# Patient Record
Sex: FEMALE | Race: WHITE | Employment: OTHER | URBAN - METROPOLITAN AREA
[De-identification: names, ages, dates, MRNs, and addresses within clinical notes are randomized per-mention and may not be internally consistent; named-entity substitution may affect disease eponyms.]

---

## 2019-04-12 ENCOUNTER — HOSPITAL ENCOUNTER (OUTPATIENT)
Dept: WOMENS IMAGING | Age: 64
Discharge: HOME | End: 2019-04-12
Payer: MEDICARE

## 2019-04-12 DIAGNOSIS — Z12.31 VISIT FOR SCREENING MAMMOGRAM: ICD-10-CM

## 2019-04-12 PROCEDURE — 77067 SCR MAMMO BI INCL CAD: CPT

## 2019-10-30 LAB — DEXA SCAN, EXTERNAL: NORMAL

## 2021-06-14 ENCOUNTER — APPOINTMENT (OUTPATIENT)
Dept: ULTRASOUND IMAGING | Age: 66
DRG: 308 | End: 2021-06-14
Payer: MEDICARE

## 2021-06-14 ENCOUNTER — HOSPITAL ENCOUNTER (INPATIENT)
Age: 66
LOS: 3 days | Discharge: HOME | DRG: 308 | End: 2021-06-17
Attending: EMERGENCY MEDICINE | Admitting: HOSPITALIST
Payer: MEDICARE

## 2021-06-14 ENCOUNTER — APPOINTMENT (OUTPATIENT)
Dept: GENERAL RADIOLOGY | Age: 66
DRG: 308 | End: 2021-06-14
Payer: MEDICARE

## 2021-06-14 ENCOUNTER — APPOINTMENT (OUTPATIENT)
Dept: CT IMAGING | Age: 66
DRG: 308 | End: 2021-06-14
Payer: MEDICARE

## 2021-06-14 DIAGNOSIS — K85.90 ACUTE PANCREATITIS WITHOUT INFECTION OR NECROSIS, UNSPECIFIED PANCREATITIS TYPE: Primary | ICD-10-CM

## 2021-06-14 DIAGNOSIS — I48.91 NEW ONSET ATRIAL FIBRILLATION (HCC): ICD-10-CM

## 2021-06-14 DIAGNOSIS — R65.10 SIRS (SYSTEMIC INFLAMMATORY RESPONSE SYNDROME) (HCC): ICD-10-CM

## 2021-06-14 DIAGNOSIS — I48.91 ATRIAL FIBRILLATION WITH RVR (HCC): ICD-10-CM

## 2021-06-14 PROBLEM — K85.00 IDIOPATHIC ACUTE PANCREATITIS WITHOUT INFECTION OR NECROSIS: Status: ACTIVE | Noted: 2021-06-14

## 2021-06-14 LAB
A/G RATIO: 1.3 (ref 1.1–2.2)
ALBUMIN SERPL-MCNC: 4 G/DL (ref 3.4–5)
ALP BLD-CCNC: 72 U/L (ref 40–129)
ALT SERPL-CCNC: 70 U/L (ref 10–40)
ANION GAP SERPL CALCULATED.3IONS-SCNC: 12 MMOL/L (ref 3–16)
AST SERPL-CCNC: 55 U/L (ref 15–37)
BASOPHILS ABSOLUTE: 0 K/UL (ref 0–0.2)
BASOPHILS RELATIVE PERCENT: 0.4 %
BILIRUB SERPL-MCNC: 1 MG/DL (ref 0–1)
BILIRUBIN URINE: NEGATIVE
BLOOD, URINE: NEGATIVE
BUN BLDV-MCNC: 16 MG/DL (ref 7–20)
C-REACTIVE PROTEIN: 61.3 MG/L (ref 0–5.1)
CALCIUM SERPL-MCNC: 9 MG/DL (ref 8.3–10.6)
CHLORIDE BLD-SCNC: 102 MMOL/L (ref 99–110)
CHOLESTEROL, TOTAL: 109 MG/DL (ref 0–199)
CLARITY: CLEAR
CO2: 24 MMOL/L (ref 21–32)
COLOR: YELLOW
CREAT SERPL-MCNC: 1 MG/DL (ref 0.6–1.2)
D DIMER: 735 NG/ML DDU (ref 0–229)
EOSINOPHILS ABSOLUTE: 0.2 K/UL (ref 0–0.6)
EOSINOPHILS RELATIVE PERCENT: 2.3 %
FERRITIN: 138.5 NG/ML (ref 15–150)
GFR AFRICAN AMERICAN: >60
GFR NON-AFRICAN AMERICAN: 55
GLOBULIN: 3.2 G/DL
GLUCOSE BLD-MCNC: 118 MG/DL (ref 70–99)
GLUCOSE BLD-MCNC: 138 MG/DL (ref 70–99)
GLUCOSE URINE: >=1000 MG/DL
HCT VFR BLD CALC: 38.9 % (ref 36–48)
HDLC SERPL-MCNC: 31 MG/DL (ref 40–60)
HEMOGLOBIN: 13.3 G/DL (ref 12–16)
KETONES, URINE: NEGATIVE MG/DL
LACTATE DEHYDROGENASE: 216 U/L (ref 100–190)
LACTIC ACID: 0.9 MMOL/L (ref 0.4–2)
LACTIC ACID: 1.4 MMOL/L (ref 0.4–2)
LDL CHOLESTEROL CALCULATED: 45 MG/DL
LEUKOCYTE ESTERASE, URINE: NEGATIVE
LIPASE: 1299 U/L (ref 13–60)
LYMPHOCYTES ABSOLUTE: 1.6 K/UL (ref 1–5.1)
LYMPHOCYTES RELATIVE PERCENT: 15.9 %
MCH RBC QN AUTO: 30.2 PG (ref 26–34)
MCHC RBC AUTO-ENTMCNC: 34.2 G/DL (ref 31–36)
MCV RBC AUTO: 88.2 FL (ref 80–100)
MICROSCOPIC EXAMINATION: ABNORMAL
MONOCYTES ABSOLUTE: 0.8 K/UL (ref 0–1.3)
MONOCYTES RELATIVE PERCENT: 7.6 %
NEUTROPHILS ABSOLUTE: 7.5 K/UL (ref 1.7–7.7)
NEUTROPHILS RELATIVE PERCENT: 73.8 %
NITRITE, URINE: NEGATIVE
PDW BLD-RTO: 14.4 % (ref 12.4–15.4)
PERFORMED ON: ABNORMAL
PH UA: 8 (ref 5–8)
PLATELET # BLD: 266 K/UL (ref 135–450)
PMV BLD AUTO: 7.8 FL (ref 5–10.5)
POTASSIUM REFLEX MAGNESIUM: 3.8 MMOL/L (ref 3.5–5.1)
PRO-BNP: 821 PG/ML (ref 0–124)
PROCALCITONIN: 0.46 NG/ML (ref 0–0.15)
PROTEIN UA: NEGATIVE MG/DL
RBC # BLD: 4.41 M/UL (ref 4–5.2)
SARS-COV-2, NAAT: NOT DETECTED
SODIUM BLD-SCNC: 138 MMOL/L (ref 136–145)
SPECIFIC GRAVITY UA: 1.02 (ref 1–1.03)
TOTAL PROTEIN: 7.2 G/DL (ref 6.4–8.2)
TRIGL SERPL-MCNC: 163 MG/DL (ref 0–150)
TROPONIN: <0.01 NG/ML
TROPONIN: <0.01 NG/ML
TSH REFLEX FT4: 5.51 UIU/ML (ref 0.27–4.2)
URINE REFLEX TO CULTURE: ABNORMAL
URINE TYPE: ABNORMAL
UROBILINOGEN, URINE: 2 E.U./DL
VLDLC SERPL CALC-MCNC: 33 MG/DL
WBC # BLD: 10.2 K/UL (ref 4–11)

## 2021-06-14 PROCEDURE — 83605 ASSAY OF LACTIC ACID: CPT

## 2021-06-14 PROCEDURE — 93005 ELECTROCARDIOGRAM TRACING: CPT | Performed by: EMERGENCY MEDICINE

## 2021-06-14 PROCEDURE — 83690 ASSAY OF LIPASE: CPT

## 2021-06-14 PROCEDURE — 81003 URINALYSIS AUTO W/O SCOPE: CPT

## 2021-06-14 PROCEDURE — 87040 BLOOD CULTURE FOR BACTERIA: CPT

## 2021-06-14 PROCEDURE — 96367 TX/PROPH/DG ADDL SEQ IV INF: CPT

## 2021-06-14 PROCEDURE — 36415 COLL VENOUS BLD VENIPUNCTURE: CPT

## 2021-06-14 PROCEDURE — 84439 ASSAY OF FREE THYROXINE: CPT

## 2021-06-14 PROCEDURE — 71045 X-RAY EXAM CHEST 1 VIEW: CPT

## 2021-06-14 PROCEDURE — 85379 FIBRIN DEGRADATION QUANT: CPT

## 2021-06-14 PROCEDURE — 87635 SARS-COV-2 COVID-19 AMP PRB: CPT

## 2021-06-14 PROCEDURE — 80053 COMPREHEN METABOLIC PANEL: CPT

## 2021-06-14 PROCEDURE — 2060000000 HC ICU INTERMEDIATE R&B

## 2021-06-14 PROCEDURE — 99285 EMERGENCY DEPT VISIT HI MDM: CPT

## 2021-06-14 PROCEDURE — 2580000003 HC RX 258: Performed by: NURSE PRACTITIONER

## 2021-06-14 PROCEDURE — 6360000002 HC RX W HCPCS: Performed by: NURSE PRACTITIONER

## 2021-06-14 PROCEDURE — 86140 C-REACTIVE PROTEIN: CPT

## 2021-06-14 PROCEDURE — 6370000000 HC RX 637 (ALT 250 FOR IP): Performed by: NURSE PRACTITIONER

## 2021-06-14 PROCEDURE — 84443 ASSAY THYROID STIM HORMONE: CPT

## 2021-06-14 PROCEDURE — 85025 COMPLETE CBC W/AUTO DIFF WBC: CPT

## 2021-06-14 PROCEDURE — 84145 PROCALCITONIN (PCT): CPT

## 2021-06-14 PROCEDURE — 83880 ASSAY OF NATRIURETIC PEPTIDE: CPT

## 2021-06-14 PROCEDURE — 96375 TX/PRO/DX INJ NEW DRUG ADDON: CPT

## 2021-06-14 PROCEDURE — 2500000003 HC RX 250 WO HCPCS: Performed by: NURSE PRACTITIONER

## 2021-06-14 PROCEDURE — 74177 CT ABD & PELVIS W/CONTRAST: CPT

## 2021-06-14 PROCEDURE — 83036 HEMOGLOBIN GLYCOSYLATED A1C: CPT

## 2021-06-14 PROCEDURE — 80061 LIPID PANEL: CPT

## 2021-06-14 PROCEDURE — 96365 THER/PROPH/DIAG IV INF INIT: CPT

## 2021-06-14 PROCEDURE — 84484 ASSAY OF TROPONIN QUANT: CPT

## 2021-06-14 PROCEDURE — 76705 ECHO EXAM OF ABDOMEN: CPT

## 2021-06-14 PROCEDURE — 82728 ASSAY OF FERRITIN: CPT

## 2021-06-14 PROCEDURE — 6360000004 HC RX CONTRAST MEDICATION

## 2021-06-14 PROCEDURE — 83615 LACTATE (LD) (LDH) ENZYME: CPT

## 2021-06-14 RX ORDER — METOPROLOL TARTRATE 50 MG/1
100 TABLET, FILM COATED ORAL 2 TIMES DAILY
Status: ON HOLD | COMMUNITY
End: 2021-06-17 | Stop reason: HOSPADM

## 2021-06-14 RX ORDER — DEXTROSE MONOHYDRATE 25 G/50ML
12.5 INJECTION, SOLUTION INTRAVENOUS PRN
Status: DISCONTINUED | OUTPATIENT
Start: 2021-06-14 | End: 2021-06-17 | Stop reason: HOSPADM

## 2021-06-14 RX ORDER — ALBUTEROL SULFATE 90 UG/1
2 AEROSOL, METERED RESPIRATORY (INHALATION) EVERY 4 HOURS PRN
Status: DISCONTINUED | OUTPATIENT
Start: 2021-06-14 | End: 2021-06-14 | Stop reason: CLARIF

## 2021-06-14 RX ORDER — SODIUM CHLORIDE 0.9 % (FLUSH) 0.9 %
5-40 SYRINGE (ML) INJECTION PRN
Status: DISCONTINUED | OUTPATIENT
Start: 2021-06-14 | End: 2021-06-17 | Stop reason: HOSPADM

## 2021-06-14 RX ORDER — ONDANSETRON 2 MG/ML
4 INJECTION INTRAMUSCULAR; INTRAVENOUS EVERY 6 HOURS PRN
Status: DISCONTINUED | OUTPATIENT
Start: 2021-06-14 | End: 2021-06-17 | Stop reason: HOSPADM

## 2021-06-14 RX ORDER — CETIRIZINE HYDROCHLORIDE 10 MG/1
10 TABLET ORAL DAILY
Status: DISCONTINUED | OUTPATIENT
Start: 2021-06-15 | End: 2021-06-17 | Stop reason: HOSPADM

## 2021-06-14 RX ORDER — NICOTINE POLACRILEX 4 MG
15 LOZENGE BUCCAL PRN
Status: DISCONTINUED | OUTPATIENT
Start: 2021-06-14 | End: 2021-06-17 | Stop reason: HOSPADM

## 2021-06-14 RX ORDER — SODIUM CHLORIDE 9 MG/ML
INJECTION, SOLUTION INTRAVENOUS CONTINUOUS
Status: DISCONTINUED | OUTPATIENT
Start: 2021-06-14 | End: 2021-06-17

## 2021-06-14 RX ORDER — ALBUTEROL SULFATE 90 UG/1
2 AEROSOL, METERED RESPIRATORY (INHALATION) EVERY 4 HOURS PRN
COMMUNITY

## 2021-06-14 RX ORDER — ACETAMINOPHEN 500 MG
1000 TABLET ORAL ONCE
Status: COMPLETED | OUTPATIENT
Start: 2021-06-14 | End: 2021-06-14

## 2021-06-14 RX ORDER — MELOXICAM 15 MG/1
15 TABLET ORAL DAILY
Status: ON HOLD | COMMUNITY
End: 2022-04-02

## 2021-06-14 RX ORDER — METHOCARBAMOL 500 MG/1
500 TABLET, FILM COATED ORAL 3 TIMES DAILY PRN
COMMUNITY

## 2021-06-14 RX ORDER — ASPIRIN 81 MG/1
81 TABLET, CHEWABLE ORAL DAILY
Status: DISCONTINUED | OUTPATIENT
Start: 2021-06-15 | End: 2021-06-17 | Stop reason: HOSPADM

## 2021-06-14 RX ORDER — LANOLIN ALCOHOL/MO/W.PET/CERES
3-6 CREAM (GRAM) TOPICAL NIGHTLY PRN
COMMUNITY

## 2021-06-14 RX ORDER — SODIUM CHLORIDE 9 MG/ML
25 INJECTION, SOLUTION INTRAVENOUS PRN
Status: DISCONTINUED | OUTPATIENT
Start: 2021-06-14 | End: 2021-06-17 | Stop reason: HOSPADM

## 2021-06-14 RX ORDER — INSULIN GLARGINE 100 [IU]/ML
72 INJECTION, SOLUTION SUBCUTANEOUS NIGHTLY
COMMUNITY

## 2021-06-14 RX ORDER — FENOFIBRATE 160 MG/1
160 TABLET ORAL DAILY
Status: ON HOLD | COMMUNITY
End: 2022-04-06

## 2021-06-14 RX ORDER — ACETAMINOPHEN 325 MG/1
650 TABLET ORAL EVERY 6 HOURS PRN
Status: DISCONTINUED | OUTPATIENT
Start: 2021-06-14 | End: 2021-06-17 | Stop reason: HOSPADM

## 2021-06-14 RX ORDER — ACETAMINOPHEN 650 MG/1
650 SUPPOSITORY RECTAL EVERY 6 HOURS PRN
Status: DISCONTINUED | OUTPATIENT
Start: 2021-06-14 | End: 2021-06-17 | Stop reason: HOSPADM

## 2021-06-14 RX ORDER — ONDANSETRON 4 MG/1
4 TABLET, ORALLY DISINTEGRATING ORAL EVERY 8 HOURS PRN
Status: DISCONTINUED | OUTPATIENT
Start: 2021-06-14 | End: 2021-06-17 | Stop reason: HOSPADM

## 2021-06-14 RX ORDER — MORPHINE SULFATE 4 MG/ML
4 INJECTION, SOLUTION INTRAMUSCULAR; INTRAVENOUS EVERY 4 HOURS PRN
Status: DISCONTINUED | OUTPATIENT
Start: 2021-06-14 | End: 2021-06-17 | Stop reason: HOSPADM

## 2021-06-14 RX ORDER — PIOGLITAZONEHYDROCHLORIDE 30 MG/1
30 TABLET ORAL DAILY
COMMUNITY

## 2021-06-14 RX ORDER — ONDANSETRON 2 MG/ML
4 INJECTION INTRAMUSCULAR; INTRAVENOUS ONCE
Status: COMPLETED | OUTPATIENT
Start: 2021-06-14 | End: 2021-06-14

## 2021-06-14 RX ORDER — 0.9 % SODIUM CHLORIDE 0.9 %
30 INTRAVENOUS SOLUTION INTRAVENOUS ONCE
Status: COMPLETED | OUTPATIENT
Start: 2021-06-14 | End: 2021-06-14

## 2021-06-14 RX ORDER — LORATADINE 10 MG/1
10 TABLET ORAL DAILY
COMMUNITY

## 2021-06-14 RX ORDER — MORPHINE SULFATE 4 MG/ML
4 INJECTION, SOLUTION INTRAMUSCULAR; INTRAVENOUS EVERY 4 HOURS PRN
Status: DISCONTINUED | OUTPATIENT
Start: 2021-06-14 | End: 2021-06-14

## 2021-06-14 RX ORDER — ALBUTEROL SULFATE 2.5 MG/3ML
2.5 SOLUTION RESPIRATORY (INHALATION) EVERY 4 HOURS PRN
Status: DISCONTINUED | OUTPATIENT
Start: 2021-06-14 | End: 2021-06-17 | Stop reason: HOSPADM

## 2021-06-14 RX ORDER — CITALOPRAM 20 MG/1
40 TABLET ORAL DAILY
Status: DISCONTINUED | OUTPATIENT
Start: 2021-06-15 | End: 2021-06-17 | Stop reason: HOSPADM

## 2021-06-14 RX ORDER — MORPHINE SULFATE 2 MG/ML
2 INJECTION, SOLUTION INTRAMUSCULAR; INTRAVENOUS EVERY 4 HOURS PRN
Status: DISCONTINUED | OUTPATIENT
Start: 2021-06-14 | End: 2021-06-17 | Stop reason: HOSPADM

## 2021-06-14 RX ORDER — EMPAGLIFLOZIN 25 MG/1
25 TABLET, FILM COATED ORAL DAILY
COMMUNITY

## 2021-06-14 RX ORDER — DILTIAZEM HYDROCHLORIDE 5 MG/ML
10 INJECTION INTRAVENOUS ONCE
Status: COMPLETED | OUTPATIENT
Start: 2021-06-14 | End: 2021-06-14

## 2021-06-14 RX ORDER — MORPHINE SULFATE 4 MG/ML
4 INJECTION, SOLUTION INTRAMUSCULAR; INTRAVENOUS ONCE
Status: COMPLETED | OUTPATIENT
Start: 2021-06-14 | End: 2021-06-14

## 2021-06-14 RX ORDER — SODIUM CHLORIDE 0.9 % (FLUSH) 0.9 %
5-40 SYRINGE (ML) INJECTION EVERY 12 HOURS SCHEDULED
Status: DISCONTINUED | OUTPATIENT
Start: 2021-06-14 | End: 2021-06-17 | Stop reason: HOSPADM

## 2021-06-14 RX ORDER — CITALOPRAM 40 MG/1
40 TABLET ORAL DAILY
COMMUNITY

## 2021-06-14 RX ORDER — INSULIN GLARGINE 100 [IU]/ML
72 INJECTION, SOLUTION SUBCUTANEOUS NIGHTLY
Status: DISCONTINUED | OUTPATIENT
Start: 2021-06-14 | End: 2021-06-16

## 2021-06-14 RX ORDER — LISINOPRIL AND HYDROCHLOROTHIAZIDE 20; 12.5 MG/1; MG/1
1 TABLET ORAL 2 TIMES DAILY
COMMUNITY

## 2021-06-14 RX ORDER — DEXTROSE MONOHYDRATE 50 MG/ML
100 INJECTION, SOLUTION INTRAVENOUS PRN
Status: DISCONTINUED | OUTPATIENT
Start: 2021-06-14 | End: 2021-06-17 | Stop reason: HOSPADM

## 2021-06-14 RX ORDER — POLYETHYLENE GLYCOL 3350 17 G/17G
17 POWDER, FOR SOLUTION ORAL DAILY PRN
Status: DISCONTINUED | OUTPATIENT
Start: 2021-06-14 | End: 2021-06-17 | Stop reason: HOSPADM

## 2021-06-14 RX ORDER — MORPHINE SULFATE 2 MG/ML
2 INJECTION, SOLUTION INTRAMUSCULAR; INTRAVENOUS EVERY 4 HOURS PRN
Status: DISCONTINUED | OUTPATIENT
Start: 2021-06-14 | End: 2021-06-14

## 2021-06-14 RX ADMIN — ACETAMINOPHEN 1000 MG: 500 TABLET ORAL at 15:59

## 2021-06-14 RX ADMIN — MORPHINE SULFATE 4 MG: 4 INJECTION, SOLUTION INTRAMUSCULAR; INTRAVENOUS at 16:01

## 2021-06-14 RX ADMIN — ONDANSETRON 4 MG: 2 INJECTION INTRAMUSCULAR; INTRAVENOUS at 16:03

## 2021-06-14 RX ADMIN — SODIUM CHLORIDE 2326 ML: 9 INJECTION, SOLUTION INTRAVENOUS at 16:07

## 2021-06-14 RX ADMIN — VANCOMYCIN HYDROCHLORIDE 1000 MG: 1 INJECTION, POWDER, LYOPHILIZED, FOR SOLUTION INTRAVENOUS at 18:01

## 2021-06-14 RX ADMIN — PIPERACILLIN AND TAZOBACTAM 4500 MG: 4; .5 INJECTION, POWDER, FOR SOLUTION INTRAVENOUS at 16:29

## 2021-06-14 RX ADMIN — DILTIAZEM HYDROCHLORIDE 10 MG: 5 INJECTION INTRAVENOUS at 18:08

## 2021-06-14 RX ADMIN — HYDROMORPHONE HYDROCHLORIDE 1 MG: 1 INJECTION, SOLUTION INTRAMUSCULAR; INTRAVENOUS; SUBCUTANEOUS at 18:08

## 2021-06-14 RX ADMIN — IOPAMIDOL 75 ML: 755 INJECTION, SOLUTION INTRAVENOUS at 16:53

## 2021-06-14 RX ADMIN — DILTIAZEM HYDROCHLORIDE 5 MG/HR: 5 INJECTION INTRAVENOUS at 18:38

## 2021-06-14 ASSESSMENT — PAIN SCALES - GENERAL
PAINLEVEL_OUTOF10: 10
PAINLEVEL_OUTOF10: 9
PAINLEVEL_OUTOF10: 0
PAINLEVEL_OUTOF10: 7
PAINLEVEL_OUTOF10: 7
PAINLEVEL_OUTOF10: 6
PAINLEVEL_OUTOF10: 0
PAINLEVEL_OUTOF10: 7
PAINLEVEL_OUTOF10: 0

## 2021-06-14 ASSESSMENT — PAIN DESCRIPTION - LOCATION
LOCATION: ABDOMEN

## 2021-06-14 ASSESSMENT — ENCOUNTER SYMPTOMS
SHORTNESS OF BREATH: 0
COLOR CHANGE: 0
CONSTIPATION: 0
VOMITING: 1
NAUSEA: 1
BLOOD IN STOOL: 0
ABDOMINAL PAIN: 1
COUGH: 0
ABDOMINAL DISTENTION: 0
BACK PAIN: 0
DIARRHEA: 0

## 2021-06-14 ASSESSMENT — PAIN DESCRIPTION - DESCRIPTORS
DESCRIPTORS: SHARP

## 2021-06-14 ASSESSMENT — PAIN - FUNCTIONAL ASSESSMENT: PAIN_FUNCTIONAL_ASSESSMENT: ACTIVITIES ARE NOT PREVENTED

## 2021-06-14 ASSESSMENT — PAIN DESCRIPTION - PROGRESSION: CLINICAL_PROGRESSION: NOT CHANGED

## 2021-06-14 ASSESSMENT — PAIN DESCRIPTION - PAIN TYPE: TYPE: ACUTE PAIN

## 2021-06-14 NOTE — H&P
Historical Provider, MD   citalopram (CELEXA) 40 MG tablet Take 40 mg by mouth daily   Yes Historical Provider, MD   fenofibrate (TRIGLIDE) 160 MG tablet Take 160 mg by mouth daily   Yes Historical Provider, MD   meloxicam (MOBIC) 15 MG tablet Take 15 mg by mouth daily   Yes Historical Provider, MD   pioglitazone (ACTOS) 30 MG tablet Take 30 mg by mouth daily   Yes Historical Provider, MD   insulin aspart (NOVOLOG) 100 UNIT/ML injection vial Inject 25 Units into the skin 3 times daily (before meals)   Yes Historical Provider, MD   terconazole (TERAZOL 3) 0.8 % vaginal cream Place 1 applicator vaginally nightly Place vaginally nightly. Yes Historical Provider, MD   empagliflozin (JARDIANCE) 25 MG tablet Take 25 mg by mouth daily   Yes Historical Provider, MD   methocarbamol (ROBAXIN) 500 MG tablet Take 500 mg by mouth 3 times daily as needed   Yes Historical Provider, MD   loratadine (CLARITIN) 10 MG tablet Take 10 mg by mouth daily   Yes Historical Provider, MD   lisinopril-hydroCHLOROthiazide (ZESTORETIC) 20-12.5 MG per tablet Take 1 tablet by mouth 2 times daily   Yes Historical Provider, MD   metoprolol tartrate (LOPRESSOR) 50 MG tablet Take 100 mg by mouth 2 times daily   Yes Historical Provider, MD   mirabegron (MYRBETRIQ) 25 MG TB24 Take 25 mg by mouth daily   Yes Historical Provider, MD   insulin glargine (LANTUS) 100 UNIT/ML injection vial Inject 72 Units into the skin nightly   Yes Historical Provider, MD   aspirin 81 MG tablet Take 81 mg by mouth daily   Yes Historical Provider, MD       Allergies:  Hydrocodone    Social History:         TOBACCO:   reports that she has never smoked. She does not have any smokeless tobacco history on file. ETOH:   reports no history of alcohol use. Family History:      Denies premature CAD    REVIEW OF SYSTEMS:   Pertinent positives as noted in the HPI. All other systems reviewed and negative.     PHYSICAL EXAM PERFORMED:    /84   Pulse 110   Temp 101.6 °F (38.7 °C) (Rectal)   Resp 22   Ht 4' 11\" (1.499 m)   Wt 171 lb 1.2 oz (77.6 kg)   SpO2 94%   BMI 34.55 kg/m²     General appearance:  No apparent distress, appears stated age and cooperative. HEENT:  Normal cephalic, atraumatic without obvious deformity. Pupils equal, round, and reactive to light. Extra ocular muscles intact. Conjunctivae/corneas clear. Neck: Supple, with full range of motion. No jugular venous distention. Trachea midline. Respiratory:  Normal respiratory effort. No use of accessory muscles, no intercostal retractions  Cardiovascular:  Irregularly irreg rhythm, tachy rate  Abdomen: Soft, (+) epigastric tenderness, no guarding, obese abd  Musculoskeletal:  No clubbing, cyanosis or edema bilaterally. No calf tenderness  Skin: Skin color, texture, turgor normal.     Neurologic:    grossly non-focal.  Psychiatric:  Alert and oriented, thought content appropriate, normal insight         Labs:     Recent Labs     06/14/21  1526   WBC 10.2   HGB 13.3   HCT 38.9        Recent Labs     06/14/21  1526      K 3.8      CO2 24   BUN 16   CREATININE 1.0   CALCIUM 9.0     Recent Labs     06/14/21  1526   AST 55*   ALT 70*   BILITOT 1.0   ALKPHOS 72     No results for input(s): INR in the last 72 hours. Recent Labs     06/14/21  1526   TROPONINI <0.01       Urinalysis:      Lab Results   Component Value Date    NITRU Negative 06/14/2021    WBCUA 6-10 06/16/2017    BACTERIA 2+ 04/27/2015    RBCUA 3-5 06/16/2017    BLOODU Negative 06/14/2021    SPECGRAV 1.021 06/14/2021    GLUCOSEU >=1000 06/14/2021       Radiology:          CT ABDOMEN PELVIS W IV CONTRAST Additional Contrast? None   Preliminary Result   1. No acute process within the abdomen or pelvis. 2. No acute bowel inflammation or obstruction. 3. Stable bilateral renal cysts. 4. Patient status post hysterectomy. XR CHEST PORTABLE   Final Result   No acute cardiopulmonary disease.          US ABDOMEN LIMITED Specify organ? LIVER, GALLBLADDER, PANCREAS    (Results Pending)       ASSESSMENT:    Active Hospital Problems    Diagnosis Date Noted    Idiopathic acute pancreatitis without infection or necrosis [K85.00] 06/14/2021         PLAN:    1) Acute pancreatitis  - IVFs  - NPO  - RUQ usn  - IV prn pain control  - check triglycerides    2) New Onset Atrial fib  - CAD history, trend trop, Card consult, cardizem gtts    3) Acute Hypoxemic resp failure  - r/o covid  - check d dimer    4) Sepsis  - elev pct,  - follow up blood cultures, RUQ usn  - started on vanc/zosyn    DVT Prophylaxis: lovenox  Diet: Diet NPO Effective Now  Code Status: Prior         Manuela Osborn MD    Thank you Orlando Santana MD for the opportunity to be involved in this patient's care. If you have any questions or concerns please feel free to contact me at [phone].

## 2021-06-14 NOTE — CONSULTS
Clinical Pharmacy Note  Vancomycin Consult    Pharmacy consult received for one-time dose of vancomycin in the Emergency Department per valentin almanzar. Ht Readings from Last 1 Encounters:   06/14/21 4' 11\" (1.499 m)        Wt Readings from Last 1 Encounters:   06/14/21 171 lb 1.2 oz (77.6 kg)         Assessment/Plan:   Vancomycin 1000 mg x 1 in ED.  If Vancomycin is to continue on admission and pharmacy is to manage dosing, please re-consult with admission orders.

## 2021-06-14 NOTE — LETTER
[facility] [address] [address]  Phone: [phone]             June 17, 2021    Patient: Linda Beth   YOB: 1955   Date of Visit: 6/14/2021       To Whom It May Concern:    Julio Vyas was seen and treated in our facility  beginning 6/14/2021 until 6/17/2021 . She may return to work on Monday, 6/21/2021.       Sincerely,       Lexa Omalley RN         Signature:__________________________________

## 2021-06-14 NOTE — ED NOTES
RN received bedside report from oncoming RN. Patient is on cardiac and SPO2 monitor with granddaughter at bedside. Patient voices no concerns at this time.         Jair Greene RN  06/14/21 1916

## 2021-06-14 NOTE — ED PROVIDER NOTES
dizziness, syncope, weakness and headaches. Hematological: Negative for adenopathy. Psychiatric/Behavioral: Negative for confusion. All other systems reviewed and are negative. Positives and Pertinent negatives as per HPI. Except as noted above in the ROS, all other systems were reviewed and negative.        PAST MEDICAL HISTORY     Past Medical History:   Diagnosis Date    CAD (coronary artery disease)     Depression     Diabetes mellitus ([facility] [address]     Hyperlipidemia     Hypertension          SURGICAL HISTORY     Past Surgical History:   Procedure Laterality Date    HYSTERECTOMY           CURRENTMEDICATIONS       Previous Medications    ALBUTEROL SULFATE HFA (VENTOLIN HFA) 108 (90 BASE) MCG/ACT INHALER    Inhale 2 puffs into the lungs every 4 hours as needed for Wheezing or Shortness of Breath    ASPIRIN 81 MG TABLET    Take 81 mg by mouth daily    CITALOPRAM (CELEXA) 40 MG TABLET    Take 40 mg by mouth daily    DICLOFENAC SODIUM (VOLTAREN) 1 % GEL    Apply 2 g topically 4 times daily as needed for Pain    EMPAGLIFLOZIN (JARDIANCE) 25 MG TABLET    Take 25 mg by mouth daily    FENOFIBRATE (TRIGLIDE) 160 MG TABLET    Take 160 mg by mouth daily    INSULIN ASPART (NOVOLOG) 100 UNIT/ML INJECTION VIAL    Inject 25 Units into the skin 3 times daily (before meals)    INSULIN GLARGINE (LANTUS) 100 UNIT/ML INJECTION VIAL    Inject 72 Units into the skin nightly    LISINOPRIL-HYDROCHLOROTHIAZIDE (ZESTORETIC) 20-12.5 MG PER TABLET    Take 1 tablet by mouth 2 times daily    LORATADINE (CLARITIN) 10 MG TABLET    Take 10 mg by mouth daily    MELATONIN 3 MG TABS TABLET    Take 3-6 mg by mouth nightly as needed (insomnia)    MELOXICAM (MOBIC) 15 MG TABLET    Take 15 mg by mouth daily    METHOCARBAMOL (ROBAXIN) 500 MG TABLET    Take 500 mg by mouth 3 times daily as needed    METOPROLOL TARTRATE (LOPRESSOR) 50 MG TABLET    Take 100 mg by mouth 2 times daily    MIRABEGRON (MYRBETRIQ) 25 MG TB24    Take 25 mg by mouth daily PIOGLITAZONE (ACTOS) 30 MG TABLET    Take 30 mg by mouth daily    TERCONAZOLE (TERAZOL 3) 0.8 % VAGINAL CREAM    Place 1 applicator vaginally nightly Place vaginally nightly. VITAMIN D (CHOLECALCIFEROL) 25 MCG (1000 UT) TABS TABLET    Take 1,000 Units by mouth daily         ALLERGIES     Hydrocodone    FAMILYHISTORY     History reviewed. No pertinent family history. SOCIAL HISTORY       Social History     Tobacco Use    Smoking status: Never Smoker   Substance Use Topics    Alcohol use: No    Drug use: No       SCREENINGS    Greeley Coma Scale  Eye Opening: Spontaneous  Best Verbal Response: Oriented  Best Motor Response: Obeys commands  Lauren Coma Scale Score: 15        PHYSICAL EXAM    (up to 7 for level 4, 8 or more for level 5)     ED Triage Vitals [06/14/21 1523]   BP Temp Temp Source Pulse Resp SpO2 Height Weight   (!) 160/92 98.3 °F (36.8 °C) Temporal 153 20 95 % 4' 11\" (1.499 m) 171 lb 1.2 oz (77.6 kg)       Physical Exam  Vitals and nursing note reviewed. Constitutional:       General: She is in acute distress (pain). Appearance: Normal appearance. She is well-developed. She is not toxic-appearing. HENT:      Head: Normocephalic and atraumatic. Eyes:      General: No scleral icterus. Conjunctiva/sclera: Conjunctivae normal.   Neck:      Vascular: No JVD. Cardiovascular:      Rate and Rhythm: Tachycardia present. Rhythm irregularly irregular. Heart sounds: Normal heart sounds. Pulmonary:      Effort: Pulmonary effort is normal. No respiratory distress. Breath sounds: Normal breath sounds. Abdominal:      General: Bowel sounds are normal. There is no distension. Palpations: Abdomen is soft. Abdomen is not rigid. Tenderness: There is generalized abdominal tenderness. There is guarding. There is no rebound. Musculoskeletal:         General: Normal range of motion. Cervical back: Normal range of motion and neck supple.    Skin:     General: Skin is warm and dry. Capillary Refill: Capillary refill takes less than 2 seconds. Findings: No rash. Neurological:      General: No focal deficit present. Mental Status: She is alert and oriented to person, place, and time.    Psychiatric:         Mood and Affect: Mood normal.         DIAGNOSTIC RESULTS   LABS:    Labs Reviewed   COMPREHENSIVE METABOLIC PANEL W/ REFLEX TO MG FOR LOW K - Abnormal; Notable for the following components:       Result Value    Glucose 138 (*)     GFR Non- 55 (*)     ALT 70 (*)     AST 55 (*)     All other components within normal limits    Narrative:     Performed at:  [address] [facility] [employer]   Phone [phone]   LIPASE - Abnormal; Notable for the following components:    Lipase 1,299.0 (*)     All other components within normal limits    Narrative:     Performed at:  [address] [facility] [employer]   Phone [phone]   URINE RT REFLEX TO CULTURE - Abnormal; Notable for the following components:    Glucose, Ur >=1000 (*)     Urobilinogen, Urine 2.0 (*)     All other components within normal limits    Narrative:     Performed at:  [address] [facility] [employer]   Phone [phone]   PROCALCITONIN - Abnormal; Notable for the following components:    Procalcitonin 0.46 (*)     All other components within normal limits    Narrative:     Performed at:  [address] [employer]   Phone [phone]   BRAIN NATRIURETIC PEPTIDE - Abnormal; Notable for the following components:    Pro- (*)     All other components within normal limits    Narrative:     Performed at:  [address] [facility] [employer]   Phone [phone]   CULTURE, BLOOD 1   CULTURE, BLOOD 2 COVID-19, RAPID   CBC WITH AUTO DIFFERENTIAL    Narrative:     Performed at:  [facility]  [address] [place] [facility] [address]   Phone [phone]   TROPONIN    Narrative:     Performed at:  [facility]  [address] [facility]   Phone [phone]   LACTIC ACID, PLASMA    Narrative:     Performed at:  [facility]  [address] [place] [facility] [address]   Phone [phone]   LACTIC ACID, PLASMA   LIPID PANEL   POCT GLUCOSE       All other labs were within normal range or not returned as of this dictation. EKG: All EKG's are interpreted by the Emergency Department Physician in the absence of a cardiologist.  Please see their note for interpretation of EKG. RADIOLOGY:   Non-plain film images such as CT, Ultrasound and MRI are read by the radiologist. Plain radiographic images are visualized and preliminarily interpreted by the ED Provider with the below findings:        Interpretation per the Radiologist below, if available at the time of this note:    CT ABDOMEN PELVIS W IV CONTRAST Additional Contrast? None   Preliminary Result   1. No acute process within the abdomen or pelvis. 2. No acute bowel inflammation or obstruction. 3. Stable bilateral renal cysts. 4. Patient status post hysterectomy. XR CHEST PORTABLE   Final Result   No acute cardiopulmonary disease. US ABDOMEN LIMITED Specify organ? LIVER, GALLBLADDER, PANCREAS    (Results Pending)     No results found. PROCEDURES   Unless otherwise noted below, none     Procedures    CRITICAL CARE TIME   CRITICAL CARE NOTE:  There was a high probability of clinically significant life-threatening deterioration of the patient's condition requiring my urgent intervention. Total critical care time was at least 40 minutes.     This includes vital sign monitoring, pulse oximetry monitoring, telemetry monitoring, clinical response to the IV medications, reviewing the nursing notes, consultation time, dictation/documentation time, and interpretation of the labwork. This excludes any separately billable procedures performed. CONSULTS:  IP CONSULT TO HOSPITALIST  IP CONSULT TO PHARMACY  IP CONSULT TO CARDIOLOGY      EMERGENCY DEPARTMENT COURSE and DIFFERENTIAL DIAGNOSIS/MDM:   Vitals:    Vitals:    06/14/21 1803 06/14/21 1832 06/14/21 1838 06/14/21 1902   BP: 123/84 (!) 131/92  129/66   Pulse: 110 114 105 84   Resp: 22 26 20 16   Temp:       TempSrc:       SpO2: 94% 94% 94% 93%   Weight:       Height:           Patient was given the following medications:  Medications   dilTIAZem 125 mg in dextrose 5 % 125 mL infusion (5 mg/hr Intravenous New Bag 6/14/21 1838)   0.9 % sodium chloride bolus (0 mL/kg × 77.6 kg Intravenous Stopped 6/14/21 1836)   acetaminophen (TYLENOL) tablet 1,000 mg (1,000 mg Oral Given 6/14/21 1559)   piperacillin-tazobactam (ZOSYN) 4,500 mg in dextrose 5 % 100 mL IVPB (mini-bag) (0 mg Intravenous Stopped 6/14/21 1705)   morphine (PF) injection 4 mg (4 mg Intravenous Given 6/14/21 1601)   ondansetron (ZOFRAN) injection 4 mg (4 mg Intravenous Given 6/14/21 1603)   vancomycin 1000 mg IVPB in 250 mL D5W addavial (1,000 mg Intravenous New Bag 6/14/21 1801)   iopamidol (ISOVUE-370) 76 % injection 75 mL (75 mLs Intravenous Given 6/14/21 1653)   HYDROmorphone (DILAUDID) injection 1 mg (1 mg Intravenous Given 6/14/21 1808)   dilTIAZem injection 10 mg (10 mg Intravenous Given 6/14/21 1808)           Differential diagnosis: Abdominal Aortic Aneurysm, Acute Coronary Syndrome, Ischemic Bowel, Bowel Obstruction (including Gastric Outlet Obstruction), PUD, GERD, Acute Cholecystitis, Pancreatitis, Hepatitis, Colitis, SMA Syndrome, Mesenteric Steal Syndrome, Splanchnic Vein Thrombosis, Appendicitis, Diverticulitis, Pyelonephritis, UTI, STD, Gonad Torsion, other     Patient presents with abdominal pain.   See HPI for presentation. Physical exam as above. 78year-old lying in bed uncomfortable in pain. Diffuse abdominal TTP. Abdomen soft without rigidity or peritoneal signs. Cardiac exam reveals new fib A. fib with RVR. Lipase 1300 with a normal CT. Urine shows no infection blood or ketones. CBC and chemistry unremarkable. Lactic acid normal.  Troponin negative. Emergency department course included sepsis fluids and pain and nausea medicine on arrival.  She met sepsis criteria on arrival so broad-spectrum antibiotics were ordered. She was ultimately given a diltiazem bolus and drip for the A. fib with RVR. She will be admitted for further work-up and treatment. She was given all test results and given an opportunity to ask and have any questions answered. She was agreeable to admission. The hospitalist was consulted and agreed to meet patient and write orders. The patient tolerated their visit well. They were seen and evaluated by the attending physician, Radha Rollins DO who agreed with the assessment and plan. The patient and / or the family were informed of the results of any tests, a time was given to answer questions, a plan was proposed and they agreed with plan. FINAL IMPRESSION      1. Acute pancreatitis without infection or necrosis, unspecified pancreatitis type    2. SIRS (systemic inflammatory response syndrome) (HCC)    3. New onset atrial fibrillation ([facility] [address]    4. Atrial fibrillation with RVR (HCC)          DISPOSITION/PLAN   DISPOSITION        PATIENT REFERRED TO:  No follow-up provider specified.     DISCHARGE MEDICATIONS:  New Prescriptions    No medications on file       DISCONTINUED MEDICATIONS:  Discontinued Medications    ATORVASTATIN (LIPITOR) 20 MG TABLET    Take 20 mg by mouth daily    CITALOPRAM (CELEXA) 20 MG TABLET    Take 20 mg by mouth daily    DOCUSATE (COLACE, DULCOLAX) 100 MG CAPS    Take 100 mg by mouth 2 times daily    GLYBURIDE (DIABETA) 5 MG TABLET Take 10 mg by mouth 2 times daily (with meals)    IMIPRAMINE (TOFRANIL) 25 MG TABLET    Take 25 mg by mouth nightly    INSULIN GLARGINE (LANTUS) 100 UNIT/ML INJECTION VIAL    Inject 65 Units into the skin 2 times daily    INSULIN LISPRO (HUMALOG) 100 UNIT/ML INJECTION VIAL    Inject 50 Units into the skin 2 times daily Sliding scale    INSULIN PEN NEEDLE 32G X 4 MM MISC    1 each by Does not apply route daily    LISINOPRIL-HYDROCHLOROTHIAZIDE (PRINZIDE;ZESTORETIC) 20-12.5 MG PER TABLET    Take 1 tablet by mouth daily    METOPROLOL (TOPROL-XL) 50 MG XL TABLET    Take 50 mg by mouth 2 times daily    NYSTATIN-TRIAMCINOLONE (MYCOLOG II) 120275-4.1 UNIT/GM-% CREAM    Apply topically 4 times daily.     PANTOPRAZOLE SODIUM (PROTONIX) 40 MG PACK PACKET    Take 40 mg by mouth daily              (Please note that portions of this note were completed with a voice recognition program.  Efforts were made to edit the dictations but occasionally words are mis-transcribed.)    SRIDHAR Ritter CNP (electronically signed)            SRIDHAR Ritter CNP  06/14/21 1911

## 2021-06-14 NOTE — ED PROVIDER NOTES
[address]      Pt Name: Grady Lee  MRN: [id number]  Jegf[place]oo 1955  Date of evaluation: 6/14/2021  Provider: Eryn Montesinos, [address] [place] [address]  Chief Complaint   Patient presents with    Abdominal Pain     started saturday night, had an episode of emesis; diabetic=bs this morning was 112       I have fully participated in the care of Grady Lee and have had a face-to-face evaluation. I have reviewed and agree with all pertinent clinical information, and midlevel provider's history, and physical exam. I have also reviewed the labs, EKG, and imaging studies and treatment plan. I have also reviewed and agree with the medications, allergies and past medical history section for this Grady Lee. I agree with the diagnosis, and I concur. I wore personal protective equipment when I was in the room the entire time. This includes gloves, N95 mask, face shield, and a glove over my stethoscope for protection. Past Medical History:   Diagnosis Date    CAD (coronary artery disease)     Depression     Diabetes mellitus ([facility] [address]     Hyperlipidemia     Hypertension        MDM:  Grady Lee is a 78 y.o. female who presents with Nausea vomiting abdominal pain that started Saturday night. She denies any fevers or chills. She denies any diarrhea. She denies any gastrointestinal bleeding. She denies any dysuria nocturia hematuria. Physical exam reveals tenderness in the epigastric area. There is no guarding rebound or rigidity. Heart is regular rate and rhythm without murmurs clicks or rubs. Remainder of exam is noncontributory. Laboratory tests revealed an elevated lipase at 1299. However, patient is keeping down fluids. Her blood sugar is 138. White count was normal.  However, her EKG showed atrial fibrillation with RVR. So the combination of these things indicated that patient need to be admitted.   This was new onset atrial fibrillation. Therefore she was admitted hospitalist was notified.     Vitals:    06/17/21 1155   BP: (!) 165/74   Pulse: 69   Resp: 16   Temp: 97.9 °F (36.6 °C)   SpO2: 91%       Lab results  Labs Reviewed   COMPREHENSIVE METABOLIC PANEL W/ REFLEX TO MG FOR LOW K - Abnormal; Notable for the following components:       Result Value    Glucose 138 (*)     GFR Non- 55 (*)     ALT 70 (*)     AST 55 (*)     All other components within normal limits    Narrative:     Performed at:  [address] [facility] [place]   Phone [phone]   LIPASE - Abnormal; Notable for the following components:    Lipase 1,299.0 (*)     All other components within normal limits    Narrative:     Performed at:  [address] [facility] [place]   Phone [phone]   URINE RT REFLEX TO CULTURE - Abnormal; Notable for the following components:    Glucose, Ur >=1000 (*)     Urobilinogen, Urine 2.0 (*)     All other components within normal limits    Narrative:     Performed at:  [address] [facility] [place]   Phone [phone]   PROCALCITONIN - Abnormal; Notable for the following components:    Procalcitonin 0.46 (*)     All other components within normal limits    Narrative:     Performed at:  [facility]  [place] [facility] [place]   Phone [phone]   BRAIN NATRIURETIC PEPTIDE - Abnormal; Notable for the following components:    Pro- (*)     All other components within normal limits    Narrative:     Performed at:  [address] [facility] [place]   Phone [phone]   LIPID PANEL - Abnormal; Notable for the following components:    Triglycerides 163 (*)     HDL 31 (*)     All other components within normal limits    Narrative: Performed at:  [address] [place] [address]   Phone [phone]   COMPREHENSIVE METABOLIC PANEL W/ REFLEX TO MG FOR LOW K - Abnormal; Notable for the following components:    Glucose 108 (*)     Calcium 8.1 (*)     Total Protein 6.0 (*)     Albumin 3.3 (*)     ALT 46 (*)     All other components within normal limits    Narrative:     Performed at:  [address] [place] [address]   Phone [phone]   CBC WITH AUTO DIFFERENTIAL - Abnormal; Notable for the following components:    Hemoglobin 11.9 (*)     Hematocrit 35.3 (*)     All other components within normal limits    Narrative:     Performed at:  [address] [place] [address]   Phone [phone]   TSH WITH REFLEX TO FT4 - Abnormal; Notable for the following components:    TSH Reflex FT4 5.51 (*)     All other components within normal limits    Narrative:     Performed at:  [address] [employer] [address]   Phone [phone]   D-DIMER, QUANTITATIVE - Abnormal; Notable for the following components:    D-Dimer, Quant 735 (*)     All other components within normal limits    Narrative:     Performed at:  [address] [address]   Phone [phone]   C-REACTIVE PROTEIN - Abnormal; Notable for the following components:    CRP 61.3 (*)     All other components within normal limits    Narrative:     Performed at:  [address] [place] [address]   Phone [phone]   LACTATE DEHYDROGENASE - Abnormal; Notable for the following components:     (*)     All other components within normal limits    Narrative:     Performed at:  [facility]  [address] [place] [address]   Phone [phone]   VANCOMYCIN, TROUGH - Abnormal; Notable for the following components:    Vancomycin Tr 9.5 (*)     All other components within normal limits    Narrative:     Performed at:  [address] [employer] [address]   Phone [phone]   COMPREHENSIVE METABOLIC PANEL - Abnormal; Notable for the following components:    Potassium 3.4 (*)     CO2 17 (*)     Glucose 104 (*)     BUN 5 (*)     Total Protein 6.2 (*)     Albumin 3.1 (*)     Albumin/Globulin Ratio 1.0 (*)     All other components within normal limits    Narrative:     Performed at:  [address] [employer] [address]   Phone [phone]   C-REACTIVE PROTEIN - Abnormal; Notable for the following components:    CRP 10.6 (*)     All other components within normal limits    Narrative:     Performed at:  [address] [facility] [employer] [address]   Phone [phone]   POCT GLUCOSE - Abnormal; Notable for the following components:    POC Glucose 118 (*)     All other components within normal limits    Narrative:     Performed at:  [address] [facility] [employer] [address]   Phone [phone]   POCT GLUCOSE - Abnormal; Notable for the following components:    POC Glucose 122 (*)     All other components within normal limits    Narrative:     Performed at:  [address] [employer] [address]   Phone [phone]   POCT GLUCOSE - Abnormal; Notable for the following components:    POC Glucose 106 (*)     All other components within normal limits    Narrative:     Performed at:  [address] [facility] [employer] [address]   Phone [phone]   POCT GLUCOSE - Abnormal; Notable for the following components:    POC Glucose 67 (*)     All other components within normal limits    Narrative:     Performed at:  [facility]  [address] [facility] [place] [address]   Phone [phone]   POCT GLUCOSE - Abnormal; Notable for the following components:    POC Glucose 150 (*)     All other components within normal limits    Narrative:     Performed at:  [facility]  [address] [facility] [place] [address]   Phone [phone]   CULTURE, BLOOD 1    Narrative:     ORDER#: [id number]                          ORDERED BY: ROMARIO LYNNE  SOURCE: Blood                              COLLECTED:  06/14/21 16:06  ANTIBIOTICS AT [place].:                      RECEIVED :  06/14/21 16:14  If child <=2 yrs old please draw pediatric bottle. ~Blood Culture 1  Performed at:  [facility]  [address] [facility] [place] [address]   Phone [phone]   CULTURE, BLOOD 2    Narrative:     ORDER#: [id number]                          ORDERED BY: ROMARIO LYNNE  SOURCE: Blood                              COLLECTED:  06/14/21 16:06  ANTIBIOTICS AT BENNIE.:                      RECEIVED :  06/14/21 16:14  If child <=2 yrs old please draw pediatric bottle. ~Blood Culture #2  Performed at:  [facility]  [address] [facility] [place] [address]   Phone [phone] RAPID    Narrative:     Performed at:  [facility]  [address] [facility] [place] [address]   Phone [phone]   MRSA DNA PROBE, NASAL    Narrative:     ORDER#: [id number]                          ORDERED BY: Danyelle Foster  SOURCE: Nares                              COLLECTED:  06/15/21 12:26  ANTIBIOTICS AT BENNIE.:                      RECEIVED :  06/15/21 12:45  Performed at:  [facility]  [address] [facility] [place] [address]   Phone [phone]   CBC WITH AUTO DIFFERENTIAL    Narrative:     Performed at:  [facility]  [address] [place] [address]   Phone [phone]   TROPONIN    Narrative:     Performed at:  [facility]  [address] [facility] [place] [address]   Phone [phone]   LACTIC ACID, PLASMA    Narrative:     Performed at:  [facility]  [address] [place] [address]   Phone [phone]   LACTIC ACID, PLASMA    Narrative:     Performed at:  [facility]  [address] [place], [place] [address]   Phone [phone]   TROPONIN    Narrative:     Performed at:  [facility]  [address] [facility] [place] [address]   Phone [phone]   TROPONIN    Narrative:     Performed at:  [facility]  [address] [facility] [place] [address]   Phone [phone]   HEMOGLOBIN A1C    Narrative:     Performed at:  [facility]  [address] [facility] [place] [address]   Phone [phone]   HEPATIC FUNCTION PANEL    Narrative:     Performed at:  [facility]  [address] [facility] [place] [address]   Phone [phone]   LACTIC ACID, PLASMA    Narrative:     Performed at:  [facility]  [address] [place], [place] [address]   Phone [phone]   FERRITIN    Narrative:     Performed at:  [facility]  [address] [facility] [place] [address]   Phone [phone]   T4, FREE    Narrative:     Performed at:  [facility]  [address] [facility] [place] [address]   Phone [phone]   MAGNESIUM    Narrative:     Performed at:  [facility]  [address] [facility] [place] [address]   Phone [phone]   PROCALCITONIN    Narrative:     Performed at:  [facility] [address]  [place] [address]   Phone [phone]   POCT GLUCOSE   POCT GLUCOSE   POCT GLUCOSE   POCT GLUCOSE   POCT GLUCOSE   POCT GLUCOSE   POCT GLUCOSE   POCT GLUCOSE   POCT GLUCOSE   POCT GLUCOSE   POCT GLUCOSE   POCT GLUCOSE      EKG Interpretation    Interpreted by emergency department physician  Time performed: 6373  Time read: 1575    Rhythm: Atrial fibrillation  Ventricular Rate: 135  QRS Axis: -28  Ectopy: None  Conduction: Atrial fibrillation with rapid ventricular response with left axis deviation. 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 ST Segments: normal  T Waves: normal  Q Waves: None    Other findings: Motion artifact but EKG is readable    Compared to EKG on: 11/16/2016 appears to be in normal sinus rhythm    Clinical Impression: Atrial fibrillation with rapid ventricular response left axis deviation. 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 This is compared to an EKG on 11/16/2016 which was a normal sinus rhythm. Moses 149, DO      Radiology results  CT CHEST PULMONARY EMBOLISM W CONTRAST   Final Result   No evidence of a pulmonary embolus. Mild cardiomegaly with a questionable filling defect in the left atrial   appendage which could represent wall thrombus vs mass or artifact. Recommend   cardiology consultation and suggest echocardiography for further evaluation. Mildly dilated and atherosclerotic thoracic aorta with no aneurysm or   dissection and no mediastinal mass or adenopathy. Mild bibasilar atelectasis vs early infiltrates or scarring which is more   prominent on the right. The findings were called to Dr. David Quintero at 5 p.m.         [address] [place] organ? LIVER, GALLBLADDER, PANCREAS   Final Result   1. Hepatic steatosis. 2. Unremarkable gallbladder. CT ABDOMEN PELVIS W IV CONTRAST Additional Contrast? None   Final Result   1. No acute process within the abdomen or pelvis. 2. No acute bowel inflammation or obstruction. 3. Stable bilateral renal cysts. 4. Patient status post hysterectomy. XR CHEST PORTABLE   Final Result   No acute cardiopulmonary disease. Medications   0.9 % sodium chloride bolus (0 mL/kg × 77.6 kg Intravenous Stopped 6/14/21 1836)   acetaminophen (TYLENOL) tablet 1,000 mg (1,000 mg Oral Given 6/14/21 1559)   piperacillin-tazobactam (ZOSYN) 4,500 mg in dextrose 5 % 100 mL IVPB (mini-bag) (0 mg Intravenous Stopped 6/14/21 1705)   morphine (PF) injection 4 mg (4 mg Intravenous Given 6/14/21 1601)   ondansetron (ZOFRAN) injection 4 mg (4 mg Intravenous Given 6/14/21 1603)   vancomycin 1000 mg IVPB in 250 mL D5W addavial (0 mg Intravenous Stopped 6/14/21 1911)   iopamidol (ISOVUE-370) 76 % injection 75 mL (75 mLs Intravenous Given 6/14/21 1653)   HYDROmorphone (DILAUDID) injection 1 mg (1 mg Intravenous Given 6/14/21 1808)   dilTIAZem injection 10 mg (10 mg Intravenous Given 6/14/21 1808)   iopamidol (ISOVUE-370) 76 % injection 75 mL (75 mLs Intravenous Given 6/15/21 1620)       Discharge Medication List as of 6/17/2021  2:35 PM      START taking these medications    Details   apixaban (ELIQUIS) 5 MG TABS tablet Take 1 tablet by mouth 2 times daily, Disp-60 tablet, R-3Normal      dilTIAZem (CARDIZEM CD) 120 MG extended release capsule Take 1 capsule by mouth daily, Disp-30 capsule, R-3Normal             The patient's blood pressure was found to be elevated according to CMS/Medicare and the Affordable Care Act/ObamaCare criteria. Elevated blood pressure could occur because of pain or anxiety or other reasons and does not mean that they need to have their blood pressure treated or medications otherwise adjusted. However, this could also be a sign that they will need to have their blood pressure treated or medications changed. The patient was instructed to follow up closely with their personal physician to have their blood pressure rechecked.

## 2021-06-15 ENCOUNTER — APPOINTMENT (OUTPATIENT)
Dept: CT IMAGING | Age: 66
DRG: 308 | End: 2021-06-15
Payer: MEDICARE

## 2021-06-15 LAB
A/G RATIO: 1.2 (ref 1.1–2.2)
ALBUMIN SERPL-MCNC: 3.3 G/DL (ref 3.4–5)
ALP BLD-CCNC: 63 U/L (ref 40–129)
ALT SERPL-CCNC: 46 U/L (ref 10–40)
ANION GAP SERPL CALCULATED.3IONS-SCNC: 11 MMOL/L (ref 3–16)
AST SERPL-CCNC: 31 U/L (ref 15–37)
BASOPHILS ABSOLUTE: 0.1 K/UL (ref 0–0.2)
BASOPHILS RELATIVE PERCENT: 0.8 %
BILIRUB SERPL-MCNC: 0.7 MG/DL (ref 0–1)
BILIRUBIN DIRECT: 0.3 MG/DL (ref 0–0.3)
BILIRUBIN, INDIRECT: 0.4 MG/DL (ref 0–1)
BUN BLDV-MCNC: 13 MG/DL (ref 7–20)
CALCIUM SERPL-MCNC: 8.1 MG/DL (ref 8.3–10.6)
CHLORIDE BLD-SCNC: 107 MMOL/L (ref 99–110)
CO2: 21 MMOL/L (ref 21–32)
CREAT SERPL-MCNC: 0.8 MG/DL (ref 0.6–1.2)
EOSINOPHILS ABSOLUTE: 0.4 K/UL (ref 0–0.6)
EOSINOPHILS RELATIVE PERCENT: 5.8 %
ESTIMATED AVERAGE GLUCOSE: 220.2 MG/DL
GFR AFRICAN AMERICAN: >60
GFR NON-AFRICAN AMERICAN: >60
GLOBULIN: 2.7 G/DL
GLUCOSE BLD-MCNC: 106 MG/DL (ref 70–99)
GLUCOSE BLD-MCNC: 108 MG/DL (ref 70–99)
GLUCOSE BLD-MCNC: 122 MG/DL (ref 70–99)
GLUCOSE BLD-MCNC: 70 MG/DL (ref 70–99)
GLUCOSE BLD-MCNC: 87 MG/DL (ref 70–99)
GLUCOSE BLD-MCNC: 98 MG/DL (ref 70–99)
HBA1C MFR BLD: 9.3 %
HCT VFR BLD CALC: 35.3 % (ref 36–48)
HEMOGLOBIN: 11.9 G/DL (ref 12–16)
LACTIC ACID: 0.8 MMOL/L (ref 0.4–2)
LV EF: 60 %
LVEF MODALITY: NORMAL
LYMPHOCYTES ABSOLUTE: 1.9 K/UL (ref 1–5.1)
LYMPHOCYTES RELATIVE PERCENT: 25.9 %
MAGNESIUM: 2 MG/DL (ref 1.8–2.4)
MCH RBC QN AUTO: 29.7 PG (ref 26–34)
MCHC RBC AUTO-ENTMCNC: 33.8 G/DL (ref 31–36)
MCV RBC AUTO: 88.1 FL (ref 80–100)
MONOCYTES ABSOLUTE: 0.6 K/UL (ref 0–1.3)
MONOCYTES RELATIVE PERCENT: 7.5 %
NEUTROPHILS ABSOLUTE: 4.4 K/UL (ref 1.7–7.7)
NEUTROPHILS RELATIVE PERCENT: 60 %
PDW BLD-RTO: 14.4 % (ref 12.4–15.4)
PERFORMED ON: ABNORMAL
PERFORMED ON: ABNORMAL
PERFORMED ON: NORMAL
PLATELET # BLD: 242 K/UL (ref 135–450)
PMV BLD AUTO: 7.8 FL (ref 5–10.5)
POTASSIUM REFLEX MAGNESIUM: 3.5 MMOL/L (ref 3.5–5.1)
RBC # BLD: 4.01 M/UL (ref 4–5.2)
SODIUM BLD-SCNC: 139 MMOL/L (ref 136–145)
T4 FREE: 1.2 NG/DL (ref 0.9–1.8)
TOTAL PROTEIN: 6 G/DL (ref 6.4–8.2)
TROPONIN: <0.01 NG/ML
WBC # BLD: 7.3 K/UL (ref 4–11)

## 2021-06-15 PROCEDURE — 6370000000 HC RX 637 (ALT 250 FOR IP): Performed by: HOSPITALIST

## 2021-06-15 PROCEDURE — 36415 COLL VENOUS BLD VENIPUNCTURE: CPT

## 2021-06-15 PROCEDURE — 84484 ASSAY OF TROPONIN QUANT: CPT

## 2021-06-15 PROCEDURE — 6360000002 HC RX W HCPCS: Performed by: HOSPITALIST

## 2021-06-15 PROCEDURE — 2580000003 HC RX 258: Performed by: HOSPITALIST

## 2021-06-15 PROCEDURE — 99222 1ST HOSP IP/OBS MODERATE 55: CPT | Performed by: INTERNAL MEDICINE

## 2021-06-15 PROCEDURE — 93306 TTE W/DOPPLER COMPLETE: CPT

## 2021-06-15 PROCEDURE — 2060000000 HC ICU INTERMEDIATE R&B

## 2021-06-15 PROCEDURE — 2500000003 HC RX 250 WO HCPCS: Performed by: HOSPITALIST

## 2021-06-15 PROCEDURE — 80053 COMPREHEN METABOLIC PANEL: CPT

## 2021-06-15 PROCEDURE — 87641 MR-STAPH DNA AMP PROBE: CPT

## 2021-06-15 PROCEDURE — 83735 ASSAY OF MAGNESIUM: CPT

## 2021-06-15 PROCEDURE — 6360000004 HC RX CONTRAST MEDICATION: Performed by: INTERNAL MEDICINE

## 2021-06-15 PROCEDURE — 85025 COMPLETE CBC W/AUTO DIFF WBC: CPT

## 2021-06-15 PROCEDURE — 2700000000 HC OXYGEN THERAPY PER DAY

## 2021-06-15 PROCEDURE — 83605 ASSAY OF LACTIC ACID: CPT

## 2021-06-15 PROCEDURE — 71260 CT THORAX DX C+: CPT

## 2021-06-15 PROCEDURE — 94761 N-INVAS EAR/PLS OXIMETRY MLT: CPT

## 2021-06-15 RX ADMIN — CITALOPRAM HYDROBROMIDE 40 MG: 20 TABLET ORAL at 09:26

## 2021-06-15 RX ADMIN — PIPERACILLIN AND TAZOBACTAM 3375 MG: 3; .375 INJECTION, POWDER, LYOPHILIZED, FOR SOLUTION INTRAVENOUS at 18:29

## 2021-06-15 RX ADMIN — PIPERACILLIN AND TAZOBACTAM 3375 MG: 3; .375 INJECTION, POWDER, LYOPHILIZED, FOR SOLUTION INTRAVENOUS at 09:24

## 2021-06-15 RX ADMIN — ENOXAPARIN SODIUM 40 MG: 40 INJECTION SUBCUTANEOUS at 09:26

## 2021-06-15 RX ADMIN — SODIUM CHLORIDE, PRESERVATIVE FREE 10 ML: 5 INJECTION INTRAVENOUS at 20:58

## 2021-06-15 RX ADMIN — PIPERACILLIN AND TAZOBACTAM 3375 MG: 3; .375 INJECTION, POWDER, LYOPHILIZED, FOR SOLUTION INTRAVENOUS at 01:37

## 2021-06-15 RX ADMIN — INSULIN GLARGINE 72 UNITS: 100 INJECTION, SOLUTION SUBCUTANEOUS at 00:05

## 2021-06-15 RX ADMIN — DILTIAZEM HYDROCHLORIDE 2.5 MG/HR: 5 INJECTION INTRAVENOUS at 18:29

## 2021-06-15 RX ADMIN — SODIUM CHLORIDE: 9 INJECTION, SOLUTION INTRAVENOUS at 00:56

## 2021-06-15 RX ADMIN — DILTIAZEM HYDROCHLORIDE 2.5 MG/HR: 5 INJECTION INTRAVENOUS at 04:00

## 2021-06-15 RX ADMIN — IOPAMIDOL 75 ML: 755 INJECTION, SOLUTION INTRAVENOUS at 16:20

## 2021-06-15 RX ADMIN — VANCOMYCIN HYDROCHLORIDE 750 MG: 750 INJECTION, POWDER, LYOPHILIZED, FOR SOLUTION INTRAVENOUS at 06:29

## 2021-06-15 RX ADMIN — ASPIRIN 81 MG: 81 TABLET, CHEWABLE ORAL at 09:26

## 2021-06-15 RX ADMIN — CETIRIZINE HYDROCHLORIDE 10 MG: 10 TABLET, FILM COATED ORAL at 09:26

## 2021-06-15 RX ADMIN — SODIUM CHLORIDE, PRESERVATIVE FREE 10 ML: 5 INJECTION INTRAVENOUS at 01:10

## 2021-06-15 RX ADMIN — VANCOMYCIN HYDROCHLORIDE 750 MG: 750 INJECTION, POWDER, LYOPHILIZED, FOR SOLUTION INTRAVENOUS at 18:29

## 2021-06-15 ASSESSMENT — PAIN SCALES - GENERAL
PAINLEVEL_OUTOF10: 0
PAINLEVEL_OUTOF10: 0

## 2021-06-15 NOTE — ACP (ADVANCE CARE PLANNING)
Advance Care Planning     Advance Care Planning Activator (Inpatient)  Conversation Note      Date of ACP Conversation: 6/15/2021     Conversation Conducted with: Patient with Decision Making Capacity    ACP Activator: Manuel Morel RN      Health Care Decision Maker:     Current Designated Health Care Decision Maker:     Primary Decision Maker: Jarod Blakely - Child - [phone]    Secondary Decision Maker: Court [place] - Child - [phone]    Care Preferences    Ventilation: \"If you were in your present state of health and suddenly became very ill and were unable to breathe on your own, what would your preference be about the use of a ventilator (breathing machine) if it were available to you? \"      Would the patient desire the use of ventilator (breathing machine)?: yes    \"If your health worsens and it becomes clear that your chance of recovery is unlikely, what would your preference be about the use of a ventilator (breathing machine) if it were available to you? \"     Would the patient desire the use of ventilator (breathing machine)?: No      Resuscitation  \"CPR works best to restart the heart when there is a sudden event, like a heart attack, in someone who is otherwise healthy. Unfortunately, CPR does not typically restart the heart for people who have serious health conditions or who are very sick. \"    \"In the event your heart stopped as a result of an underlying serious health condition, would you want attempts to be made to restart your heart (answer \"yes\" for attempt to resuscitate) or would you prefer a natural death (answer \"no\" for do not attempt to resuscitate)? \" yes       [] Yes   [] No   Educated Patient / Evetta Alpers regarding differences between Advance Directives and portable DNR orders.     Length of ACP Conversation in minutes:  5 minutes    Conversation Outcomes:  [x] ACP discussion completed  [] Existing advance directive reviewed with patient; no changes to patient's previously recorded wishes  [] New Advance Directive completed  [] Portable Do Not Rescitate prepared for Provider review and signature  [] POLST/POST/MOLST/MOST prepared for Provider review and signature      Follow-up plan:    [] Schedule follow-up conversation to continue planning  [] Referred individual to Provider for additional questions/concerns   [] Advised patient/agent/surrogate to review completed ACP document and update if needed with changes in condition, patient preferences or care setting    [x] This note routed to one or more involved healthcare providers  Electronically signed by Bk Parsons RN Case Management on 6/15/2021 at 3:42 PM

## 2021-06-15 NOTE — PLAN OF CARE
Problem: Pain:  Goal: Pain level will decrease  Description: Pain level will decrease  6/15/2021 1847 by [place] Laguna RN  Outcome: Ongoing  6/15/2021 0648 by Harvinder Cruz RN  Outcome: Ongoing  Goal: Control of acute pain  Description: Control of acute pain  6/15/2021 1847 by [place] Laguna RN  Outcome: Ongoing  6/15/2021 0648 by Harvinder Cruz RN  Outcome: Ongoing  Goal: Control of chronic pain  Description: Control of chronic pain  6/15/2021 1847 by [place] Laguna RN  Outcome: Ongoing  6/15/2021 0648 by Harvinder Cruz RN  Outcome: Ongoing

## 2021-06-15 NOTE — PROGRESS NOTES
RN transported patient to 5N via stretcher on cardiac and SPO2 monitor, 3LNC in place. Patient states that she has no pain at this time. Patient ambulated to the hospital bed with a steady gait and gave bedside report to Pedro ARGUELLO.

## 2021-06-15 NOTE — FLOWSHEET NOTE
06/15/21 1457   Encounter Summary   Services provided to: Patient; Family  (Pt resting upon entering.)   Referral/Consult From: Nurse   Support System Family members   Continue Visiting   (Pt resting, AD docs given. GB 6/15)   Complexity of Encounter Low   Length of Encounter 15 minutes   Advance Directives (For Healthcare)   Healthcare Directive No, patient does not have an advance directive for healthcare treatment   Information on [place] Requested No   Patient Requests Assistance Yes, will do independently   Advance Directives Documents given     See ACP note below for details. Electronically signed by Gilda Santana on 6/15/2021 at 2:58 PM    Advance Care Planning     Advance Care Planning Inpatient Note  Spiritual Care Department    Today's Date: 6/15/2021  Unit: [facility] PROGRESSIVE CARE    Received request from IDT Member. Upon review of chart and communication with care team, patient's decision making abilities are not in question. Patient and Familywas/were present in the room during visit. Goals of ACP Conversation:  Discuss advance care planning documents    Assessment:   self-initiated this visit w/the pt and family member present, per spiritual care consult, to assess AD needs. Pt presented as resting and woke up briefly to engage w/. Due to pt resting,  offered to leave documents for pt's independent review and completion. Pt was open to this at this time. Should pt wish to complete AD documents while in-patient, please contact spiritual care services. No other needs were expressed at this time. Interventions:  Encouraged ongoing ACP conversation with future decision makers and loved ones. AD docs given for independent review/completion.      Outcomes/Plan:  ACP Discussion: Postponed    Electronically signed by Dora Muhammad on 6/15/2021 at 2:59 PM

## 2021-06-15 NOTE — CONSULTS
Cardiac Electrophysiology Consultation   Date: 6/15/2021  Admit Date:  6/14/2021  Reason for Consultation: new-onset atrial fibrillation with v-rates 130-140 bpm  Consult Requesting Physician: Miley Brito MD     Chief Complaint   Patient presents with    Abdominal Pain     started saturday night, had an episode of emesis; diabetic=bs this morning was 112     HPI: Alexandra Mahmood is a 78 y.o. female h/o CAD, DM, obesity who presents with worsening epigastric sharp pain radiating to her back a/w N/V (non-bloody, non-bilious) and is diagnosed with pancreatitis with lipase 1299. Upon workup, she is noted to be in newfound atrial fibrillation with v-rates 120-130 bpm. Started on cardizem IV infusion. Rivera negative x 2. [id number] score is 11 (age, female, HTN, DM, MI 2008). Past Medical History:   Diagnosis Date    CAD (coronary artery disease)     Depression     Diabetes mellitus ([facility] [address] [address]     Hyperlipidemia     Hypertension         Past Surgical History:   Procedure Laterality Date    HYSTERECTOMY         Allergies   Allergen Reactions    Hydrocodone Itching       Social History:  Reviewed. reports that she has never smoked. She does not have any smokeless tobacco history on file. She reports that she does not drink alcohol and does not use drugs. Family History:  Reviewed. family history is not on file. No premature CAD. Review of System:  All other systems reviewed except for that noted above.  Pertinent negatives and positives are:     · General: negative for fever, chills   · Ophthalmic ROS: negative for - eye pain or loss of vision  · ENT ROS: negative for - headaches, sore throat   · Respiratory: negative for - cough, sputum  · Cardiovascular: Reviewed in HPI  · Gastrointestinal: negative for - abdominal pain, diarrhea, N/V  · Hematology: negative for - bleeding, blood clots, bruising or jaundice  · Genito-Urinary:  negative for - Dysuria or incontinence  · Musculoskeletal: negative for - Joint swelling, muscle pain  · Neurological: negative for - confusion, dizziness, headaches   · Psychiatric: No anxiety, no depression. · Dermatological: negative for - rash    Physical Examination:  Vitals:    06/15/21 1143   BP: 139/82   Pulse: 111   Resp: 18   Temp: 98 °F (36.7 °C)   SpO2: 95%        Intake/Output Summary (Last 24 hours) at 6/15/2021 1154  Last data filed at 6/15/2021 1125  Gross per 24 hour   Intake 2576 ml   Output 2150 ml   Net 426 ml     In: 2576   Out: 2150    Wt Readings from Last 3 Encounters:   06/15/21 180 lb 5.4 oz (81.8 kg)   17 174 lb 2.6 oz (79 kg)   17 164 lb (74.4 kg)     Temp  Av.4 °F (36.9 °C)  Min: 97.6 °F (36.4 °C)  Max: 101.6 °F (38.7 °C)  Pulse  Av.2  Min: 77  Max: 153  BP  Min: 93/56  Max: 160/92  SpO2  Av %  Min: 87 %  Max: 97 %    · Telemetry: atrial fibrillation with v-rates 80-90's bpm   · Constitutional: Alert. Oriented to person, place, and time. No distress. · Head: Normocephalic and atraumatic. · Mouth/Throat: Lips appear moist. Oropharynx is clear and moist.  · Eyes: Conjunctivae normal. EOM are normal.   · Neck: Neck supple. No lymphadenopathy. No rigidity. No JVD present. · Cardiovascular: Normal rate, regular rhythm. Normal S1&S2. Carotid pulse 2+ bilaterally. · Pulmonary/Chest: Bilateral respiratory sounds present. No respiratory accessory muscle use. No wheezes, No rhonchi. No bibasilar rales. · Abdominal: Soft. Normal bowel sounds present. No distension, No tenderness. No splenomegaly. No hernia. · Musculoskeletal: No tenderness. Full range of motion in bilateral upper and lower extremities. No pitting BLE edema . · Lymphadenopathy: Has no cervical adenopathy. · Neurological: Alert and oriented. Cranial nerve II-XII grossly intact, No gross deficit to touch. · Skin: Skin is warm and dry. No rash, lesions, ulcerations noted. · Psychiatric: No anxiety nor agitation. Labs:  Reviewed.    Recent Labs 06/14/21  1526 06/15/21  0534    139   K 3.8 3.5    107   CO2 24 21   BUN 16 13   CREATININE 1.0 0.8     Recent Labs     06/14/21  1526 06/15/21  0534   WBC 10.2 7.3   HGB 13.3 11.9*   HCT 38.9 35.3*   MCV 88.2 88.1    242     Lab Results   Component Value Date    TROPONINI <0.01 06/15/2021     No results found for: BNP  No results found for: PROTIME, INR  Lab Results   Component Value Date    CHOL 109 06/14/2021    HDL 31 06/14/2021    HDL 33 03/06/2012    TRIG 163 06/14/2021       Diagnostic and imaging results reviewed. ECG: (not done)  Echo: (pending)  Cath: n/a    I independently reviewed the ECG, telemetry, serology, echocardiographic results.     Scheduled Meds:   piperacillin-tazobactam  3,375 mg Intravenous Q8H    aspirin  81 mg Oral Daily    citalopram  40 mg Oral Daily    insulin glargine  72 Units Subcutaneous Nightly    cetirizine  10 mg Oral Daily    insulin lispro  0-6 Units Subcutaneous 6 times per day    sodium chloride flush  5-40 mL Intravenous 2 times per day    enoxaparin  40 mg Subcutaneous Daily    vancomycin  750 mg Intravenous Q12H    vancomycin (VANCOCIN) intermittent dosing (placeholder)   Other RX Placeholder     Continuous Infusions:   dilTIAZem 2.5 mg/hr (06/15/21 0400)    dextrose      sodium chloride      sodium chloride 150 mL/hr at 06/15/21 0056     PRN Meds:.perflutren lipid microspheres, glucose, dextrose, glucagon (rDNA), dextrose, sodium chloride flush, sodium chloride, ondansetron **OR** ondansetron, polyethylene glycol, acetaminophen **OR** acetaminophen, albuterol, morphine **OR** morphine     Assessment:   Patient Active Problem List    Diagnosis Date Noted    Idiopathic acute pancreatitis without infection or necrosis 06/14/2021    DM (diabetes mellitus), type 2, uncontrolled ([facility] 11/22/2016    Hypoxia 11/22/2016    Pelvic pain in female 11/21/2016    Adnexal mass 11/21/2016    Hypersomnia       Active Hospital Problems Diagnosis Date Noted    Idiopathic acute pancreatitis without infection or necrosis [K85.00] 06/14/2021         Recommendation(s):    New onset atrial fibrillation  -currently on Cardizem IV infusion at 5mg/hr and well rate controlled. Would switch to Cardizem CD 120mg daily once able to take po. -[id number] score of 5 and warrants [facility], but currently NPO. Once able to take po, would start Eliquis 5mg BID for thromboembolic prophylaxis. Not necessary for IV heparin at this time as daily risk of thromboembolism is low.  -she may be discharged home with Cardizem CD and DOAC and follow-up with me as outpatient to discuss treatment options for atrial fibrillation including ablation. Will sign off from EP perspective. Thank you for allowing me to participate in the care of Sandra Grimaldo . If you have any questions/comments, please do not hesitate to contact us.       Blaze Heredia MD, MS, [facility], [facility]  Cardiac Electrophysiology  [address]  [address] [facility], [address] [place] [address]  [phone]

## 2021-06-15 NOTE — CARE COORDINATION
INITIAL CASE MANAGEMENT ASSESSMENT    Reviewed chart, met with patient & daughter Sherwin Conner to assess possible discharge needs. Explained Case Management role/services. Living Situation: Confirmed address, lives with 15 yo granddaughter, 2nd floor apartment, 6 steps up to home, level entry to enter building    ADLs: Independent     DME: None    PT/OT Recs: Not ordered, patient has been independent in room     Active Services: None     Transportation: Active  - family transport home     Medications: [employer] in [facility] (close to [employer]) - no issues    PCP: Dennie Mooring Copper, PA-C      HD/PD: n/a    PLAN/COMMENTS: Patient & family confirm return home at discharge. No anticipated home needs. CM provided contact information for patient or family to call with any questions. CM will follow and assist as needed.   Electronically signed by Raquel Hightower RN Case Management [phone] on 6/15/2021 at 3:40 PM

## 2021-06-15 NOTE — PROGRESS NOTES
4 Eyes Skin Assessment     NAME:  Mervin Perea  YOB: 1955  MEDICAL RECORD NUMBER:  [id number]    The patient is being assess for  Admission    I agree that 2 RN's have performed a thorough Head to Toe Skin Assessment on the patient. ALL assessment sites listed below have been assessed. Areas assessed by both nurses:    Head, Face, Ears, Shoulders, Back, Chest, Arms, Elbows, Hands, Sacrum. Buttock, Coccyx, Ischium and Legs. Feet and Heels        Does the Patient have a Wound?  No noted wound(s)       Nasim Prevention initiated:  No    Wound Care Orders initiated:  No    Pressure Injury (Stage 3,4, Unstageable, DTI, NWPT, and Complex wounds) if present place consult order under [de-identified] No    New and Established Ostomies if present place consult order under : No      Nurse 1 eSignature: Electronically signed by Rosalba Major RN on 6/15/21 at 4:30 AM EDT    **SHARE this note so that the co-signing nurse is able to place an eSignature**    Nurse 2 eSignature: Electronically signed by Jakob Callaway RN on 6/15/21 at 4:30 AM EDT

## 2021-06-16 LAB
EKG ATRIAL RATE: 156 BPM
EKG DIAGNOSIS: NORMAL
EKG P AXIS: 17 DEGREES
EKG P-R INTERVAL: 158 MS
EKG Q-T INTERVAL: 268 MS
EKG QRS DURATION: 76 MS
EKG QTC CALCULATION (BAZETT): 402 MS
EKG R AXIS: -28 DEGREES
EKG T AXIS: 119 DEGREES
EKG VENTRICULAR RATE: 135 BPM
GLUCOSE BLD-MCNC: 150 MG/DL (ref 70–99)
GLUCOSE BLD-MCNC: 67 MG/DL (ref 70–99)
GLUCOSE BLD-MCNC: 71 MG/DL (ref 70–99)
GLUCOSE BLD-MCNC: 75 MG/DL (ref 70–99)
GLUCOSE BLD-MCNC: 76 MG/DL (ref 70–99)
GLUCOSE BLD-MCNC: 77 MG/DL (ref 70–99)
GLUCOSE BLD-MCNC: 79 MG/DL (ref 70–99)
GLUCOSE BLD-MCNC: 87 MG/DL (ref 70–99)
MRSA SCREEN RT-PCR: NORMAL
PERFORMED ON: ABNORMAL
PERFORMED ON: ABNORMAL
PERFORMED ON: NORMAL
VANCOMYCIN TROUGH: 9.5 UG/ML (ref 10–20)

## 2021-06-16 PROCEDURE — 6370000000 HC RX 637 (ALT 250 FOR IP): Performed by: HOSPITALIST

## 2021-06-16 PROCEDURE — 93010 ELECTROCARDIOGRAM REPORT: CPT | Performed by: INTERNAL MEDICINE

## 2021-06-16 PROCEDURE — 6360000002 HC RX W HCPCS: Performed by: HOSPITALIST

## 2021-06-16 PROCEDURE — 2580000003 HC RX 258: Performed by: INTERNAL MEDICINE

## 2021-06-16 PROCEDURE — 6360000002 HC RX W HCPCS: Performed by: NURSE PRACTITIONER

## 2021-06-16 PROCEDURE — 36415 COLL VENOUS BLD VENIPUNCTURE: CPT

## 2021-06-16 PROCEDURE — 2060000000 HC ICU INTERMEDIATE R&B

## 2021-06-16 PROCEDURE — 6370000000 HC RX 637 (ALT 250 FOR IP): Performed by: NURSE PRACTITIONER

## 2021-06-16 PROCEDURE — 6370000000 HC RX 637 (ALT 250 FOR IP): Performed by: INTERNAL MEDICINE

## 2021-06-16 PROCEDURE — 2580000003 HC RX 258: Performed by: HOSPITALIST

## 2021-06-16 PROCEDURE — 80202 ASSAY OF VANCOMYCIN: CPT

## 2021-06-16 RX ORDER — DILTIAZEM HYDROCHLORIDE 120 MG/1
120 CAPSULE, COATED, EXTENDED RELEASE ORAL DAILY
Status: DISCONTINUED | OUTPATIENT
Start: 2021-06-16 | End: 2021-06-17 | Stop reason: HOSPADM

## 2021-06-16 RX ORDER — HYDRALAZINE HYDROCHLORIDE 20 MG/ML
10 INJECTION INTRAMUSCULAR; INTRAVENOUS EVERY 8 HOURS PRN
Status: DISCONTINUED | OUTPATIENT
Start: 2021-06-16 | End: 2021-06-17 | Stop reason: HOSPADM

## 2021-06-16 RX ORDER — GUAIFENESIN 600 MG/1
600 TABLET, EXTENDED RELEASE ORAL 2 TIMES DAILY
Status: DISCONTINUED | OUTPATIENT
Start: 2021-06-16 | End: 2021-06-17 | Stop reason: HOSPADM

## 2021-06-16 RX ADMIN — SODIUM CHLORIDE: 9 INJECTION, SOLUTION INTRAVENOUS at 01:30

## 2021-06-16 RX ADMIN — GUAIFENESIN 600 MG: 600 TABLET ORAL at 22:03

## 2021-06-16 RX ADMIN — PIPERACILLIN AND TAZOBACTAM 3375 MG: 3; .375 INJECTION, POWDER, LYOPHILIZED, FOR SOLUTION INTRAVENOUS at 01:30

## 2021-06-16 RX ADMIN — PIPERACILLIN AND TAZOBACTAM 3375 MG: 3; .375 INJECTION, POWDER, LYOPHILIZED, FOR SOLUTION INTRAVENOUS at 09:12

## 2021-06-16 RX ADMIN — SODIUM CHLORIDE, PRESERVATIVE FREE 10 ML: 5 INJECTION INTRAVENOUS at 20:54

## 2021-06-16 RX ADMIN — HYDRALAZINE HYDROCHLORIDE 10 MG: 20 INJECTION INTRAMUSCULAR; INTRAVENOUS at 23:25

## 2021-06-16 RX ADMIN — APIXABAN 5 MG: 5 TABLET, FILM COATED ORAL at 20:54

## 2021-06-16 RX ADMIN — ASPIRIN 81 MG: 81 TABLET, CHEWABLE ORAL at 09:23

## 2021-06-16 RX ADMIN — ENOXAPARIN SODIUM 40 MG: 40 INJECTION SUBCUTANEOUS at 09:23

## 2021-06-16 RX ADMIN — APIXABAN 5 MG: 5 TABLET, FILM COATED ORAL at 12:56

## 2021-06-16 RX ADMIN — SODIUM CHLORIDE: 9 INJECTION, SOLUTION INTRAVENOUS at 23:31

## 2021-06-16 RX ADMIN — SODIUM CHLORIDE, PRESERVATIVE FREE 10 ML: 5 INJECTION INTRAVENOUS at 09:23

## 2021-06-16 RX ADMIN — INSULIN LISPRO 1 UNITS: 100 INJECTION, SOLUTION INTRAVENOUS; SUBCUTANEOUS at 20:56

## 2021-06-16 RX ADMIN — PIPERACILLIN AND TAZOBACTAM 3375 MG: 3; .375 INJECTION, POWDER, LYOPHILIZED, FOR SOLUTION INTRAVENOUS at 18:27

## 2021-06-16 RX ADMIN — CITALOPRAM HYDROBROMIDE 40 MG: 20 TABLET ORAL at 09:23

## 2021-06-16 RX ADMIN — CETIRIZINE HYDROCHLORIDE 10 MG: 10 TABLET, FILM COATED ORAL at 09:23

## 2021-06-16 RX ADMIN — VANCOMYCIN HYDROCHLORIDE 1000 MG: 1 INJECTION, POWDER, LYOPHILIZED, FOR SOLUTION INTRAVENOUS at 07:43

## 2021-06-16 RX ADMIN — DILTIAZEM HYDROCHLORIDE 120 MG: 120 CAPSULE, COATED, EXTENDED RELEASE ORAL at 12:56

## 2021-06-16 ASSESSMENT — PAIN SCALES - GENERAL
PAINLEVEL_OUTOF10: 0

## 2021-06-16 NOTE — PROGRESS NOTES
Hospitalist Progress Note      PCP: Orlando Santana MD    Date of Admission: 6/14/2021    Chief Complaint: Abdominal pain    Hospital Course:  78 y.o. female with history of CAD, DM, obesity, presents following onset of abdominal pain yesterday morning. She woke up around 2 am, noted onset of epigastric sharp pain that radiated to her back. She had one episode of non bloody/ non bilious emesis consisting of previous evening's dinner. She had anorexia throughout the day, was able to tolerate some PO that wasn't associated with acute worsening of her abdominal pain. She denies chest pain, palpitations, cough, fever, chills. She does note some ROSS over the past 2 months, and recalls completing her second dose of covid vaccination the end of last month. Abd pain persisted prompting ER consultation where during her work up she required 3 lpm NC O2. EKG done in ED showed new onset Afib with RVR. Cardizem IV started and cardiology consulted. 6/16 CTPA showed no PE. Echo WNL. Converted to NSR last night. HR in the 70s. Subjective: Patient seen and examined. Patient is feeling much better today. Would like to try to eat something. Have advanced diet to clears. Will monitor how she tolerates and continue to advance as necessary. Switching to PO cardizem and eliquis started. Patient informed regarding negative CTPA and echo results. Does not have any SOB today and has been taken off supplemental O2. O2 sat 95% ORA. Will continue to monitor for pain and clinical improvement.       Medications:  Reviewed    Infusion Medications    dextrose      sodium chloride      sodium chloride 75 mL/hr at 06/16/21 1050     Scheduled Medications    vancomycin  1,000 mg Intravenous Q12H    apixaban  5 mg Oral BID    dilTIAZem  120 mg Oral Daily    piperacillin-tazobactam  3,375 mg Intravenous Q8H    aspirin  81 mg Oral Daily    citalopram  40 mg Oral Daily    cetirizine  10 mg Oral Daily    insulin lispro  0-6 Units Subcutaneous 6 times per day    sodium chloride flush  5-40 mL Intravenous 2 times per day    vancomycin (VANCOCIN) intermittent dosing (placeholder)   Other RX Placeholder     PRN Meds: perflutren lipid microspheres, glucose, dextrose, glucagon (rDNA), dextrose, sodium chloride flush, sodium chloride, ondansetron **OR** ondansetron, polyethylene glycol, acetaminophen **OR** acetaminophen, albuterol, morphine **OR** morphine      Intake/Output Summary (Last 24 hours) at 6/16/2021 1407  Last data filed at 6/16/2021 1240  Gross per 24 hour   Intake 640 ml   Output 1550 ml   Net -910 ml       Physical Exam Performed:    BP (!) 170/79   Pulse 74   Temp 98.5 °F (36.9 °C) (Oral)   Resp 18   Ht 4' 11\" (1.499 m)   Wt 179 lb 3.2 oz (81.3 kg)   SpO2 95%   BMI 36.19 kg/m²     General appearance: No apparent distress, appears stated age and cooperative. HEENT: Pupils equal, round, and reactive to light. Conjunctivae/corneas clear. Neck: Supple, with full range of motion. No jugular venous distention. Trachea midline. Respiratory:  Normal respiratory effort. Clear to auscultation, bilaterally without Rales/Wheezes/Rhonchi. Cardiovascular: Regular rate and rhythm with normal S1/S2 without murmurs, rubs or gallops. Abdomen: Soft, non-tender, non-distended with normal bowel sounds. Musculoskeletal: No clubbing, cyanosis or edema bilaterally. Full range of motion without deformity. Skin: Skin color, texture, turgor normal.  No rashes or lesions. Neurologic:  Neurovascularly intact without any focal sensory/motor deficits.  Cranial nerves: II-XII intact, grossly non-focal.  Psychiatric: Alert and oriented, thought content appropriate, normal insight  Capillary Refill: Brisk,< 3 seconds   Peripheral Pulses: +2 palpable, equal bilaterally       Labs:   Recent Labs     06/14/21  1526 06/15/21  0534   WBC 10.2 7.3   HGB 13.3 11.9*   HCT 38.9 35.3*    242     Recent Labs     06/14/21  1526 06/15/21  0534   NA 138 139   K 3.8 3.5    107   CO2 24 21   BUN 16 13   CREATININE 1.0 0.8   CALCIUM 9.0 8.1*     Recent Labs     06/14/21  1526 06/15/21  0534   AST 55* 31   ALT 70* 46*   BILIDIR  --  0.3   BILITOT 1.0 0.7   ALKPHOS 72 63     No results for input(s): INR in the last 72 hours. Recent Labs     06/14/21  1526 06/14/21  2249 06/15/21  0251   TROPONINI <0.01 <0.01 <0.01       Urinalysis:      Lab Results   Component Value Date    NITRU Negative 06/14/2021    WBCUA 6-10 06/16/2017    BACTERIA 2+ 04/27/2015    RBCUA 3-5 06/16/2017    BLOODU Negative 06/14/2021    SPECGRAV 1.021 06/14/2021    GLUCOSEU >=1000 06/14/2021       Radiology:  CT CHEST PULMONARY EMBOLISM W CONTRAST   Final Result   No evidence of a pulmonary embolus. Mild cardiomegaly with a questionable filling defect in the left atrial   appendage which could represent wall thrombus vs mass or artifact. Recommend   cardiology consultation and suggest echocardiography for further evaluation. Mildly dilated and atherosclerotic thoracic aorta with no aneurysm or   dissection and no mediastinal mass or adenopathy. Mild bibasilar atelectasis vs early infiltrates or scarring which is more   prominent on the right. The findings were called to Dr. Marli Perea at 5 p.m.         [address] [place] organ? LIVER, GALLBLADDER, PANCREAS   Preliminary Result   1. Hepatic steatosis. 2. Unremarkable gallbladder. CT ABDOMEN PELVIS W IV CONTRAST Additional Contrast? None   Final Result   1. No acute process within the abdomen or pelvis. 2. No acute bowel inflammation or obstruction. 3. Stable bilateral renal cysts. 4. Patient status post hysterectomy. XR CHEST PORTABLE   Final Result   No acute cardiopulmonary disease.                  Assessment/Plan:    Acute pancreatitis  Triglycerides 163  Previous episode of medication induced pancreatitis  RUQ u/s WNL  CT of the abdomen pelvis without any acute abnormality  Continue

## 2021-06-16 NOTE — PROGRESS NOTES
Physician Progress Note      Kelly Veronica  CSN #:                  [id number]  :                       1955  ADMIT DATE:       2021 3:30 PM  [address] [place] DATE:  RESPONDING  PROVIDER #:        Abigail Yoo MD          QUERY TEXT:    Patient admitted with Acute Pancreatitis. Noted documentation of Sepsis in H&P   21 and SIRS in progress note 6/15/21 . If possible, please document in progress notes and discharge summary if you   are evaluating and /or treating any of the following: The medical record reflects the following:  Risk Factors: Acute Pancreatitis Vomiting DM2  Clinical Indicators:   20-32   101.6   87% RA  94% on 3l NC   procalcitonin 0.46, CT \"Mild bibasilar atelectasis vs early infiltrates or   scarring which is more prominent on the right\"  Treatment: In ED 2.3 L NS Bolus IV Cardizem gtt  IV Zosyn IV Vanco  Options provided:  -- Sepsis confirmed and SIRS ruled out  -- SIRS confirmed and Sepsis ruled out  -- Other - I will add my own diagnosis  -- Disagree - Not applicable / Not valid  -- Disagree - Clinically unable to determine / Unknown  -- Refer to Clinical Documentation Reviewer    PROVIDER RESPONSE TEXT:    After study, SIRS confirmed and Sepsis ruled out.     Query created by: Melissa Ghosh on 2021 1:58 PM      Electronically signed by:  Abigail Yoo MD 2021 2:07 PM

## 2021-06-16 NOTE — PLAN OF CARE
Problem: Pain:  Goal: Pain level will decrease  Description: Pain level will decrease  6/16/2021 0319 by Brianna Frank RN  Outcome: Ongoing     Problem: Pain:  Goal: Control of acute pain  Description: Control of acute pain  6/16/2021 0319 by Brianna Frank RN  Outcome: Ongoing     Problem: Pain:  Goal: Control of chronic pain  Description: Control of chronic pain  6/16/2021 0319 by Brianna Frank RN  Outcome: Ongoing     Problem: Falls - Risk of:  Goal: Will remain free from falls  Description: Will remain free from falls  Outcome: Ongoing     Problem: Falls - Risk of:  Goal: Absence of physical injury  Description: Absence of physical injury  Outcome: Ongoing

## 2021-06-16 NOTE — PROGRESS NOTES
Patient has an order for 72 units of Lantus to be given. Pt is currently NPO with a blood sugar of 87. I sent secure message to [facility] NP and am holding order per her instruction.

## 2021-06-16 NOTE — PROGRESS NOTES
Clinical Pharmacy Note  Vancomycin Consult    Alexandra Mahmood is a 78 y.o. female ordered Vancomycin for sepsis; consult received from Dr. Shasta Dixon to manage therapy. Also receiving Zosyn. Patient Active Problem List   Diagnosis    Pelvic pain in female    Adnexal mass    Hypersomnia    DM (diabetes mellitus), type 2, uncontrolled ([facility]    Hypoxia    Idiopathic acute pancreatitis without infection or necrosis       Allergies:  Hydrocodone     Temp max:  Temp (24hrs), Av.8 °F (36.6 °C), Min:97.5 °F (36.4 °C), Max:98.2 °F (36.8 °C)      Recent Labs     21  1526 06/15/21  0534   WBC 10.2 7.3       Recent Labs     21  1526 06/15/21  0534   BUN 16 13   CREATININE 1.0 0.8         Intake/Output Summary (Last 24 hours) at 2021 0651  Last data filed at 6/15/2021 2253  Gross per 24 hour   Intake 0 ml   Output 2050 ml   Net -2050 ml       Ht Readings from Last 1 Encounters:   21 4' 11\" (1.499 m)        Wt Readings from Last 1 Encounters:   06/15/21 180 lb 5.4 oz (81.8 kg)         CrCl cannot be calculated (Unknown ideal weight. ). Assessment/Plan:  Vanc trough = 9.5mg/L    Will increase to vancomycin 1000 mg IV every 12 hours. Regimen projects a trough level of 15-20 mg/L. Next trough ordered for 0700 on 21. Thank you for the consult.      [facility], [address] [place] [address]  2021 6:51 AM

## 2021-06-17 VITALS
TEMPERATURE: 97.9 F | RESPIRATION RATE: 16 BRPM | WEIGHT: 176.81 LBS | BODY MASS INDEX: 35.64 KG/M2 | HEART RATE: 69 BPM | SYSTOLIC BLOOD PRESSURE: 165 MMHG | OXYGEN SATURATION: 91 % | DIASTOLIC BLOOD PRESSURE: 74 MMHG | HEIGHT: 59 IN

## 2021-06-17 LAB
A/G RATIO: 1 (ref 1.1–2.2)
ALBUMIN SERPL-MCNC: 3.1 G/DL (ref 3.4–5)
ALP BLD-CCNC: 66 U/L (ref 40–129)
ALT SERPL-CCNC: 25 U/L (ref 10–40)
ANION GAP SERPL CALCULATED.3IONS-SCNC: 13 MMOL/L (ref 3–16)
AST SERPL-CCNC: 20 U/L (ref 15–37)
BILIRUB SERPL-MCNC: 0.7 MG/DL (ref 0–1)
BUN BLDV-MCNC: 5 MG/DL (ref 7–20)
C-REACTIVE PROTEIN: 10.6 MG/L (ref 0–5.1)
CALCIUM SERPL-MCNC: 8.3 MG/DL (ref 8.3–10.6)
CHLORIDE BLD-SCNC: 109 MMOL/L (ref 99–110)
CO2: 17 MMOL/L (ref 21–32)
CREAT SERPL-MCNC: 0.6 MG/DL (ref 0.6–1.2)
GFR AFRICAN AMERICAN: >60
GFR NON-AFRICAN AMERICAN: >60
GLOBULIN: 3.1 G/DL
GLUCOSE BLD-MCNC: 104 MG/DL (ref 70–99)
GLUCOSE BLD-MCNC: 93 MG/DL (ref 70–99)
GLUCOSE BLD-MCNC: 97 MG/DL (ref 70–99)
GLUCOSE BLD-MCNC: 99 MG/DL (ref 70–99)
PERFORMED ON: NORMAL
POTASSIUM SERPL-SCNC: 3.4 MMOL/L (ref 3.5–5.1)
PROCALCITONIN: 0.13 NG/ML (ref 0–0.15)
SODIUM BLD-SCNC: 139 MMOL/L (ref 136–145)
TOTAL PROTEIN: 6.2 G/DL (ref 6.4–8.2)

## 2021-06-17 PROCEDURE — 6370000000 HC RX 637 (ALT 250 FOR IP): Performed by: INTERNAL MEDICINE

## 2021-06-17 PROCEDURE — 80053 COMPREHEN METABOLIC PANEL: CPT

## 2021-06-17 PROCEDURE — 6370000000 HC RX 637 (ALT 250 FOR IP): Performed by: HOSPITALIST

## 2021-06-17 PROCEDURE — 94760 N-INVAS EAR/PLS OXIMETRY 1: CPT

## 2021-06-17 PROCEDURE — 36415 COLL VENOUS BLD VENIPUNCTURE: CPT

## 2021-06-17 PROCEDURE — 6360000002 HC RX W HCPCS: Performed by: HOSPITALIST

## 2021-06-17 PROCEDURE — 2580000003 HC RX 258: Performed by: HOSPITALIST

## 2021-06-17 PROCEDURE — 6360000002 HC RX W HCPCS: Performed by: NURSE PRACTITIONER

## 2021-06-17 PROCEDURE — 86140 C-REACTIVE PROTEIN: CPT

## 2021-06-17 PROCEDURE — 6370000000 HC RX 637 (ALT 250 FOR IP): Performed by: NURSE PRACTITIONER

## 2021-06-17 PROCEDURE — 84145 PROCALCITONIN (PCT): CPT

## 2021-06-17 RX ORDER — DILTIAZEM HYDROCHLORIDE 120 MG/1
120 CAPSULE, COATED, EXTENDED RELEASE ORAL DAILY
Qty: 30 CAPSULE | Refills: 3 | Status: SHIPPED | OUTPATIENT
Start: 2021-06-18 | End: 2022-01-14

## 2021-06-17 RX ADMIN — CETIRIZINE HYDROCHLORIDE 10 MG: 10 TABLET, FILM COATED ORAL at 08:33

## 2021-06-17 RX ADMIN — CITALOPRAM HYDROBROMIDE 40 MG: 20 TABLET ORAL at 08:33

## 2021-06-17 RX ADMIN — GUAIFENESIN 600 MG: 600 TABLET ORAL at 08:33

## 2021-06-17 RX ADMIN — SODIUM CHLORIDE, PRESERVATIVE FREE 10 ML: 5 INJECTION INTRAVENOUS at 08:33

## 2021-06-17 RX ADMIN — HYDRALAZINE HYDROCHLORIDE 10 MG: 20 INJECTION INTRAMUSCULAR; INTRAVENOUS at 08:33

## 2021-06-17 RX ADMIN — PIPERACILLIN AND TAZOBACTAM 3375 MG: 3; .375 INJECTION, POWDER, LYOPHILIZED, FOR SOLUTION INTRAVENOUS at 02:06

## 2021-06-17 RX ADMIN — APIXABAN 5 MG: 5 TABLET, FILM COATED ORAL at 08:32

## 2021-06-17 RX ADMIN — DILTIAZEM HYDROCHLORIDE 120 MG: 120 CAPSULE, COATED, EXTENDED RELEASE ORAL at 08:33

## 2021-06-17 RX ADMIN — PIPERACILLIN AND TAZOBACTAM 3375 MG: 3; .375 INJECTION, POWDER, LYOPHILIZED, FOR SOLUTION INTRAVENOUS at 08:33

## 2021-06-17 RX ADMIN — ASPIRIN 81 MG: 81 TABLET, CHEWABLE ORAL at 08:32

## 2021-06-17 ASSESSMENT — PAIN SCALES - GENERAL
PAINLEVEL_OUTOF10: 0

## 2021-06-17 NOTE — PLAN OF CARE
Problem: Falls - Risk of:  Goal: Will remain free from falls  Description: Will remain free from falls  Outcome: Ongoing  Note: Fall risk assessment completed every shift. All precautions in place. Pt has call light within reach at all times. Room clear of clutter. Pt aware to call for assistance when getting up. Goal: Absence of physical injury  Description: Absence of physical injury  Outcome: Ongoing     Problem:  Bowel/Gastric:  Goal: Control of bowel function will improve  Description: Control of bowel function will improve  Outcome: Ongoing  Goal: Ability to achieve a regular elimination pattern will improve  Description: Ability to achieve a regular elimination pattern will improve  Outcome: Ongoing

## 2021-06-17 NOTE — DISCHARGE SUMMARY
Hospital Medicine Discharge Summary    Patient ID: Alex Cevallos      Patient's PCP: Tutu Canales    Admit Date: 6/14/2021     Discharge Date:   6/17/21    Admitting Physician: Lisa Chang MD     Discharge Physician: Abran Wilder MD     Discharge Diagnoses: Active Hospital Problems    Diagnosis Date Noted    Idiopathic acute pancreatitis without infection or necrosis [K85.00] 06/14/2021       The patient was seen and examined on day of discharge and this discharge summary is in conjunction with any daily progress note from day of discharge. Hospital Course:     78 y.o. female with history of CAD, DM, obesity, presents following onset of abdominal pain yesterday morning. She woke up around 2 am, noted onset of epigastric sharp pain that radiated to her back. She had one episode of non bloody/ non bilious emesis consisting of previous evening's dinner. She had anorexia throughout the day, was able to tolerate some PO that wasn't associated with acute worsening of her abdominal pain. She denies chest pain, palpitations, cough, fever, chills. She does note some ROSS over the past 2 months, and recalls completing her second dose of covid vaccination the end of last month. Abd pain persisted prompting ER consultation where during her work up she required 3 lpm NC O2. EKG done in ED showed new onset Afib with RVR. Cardizem IV started and cardiology consulted. Patient was transition from IV Cardizem to oral Cardizem with good rate control. She was also started on Eliquis for blood thinning agent. Patient was able to tolerate a clear liquid then a full liquid and then a low-fat diet without issue. Patient is stable for discharge home with outpatient follow-up with cardiology.     Acute pancreatitis  Triglycerides 163  Previous episode of medication induced pancreatitis  RUQ u/s WNL  CT of the abdomen pelvis without any acute abnormality  Continue IVFs  Advancing diet to clear liquids  IV prn as needed for Pain      albuterol sulfate HFA (VENTOLIN HFA) 108 (90 Base) MCG/ACT inhaler Inhale 2 puffs into the lungs every 4 hours as needed for Wheezing or Shortness of Breath      melatonin 3 MG TABS tablet Take 3-6 mg by mouth nightly as needed (insomnia)      citalopram (CELEXA) 40 MG tablet Take 40 mg by mouth daily      fenofibrate (TRIGLIDE) 160 MG tablet Take 160 mg by mouth daily      meloxicam (MOBIC) 15 MG tablet Take 15 mg by mouth daily      pioglitazone (ACTOS) 30 MG tablet Take 30 mg by mouth daily      insulin aspart (NOVOLOG) 100 UNIT/ML injection vial Inject 25 Units into the skin 3 times daily (before meals)      terconazole (TERAZOL 3) 0.8 % vaginal cream Place 1 applicator vaginally nightly Place vaginally nightly. empagliflozin (JARDIANCE) 25 MG tablet Take 25 mg by mouth daily      methocarbamol (ROBAXIN) 500 MG tablet Take 500 mg by mouth 3 times daily as needed      loratadine (CLARITIN) 10 MG tablet Take 10 mg by mouth daily      lisinopril-hydroCHLOROthiazide (ZESTORETIC) 20-12.5 MG per tablet Take 1 tablet by mouth 2 times daily      metoprolol tartrate (LOPRESSOR) 50 MG tablet Take 100 mg by mouth 2 times daily      mirabegron (MYRBETRIQ) 25 MG TB24 Take 25 mg by mouth daily      insulin glargine (LANTUS) 100 UNIT/ML injection vial Inject 72 Units into the skin nightly      aspirin 81 MG tablet Take 81 mg by mouth daily             No future appointments. Time Spent on discharge is more than 30 minutes in the examination, evaluation, counseling and review of medications and discharge plan. Signed:    Janee Nagel MD   6/17/2021      Thank you Alexandrea Nguyễn for the opportunity to be involved in this patient's care. If you have any questions or concerns please feel free to contact me at [phone].

## 2021-06-17 NOTE — CARE COORDINATION
DISCHARGE SUMMARY     DATE OF DISCHARGE: 6/17/2021    DISCHARGE DESTINATION: Home with family support    TRANSPORTATION: Daughter to transport later today    Time: TBD    COMMENTS: Met with patient. Patient ready to DC home today. Denies home needs. Family to transport.   Electronically signed by Yamila Elam RN Case Management [phone] on 6/17/2021 at 9:27 AM

## 2021-06-17 NOTE — PROGRESS NOTES
Telemetry monitor removed, AVS reviewed with patient, emphasis placed on new medications Eliquis and Cardizem as well as follow-up care. Patient verbalized understanding of all instructions. She is awaiting medications from retail pharmacy and ride home from family member at this time.

## 2021-06-17 NOTE — PROGRESS NOTES
CLINICAL PHARMACY NOTE: MEDS TO BEDS    Total # of Prescriptions Filled: 1   The following medications were delivered to the patient:                   -Eliquis 5mg     Additional Documentation:

## 2021-06-18 LAB
BLOOD CULTURE, ROUTINE: NORMAL
CULTURE, BLOOD 2: NORMAL

## 2021-07-21 ENCOUNTER — HOSPITAL ENCOUNTER (EMERGENCY)
Age: 66
Discharge: HOME | End: 2021-07-21
Attending: STUDENT IN AN ORGANIZED HEALTH CARE EDUCATION/TRAINING PROGRAM
Payer: MEDICARE

## 2021-07-21 VITALS
SYSTOLIC BLOOD PRESSURE: 159 MMHG | OXYGEN SATURATION: 94 % | DIASTOLIC BLOOD PRESSURE: 83 MMHG | RESPIRATION RATE: 18 BRPM | HEART RATE: 83 BPM | BODY MASS INDEX: 35.27 KG/M2 | TEMPERATURE: 98.2 F | WEIGHT: 174.6 LBS

## 2021-07-21 DIAGNOSIS — M70.52 BURSITIS OF LEFT KNEE, UNSPECIFIED BURSA: Primary | ICD-10-CM

## 2021-07-21 PROCEDURE — 6370000000 HC RX 637 (ALT 250 FOR IP): Performed by: STUDENT IN AN ORGANIZED HEALTH CARE EDUCATION/TRAINING PROGRAM

## 2021-07-21 PROCEDURE — 99283 EMERGENCY DEPT VISIT LOW MDM: CPT

## 2021-07-21 PROCEDURE — 99282 EMERGENCY DEPT VISIT SF MDM: CPT

## 2021-07-21 RX ORDER — LIDOCAINE 4 G/G
1 PATCH TOPICAL DAILY
Qty: 30 PATCH | Refills: 0 | Status: SHIPPED | OUTPATIENT
Start: 2021-07-21 | End: 2021-08-20

## 2021-07-21 RX ORDER — LIDOCAINE 4 G/G
1 PATCH TOPICAL ONCE
Status: DISCONTINUED | OUTPATIENT
Start: 2021-07-21 | End: 2021-07-21 | Stop reason: HOSPADM

## 2021-07-21 RX ORDER — ACETAMINOPHEN 500 MG
1000 TABLET ORAL ONCE
Status: COMPLETED | OUTPATIENT
Start: 2021-07-21 | End: 2021-07-21

## 2021-07-21 RX ORDER — ACETAMINOPHEN 500 MG
1000 TABLET ORAL EVERY 6 HOURS PRN
Qty: 180 TABLET | Refills: 0 | Status: SHIPPED | OUTPATIENT
Start: 2021-07-21

## 2021-07-21 RX ADMIN — ACETAMINOPHEN 1000 MG: 500 TABLET ORAL at 19:43

## 2021-07-21 ASSESSMENT — PAIN SCALES - GENERAL
PAINLEVEL_OUTOF10: 9
PAINLEVEL_OUTOF10: 9

## 2021-07-21 ASSESSMENT — PAIN DESCRIPTION - ORIENTATION: ORIENTATION: LEFT

## 2021-07-21 ASSESSMENT — PAIN DESCRIPTION - LOCATION: LOCATION: KNEE

## 2021-07-22 NOTE — ED PROVIDER NOTES
[address] [facility]      Pt Name: Estelita Stanley  MRN: [id number]  [place] 1955  Date of evaluation: 7/21/2021  Provider: Casie Zimmerman MD    CHIEF COMPLAINT       Chief Complaint   Patient presents with    Knee Pain     pt c/o left knee pain \"for a couple weeks\". pt. states that the pain have gotten worse today. HISTORY OF PRESENT ILLNESS   (Location/Symptom, Timing/Onset,Context/Setting, Quality, Duration, Modifying Factors, Severity)  Note limiting factors. Estelita Stanley is a 78 y.o. female who presents to the emergency department complaint of left knee pain for the past 2 weeks, onset gradual, progressively worse, localized to the left lateral knee, rated 9 out of 10 in severity, nothing she is taking at home is helping. Denies fevers, chills, nausea, vomiting. Denies recent surgeries or operations on the knee. Described as throbbing, worse with walking, she is able to range the knee completely however. Denies sensory loss, focal weakness. Symptoms not otherwise alleviated or exacerbated by other factors. NursingNotes were reviewed. REVIEW OF SYSTEMS    (2-9 systems for level 4, 10 or more for level 5)       Constitutional: No fever or chills. Eye: No visual disturbances. No eye pain. Ear/Nose/Mouth/Throat: No nasal congestion. No sore throat. Respiratory: No cough, No shortness of breath, No sputum production. Cardiovascular: No chest pain. No palpitations. Gastrointestinal: No abdominal pain. No nausea or vomiting  Genitourinary: No dysuria. No hematuria. Hematology/Lymphatics: No bleeding or bruising tendency. Immunologic: No malaise. No swollen glands. Musculoskeletal: No back pain. Left knee pain as in HPI. Integumentary: No rash. No abrasions. Neurologic: No headache. No focal numbness or weakness.       PAST MEDICAL HISTORY     Past Medical History:   Diagnosis Date    CAD (coronary artery disease)     Depression     Diabetes mellitus ([facility] [address]     Hyperlipidemia     Hypertension          SURGICALHISTORY       Past Surgical History:   Procedure Laterality Date    HYSTERECTOMY           CURRENT MEDICATIONS       Discharge Medication List as of 7/21/2021  8:16 PM      CONTINUE these medications which have NOT CHANGED    Details   apixaban (ELIQUIS) 5 MG TABS tablet Take 1 tablet by mouth 2 times daily, Disp-60 tablet, R-3Normal      dilTIAZem (CARDIZEM CD) 120 MG extended release capsule Take 1 capsule by mouth daily, Disp-30 capsule, R-3Normal      vitamin D (CHOLECALCIFEROL) 25 MCG (1000 UT) TABS tablet Take 1,000 Units by mouth dailyHistorical Med      diclofenac sodium (VOLTAREN) 1 % GEL Apply 2 g topically 4 times daily as needed for Pain, Topical, 4 TIMES DAILY PRN, Historical Med      albuterol sulfate HFA (VENTOLIN HFA) 108 (90 Base) MCG/ACT inhaler Inhale 2 puffs into the lungs every 4 hours as needed for Wheezing or Shortness of BreathHistorical Med      melatonin 3 MG TABS tablet Take 3-6 mg by mouth nightly as needed (insomnia)Historical Med      citalopram (CELEXA) 40 MG tablet Take 40 mg by mouth dailyHistorical Med      fenofibrate (TRIGLIDE) 160 MG tablet Take 160 mg by mouth dailyHistorical Med      meloxicam (MOBIC) 15 MG tablet Take 15 mg by mouth dailyHistorical Med      pioglitazone (ACTOS) 30 MG tablet Take 30 mg by mouth dailyHistorical Med      insulin aspart (NOVOLOG) 100 UNIT/ML injection vial Inject 25 Units into the skin 3 times daily (before meals)Historical Med      terconazole (TERAZOL 3) 0.8 % vaginal cream Place 1 applicator vaginally nightly Place vaginally nightly. Historical Med      empagliflozin (JARDIANCE) 25 MG tablet Take 25 mg by mouth dailyHistorical Med      methocarbamol (ROBAXIN) 500 MG tablet Take 500 mg by mouth 3 times daily as neededHistorical Med      loratadine (CLARITIN) 10 MG tablet Take 10 mg by mouth dailyHistorical Med lisinopril-hydroCHLOROthiazide (ZESTORETIC) 20-12.5 MG per tablet Take 1 tablet by mouth 2 times dailyHistorical Med      mirabegron (MYRBETRIQ) 25 MG TB24 Take 25 mg by mouth dailyHistorical Med      insulin glargine (LANTUS) 100 UNIT/ML injection vial Inject 72 Units into the skin nightlyHistorical Med      aspirin 81 MG tablet Take 81 mg by mouth daily             ALLERGIES     Hydrocodone    FAMILY HISTORY     No family history on file. SOCIAL HISTORY       Social History     Socioeconomic History    Marital status: Single     Spouse name: Not on file    Number of children: Not on file    Years of education: Not on file    Highest education level: Not on file   Occupational History    Not on file   Tobacco Use    Smoking status: Never Smoker   Substance and Sexual Activity    Alcohol use: No    Drug use: No    Sexual activity: Not on file   Other Topics Concern    Not on file   Social History Narrative    Not on file     Social Determinants of Health     Financial Resource Strain:     Difficulty of Paying Living Expenses:    Food Insecurity:     Worried About Running Out of Food in the Last Year:     [address] in the Last Year:    Transportation Needs:     Lack of Transportation (Medical):      Lack of Transportation (Non-Medical):    Physical Activity:     Days of Exercise per Week:     Minutes of Exercise per Session:    Stress:     Feeling of Stress :    Social Connections:     Frequency of Communication with Friends and Family:     Frequency of Social Gatherings with Friends and Family:     Attends Yazdanism Services:     Active Member of Clubs or Organizations:     Attends Club or Organization Meetings:     Marital Status:    Intimate Partner Violence:     Fear of Current or Ex-Partner:     Emotionally Abused:     Physically Abused:     Sexually Abused:        SCREENINGS             PHYSICAL EXAM    (up to 7 for level 4, 8 or more for level 5)     ED Triage Vitals BP Temp Temp Source Pulse Resp SpO2 Height Weight   21 1808 21 1808 21 1808 21 18021 18021 180 -- 21   (!) 159/83 98.2 °F (36.8 °C) Oral 83 18 94 %  174 lb 9.7 oz (79.2 kg)       General: Alert and oriented appropriately for age, No acute distress. Eye: Normal conjunctiva. Pupils equal and reactive. HENT: Oral mucosa is moist.  Respiratory: Respirations even and non-labored. Cardiovascular: Normal rate, Regular rhythm. Gastrointestinal: Soft, Non-tender, Non-distended. Physical Exam  Musculoskeletal:      Left knee: No swelling, deformity, effusion, erythema, ecchymosis, lacerations, bony tenderness or crepitus. Normal range of motion (has pain with ROM however but able to range from 0-110). Tenderness present over the lateral joint line. No LCL laxity or MCL laxity. Normal alignment and normal meniscus. Normal pulse. Instability Tests: Anterior drawer test negative. Posterior drawer test negative. Anterior Lachman test negative. Integumentary: Warm, Dry. No rashes, no erythema. Neurologic: Alert and appropriate for age. No focal deficits. Psychiatric: Cooperative. DIAGNOSTIC RESULTS         EMERGENCY DEPARTMENT COURSE and DIFFERENTIAL DIAGNOSIS/MDM:   Vitals:    Vitals:    21 18021   BP:  (!) 159/83   Pulse:  83   Resp:  18   Temp:  98.2 °F (36.8 °C)   TempSrc:  Oral   SpO2:  94%   Weight: 174 lb 9.7 oz (79.2 kg)          Medical decision makin-year-old female with left lateral knee pain, tender to palpation over the lateral aspect of the knee, no evidence of trauma to the knee externally, no history of trauma reported by the patient, knee is stable to varus, valgus, Lachman. Unlikely meniscal tear given absence of trauma. Most likely bursitis versus arthritis flare, able to range fully and has been ambulatory in the ED, afebrile, not septic arthritis. Requires pain control.   Patient is on Eliquis so ibuprofen and other NSAIDs are contraindicated. Narcotics not indicated. Given Tylenol, lidocaine patch, outpatient follow-up. Given d/c instructions and return precautions, pt voices understanding. D/c home, ambulated steadily from the ED. Medications   acetaminophen (TYLENOL) tablet 1,000 mg (1,000 mg Oral Given 7/21/21 1943)              FINAL IMPRESSION      1.  Bursitis of left knee, unspecified bursa          DISPOSITION/PLAN   DISPOSITION Decision To Discharge 07/21/2021 08:17:50 PM      PATIENT REFERRED TO:  Abran Hairston [facility]  [address]  [phone]    In 1 week      [facility] Emergency Department  [address]  [phone]    If symptoms worsen    Rene Richmond MD  [address] [facility], [address]  [phone]            DISCHARGE MEDICATIONS:  Discharge Medication List as of 7/21/2021  8:16 PM      START taking these medications    Details   acetaminophen (TYLENOL) 500 MG tablet Take 2 tablets by mouth every 6 hours as needed for Pain, Disp-180 tablet, R-0Print      lidocaine 4 % external patch Place 1 patch onto the skin daily Apply to L lateral knee daily, Transdermal, DAILY Starting Wed 7/21/2021, Until Fri 8/20/2021, For 30 days, Disp-30 patch, R-0, Print                (Please note that portions of this note were completed with a voice recognition program.Efforts were made to edit the dictations but occasionally words are mis-transcribed.)    Eder Sotelo MD (electronically signed)  Attending Emergency Physician          Eder Sotelo MD  07/22/21 4834

## 2021-09-28 ENCOUNTER — TELEPHONE (OUTPATIENT)
Dept: CARDIOLOGY CLINIC | Age: 66
End: 2021-09-28

## 2021-09-28 NOTE — TELEPHONE ENCOUNTER
Patient is scheduled to see Monica Saldivar CNP on Thursday for hospital follow up she needs to see Dr. Stefanie Whitney. Please contact patient and move to Dr. Ivett Claudio schedule next available.

## 2021-10-01 NOTE — PROGRESS NOTES
Cardiac Electrophysiology Consultation   Date: 10/4/2021  Reason for Consultation: Atrial fibrillation  Consult Requesting Physician: Garry Reyes  Orthopedic surgeon: Karoline Bolanos MD [facility])    Chief Complaint:   Chief Complaint   Patient presents with    Follow-Up from Hospital     afib - no cardiac complaints        HPI: Agueda Westfall is a 78 y.o. patient with a history of CAD, DM and atrial fibrillation. Atrial fibrillation was first diagnosed in 6/14/2021 while hospitalized at [facility] for pancreatitis. Interval History: Today, she presents to office accompanied by her daughter for hospital follow up to discuss treatment options for her atrial fibrillation & atrial flutter. She is compliant with her medications and tolerating them well. She denies chest pain/pressure, tightness, edema, shortness of breath, heart racing, palpitations, lightheadedness, dizziness, syncope, presyncope,  PND or orthopnea. She states that she is also needing clearance for upcoming knee replacement surgery on 10/7/2021. Past Medical History:   Diagnosis Date    CAD (coronary artery disease)     Depression     Diabetes mellitus ([facility] [address]     Hyperlipidemia     Hypertension         Past Surgical History:   Procedure Laterality Date    HYSTERECTOMY         Allergies: Allergies   Allergen Reactions    Hydrocodone Itching       Medication:   Prior to Admission medications    Medication Sig Start Date End Date Taking?  Authorizing Provider   apixaban (ELIQUIS) 5 MG TABS tablet Take 1 tablet by mouth 2 times daily 10/4/21  Yes Francisco J Butts MD   acetaminophen (TYLENOL) 500 MG tablet Take 2 tablets by mouth every 6 hours as needed for Pain 7/21/21  Yes Ciro Rosario MD   dilTIAZem (CARDIZEM CD) 120 MG extended release capsule Take 1 capsule by mouth daily 6/18/21  Yes Ortiz Anna MD   vitamin D (CHOLECALCIFEROL) 25 MCG (1000 UT) TABS tablet Take 1,000 Units by mouth daily Yes Historical Provider, MD   diclofenac sodium (VOLTAREN) 1 % GEL Apply 2 g topically 4 times daily as needed for Pain   Yes Historical Provider, MD   albuterol sulfate HFA (VENTOLIN HFA) 108 (90 Base) MCG/ACT inhaler Inhale 2 puffs into the lungs every 4 hours as needed for Wheezing or Shortness of Breath   Yes Historical Provider, MD   melatonin 3 MG TABS tablet Take 3-6 mg by mouth nightly as needed (insomnia)   Yes Historical Provider, MD   citalopram (CELEXA) 40 MG tablet Take 40 mg by mouth daily   Yes Historical Provider, MD   fenofibrate (TRIGLIDE) 160 MG tablet Take 160 mg by mouth daily   Yes Historical Provider, MD   meloxicam (MOBIC) 15 MG tablet Take 15 mg by mouth daily   Yes Historical Provider, MD   pioglitazone (ACTOS) 30 MG tablet Take 30 mg by mouth daily   Yes Historical Provider, MD   insulin aspart (NOVOLOG) 100 UNIT/ML injection vial Inject 25 Units into the skin 3 times daily (before meals)   Yes Historical Provider, MD   terconazole (TERAZOL 3) 0.8 % vaginal cream Place 1 applicator vaginally nightly Place vaginally nightly. Yes Historical Provider, MD   empagliflozin (JARDIANCE) 25 MG tablet Take 25 mg by mouth daily   Yes Historical Provider, MD   methocarbamol (ROBAXIN) 500 MG tablet Take 500 mg by mouth 3 times daily as needed   Yes Historical Provider, MD   loratadine (CLARITIN) 10 MG tablet Take 10 mg by mouth daily   Yes Historical Provider, MD   lisinopril-hydroCHLOROthiazide (ZESTORETIC) 20-12.5 MG per tablet Take 1 tablet by mouth 2 times daily   Yes Historical Provider, MD   mirabegron (MYRBETRIQ) 25 MG TB24 Take 25 mg by mouth daily   Yes Historical Provider, MD   insulin glargine (LANTUS) 100 UNIT/ML injection vial Inject 72 Units into the skin nightly   Yes Historical Provider, MD   aspirin 81 MG tablet Take 81 mg by mouth daily   Yes Historical Provider, MD       Social History:   reports that she has never smoked.  She has never used smokeless tobacco. She reports that she does not drink alcohol and does not use drugs. Family History:  family history is not on file. Reviewed. Denies family history of sudden cardiac death, arrhythmia, premature CAD    Review of System:    · General ROS: negative for - chills, fever   · Psychological ROS: negative for - anxiety or depression  · Ophthalmic ROS: negative for - eye pain or loss of vision  · ENT ROS: negative for - epistaxis, headaches, nasal discharge, sore throat   · Allergy and Immunology ROS: negative for - hives, nasal congestion   · Hematological and Lymphatic ROS: negative for - bleeding problems, blood clots, bruising or jaundice  · Endocrine ROS: negative for - skin changes, temperature intolerance or unexpected weight changes  · Respiratory ROS: negative for - cough, hemoptysis, pleuritic pain, SOB, sputum changes or wheezing  · Cardiovascular ROS: Per HPI. · Gastrointestinal ROS: negative for - abdominal pain, blood in stools, diarrhea, hematemesis, melena, nausea/vomiting or swallowing difficulty/pain  · Genito-Urinary ROS: negative for - dysuria or incontinence  · Musculoskeletal ROS: negative for - joint swelling or muscle pain  · Neurological ROS: negative for - confusion, dizziness, gait disturbance, headaches, numbness/tingling, seizures, speech problems, tremors, visual changes or weakness  · Dermatological ROS: negative for - rash    Physical Examination:  Vitals:    10/04/21 1046   BP: (!) 150/92   Pulse: 118   SpO2:        · Constitutional: Oriented. No distress. · Head: Normocephalic and atraumatic. · Mouth/Throat: Oropharynx is clear and moist.   · Eyes: Conjunctivae normal. EOM are normal.   · Neck: Normal range of motion. Neck supple. No rigidity. No JVD present. · Cardiovascular: Tachy rate, irregular rhythm, S1&S2 and intact distal pulses. · Pulmonary/Chest: Bilateral respiratory sounds. No wheezes. No rhonchi. · Abdominal: Soft. Bowel sounds present. No distension, No tenderness. · Musculoskeletal: No tenderness. No edema    · Lymphadenopathy: Has no cervical adenopathy. · Neurological: Alert and oriented. Cranial nerve appears intact, No Gross deficit   · Skin: Skin is warm and dry. No rash noted. · Psychiatric: Has a normal mood, affect and behavior     Labs:  Reviewed. ECG: 10/4/2021 probable sinus tachycardia with RI prolongation or else atrial tachycardia with v-rate of 118 bpm.     Studies:   1. Event monitor:       2. Echo: 6/15/2021  Summary  The left atrium is mildly dilated. Normal LV size and wall motion. EF is  60%. Indeterminate diastolic function. The right ventricle is normal in size and function. Trivial Mitral and Tricuspid regurgitation. LA volume/Index: 65.1 ml /37 ml/m2     3. Stress Test:  9/4/2020  SUMMARY:     1. No ECG changes to suggest ischemia following administration of  Lexiscan. 2. Myocardial perfusion imaging was performed in conjunction and will be reported separately. 4. Cath:     I independently reviewed and interpreted the ECG, MCOT, echocardiogram, stress test, and coronary angiography/PCI results and used them for my plan of care. Procedures:  1. Assessment/Plan:        Paroxysmal  atrial fibrillation  -First seen on telemetry 6/14/2021  -EKG today shows probable sinus tachycardia with RI prolongation or else atrial tachycardia, v-rate 106 bpm.  -She has a [id number] score of 5 (HTN, AGE, vascular disease, gender)   -On Eliquis 5mg BID for thromboembolic prophylaxis. We educated the patient that atrial fibrillation is a worsening and progressive disease, with more frequent episodes that will ensue. Subsequent episodes usually become more sustained to the extent that many individuals would then develop persistent atrial fibrillation. Once persistence is reached, permanent atrial fibrillation is inevitable.  We also discussed the fact that atrial fibrillation is associated with stroke, including life-threatening stroke, given the option of postponing their procedure. The patient was also presented reasonable alternatives to the proposed care, treatment, and services. The discussion I have had with the patient encompassed risks, benefits, and side effects related to the alternatives and the risks related to not receiving the proposed care, treatment and services. I spent 40 minutes face to face with the patient, with greater than 50% of that time spent in counseling on the above. The patient would like some time to deliberate further regarding procedure. If she wishes to proceed, he will contact our nurse, Phill Chase at which time we will schedule for a radiofrequency ablation with [employer] Navigation system with YAHAIRA immediately prior to this ablation. We will order BMP, CBC, PT/INR, and Type & Screen prior to the procedure. A cardiac CTA will be ordered for pulmonary vein mapping prior to this procedure. Obstructive sleep apnea (JULITA)   Follows with [address] [facility] sleep clinic). Hypertension  Managed by PCP. Preoperative Risk Assessment   Presuming there are no clinical changes, the patient is considered at medium risk given her past medical history to undergo a medium risk left knee replacement surgery  according to the Revised Cardiac Risk Index (RCRI). No recommendations for cardiac testing is required at this time. The eliquis may be held for 3 days prior to the procedure and resumed as soon as safely possible. Follow ups:  · Schedule an appointment to establish care with general cardiology to establish care for CAD. · Follow up three months after procedure with Yaz Pollock CNP if he decides to proceed otherwise follow up in 1 year. Thank you for allowing me to participate in the care of Gladys Horne. All questions and concerns were addressed to the patient/family. Alternatives to my treatment were discussed.      This note was scribed in the presence of Dr. Herminia Cazares MD by Woody Guaman RN. The scribe's documentation has been prepared under my direction and personally reviewed by me in its entirety. I confirm that the note above accurately reflects all work, physical examination, the discussion of treatments and procedures, and medical decision making performed by me.     Rosemary Hanks MD, MS, [facility]  Cardiac Electrophysiology  [address]  [address] [place], [address] [facility]  [address] [address]  [phone]

## 2021-10-04 ENCOUNTER — OFFICE VISIT (OUTPATIENT)
Dept: CARDIOLOGY CLINIC | Age: 66
End: 2021-10-04
Payer: MEDICARE

## 2021-10-04 VITALS
BODY MASS INDEX: 35.88 KG/M2 | DIASTOLIC BLOOD PRESSURE: 92 MMHG | WEIGHT: 178 LBS | SYSTOLIC BLOOD PRESSURE: 150 MMHG | HEIGHT: 59 IN | OXYGEN SATURATION: 93 % | HEART RATE: 118 BPM

## 2021-10-04 DIAGNOSIS — Z01.810 PREOPERATIVE CARDIOVASCULAR EXAMINATION: ICD-10-CM

## 2021-10-04 DIAGNOSIS — I10 ESSENTIAL HYPERTENSION: ICD-10-CM

## 2021-10-04 DIAGNOSIS — I48.0 PAF (PAROXYSMAL ATRIAL FIBRILLATION) (HCC): Primary | ICD-10-CM

## 2021-10-04 DIAGNOSIS — Z79.01 CURRENT USE OF ANTICOAGULANT THERAPY: ICD-10-CM

## 2021-10-04 DIAGNOSIS — G47.33 OSA (OBSTRUCTIVE SLEEP APNEA): ICD-10-CM

## 2021-10-04 PROCEDURE — 93000 ELECTROCARDIOGRAM COMPLETE: CPT | Performed by: INTERNAL MEDICINE

## 2021-10-04 PROCEDURE — 1090F PRES/ABSN URINE INCON ASSESS: CPT | Performed by: INTERNAL MEDICINE

## 2021-10-04 PROCEDURE — 1111F DSCHRG MED/CURRENT MED MERGE: CPT | Performed by: INTERNAL MEDICINE

## 2021-10-04 PROCEDURE — G8417 CALC BMI ABV UP PARAM F/U: HCPCS | Performed by: INTERNAL MEDICINE

## 2021-10-04 PROCEDURE — G8427 DOCREV CUR MEDS BY ELIG CLIN: HCPCS | Performed by: INTERNAL MEDICINE

## 2021-10-04 PROCEDURE — G8400 PT W/DXA NO RESULTS DOC: HCPCS | Performed by: INTERNAL MEDICINE

## 2021-10-04 PROCEDURE — 3017F COLORECTAL CA SCREEN DOC REV: CPT | Performed by: INTERNAL MEDICINE

## 2021-10-04 PROCEDURE — 1036F TOBACCO NON-USER: CPT | Performed by: INTERNAL MEDICINE

## 2021-10-04 PROCEDURE — G8484 FLU IMMUNIZE NO ADMIN: HCPCS | Performed by: INTERNAL MEDICINE

## 2021-10-04 PROCEDURE — 1123F ACP DISCUSS/DSCN MKR DOCD: CPT | Performed by: INTERNAL MEDICINE

## 2021-10-04 PROCEDURE — 99215 OFFICE O/P EST HI 40 MIN: CPT | Performed by: INTERNAL MEDICINE

## 2021-10-04 PROCEDURE — 4040F PNEUMOC VAC/ADMIN/RCVD: CPT | Performed by: INTERNAL MEDICINE

## 2021-10-04 NOTE — PATIENT INSTRUCTIONS
If you have any question regarding your atrial fibrillation ablation or would like to proceed with scheduling please contact Dr. Carol Castro Nurse Lynnette Trevino at [phone]. Atrial Fibrillation Ablation Pre procedure Instructions    As part of your pre procedure work up you will need to get a CT scan of your heart. This scan is completed in order to give Dr. Ashly Hendrix a map of the atrium (chamber of your heart) where he will be doing the ablation. CTA Pre procedure instructions      Date:_________________________________    Time:_________________________________    -Arrive 20 minutes prior to scheduled testing time  -Nothing to eat or drink for at least 4 hours prior to test   -You will need to get lab work 5-7 days prior to this test (Renal panel) to check your kidney function. You will be receiving intravenous dye for the procedure and the doctor needs to check to make sure your kidneys are functioning properly prior to the test.    Atrial Fibrillation Ablation Pre procedure Instructions    Date:______________________________    Arrive at:__________________________    Procedure time:____________________    The morning of your procedure you will park in the hospital parking lot and report directly to the cath lab to check in. At the information desk stay right and go all the way to the end of the duggan, this will take you directly to your check in desk for the cath lab. Pre-Procedure Instructions   1. You will need to fast (nothing to eat or drink) for at least 8 hours prior to procedure. 2. You will need to hold your Eliquis for 12 hours prior to the procedure. 3. You will need to hold your Aspirin for 7 days prior to the procedure. 4. You will need to hold all diabetic medications including,pioglitazone (ACTOS) & empagliflozin (Ean Parra)  5. Metformin the morning of the procedure. If you take Lantus/Levemir only take ½ your normal dose the evening before.   6. Do not use any lotions, creams or perfume the atrial fibrillation. But other people don't notice any symptoms. If you have symptoms, you may feel:  · A fluttering, racing, or pounding feeling in your chest called palpitations. · Weak or tired. · Dizzy or lightheaded. · Short of breath. · Chest pain. · Confused. You may notice signs of atrial fibrillation when you check your pulse. Your pulse may seem uneven or fast.  What can you expect when you have atrial fibrillation? At first, spells of atrial fibrillation may come on suddenly and last a short time. They may go away on their own or with treatment. Over time, the spells may last longer and occur more often. They often don't go away on their own. How is it treated? Treatments can help you feel better and prevent future problems, especially stroke and heart failure. Your treatment will depend on the cause of your atrial fibrillation, your symptoms, and your risk for stroke. Types of treatment include:  · Heart rate treatment. Medicine may be used to slow your heart rate. Your heartbeat may still be irregular. But these medicines keep your heart from beating too fast. They may also help relieve symptoms. · Heart rhythm treatment. Different treatments may be used to try to stop atrial fibrillation and keep it from returning. They can also relieve symptoms. These treatments include medicine, electrical cardioversion to shock the heart back to a normal rhythm, a procedure called catheter ablation, and heart surgery. · Stroke prevention. You and your doctor can decide how to lower your risk. You may decide to take a blood-thinning medicine called an anticoagulant. What is a heart-healthy lifestyle for atrial fibrillation? You can live well and help manage atrial fibrillation by having a heart-healthy lifestyle. This lifestyle may help reduce how often you have episodes of atrial fibrillation. Don't smoke. Avoid secondhand smoke too. Quitting smoking is the best thing you can do for your heart. Be active. Talk to your doctor about what type and level of exercise is safe for you. Eat a heart-healthy diet. These foods include vegetables, fruits, nuts, beans, lean meat, fish, and whole grains. Limit sodium, alcohol, and sugar. Avoid alcohol if it triggers symptoms. Stay at a healthy weight. Lose weight if you need to. Losing weight can help relieve symptoms. Manage other health problems. These problems include diabetes, high blood pressure, and high cholesterol. If you think you may have a problem with alcohol or drug use, talk to your doctor. Manage stress. Options like yoga, biofeedback, and meditation may help. Where can you learn more? Go to https://SendMeHome.com.Terresolve Technologies. org and sign in to your [employer] account. Enter N906 in the [employer] box to learn more about \"Learning About Atrial Fibrillation. \"     If you do not have an account, please click on the \"Sign Up Now\" link. Current as of: April 29, 2021               Content Version: 13.0  © 2006-2021 [employer]. Care instructions adapted under license by [facility] ([place] [facility]). If you have questions about a medical condition or this instruction, always ask your healthcare professional. [place] [address] any warranty or liability for your use of this information. Patient Education        Atrial Fibrillation: Care Instructions  Your Care Instructions     Atrial fibrillation is an irregular and often fast heartbeat. Treating this condition is important for several reasons. It can cause blood clots, which can travel from your heart to your brain and cause a stroke. If you have a fast heartbeat, you may feel lightheaded, dizzy, and weak. An irregular heartbeat can also increase your risk for heart failure. Atrial fibrillation is often the result of another heart condition, such as high blood pressure or coronary artery disease.  Making changes to improve your heart condition will help you stay healthy and active. Follow-up care is a key part of your treatment and safety. Be sure to make and go to all appointments, and call your doctor if you are having problems. It's also a good idea to know your test results and keep a list of the medicines you take. How can you care for yourself at home? Medicines    · Take your medicines exactly as prescribed. Call your doctor if you think you are having a problem with your medicine. You will get more details on the specific medicines your doctor prescribes.     · If your doctor has given you a blood thinner to prevent a stroke, be sure you get instructions about how to take your medicine safely. Blood thinners can cause serious bleeding problems.     · Do not take any vitamins, over-the-counter drugs, or herbal products without talking to your doctor first.   Lifestyle changes    · Do not smoke. Smoking can increase your chance of a stroke and heart attack. If you need help quitting, talk to your doctor about stop-smoking programs and medicines. These can increase your chances of quitting for good.     · Eat a heart-healthy diet.     · Stay at a healthy weight. Lose weight if you need to.     · Limit alcohol to 2 drinks a day for men and 1 drink a day for women. Too much alcohol can cause health problems.     · Avoid colds and flu. Get a pneumococcal vaccine shot. If you have had one before, ask your doctor whether you need another dose. Get a flu shot every year. If you must be around people with colds or flu, wash your hands often. Activity    · If your doctor recommends it, get more exercise. Walking is a good choice. Bit by bit, increase the amount you walk every day. Try for at least 30 minutes on most days of the week. You also may want to swim, bike, or do other activities.  Your doctor may suggest that you join a cardiac rehabilitation program so that you can have help increasing your physical activity safely.     · Start light exercise if faint.     · Your heart rate becomes irregular.     · You can feel your heart flutter in your chest or skip heartbeats. Tell your doctor if these symptoms are new or worse. Watch closely for changes in your health, and be sure to contact your doctor if you have any problems. Where can you learn more? Go to https://chpenikole.[employer] Royalty. org and sign in to your [employer] account. Enter U020 in the [employer] box to learn more about \"Atrial Fibrillation: Care Instructions. \"     If you do not have an account, please click on the \"Sign Up Now\" link. Current as of: April 29, 2021               Content Version: 13.0  © 2006-2021 [employer]. Care instructions adapted under license by [facility] ([facility]). If you have questions about a medical condition or this instruction, always ask your healthcare professional. [place] [address] any warranty or liability for your use of this information. Patient Education        Learning About Catheter Ablation for Heart Rhythm Problems  What is catheter ablation? Catheter ablation is a procedure that treats heart rhythm problems. These problems include atrial fibrillation, supraventricular tachycardia (SVT), atrial flutter, and ventricular tachycardia. Your heart should have a strong, steady beat. That beat is controlled by the heart's electrical system. Sometimes that system misfires. This causes a heartbeat that is too fast and isn't steady. Catheter ablation is a way to get into your heart and fix the problem. Ablation is not surgery. How is catheter ablation done? Your doctor inserts thin tubes called catheters into a blood vessel in your groin, arm, or neck. Then your doctor feeds them into the heart. Wires in the catheters help the doctor find the problem areas. Then the doctor uses the wires to send energy to destroy the tiny areas of heart tissue that are causing the problems.   It may seem like a bad idea to destroy parts of your heart on purpose. But the areas that are destroyed are very tiny. They should not affect your heart's ability to do its job. You may be awake during the procedure. Or you may be asleep. The doctor will give you medicines to help you feel relaxed and to numb the areas where the catheters go in. You may feel a little uncomfortable, but you should not feel pain. What can you expect after catheter ablation? You may stay overnight in the hospital. How long you stay in the hospital depends on the type of ablation you have. Do not exercise hard or lift anything heavy for a week. You will probably be able to go back to work and to your normal routine in 1 or 2 days. You may have swelling, bruising, or a small lump around the site where the catheters went into your body. These should go away in 3 to 4 weeks. You may have to take some medicines for a while. Follow-up care is a key part of your treatment and safety. Be sure to make and go to all appointments, and call your doctor if you are having problems. It's also a good idea to know your test results and keep a list of the medicines you take. Where can you learn more? Go to https://IntheGlo.VoxPopMe. org and sign in to your [employer] account. Enter G293 in the [facility] box to learn more about \"Learning About Catheter Ablation for Heart Rhythm Problems. \"     If you do not have an account, please click on the \"Sign Up Now\" link. Current as of: April 29, 2021               Content Version: 13.0  © 2006-2021 Healthwise, Incorporated. Care instructions adapted under license by [facility] ([facility]). If you have questions about a medical condition or this instruction, always ask your healthcare professional. [place] [address] any warranty or liability for your use of this information.          Patient Education        Electrophysiology Study and Catheter Ablation: Before Your Procedure  What is an electrophysiology study and catheter ablation? An electrophysiology study is a test to see if there is a problem with your heart rhythm and to find out how to fix it. It is also called an EP study. A catheter ablation procedure is sometimes done at the same time. This procedure destroys (ablates) small areas of your heart that are causing your heart rhythm problem. The doctor puts plastic tubes called catheters into blood vessels in your groin, arm, or neck. He or she then uses an X-ray machine to guide long wires through the tubes to your heart. The doctor can use these wires to record your heart's electrical signals. If the doctor thinks your problem can be fixed with ablation, he or she can use the wires to destroy a small part of your heart tissue. This is most often done with radio waves. You will probably be awake during the procedure. But you might be asleep. The doctor will give you medicines to help you feel relaxed and to numb the areas where the catheters go in. An EP study and ablation can take 2 to 6 hours. In rare cases, it can take longer. If you have an EP study only and you don't need more treatment, you may go home the same day. But if you also have ablation, you may stay overnight in the hospital. How long you stay in the hospital depends on the type of ablation you have. Do not exercise hard or lift anything heavy for a week. You may be able to go back to work and to your normal routine in 1 or 2 days. Follow-up care is a key part of your treatment and safety. Be sure to make and go to all appointments, and call your doctor if you are having problems. It's also a good idea to know your test results and keep a list of the medicines you take. How do you prepare for the procedure? Procedures can be stressful. This information will help you understand what you can expect. And it will help you safely prepare for your procedure. Preparing for the procedure    · Be sure you have someone to take you home. Anesthesia and pain medicine will make it unsafe for you to drive or get home on your own.     · Understand exactly what procedure is planned, along with the risks, benefits, and other options.     · Tell your doctor ALL the medicines, vitamins, supplements, and herbal remedies you take. Some may increase the risk of problems during your procedure. Your doctor will tell you if you should stop taking any of them before the procedure and how soon to do it.     · If you take aspirin or some other blood thinner, ask your doctor if you should stop taking it before your procedure. Make sure that you understand exactly what your doctor wants you to do. These medicines increase the risk of bleeding.     · Make sure your doctor and the hospital have a copy of your advance directive. If you don't have one, you may want to prepare one. It lets others know your health care wishes. It's a good thing to have before any type of surgery or procedure. What happens on the day of the procedure? · Follow the instructions exactly about when to stop eating and drinking. If you don't, your procedure may be canceled. If your doctor told you to take your medicines on the day of the procedure, take them with only a sip of water.     · Take a bath or shower before you come in for your procedure. Do not apply lotions, perfumes, deodorants, or nail polish.     · Take off all jewelry and piercings. And take out contact lenses, if you wear them. At the hospital or surgery center   · Bring a picture ID.     · You will be kept comfortable and safe by your anesthesia provider. The anesthesia may make you sleep. Or it may just numb the area being worked on.     · This procedure can take 2 to 6 hours. In rare cases, it can take longer.     · After the procedure, pressure will be applied to the area where the catheter was put in your blood vessel. Then the area may be covered with a bandage or a compression device.  This will prevent bleeding.     · Nurses will check your heart rate and blood pressure. The nurse will also check the catheter site for bleeding.     · If the catheter was put in your groin, you will need to lie still and keep your leg straight for several hours. The nurse may put a weighted bag on your leg to keep it still.     · If the catheter was put in your arm, you may be able to sit up and get out of bed right away. But you will need to keep your arm still for at least 1 hour.     · You may have a bruise or a small lump where the catheter was put in your blood vessel. This is normal and will go away. When should you call your doctor? · You have questions or concerns.     · You don't understand how to prepare for your procedure.     · You become ill before the procedure (such as fever, flu, or a cold).     · You need to reschedule or have changed your mind about having the procedure. Where can you learn more? Go to https://IntroNiche.[employer]eless Networks. org and sign in to your [employer] account. Enter Q404 in the [employer] box to learn more about \"Electrophysiology Study and Catheter Ablation: Before Your Procedure. \"     If you do not have an account, please click on the \"Sign Up Now\" link. Current as of: April 29, 2021               Content Version: 13.0  © 2006-2021 Healthwise, Incorporated. Care instructions adapted under license by [facility] ([place] [facility]). If you have questions about a medical condition or this instruction, always ask your healthcare professional. [place] [address] any warranty or liability for your use of this information. Patient Education        Electrophysiology Study and Catheter Ablation: What to Expect at [address] [place] [address] Drive had an electrophysiology study for a problem with your heartbeat. You may also have had a catheter ablation to try to correct the problem. You may have swelling, bruising, or a small lump around the site where the catheters went into your body.  These should go away in 3 to 4 weeks. Do not exercise hard or lift anything heavy for a week. You may be able to go back to work and to your normal routine in 1 or 2 days. This care sheet gives you a general idea about how long it will take for you to recover. But each person recovers at a different pace. Follow the steps below to get better as quickly as possible. How can you care for yourself at home? Activity    · For 1 week, do not lift anything that would make you strain. This may include heavy grocery bags and milk containers, a heavy briefcase or backpack, cat litter or dog food bags, a vacuum , or a child.     · For 1 week, do not exercise hard or do any activity that could strain your blood vessels or the site where the catheters went into your body.     · Ask your doctor when it is okay to have sex. Diet    · You can eat your normal diet. If your stomach is upset, try bland, low-fat foods like plain rice, broiled chicken, toast, and yogurt.     · Drink plenty of fluids (unless your doctor tells you not to). Medicines    · Your doctor will tell you if and when you can restart your medicines. He or she will also give you instructions about taking any new medicines.     · If you take aspirin or some other blood thinner, ask your doctor if and when to start taking it again. Make sure that you understand exactly what your doctor wants you to do.     · Ask your doctor if you can take acetaminophen (Tylenol) for pain. Do not take aspirin for 3 days, unless your doctor says it is okay.     · Check with your doctor before you take aspirin or anti-inflammatory medicines to reduce pain and swelling. These include ibuprofen (Advil, Motrin) and naproxen (Aleve).   · Make sure you know which heart medicines to continue and which ones to stop. Ask your doctor if you aren't sure.    Catheter site care    · You can remove your bandages the day after the procedure.     · You may shower 24 to 48 hours after the procedure, if your doctor okays it. Pat the incision dry.     · Do not soak the catheter site until it is healed. Don't take a bath for 1 week, or until your doctor tells you it is okay.     · Watch for bleeding from the site. A small amount of blood (up to the size of a quarter) on the bandage can be normal.     · If you are bleeding, lie down and press on the area for 15 minutes to try to make it stop. If the bleeding does not stop, call your doctor or seek immediate medical care. Follow-up care is a key part of your treatment and safety. Be sure to make and go to all appointments, and call your doctor if you are having problems. It's also a good idea to know your test results and keep a list of the medicines you take. When should you call for help? Call 911  anytime you think you may need emergency care. For example, call if:    · You passed out (lost consciousness).     · You have symptoms of a heart attack. These may include:  ? Chest pain or pressure, or a strange feeling in the chest.  ? Sweating. ? Shortness of breath. ? Nausea or vomiting. ? Pain, pressure, or a strange feeling in the back, neck, jaw, or upper belly, or in one or both shoulders or arms. ? Lightheadedness or sudden weakness. ? A fast or irregular heartbeat. After you call 911, the  may tell you to chew 1 adult-strength or 2 to 4 low-dose aspirin. Wait for an ambulance. Do not try to drive yourself.     · You have symptoms of a stroke. These may include:  ? Sudden numbness, tingling, weakness, or loss of movement in your face, arm, or leg, especially on only one side of your body. ? Sudden vision changes. ? Sudden trouble speaking. ? Sudden confusion or trouble understanding simple statements. ? Sudden problems with walking or balance. ? A sudden, severe headache that is different from past headaches.    Call your doctor now or seek immediate medical care if:    · You are bleeding from the area where the catheter was put in your blood vessel.     · You have a fast-growing, painful lump at the catheter site.     · You have signs of infection, such as:  ? Increased pain, swelling, warmth, or redness. ? Red streaks leading from the catheter site. ? Pus draining from the catheter site. ? A fever.     · Your leg, arm, or hand is painful, looks blue, or feels cold, numb, or tingly. Watch closely for any changes in your health, and be sure to contact your doctor if you have any problems. Where can you learn more? Go to https://Weekend-a-gogopeGaleno Plus.SportEmp.com. org and sign in to your [employer] account. Enter [phone] in the [employer] box to learn more about \"Electrophysiology Study and Catheter Ablation: What to Expect at Home. \"     If you do not have an account, please click on the \"Sign Up Now\" link. Current as of: April 29, 2021               Content Version: 13.0  © 2006-2021 Healthwise, Incorporated. Care instructions adapted under license by [facility] ([facility]). If you have questions about a medical condition or this instruction, always ask your healthcare professional. [place] [address] any warranty or liability for your use of this information.

## 2021-10-14 ENCOUNTER — TELEPHONE (OUTPATIENT)
Dept: CARDIOLOGY CLINIC | Age: 66
End: 2021-10-14

## 2021-10-14 NOTE — LETTER
[address] [facility]  Phone: [phone]  Fax: [phone]    October 15, 2021    Daksha Jerez  [facility]    Dear Evangelina Larson:    Atrial Fibrillation Ablation Pre procedure Instructions     Date: 12-  Arrive at: 10:00 am  Procedure time: 11:30 am     The morning of your procedure you will park in the hospital parking lot and report directly to the cath lab to check in. At the information desk stay right and go all the way to the end of the duggan, this will take you directly to your check in desk for the cath lab.     Pre-Procedure Instructions   1. You will need to fast (nothing to eat or drink) for at least 8 hours prior to procedure. 2. You will need to hold your Eliquis for 12 hours prior to the procedure. 3. You will need to hold your Aspirin for 7 days prior to the procedure. 4. You will need to hold all diabetic medications including,pioglitazone (ACTOS) & empagliflozin (JARDIANCE) the morning of the procedure. If you take Lantus/Levemir only take ½ your normal dose the evening before. 5. Do not use any lotions, creams or perfume the morning of procedure. 6. You will need to complete pre-procedure lab work 4-6 days prior to your procedure. 7. You will need to get a COVID test  6 days prior to your procedure, regardless of your vaccination status. You can get you COVID test at the [facility] lab (SEE BELOW). [facility] and [facility]  offer these at no cost. Please contact the location in which you would like to get the COVID testing completed to make sure it is one of the participating location. 8. Please have a responsible adult to drive you home after procedure, you should go home same day, but there is always a possibility of an overnight stay. 9. Cath lab will provide you with all post procedure instructions  10.  A 3 month follow up will be scheduled with Dr. Aniket Friedman Nurse practitioner Selwyn Taveras CNP post procedure. If you have any questions or concerns, please don't hesitate to call. Sincerely,    Emeka Marcial MD / Meenu Jay RN             [address] [facility] Lab services  [address] [place] [address]. [address] [place] [address]  Phone: [phone]  The hours are Mon -Fri.  7:00 am  5:00 pm   No appointment necessary    You may complete pre procedure labs & COVID test at this location. Between hours of 12:30 pm & 4:00 pm      There are parking spaces behind the building designated for COVID test where you can pull up and call number listed on the pole and they will come out and complete covid test while you are in your car. THE [address]  The hours are Mon -Fri.  7:00 am  5:00 pm   Saturday 8 am to noon  No appointment necessary You may complete your lab work at this location but they do NOT have COVID testing at this site.

## 2021-10-14 NOTE — TELEPHONE ENCOUNTER
Art Olmos called in this afternoon wanting to talk to [facility]. She is wanting to schedule her Ablation.        You can reach Art Olmos at #[phone]

## 2021-10-15 DIAGNOSIS — I48.0 PAF (PAROXYSMAL ATRIAL FIBRILLATION) (HCC): ICD-10-CM

## 2021-10-15 NOTE — TELEPHONE ENCOUNTER
Called spoke with patient scheduled atrial fibrillation ablation. Atrial Fibrillation Ablation Pre procedure Instructions     Date: 12-     Arrive at: 10:00 am     Procedure time: 11:30 am     The morning of your procedure you will park in the hospital parking lot and report directly to the cath lab to check in. At the information desk stay right and go all the way to the end of the duggan, this will take you directly to your check in desk for the cath lab.     Pre-Procedure Instructions   1. You will need to fast (nothing to eat or drink) for at least 8 hours prior to procedure. 2. You will need to hold your Eliquis for 12 hours prior to the procedure. 3. You will need to hold your Aspirin for 7 days prior to the procedure. 4. You will need to hold all diabetic medications including,pioglitazone (ACTOS) & empagliflozin (JARDIANCE) the morning of the procedure. If you take Lantus/Levemir only take ½ your normal dose the evening before. 5. Do not use any lotions, creams or perfume the morning of procedure. 6. You will need to complete pre-procedure lab work 4-6 days prior to your procedure. 7. You will need to get a COVID test  6 days prior to your procedure, regardless of your vaccination status. You can get you COVID test at the [facility] lab (SEE BELOW). [facility] and [facility]  offer these at no cost. Please contact the location in which you would like to get the COVID testing completed to make sure it is one of the participating location. 8. Please have a responsible adult to drive you home after procedure, you should go home same day, but there is always a possibility of an overnight stay. 9. Cath lab will provide you with all post procedure instructions  10.  A 3 month follow up will be scheduled with Dr. Lisha Sinclair Nurse practitioner Tony Jaramillo CNP post procedure                                                  [address] [facility]  [address] [facility]. [address] [address]  Phone: [phone]  The hours are Mon -Fri.  7:00 am - 5:00 pm   No appointment necessary    You may complete pre procedure labs & COVID test at this location. Between hours of 12:30 pm & 4:00 pm      There are parking spaces behind the building designated for COVID test where you can pull up and call number listed on the pole and they will come out and complete covid test while you are in your car. THE [address]  The hours are Mon -Fri.  7:00 am - 5:00 pm   Saturday 8 am to noon  No appointment necessary You may complete your lab work at this location but they do NOT have COVID testing at this site. Case published to [employer] and form emailed to Emile Tenorio in cath lab.

## 2021-10-15 NOTE — TELEPHONE ENCOUNTER
Please schedule CTA for pulmonary vein mapping. Any day and time ok with patient. Please contact her to notify of date and time once scheduled.     CTA Pre procedure instructions       Date:_________________________________     Time:_________________________________     -Arrive 20 minutes prior to scheduled testing time  -Nothing to eat or drink for at least 4 hours prior to test   -You will need to get lab work 5-7 days prior to this test (Renal panel) to check your kidney function.  You will be receiving intravenous dye for the procedure and the doctor needs to check to make sure your kidneys are functioning properly prior to the test

## 2021-10-15 NOTE — TELEPHONE ENCOUNTER
I have Mariah Hubbard scheduled for Tuesday 10/19/2021 at 3:00 pm at [facility].     I called and went over the date/time and instructions with Mariah Hubbard, she v/u

## 2021-10-16 LAB
ABO/RH: NORMAL
ANION GAP SERPL CALCULATED.3IONS-SCNC: 17 MMOL/L (ref 3–16)
ANTIBODY SCREEN: NORMAL
BUN BLDV-MCNC: 27 MG/DL (ref 7–20)
CALCIUM SERPL-MCNC: 9.8 MG/DL (ref 8.3–10.6)
CHLORIDE BLD-SCNC: 102 MMOL/L (ref 99–110)
CO2: 22 MMOL/L (ref 21–32)
CREAT SERPL-MCNC: 1.1 MG/DL (ref 0.6–1.2)
GFR AFRICAN AMERICAN: 60
GFR NON-AFRICAN AMERICAN: 49
GLUCOSE BLD-MCNC: 207 MG/DL (ref 70–99)
HCT VFR BLD CALC: 36.8 % (ref 36–48)
HEMOGLOBIN: 12 G/DL (ref 12–16)
MCH RBC QN AUTO: 29 PG (ref 26–34)
MCHC RBC AUTO-ENTMCNC: 32.6 G/DL (ref 31–36)
MCV RBC AUTO: 89 FL (ref 80–100)
PDW BLD-RTO: 15 % (ref 12.4–15.4)
PLATELET # BLD: 463 K/UL (ref 135–450)
PMV BLD AUTO: 8 FL (ref 5–10.5)
POTASSIUM SERPL-SCNC: 4.4 MMOL/L (ref 3.5–5.1)
RBC # BLD: 4.13 M/UL (ref 4–5.2)
SODIUM BLD-SCNC: 141 MMOL/L (ref 136–145)
WBC # BLD: 8.5 K/UL (ref 4–11)

## 2021-10-19 ENCOUNTER — HOSPITAL ENCOUNTER (OUTPATIENT)
Dept: CT IMAGING | Age: 66
Discharge: HOME | End: 2021-10-19
Payer: MEDICARE

## 2021-10-19 DIAGNOSIS — I48.0 PAF (PAROXYSMAL ATRIAL FIBRILLATION) (HCC): ICD-10-CM

## 2021-10-19 PROCEDURE — 6360000004 HC RX CONTRAST MEDICATION: Performed by: INTERNAL MEDICINE

## 2021-10-19 PROCEDURE — 75574 CT ANGIO HRT W/3D IMAGE: CPT

## 2021-10-19 RX ADMIN — IOPAMIDOL 75 ML: 755 INJECTION, SOLUTION INTRAVENOUS at 14:25

## 2021-10-29 ENCOUNTER — TELEPHONE (OUTPATIENT)
Dept: CARDIOLOGY CLINIC | Age: 66
End: 2021-10-29

## 2021-12-13 ENCOUNTER — TELEPHONE (OUTPATIENT)
Dept: CARDIOLOGY CLINIC | Age: 66
End: 2021-12-13

## 2021-12-13 DIAGNOSIS — H53.9 VISION CHANGES: Primary | ICD-10-CM

## 2021-12-13 NOTE — TELEPHONE ENCOUNTER
Melody's daughter Allison Jauregui (ok per HIPAA) called in and states that Melody's Ablation is scheduled on 12/28 and after a visit at the [facility] they seem  to think she had a stroke and thinks she may need an MRI before her ablation.      Allison Jauregui can be reached back at [phone]

## 2021-12-13 NOTE — TELEPHONE ENCOUNTER
Dr. Sharon Schneider,    Patient is scheduled for ablation 12/28 and she was recently seen by the [facility] ([facility] and they think that she may have suffered a stroke. I called to speak with the patients daughter and she stated that [facility] instructed patient to see if PCP or Dr. Sharon Schneider would ordered testing to r/o stroke. The daughter states that she left a message with PCP but states she is concerned that it may take a while for them to respond as it typically does and since her ablation is coming up wanted to know if you would order testing. Please advise.     Thanks,  Flores Antony RN

## 2021-12-14 NOTE — TELEPHONE ENCOUNTER
She should be quickly referred to a stroke-specializing neurologist for further workup, whether it be MRI Head, etc. . . .

## 2021-12-14 NOTE — TELEPHONE ENCOUNTER
Dominique Dean at [phone] no answer left message advising of Dr. Corbin Brandt recommendations.

## 2021-12-14 NOTE — TELEPHONE ENCOUNTER
Melody's daughter Ed Krystle called back in this morning wanting to talk to Sonu RN.  Ed Krystle states that she still hasn't [facility] and is wanting to know if Dr. Maria Childress can refer her to a neurologist?     You can reach Anibal Dalton at #[phone]

## 2021-12-20 NOTE — TELEPHONE ENCOUNTER
Sherron Tafoya is calling in wanting to notify Dr. Tamara Adames that she is on a cancellation list for appointment to see neurology .  It is scheduled on 12/30/2021, but they are going to see if the family doctor will order the test. Sherron Tafoya states that she will know by tomorrow and will notify Neeraj Antonio RN

## 2021-12-20 NOTE — TELEPHONE ENCOUNTER
I called patient and daughter no answer left message instructing to call back with update on wheter on not she has been seen by neurology for work up for possible CVA.

## 2021-12-21 NOTE — TELEPHONE ENCOUNTER
Spoke with patients daughter she reports that patient will be having MRI at [facility]. Advised to call to update results and that I would also look for results to come through [facility] and will call her if I see them come though. Advised that if the MRI were to show that potentially suffered a stroke we would have to push back the date of her ablation procedure. Verbalized understanding.

## 2021-12-22 NOTE — TELEPHONE ENCOUNTER
[facility] MRI results. IMPRESSION:     1. No acute intracranial abnormality. No evidence of acute infarction. 2. Mild burden T2 hyperintense white matter disease which are nonspecific, may be attributed to chronic microvascular ischemia.

## 2021-12-23 DIAGNOSIS — I48.0 PAF (PAROXYSMAL ATRIAL FIBRILLATION) (HCC): ICD-10-CM

## 2021-12-24 LAB — SARS-COV-2: NOT DETECTED

## 2021-12-27 ENCOUNTER — ANESTHESIA EVENT (OUTPATIENT)
Dept: CARDIAC CATH/INVASIVE PROCEDURES | Age: 66
End: 2021-12-27

## 2021-12-27 NOTE — ANESTHESIA PRE PROCEDURE
Department of Anesthesiology  Preprocedure Note       Name:  Matthew Montalvo   Age:  78 y.o.  :  1955                                          MRN:  [id number]         Date:  2021      Surgeon: Dr. Edwige Kohli MD    Procedure: ECHOCARDIOGRAM TRANSESOPHAGEAL + ATRIAL FIBRILLATION ABLATION    Medications prior to admission:   Prior to Admission medications    Medication Sig Start Date End Date Taking?  Authorizing Provider   dilTIAZem (CARDIZEM CD) 120 MG extended release capsule Take 1 capsule by mouth daily 21  Yes Artjacob Francis MD   vitamin D (CHOLECALCIFEROL) 25 MCG (1000 UT) TABS tablet Take 1,000 Units by mouth daily   Yes Historical Provider, MD   citalopram (CELEXA) 40 MG tablet Take 40 mg by mouth daily   Yes Historical Provider, MD   fenofibrate (TRIGLIDE) 160 MG tablet Take 160 mg by mouth daily   Yes Historical Provider, MD   meloxicam (MOBIC) 15 MG tablet Take 15 mg by mouth daily   Yes Historical Provider, MD   pioglitazone (ACTOS) 30 MG tablet Take 30 mg by mouth daily   Yes Historical Provider, MD   insulin aspart (NOVOLOG) 100 UNIT/ML injection vial Inject 25 Units into the skin 3 times daily (before meals)   Yes Historical Provider, MD   empagliflozin (JARDIANCE) 25 MG tablet Take 25 mg by mouth daily   Yes Historical Provider, MD   methocarbamol (ROBAXIN) 500 MG tablet Take 500 mg by mouth 3 times daily as needed   Yes Historical Provider, MD   loratadine (CLARITIN) 10 MG tablet Take 10 mg by mouth daily   Yes Historical Provider, MD   lisinopril-hydroCHLOROthiazide (ZESTORETIC) 20-12.5 MG per tablet Take 1 tablet by mouth 2 times daily   Yes Historical Provider, MD   mirabegron (MYRBETRIQ) 25 MG TB24 Take 25 mg by mouth daily   Yes Historical Provider, MD   insulin glargine (LANTUS) 100 UNIT/ML injection vial Inject 72 Units into the skin nightly   Yes Historical Provider, MD   apixaban (ELIQUIS) 5 MG TABS tablet Take 1 tablet by mouth 2 times daily 10/4/21   Edwige Kohli MD acetaminophen (TYLENOL) 500 MG tablet Take 2 tablets by mouth every 6 hours as needed for Pain 7/21/21   Marley Lopez MD   diclofenac sodium (VOLTAREN) 1 % GEL Apply 2 g topically 4 times daily as needed for Pain    Historical Provider, MD   albuterol sulfate HFA (VENTOLIN HFA) 108 (90 Base) MCG/ACT inhaler Inhale 2 puffs into the lungs every 4 hours as needed for Wheezing or Shortness of Breath    Historical Provider, MD   melatonin 3 MG TABS tablet Take 3-6 mg by mouth nightly as needed (insomnia)    Historical Provider, MD   aspirin 81 MG tablet Take 81 mg by mouth daily    Historical Provider, MD       Current medications:    Current Outpatient Medications   Medication Sig Dispense Refill    dilTIAZem (CARDIZEM CD) 120 MG extended release capsule Take 1 capsule by mouth daily 30 capsule 3    vitamin D (CHOLECALCIFEROL) 25 MCG (1000 UT) TABS tablet Take 1,000 Units by mouth daily      citalopram (CELEXA) 40 MG tablet Take 40 mg by mouth daily      fenofibrate (TRIGLIDE) 160 MG tablet Take 160 mg by mouth daily      meloxicam (MOBIC) 15 MG tablet Take 15 mg by mouth daily      pioglitazone (ACTOS) 30 MG tablet Take 30 mg by mouth daily      insulin aspart (NOVOLOG) 100 UNIT/ML injection vial Inject 25 Units into the skin 3 times daily (before meals)      empagliflozin (JARDIANCE) 25 MG tablet Take 25 mg by mouth daily      methocarbamol (ROBAXIN) 500 MG tablet Take 500 mg by mouth 3 times daily as needed      loratadine (CLARITIN) 10 MG tablet Take 10 mg by mouth daily      lisinopril-hydroCHLOROthiazide (ZESTORETIC) 20-12.5 MG per tablet Take 1 tablet by mouth 2 times daily      mirabegron (MYRBETRIQ) 25 MG TB24 Take 25 mg by mouth daily      insulin glargine (LANTUS) 100 UNIT/ML injection vial Inject 72 Units into the skin nightly      apixaban (ELIQUIS) 5 MG TABS tablet Take 1 tablet by mouth 2 times daily 60 tablet 11    acetaminophen (TYLENOL) 500 MG tablet Take 2 tablets by mouth every 6 °C)   SpO2: 96%   Weight: 178 lb (80.7 kg)   Height: 4' 11\" (1.499 m)                                              BP Readings from Last 3 Encounters:   12/28/21 (!) 146/76   10/04/21 (!) 150/92   07/21/21 (!) 159/83       NPO Status:                                                                                 BMI:   Wt Readings from Last 3 Encounters:   12/28/21 178 lb (80.7 kg)   10/04/21 178 lb (80.7 kg)   07/21/21 174 lb 9.7 oz (79.2 kg)     Body mass index is 35.95 kg/m². CBC:   Lab Results   Component Value Date    WBC 6.2 12/28/2021    RBC 4.32 12/28/2021    HGB 12.3 12/28/2021    HCT 36.7 12/28/2021    MCV 85.1 12/28/2021    RDW 15.3 12/28/2021     12/28/2021       CMP:   Lab Results   Component Value Date     10/15/2021    K 4.4 10/15/2021    K 3.5 06/15/2021     10/15/2021    CO2 22 10/15/2021    BUN 27 10/15/2021    CREATININE 1.1 10/15/2021    GFRAA 60 10/15/2021    GFRAA >60 05/28/2013    AGRATIO 1.0 06/17/2021    LABGLOM 49 10/15/2021    GLUCOSE 207 10/15/2021    PROT 6.2 06/17/2021    PROT 7.1 02/26/2013    CALCIUM 9.8 10/15/2021    BILITOT 0.7 06/17/2021    ALKPHOS 66 06/17/2021    AST 20 06/17/2021    ALT 25 06/17/2021       POC Tests: No results for input(s): POCGLU, POCNA, POCK, POCCL, POCBUN, POCHEMO, POCHCT in the last 72 hours.     Coags: No results found for: PROTIME, INR, APTT    HCG (If Applicable): No results found for: PREGTESTUR, PREGSERUM, HCG, HCGQUANT     ABGs: No results found for: PHART, PO2ART, WZY2IJY, BLW4KHD, BEART, K0MPDFTJ     Type & Screen (If Applicable):  No results found for: LABABO, LABRH    Drug/Infectious Status (If Applicable):  Lab Results   Component Value Date    HEPCAB Non-Reactive (Negative) 02/26/2013       COVID-19 Screening (If Applicable):   Lab Results   Component Value Date    COVID19 Not Detected 12/23/2021           Anesthesia Evaluation   no history of anesthetic complications:   Airway: Mallampati: III       Comment: Excess submandibular tissue  Mouth opening: > = 3 FB Dental:      Comment: No loose teeth per patient    Pulmonary:   (+) shortness of breath (with activity):  decreased breath sounds,  asthma:     (-) sleep apnea, rhonchi, wheezes, rales and not a current smoker                           Cardiovascular:  Exercise tolerance: poor (<4 METS),   (+) hypertension:, CAD:, ROSS: no interval change, peripheral edema (1-2+, chronic LLE>RLE, s/p recent LKA), hyperlipidemia    (-) valvular problems/murmurs, orthopnea, murmur and weak pulses      Rhythm: regular  Rate: normal                 ROS comment: Echocardiogram:  Summary   The left atrium is mildly dilated. Normal LV size and wall motion. EF is 60%. Indeterminate diastolic function. The right ventricle is normal in size and function. Trivial Mitral and Tricuspid regurgitation. PE comment: Appears normal sinus on monitor when seen   Neuro/Psych:   (+) depression/anxiety    (-) seizures, TIA and CVA            ROS comment: Chronic pain GI/Hepatic/Renal:   (+) morbid obesity     (-) liver diseaseRenal disease: SCr: 1.1. Endo/Other:    (+) DiabetesType II DM, using insulin, blood dyscrasia (Eliquis, last 12/22 per patient): anticoagulation therapy:., .    (-) hypothyroidism, hyperthyroidism               Abdominal:   (+) obese,     Abdomen: soft. Vascular: Other Findings:           Anesthesia Plan      MAC     ASA 3       Induction: intravenous. MIPS: Postoperative opioids intended and Prophylactic antiemetics administered. Anesthetic plan and risks discussed with patient. Plan discussed with CRNA. This pre-anesthesia assessment may be used as a history and physical.    DOS STAFF ADDENDUM:    Pt seen and examined, chart reviewed (including anesthesia, drug and allergy history). No interval changes to history and physical examination. Anesthetic plan, risks, benefits, alternatives, and personnel involved discussed with patient.   Patient verbalized an understanding and agrees to proceed.       Evgeny Raya MD  December 28, 2021  9:24 AM

## 2021-12-28 ENCOUNTER — HOSPITAL ENCOUNTER (OUTPATIENT)
Dept: CARDIAC CATH/INVASIVE PROCEDURES | Age: 66
Discharge: HOME | End: 2021-12-28
Payer: MEDICARE

## 2021-12-28 ENCOUNTER — ANESTHESIA (OUTPATIENT)
Dept: CARDIAC CATH/INVASIVE PROCEDURES | Age: 66
End: 2021-12-28

## 2021-12-28 VITALS
TEMPERATURE: 98.2 F | SYSTOLIC BLOOD PRESSURE: 188 MMHG | OXYGEN SATURATION: 94 % | DIASTOLIC BLOOD PRESSURE: 91 MMHG | RESPIRATION RATE: 2 BRPM

## 2021-12-28 VITALS
HEART RATE: 77 BPM | OXYGEN SATURATION: 92 % | SYSTOLIC BLOOD PRESSURE: 126 MMHG | DIASTOLIC BLOOD PRESSURE: 83 MMHG | TEMPERATURE: 98.2 F | WEIGHT: 178 LBS | BODY MASS INDEX: 35.88 KG/M2 | RESPIRATION RATE: 21 BRPM | HEIGHT: 59 IN

## 2021-12-28 DIAGNOSIS — I48.92 LEFT ATRIAL FLUTTER BY ELECTROCARDIOGRAM (HCC): ICD-10-CM

## 2021-12-28 DIAGNOSIS — I48.0 PAF (PAROXYSMAL ATRIAL FIBRILLATION) (HCC): ICD-10-CM

## 2021-12-28 LAB
ABO/RH: NORMAL
ANION GAP SERPL CALCULATED.3IONS-SCNC: 12 MMOL/L (ref 3–16)
ANTIBODY SCREEN: NORMAL
BUN BLDV-MCNC: 21 MG/DL (ref 7–20)
CALCIUM SERPL-MCNC: 9.3 MG/DL (ref 8.3–10.6)
CHLORIDE BLD-SCNC: 103 MMOL/L (ref 99–110)
CO2: 23 MMOL/L (ref 21–32)
CREAT SERPL-MCNC: 0.8 MG/DL (ref 0.6–1.2)
EKG ATRIAL RATE: 75 BPM
EKG DIAGNOSIS: NORMAL
EKG P AXIS: 44 DEGREES
EKG P-R INTERVAL: 190 MS
EKG Q-T INTERVAL: 420 MS
EKG QRS DURATION: 72 MS
EKG QTC CALCULATION (BAZETT): 469 MS
EKG R AXIS: -32 DEGREES
EKG T AXIS: 51 DEGREES
EKG VENTRICULAR RATE: 75 BPM
GFR AFRICAN AMERICAN: >60
GFR NON-AFRICAN AMERICAN: >60
GLUCOSE BLD-MCNC: 182 MG/DL (ref 70–99)
GLUCOSE BLD-MCNC: 190 MG/DL (ref 70–99)
HCT VFR BLD CALC: 36.7 % (ref 36–48)
HEMOGLOBIN: 12.3 G/DL (ref 12–16)
MCH RBC QN AUTO: 28.5 PG (ref 26–34)
MCHC RBC AUTO-ENTMCNC: 33.5 G/DL (ref 31–36)
MCV RBC AUTO: 85.1 FL (ref 80–100)
PDW BLD-RTO: 15.3 % (ref 12.4–15.4)
PERFORMED ON: ABNORMAL
PLATELET # BLD: 307 K/UL (ref 135–450)
PMV BLD AUTO: 7.5 FL (ref 5–10.5)
POC ACT LR: 313 SEC
POC ACT LR: 320 SEC
POC ACT LR: 357 SEC
POTASSIUM SERPL-SCNC: 4 MMOL/L (ref 3.5–5.1)
RBC # BLD: 4.32 M/UL (ref 4–5.2)
SODIUM BLD-SCNC: 138 MMOL/L (ref 136–145)
WBC # BLD: 6.2 K/UL (ref 4–11)

## 2021-12-28 PROCEDURE — 86850 RBC ANTIBODY SCREEN: CPT

## 2021-12-28 PROCEDURE — 6360000002 HC RX W HCPCS

## 2021-12-28 PROCEDURE — 6360000002 HC RX W HCPCS: Performed by: INTERNAL MEDICINE

## 2021-12-28 PROCEDURE — 94761 N-INVAS EAR/PLS OXIMETRY MLT: CPT

## 2021-12-28 PROCEDURE — 6360000002 HC RX W HCPCS: Performed by: NURSE ANESTHETIST, CERTIFIED REGISTERED

## 2021-12-28 PROCEDURE — 2580000003 HC RX 258: Performed by: NURSE ANESTHETIST, CERTIFIED REGISTERED

## 2021-12-28 PROCEDURE — 2500000003 HC RX 250 WO HCPCS: Performed by: NURSE ANESTHETIST, CERTIFIED REGISTERED

## 2021-12-28 PROCEDURE — 2700000000 HC OXYGEN THERAPY PER DAY

## 2021-12-28 PROCEDURE — 93655 ICAR CATH ABLTJ DSCRT ARRHYT: CPT

## 2021-12-28 PROCEDURE — 85027 COMPLETE CBC AUTOMATED: CPT

## 2021-12-28 PROCEDURE — 86900 BLOOD TYPING SEROLOGIC ABO: CPT

## 2021-12-28 PROCEDURE — C1894 INTRO/SHEATH, NON-LASER: HCPCS

## 2021-12-28 PROCEDURE — 93623 PRGRMD STIMJ&PACG IV RX NFS: CPT

## 2021-12-28 PROCEDURE — 3700000000 HC ANESTHESIA ATTENDED CARE

## 2021-12-28 PROCEDURE — 2709999900 HC NON-CHARGEABLE SUPPLY

## 2021-12-28 PROCEDURE — 94640 AIRWAY INHALATION TREATMENT: CPT

## 2021-12-28 PROCEDURE — 93623 PRGRMD STIMJ&PACG IV RX NFS: CPT | Performed by: INTERNAL MEDICINE

## 2021-12-28 PROCEDURE — 93656 COMPRE EP EVAL ABLTJ ATR FIB: CPT

## 2021-12-28 PROCEDURE — 93622 COMP EP EVAL L VENTR PAC&REC: CPT | Performed by: INTERNAL MEDICINE

## 2021-12-28 PROCEDURE — 85347 COAGULATION TIME ACTIVATED: CPT

## 2021-12-28 PROCEDURE — 93613 INTRACARDIAC EPHYS 3D MAPG: CPT

## 2021-12-28 PROCEDURE — 6370000000 HC RX 637 (ALT 250 FOR IP)

## 2021-12-28 PROCEDURE — C1730 CATH, EP, 19 OR FEW ELECT: HCPCS

## 2021-12-28 PROCEDURE — C1732 CATH, EP, DIAG/ABL, 3D/VECT: HCPCS

## 2021-12-28 PROCEDURE — 2500000003 HC RX 250 WO HCPCS

## 2021-12-28 PROCEDURE — 93613 INTRACARDIAC EPHYS 3D MAPG: CPT | Performed by: INTERNAL MEDICINE

## 2021-12-28 PROCEDURE — 80048 BASIC METABOLIC PNL TOTAL CA: CPT

## 2021-12-28 PROCEDURE — 93657 TX L/R ATRIAL FIB ADDL: CPT | Performed by: INTERNAL MEDICINE

## 2021-12-28 PROCEDURE — 93662 INTRACARDIAC ECG (ICE): CPT | Performed by: INTERNAL MEDICINE

## 2021-12-28 PROCEDURE — 2580000003 HC RX 258

## 2021-12-28 PROCEDURE — 6370000000 HC RX 637 (ALT 250 FOR IP): Performed by: STUDENT IN AN ORGANIZED HEALTH CARE EDUCATION/TRAINING PROGRAM

## 2021-12-28 PROCEDURE — 3700000001 HC ADD 15 MINUTES (ANESTHESIA)

## 2021-12-28 PROCEDURE — C1759 CATH, INTRA ECHOCARDIOGRAPHY: HCPCS

## 2021-12-28 PROCEDURE — 93662 INTRACARDIAC ECG (ICE): CPT

## 2021-12-28 PROCEDURE — 93656 COMPRE EP EVAL ABLTJ ATR FIB: CPT | Performed by: INTERNAL MEDICINE

## 2021-12-28 PROCEDURE — 93622 COMP EP EVAL L VENTR PAC&REC: CPT

## 2021-12-28 PROCEDURE — 7100000000 HC PACU RECOVERY - FIRST 15 MIN

## 2021-12-28 PROCEDURE — C1893 INTRO/SHEATH, FIXED,NON-PEEL: HCPCS

## 2021-12-28 PROCEDURE — 7100000001 HC PACU RECOVERY - ADDTL 15 MIN

## 2021-12-28 PROCEDURE — 86901 BLOOD TYPING SEROLOGIC RH(D): CPT

## 2021-12-28 PROCEDURE — 93010 ELECTROCARDIOGRAM REPORT: CPT | Performed by: INTERNAL MEDICINE

## 2021-12-28 PROCEDURE — 93005 ELECTROCARDIOGRAM TRACING: CPT | Performed by: INTERNAL MEDICINE

## 2021-12-28 PROCEDURE — 93655 ICAR CATH ABLTJ DSCRT ARRHYT: CPT | Performed by: INTERNAL MEDICINE

## 2021-12-28 PROCEDURE — 93657 TX L/R ATRIAL FIB ADDL: CPT

## 2021-12-28 RX ORDER — SODIUM CHLORIDE 9 MG/ML
INJECTION, SOLUTION INTRAVENOUS CONTINUOUS
Status: DISCONTINUED | OUTPATIENT
Start: 2021-12-28 | End: 2021-12-28

## 2021-12-28 RX ORDER — SODIUM CHLORIDE 0.9 % (FLUSH) 0.9 %
5-40 SYRINGE (ML) INJECTION EVERY 12 HOURS SCHEDULED
Status: DISCONTINUED | OUTPATIENT
Start: 2021-12-28 | End: 2021-12-29 | Stop reason: HOSPADM

## 2021-12-28 RX ORDER — GLYCOPYRROLATE 0.2 MG/ML
INJECTION INTRAMUSCULAR; INTRAVENOUS PRN
Status: DISCONTINUED | OUTPATIENT
Start: 2021-12-28 | End: 2021-12-28 | Stop reason: SDUPTHER

## 2021-12-28 RX ORDER — DOBUTAMINE HYDROCHLORIDE 200 MG/100ML
INJECTION INTRAVENOUS CONTINUOUS PRN
Status: DISCONTINUED | OUTPATIENT
Start: 2021-12-28 | End: 2021-12-28 | Stop reason: SDUPTHER

## 2021-12-28 RX ORDER — DEXTROSE MONOHYDRATE 50 MG/ML
100 INJECTION, SOLUTION INTRAVENOUS PRN
Status: DISCONTINUED | OUTPATIENT
Start: 2021-12-28 | End: 2021-12-29 | Stop reason: HOSPADM

## 2021-12-28 RX ORDER — PROTAMINE SULFATE 10 MG/ML
INJECTION, SOLUTION INTRAVENOUS PRN
Status: DISCONTINUED | OUTPATIENT
Start: 2021-12-28 | End: 2021-12-28 | Stop reason: SDUPTHER

## 2021-12-28 RX ORDER — PROTAMINE SULFATE 10 MG/ML
30 INJECTION, SOLUTION INTRAVENOUS
Status: ACTIVE | OUTPATIENT
Start: 2021-12-28 | End: 2021-12-28

## 2021-12-28 RX ORDER — HEPARIN SODIUM 10000 [USP'U]/100ML
INJECTION, SOLUTION INTRAVENOUS CONTINUOUS PRN
Status: DISCONTINUED | OUTPATIENT
Start: 2021-12-28 | End: 2021-12-28 | Stop reason: SDUPTHER

## 2021-12-28 RX ORDER — ONDANSETRON 2 MG/ML
4 INJECTION INTRAMUSCULAR; INTRAVENOUS
Status: DISCONTINUED | OUTPATIENT
Start: 2021-12-28 | End: 2021-12-28

## 2021-12-28 RX ORDER — LISINOPRIL AND HYDROCHLOROTHIAZIDE 20; 12.5 MG/1; MG/1
1 TABLET ORAL 2 TIMES DAILY
Status: DISCONTINUED | OUTPATIENT
Start: 2021-12-28 | End: 2021-12-29 | Stop reason: HOSPADM

## 2021-12-28 RX ORDER — DILTIAZEM HYDROCHLORIDE 120 MG/1
120 CAPSULE, COATED, EXTENDED RELEASE ORAL DAILY
Status: DISCONTINUED | OUTPATIENT
Start: 2021-12-28 | End: 2021-12-29 | Stop reason: HOSPADM

## 2021-12-28 RX ORDER — SODIUM CHLORIDE 9 MG/ML
25 INJECTION, SOLUTION INTRAVENOUS PRN
Status: DISCONTINUED | OUTPATIENT
Start: 2021-12-28 | End: 2021-12-29 | Stop reason: HOSPADM

## 2021-12-28 RX ORDER — SUCCINYLCHOLINE/SOD CL,ISO/PF 200MG/10ML
SYRINGE (ML) INTRAVENOUS PRN
Status: DISCONTINUED | OUTPATIENT
Start: 2021-12-28 | End: 2021-12-28 | Stop reason: SDUPTHER

## 2021-12-28 RX ORDER — HEPARIN SODIUM 1000 [USP'U]/ML
INJECTION, SOLUTION INTRAVENOUS; SUBCUTANEOUS PRN
Status: DISCONTINUED | OUTPATIENT
Start: 2021-12-28 | End: 2021-12-28 | Stop reason: SDUPTHER

## 2021-12-28 RX ORDER — SODIUM CHLORIDE 9 MG/ML
INJECTION INTRAVENOUS PRN
Status: DISCONTINUED | OUTPATIENT
Start: 2021-12-28 | End: 2021-12-28 | Stop reason: SDUPTHER

## 2021-12-28 RX ORDER — ACETAMINOPHEN 325 MG/1
650 TABLET ORAL EVERY 4 HOURS PRN
Status: DISCONTINUED | OUTPATIENT
Start: 2021-12-28 | End: 2021-12-29 | Stop reason: HOSPADM

## 2021-12-28 RX ORDER — INSULIN GLARGINE 100 [IU]/ML
72 INJECTION, SOLUTION SUBCUTANEOUS NIGHTLY
Status: DISCONTINUED | OUTPATIENT
Start: 2021-12-28 | End: 2021-12-29 | Stop reason: HOSPADM

## 2021-12-28 RX ORDER — FENTANYL CITRATE 50 UG/ML
25 INJECTION, SOLUTION INTRAMUSCULAR; INTRAVENOUS EVERY 5 MIN PRN
Status: DISCONTINUED | OUTPATIENT
Start: 2021-12-28 | End: 2021-12-28

## 2021-12-28 RX ORDER — SODIUM CHLORIDE 9 MG/ML
INJECTION, SOLUTION INTRAVENOUS CONTINUOUS PRN
Status: DISCONTINUED | OUTPATIENT
Start: 2021-12-28 | End: 2021-12-28 | Stop reason: SDUPTHER

## 2021-12-28 RX ORDER — HYDRALAZINE HYDROCHLORIDE 20 MG/ML
5 INJECTION INTRAMUSCULAR; INTRAVENOUS EVERY 10 MIN PRN
Status: DISCONTINUED | OUTPATIENT
Start: 2021-12-28 | End: 2021-12-28

## 2021-12-28 RX ORDER — FLECAINIDE ACETATE 100 MG/1
50 TABLET ORAL EVERY 12 HOURS SCHEDULED
Status: DISCONTINUED | OUTPATIENT
Start: 2021-12-28 | End: 2021-12-29 | Stop reason: HOSPADM

## 2021-12-28 RX ORDER — LIDOCAINE HYDROCHLORIDE 10 MG/ML
INJECTION, SOLUTION EPIDURAL; INFILTRATION; INTRACAUDAL; PERINEURAL PRN
Status: DISCONTINUED | OUTPATIENT
Start: 2021-12-28 | End: 2021-12-28 | Stop reason: SDUPTHER

## 2021-12-28 RX ORDER — MIDAZOLAM HYDROCHLORIDE 1 MG/ML
INJECTION INTRAMUSCULAR; INTRAVENOUS PRN
Status: DISCONTINUED | OUTPATIENT
Start: 2021-12-28 | End: 2021-12-28 | Stop reason: SDUPTHER

## 2021-12-28 RX ORDER — SODIUM CHLORIDE 0.9 % (FLUSH) 0.9 %
5-40 SYRINGE (ML) INJECTION PRN
Status: DISCONTINUED | OUTPATIENT
Start: 2021-12-28 | End: 2021-12-29 | Stop reason: HOSPADM

## 2021-12-28 RX ORDER — VECURONIUM BROMIDE 1 MG/ML
INJECTION, POWDER, LYOPHILIZED, FOR SOLUTION INTRAVENOUS PRN
Status: DISCONTINUED | OUTPATIENT
Start: 2021-12-28 | End: 2021-12-28 | Stop reason: SDUPTHER

## 2021-12-28 RX ORDER — IPRATROPIUM BROMIDE AND ALBUTEROL SULFATE 2.5; .5 MG/3ML; MG/3ML
1 SOLUTION RESPIRATORY (INHALATION) ONCE
Status: COMPLETED | OUTPATIENT
Start: 2021-12-28 | End: 2021-12-28

## 2021-12-28 RX ORDER — LIDOCAINE HYDROCHLORIDE AND EPINEPHRINE BITARTRATE 20; .01 MG/ML; MG/ML
15 INJECTION, SOLUTION SUBCUTANEOUS SEE ADMIN INSTRUCTIONS
Status: DISCONTINUED | OUTPATIENT
Start: 2021-12-28 | End: 2021-12-29 | Stop reason: HOSPADM

## 2021-12-28 RX ORDER — MELOXICAM 7.5 MG/1
15 TABLET ORAL DAILY
Status: DISCONTINUED | OUTPATIENT
Start: 2021-12-28 | End: 2021-12-29 | Stop reason: HOSPADM

## 2021-12-28 RX ORDER — PROCHLORPERAZINE EDISYLATE 5 MG/ML
5 INJECTION INTRAMUSCULAR; INTRAVENOUS
Status: DISCONTINUED | OUTPATIENT
Start: 2021-12-28 | End: 2021-12-28

## 2021-12-28 RX ORDER — ALBUTEROL SULFATE 90 UG/1
2 AEROSOL, METERED RESPIRATORY (INHALATION) EVERY 4 HOURS PRN
Status: DISCONTINUED | OUTPATIENT
Start: 2021-12-28 | End: 2021-12-29 | Stop reason: HOSPADM

## 2021-12-28 RX ORDER — 0.9 % SODIUM CHLORIDE 0.9 %
1000 INTRAVENOUS SOLUTION INTRAVENOUS
Status: ACTIVE | OUTPATIENT
Start: 2021-12-28 | End: 2021-12-28

## 2021-12-28 RX ORDER — NICOTINE POLACRILEX 4 MG
15 LOZENGE BUCCAL PRN
Status: DISCONTINUED | OUTPATIENT
Start: 2021-12-28 | End: 2021-12-29 | Stop reason: HOSPADM

## 2021-12-28 RX ORDER — FLECAINIDE ACETATE 50 MG/1
50 TABLET ORAL EVERY 12 HOURS SCHEDULED
Qty: 60 TABLET | Refills: 3 | Status: SHIPPED | OUTPATIENT
Start: 2021-12-28 | End: 2022-01-14

## 2021-12-28 RX ORDER — ONDANSETRON 2 MG/ML
INJECTION INTRAMUSCULAR; INTRAVENOUS PRN
Status: DISCONTINUED | OUTPATIENT
Start: 2021-12-28 | End: 2021-12-28 | Stop reason: SDUPTHER

## 2021-12-28 RX ORDER — FUROSEMIDE 10 MG/ML
80 INJECTION INTRAMUSCULAR; INTRAVENOUS ONCE
Status: COMPLETED | OUTPATIENT
Start: 2021-12-28 | End: 2021-12-28

## 2021-12-28 RX ORDER — PROPOFOL 10 MG/ML
INJECTION, EMULSION INTRAVENOUS PRN
Status: DISCONTINUED | OUTPATIENT
Start: 2021-12-28 | End: 2021-12-28 | Stop reason: SDUPTHER

## 2021-12-28 RX ORDER — PIOGLITAZONEHYDROCHLORIDE 15 MG/1
30 TABLET ORAL DAILY
Status: DISCONTINUED | OUTPATIENT
Start: 2021-12-28 | End: 2021-12-29 | Stop reason: HOSPADM

## 2021-12-28 RX ORDER — DEXTROSE MONOHYDRATE 25 G/50ML
12.5 INJECTION, SOLUTION INTRAVENOUS PRN
Status: DISCONTINUED | OUTPATIENT
Start: 2021-12-28 | End: 2021-12-29 | Stop reason: HOSPADM

## 2021-12-28 RX ORDER — FENTANYL CITRATE 50 UG/ML
50 INJECTION, SOLUTION INTRAMUSCULAR; INTRAVENOUS EVERY 5 MIN PRN
Status: DISCONTINUED | OUTPATIENT
Start: 2021-12-28 | End: 2021-12-28

## 2021-12-28 RX ORDER — DEXAMETHASONE SODIUM PHOSPHATE 4 MG/ML
INJECTION, SOLUTION INTRA-ARTICULAR; INTRALESIONAL; INTRAMUSCULAR; INTRAVENOUS; SOFT TISSUE PRN
Status: DISCONTINUED | OUTPATIENT
Start: 2021-12-28 | End: 2021-12-28 | Stop reason: SDUPTHER

## 2021-12-28 RX ORDER — LABETALOL HYDROCHLORIDE 5 MG/ML
5 INJECTION, SOLUTION INTRAVENOUS EVERY 10 MIN PRN
Status: DISCONTINUED | OUTPATIENT
Start: 2021-12-28 | End: 2021-12-28

## 2021-12-28 RX ORDER — FENTANYL CITRATE 50 UG/ML
INJECTION, SOLUTION INTRAMUSCULAR; INTRAVENOUS PRN
Status: DISCONTINUED | OUTPATIENT
Start: 2021-12-28 | End: 2021-12-28 | Stop reason: SDUPTHER

## 2021-12-28 RX ADMIN — PROTAMINE SULFATE 150 MG: 10 INJECTION, SOLUTION INTRAVENOUS at 11:38

## 2021-12-28 RX ADMIN — SODIUM CHLORIDE: 9 INJECTION, SOLUTION INTRAVENOUS at 09:27

## 2021-12-28 RX ADMIN — IPRATROPIUM BROMIDE AND ALBUTEROL SULFATE 1 AMPULE: .5; 2.5 SOLUTION RESPIRATORY (INHALATION) at 12:11

## 2021-12-28 RX ADMIN — LIDOCAINE HYDROCHLORIDE 50 MG: 10 INJECTION, SOLUTION EPIDURAL; INFILTRATION; INTRACAUDAL; PERINEURAL at 09:32

## 2021-12-28 RX ADMIN — VECURONIUM BROMIDE 10 MG: 1 INJECTION, POWDER, LYOPHILIZED, FOR SOLUTION INTRAVENOUS at 09:36

## 2021-12-28 RX ADMIN — GLYCOPYRROLATE 0.2 MG: 0.2 INJECTION, SOLUTION INTRAMUSCULAR; INTRAVENOUS at 09:38

## 2021-12-28 RX ADMIN — Medication 120 MG: at 09:32

## 2021-12-28 RX ADMIN — PROPOFOL 50 MG: 10 INJECTION, EMULSION INTRAVENOUS at 09:38

## 2021-12-28 RX ADMIN — DOBUTAMINE HYDROCHLORIDE 10 MCG/KG/MIN: 200 INJECTION INTRAVENOUS at 11:32

## 2021-12-28 RX ADMIN — MIDAZOLAM 2 MG: 1 INJECTION INTRAMUSCULAR; INTRAVENOUS at 09:27

## 2021-12-28 RX ADMIN — Medication 8000 UNITS/HR: at 10:45

## 2021-12-28 RX ADMIN — PROPOFOL 100 MG: 10 INJECTION, EMULSION INTRAVENOUS at 09:32

## 2021-12-28 RX ADMIN — SODIUM CHLORIDE 10 ML: 9 INJECTION INTRAMUSCULAR; INTRAVENOUS; SUBCUTANEOUS at 09:36

## 2021-12-28 RX ADMIN — VECURONIUM BROMIDE 3 MG: 1 INJECTION, POWDER, LYOPHILIZED, FOR SOLUTION INTRAVENOUS at 10:57

## 2021-12-28 RX ADMIN — HEPARIN SODIUM 4000 UNITS: 1000 INJECTION INTRAVENOUS; SUBCUTANEOUS at 10:25

## 2021-12-28 RX ADMIN — FUROSEMIDE 80 MG: 10 INJECTION, SOLUTION INTRAVENOUS at 12:38

## 2021-12-28 RX ADMIN — SUGAMMADEX 200 MG: 100 INJECTION, SOLUTION INTRAVENOUS at 11:38

## 2021-12-28 RX ADMIN — FENTANYL CITRATE 100 MCG: 50 INJECTION INTRAMUSCULAR; INTRAVENOUS at 09:28

## 2021-12-28 RX ADMIN — DEXAMETHASONE SODIUM PHOSPHATE 8 MG: 4 INJECTION, SOLUTION INTRAMUSCULAR; INTRAVENOUS at 09:38

## 2021-12-28 RX ADMIN — SODIUM CHLORIDE: 9 INJECTION, SOLUTION INTRAVENOUS at 09:38

## 2021-12-28 RX ADMIN — ONDANSETRON 4 MG: 2 INJECTION INTRAMUSCULAR; INTRAVENOUS at 09:38

## 2021-12-28 RX ADMIN — HEPARIN SODIUM 4000 UNITS: 1000 INJECTION INTRAVENOUS; SUBCUTANEOUS at 10:21

## 2021-12-28 ASSESSMENT — PULMONARY FUNCTION TESTS
PIF_VALUE: 34
PIF_VALUE: 34
PIF_VALUE: 32
PIF_VALUE: 30
PIF_VALUE: 35
PIF_VALUE: 0
PIF_VALUE: 31
PIF_VALUE: 1
PIF_VALUE: 1
PIF_VALUE: 36
PIF_VALUE: 34
PIF_VALUE: 32
PIF_VALUE: 32
PIF_VALUE: 34
PIF_VALUE: 31
PIF_VALUE: 31
PIF_VALUE: 32
PIF_VALUE: 26
PIF_VALUE: 35
PIF_VALUE: 16
PIF_VALUE: 34
PIF_VALUE: 35
PIF_VALUE: 34
PIF_VALUE: 34
PIF_VALUE: 37
PIF_VALUE: 32
PIF_VALUE: 32
PIF_VALUE: 34
PIF_VALUE: 28
PIF_VALUE: 1
PIF_VALUE: 32
PIF_VALUE: 23
PIF_VALUE: 32
PIF_VALUE: 34
PIF_VALUE: 1
PIF_VALUE: 32
PIF_VALUE: 34
PIF_VALUE: 0
PIF_VALUE: 31
PIF_VALUE: 32
PIF_VALUE: 34
PIF_VALUE: 35
PIF_VALUE: 31
PIF_VALUE: 34
PIF_VALUE: 34
PIF_VALUE: 31
PIF_VALUE: 34
PIF_VALUE: 32
PIF_VALUE: 31
PIF_VALUE: 36
PIF_VALUE: 35
PIF_VALUE: 0
PIF_VALUE: 32
PIF_VALUE: 31
PIF_VALUE: 32
PIF_VALUE: 2
PIF_VALUE: 34
PIF_VALUE: 34
PIF_VALUE: 32
PIF_VALUE: 0
PIF_VALUE: 31
PIF_VALUE: 35
PIF_VALUE: 34
PIF_VALUE: 32
PIF_VALUE: 31
PIF_VALUE: 34
PIF_VALUE: 32
PIF_VALUE: 34
PIF_VALUE: 33
PIF_VALUE: 34
PIF_VALUE: 34
PIF_VALUE: 30
PIF_VALUE: 31
PIF_VALUE: 34
PIF_VALUE: 31
PIF_VALUE: 34
PIF_VALUE: 34
PIF_VALUE: 32
PIF_VALUE: 31
PIF_VALUE: 3
PIF_VALUE: 33
PIF_VALUE: 34
PIF_VALUE: 34
PIF_VALUE: 1
PIF_VALUE: 31
PIF_VALUE: 1
PIF_VALUE: 31
PIF_VALUE: 32
PIF_VALUE: 32
PIF_VALUE: 34
PIF_VALUE: 31
PIF_VALUE: 35
PIF_VALUE: 34
PIF_VALUE: 34
PIF_VALUE: 31
PIF_VALUE: 5
PIF_VALUE: 32
PIF_VALUE: 32
PIF_VALUE: 34
PIF_VALUE: 36
PIF_VALUE: 35
PIF_VALUE: 32
PIF_VALUE: 31
PIF_VALUE: 35
PIF_VALUE: 35
PIF_VALUE: 34
PIF_VALUE: 31
PIF_VALUE: 34
PIF_VALUE: 34
PIF_VALUE: 32
PIF_VALUE: 42
PIF_VALUE: 41
PIF_VALUE: 31
PIF_VALUE: 33
PIF_VALUE: 32
PIF_VALUE: 34
PIF_VALUE: 31
PIF_VALUE: 34
PIF_VALUE: 36
PIF_VALUE: 34
PIF_VALUE: 34
PIF_VALUE: 37
PIF_VALUE: 34
PIF_VALUE: 34
PIF_VALUE: 32
PIF_VALUE: 34
PIF_VALUE: 1
PIF_VALUE: 34
PIF_VALUE: 32
PIF_VALUE: 35
PIF_VALUE: 34
PIF_VALUE: 34
PIF_VALUE: 31
PIF_VALUE: 32
PIF_VALUE: 34
PIF_VALUE: 34
PIF_VALUE: 32
PIF_VALUE: 36
PIF_VALUE: 33
PIF_VALUE: 31
PIF_VALUE: 20

## 2021-12-28 ASSESSMENT — PAIN SCALES - GENERAL
PAINLEVEL_OUTOF10: 0
PAINLEVEL_OUTOF10: 0

## 2021-12-28 ASSESSMENT — ENCOUNTER SYMPTOMS
DYSPNEA ACTIVITY LEVEL: NO INTERVAL CHANGE
SHORTNESS OF BREATH: 1

## 2021-12-28 ASSESSMENT — LIFESTYLE VARIABLES: SMOKING_STATUS: 0

## 2021-12-28 NOTE — PROGRESS NOTES
Pt arrived to PACU from cath lab. Pt awake and alert, on 2l/nc. Right groin site with figure of 8 suture intact. +pulses noted.

## 2021-12-28 NOTE — ANESTHESIA POSTPROCEDURE EVALUATION
Department of Anesthesiology  Postprocedure Note    Patient: Cheryl Cifuentes  MRN: [id number]  YOB: 1955  Date of evaluation: 12/28/2021  Time:  12:48 PM     Procedure Summary     Date: 12/28/21 Room / Location: [facility] Cath Lab    Anesthesia Start: 0927 Anesthesia Stop: 7397    Procedure: ECHOCARDIOGRAM TRANSESOPHAGEAL Diagnosis:       PAF (paroxysmal atrial fibrillation) (HCC)      (To check for clots in the ARISTIDES prior to ablation.)    Scheduled Providers:  Responsible Provider: Bishop Deleon MD    Anesthesia Type: General ASA Status: 3          Anesthesia Type: General    Valerie Phase I: Valerie Score: 8    Valerie Phase II:      Last vitals: Reviewed and per EMR flowsheets. Anesthesia Post Evaluation    Patient location during evaluation: PACU  Patient participation: complete - patient participated  Level of consciousness: awake and alert  Airway patency: patent  Nausea & Vomiting: no nausea and no vomiting  Complications: no  Cardiovascular status: hemodynamically stable and blood pressure returned to baseline  Respiratory status: spontaneous ventilation and nonlabored ventilation (Wheezing remains present, but improved following duoneb. Patient reports chronic wheezing, feels like she is at her baseline.)  Hydration status: stable (lasix given as part of normal post-procedure set per Dr. Krystle Lopez)  Comments: Ms. Darryl Nayak resting comfortably supine in PACU. Appropriate for transfer to floor at this time.        Bishop Deleon MD

## 2021-12-28 NOTE — H&P
Cardiac Electrophysiology Consultation   Date: 12/28/2021  Reason for Consultation: Atrial fibrillation  Consult Requesting Physician: Celia Colon MD  Orthopedic surgeon: Yadiel Gallo MD [facility])     Chief Complaint:        Chief Complaint   Patient presents with    Follow-Up from [place] [address] - no cardiac complaints         HPI: Dwight Cabot is a 78 y.o. patient with a history of CAD, DM and atrial fibrillation. Atrial fibrillation was first diagnosed in 6/14/2021 while hospitalized at [facility] for pancreatitis.      Interval History: Today, she presents to office accompanied by her daughter for hospital follow up to discuss treatment options for her atrial fibrillation & atrial flutter. She is compliant with her medications and tolerating them well. She denies chest pain/pressure, tightness, edema, shortness of breath, heart racing, palpitations, lightheadedness, dizziness, syncope, presyncope,  PND or orthopnea. She states that she is also needing clearance for upcoming knee replacement surgery on 10/7/2021.        Past Medical History        Past Medical History:   Diagnosis Date    CAD (coronary artery disease)      Depression      Diabetes mellitus ([facility] [address]      Hyperlipidemia      Hypertension              Past Surgical History         Past Surgical History:   Procedure Laterality Date    HYSTERECTOMY                Allergies: Allergies   Allergen Reactions    Hydrocodone Itching         Medication:   Home Medications           Prior to Admission medications    Medication Sig Start Date End Date Taking?  Authorizing Provider   apixaban (ELIQUIS) 5 MG TABS tablet Take 1 tablet by mouth 2 times daily 10/4/21   Yes Ravi Iglesias MD   acetaminophen (TYLENOL) 500 MG tablet Take 2 tablets by mouth every 6 hours as needed for Pain 7/21/21   Yes Marcelina Thorne MD   dilTIAZem (CARDIZEM CD) 120 MG extended release capsule Take 1 capsule by mouth daily 6/18/21   Yes Nenita Villa MD   vitamin D (CHOLECALCIFEROL) 25 MCG (1000 UT) TABS tablet Take 1,000 Units by mouth daily     Yes Historical Provider, MD   diclofenac sodium (VOLTAREN) 1 % GEL Apply 2 g topically 4 times daily as needed for Pain     Yes Historical Provider, MD   albuterol sulfate HFA (VENTOLIN HFA) 108 (90 Base) MCG/ACT inhaler Inhale 2 puffs into the lungs every 4 hours as needed for Wheezing or Shortness of Breath     Yes Historical Provider, MD   melatonin 3 MG TABS tablet Take 3-6 mg by mouth nightly as needed (insomnia)     Yes Historical Provider, MD   citalopram (CELEXA) 40 MG tablet Take 40 mg by mouth daily     Yes Historical Provider, MD   fenofibrate (TRIGLIDE) 160 MG tablet Take 160 mg by mouth daily     Yes Historical Provider, MD   meloxicam (MOBIC) 15 MG tablet Take 15 mg by mouth daily     Yes Historical Provider, MD   pioglitazone (ACTOS) 30 MG tablet Take 30 mg by mouth daily     Yes Historical Provider, MD   insulin aspart (NOVOLOG) 100 UNIT/ML injection vial Inject 25 Units into the skin 3 times daily (before meals)     Yes Historical Provider, MD   terconazole (TERAZOL 3) 0.8 % vaginal cream Place 1 applicator vaginally nightly Place vaginally nightly.     Yes Historical Provider, MD   empagliflozin (JARDIANCE) 25 MG tablet Take 25 mg by mouth daily     Yes Historical Provider, MD   methocarbamol (ROBAXIN) 500 MG tablet Take 500 mg by mouth 3 times daily as needed     Yes Historical Provider, MD   loratadine (CLARITIN) 10 MG tablet Take 10 mg by mouth daily     Yes Historical Provider, MD   lisinopril-hydroCHLOROthiazide (ZESTORETIC) 20-12.5 MG per tablet Take 1 tablet by mouth 2 times daily     Yes Historical Provider, MD   mirabegron (MYRBETRIQ) 25 MG TB24 Take 25 mg by mouth daily     Yes Historical Provider, MD   insulin glargine (LANTUS) 100 UNIT/ML injection vial Inject 72 Units into the skin nightly     Yes Historical Provider, MD   aspirin 81 MG tablet Take 81 mg by mouth daily     Yes Historical Provider, MD            Social History:   reports that she has never smoked. She has never used smokeless tobacco. She reports that she does not drink alcohol and does not use drugs.         Family History:  family history is not on file. Reviewed. Denies family history of sudden cardiac death, arrhythmia, premature CAD     Review of System:     · General ROS: negative for - chills, fever   · Psychological ROS: negative for - anxiety or depression  · Ophthalmic ROS: negative for - eye pain or loss of vision  · ENT ROS: negative for - epistaxis, headaches, nasal discharge, sore throat   · Allergy and Immunology ROS: negative for - hives, nasal congestion   · Hematological and Lymphatic ROS: negative for - bleeding problems, blood clots, bruising or jaundice  · Endocrine ROS: negative for - skin changes, temperature intolerance or unexpected weight changes  · Respiratory ROS: negative for - cough, hemoptysis, pleuritic pain, SOB, sputum changes or wheezing  · Cardiovascular ROS: Per HPI. · Gastrointestinal ROS: negative for - abdominal pain, blood in stools, diarrhea, hematemesis, melena, nausea/vomiting or swallowing difficulty/pain  · Genito-Urinary ROS: negative for - dysuria or incontinence  · Musculoskeletal ROS: negative for - joint swelling or muscle pain  · Neurological ROS: negative for - confusion, dizziness, gait disturbance, headaches, numbness/tingling, seizures, speech problems, tremors, visual changes or weakness  · Dermatological ROS: negative for - rash     Physical Examination:      Vitals:     10/04/21 1046   BP: (!) 150/92   Pulse: 118   SpO2:           · Constitutional: Oriented. No distress. · Head: Normocephalic and atraumatic. · Mouth/Throat: Oropharynx is clear and moist.   · Eyes: Conjunctivae normal. EOM are normal.   · Neck: Normal range of motion. Neck supple. No rigidity. No JVD present.    · Cardiovascular: Tachy rate, irregular rhythm, S1&S2 and intact distal pulses. · Pulmonary/Chest: Bilateral respiratory sounds. No wheezes. No rhonchi. · Abdominal: Soft. Bowel sounds present. No distension, No tenderness. · Musculoskeletal: No tenderness. No edema    · Lymphadenopathy: Has no cervical adenopathy. · Neurological: Alert and oriented. Cranial nerve appears intact, No Gross deficit   · Skin: Skin is warm and dry. No rash noted. · Psychiatric: Has a normal mood, affect and behavior      Labs:  Reviewed.      ECG: 10/4/2021 probable sinus tachycardia with LA prolongation or else atrial tachycardia with v-rate of 118 bpm.      Studies:   1. Event monitor:         2. Echo: 6/15/2021  Summary  The left atrium is mildly dilated. Normal LV size and wall motion. EF is  60%. Indeterminate diastolic function. The right ventricle is normal in size and function. Trivial Mitral and Tricuspid regurgitation. LA volume/Index: 65.1 ml /37 ml/m2     3. Stress Test:  9/4/2020  SUMMARY:     1. No ECG changes to suggest ischemia following administration of  Lexiscan. 2. Myocardial perfusion imaging was performed in conjunction and will be reported separately.         4. Cath:      I independently reviewed and interpreted the ECG, MCOT, echocardiogram, stress test, and coronary angiography/PCI results and used them for my plan of care. Procedures:  1.      Assessment/Plan:         Paroxysmal  atrial fibrillation  -First seen on telemetry 6/14/2021  -EKG today shows probable sinus tachycardia with LA prolongation or else atrial tachycardia, v-rate 106 bpm.  -She has a [id number] score of 5 (HTN, AGE, vascular disease, gender)   -On Eliquis 5mg BID for thromboembolic prophylaxis.     We educated the patient that atrial fibrillation is a worsening and progressive disease, with more frequent episodes that will ensue. Subsequent episodes usually become more sustained to the extent that many individuals would then develop persistent atrial fibrillation.  Once persistence is reached, permanent atrial fibrillation is inevitable. We also discussed the fact that atrial fibrillation is associated with stroke, including life-threatening stroke, and therefore oral anticoagulation is warranted depending on the patient's [id number] score.      We discussed different management options for atrial fibrillation including their risks and benefits. These options include use of cardioversion (mainly for persisting atrial fibrillation or atrial flutter) which provides an effective immediate therapy with success rates of 75% or higher, but it provides no short nor long term efficacy.  Anti-arrhythmic medications provide a very effective short term therapy, but even with our most potent anti-arrhythmic medication there is limited long term efficacy (clinical studies have shown that 40% of patients remain atrial fibrillation-free after 4 years of follow-up after starting one of the more powerful anti-arrhythmic medication (amiodarone), and, if extrapolated, may have further diminishing success as time goes on). Atrial fibrillation ablation is a potentially curative therapy with very reasonable success rate after a first time procedure and with improving success rates with subsequent procedures.      The risks, benefits and alternatives of the atrial fibrillation ablation procedure were discussed with the patient. The risks including, but not limited to, bleeding, infection, radiation exposure, injury to vascular, cardiac and surrounding structures (including pneumothorax), stroke, cardiac perforation, tamponade, need for emergent open heart surgery, need for pacemaker implantation, injury to the phrenic nerve, injury to the esophagus, myocardial infarction and death were discussed in detail.  The patient was also counseled at length about the risks of angie Covid-19 in the jamil-operative and post-operative states including the recovery window of their procedure. The patient was made aware that angie Covid-19 after a surgical procedure may worsen their prognosis for recovering from the virus and lend to a higher morbidity and or mortality risk. The patient was given the option of postponing their procedure. The patient was also presented reasonable alternatives to the proposed care, treatment, and services. The discussion I have had with the patient encompassed risks, benefits, and side effects related to the alternatives and the risks related to not receiving the proposed care, treatment and services.      I spent 40 minutes face to face with the patient, with greater than 50% of that time spent in counseling on the above.     The patient would like some time to deliberate further regarding procedure. If she wishes to proceed, he will contact our nurse, Judi Gipson at which time we will schedule for a radiofrequency ablation with [employer] Navigation system with YAHAIRA immediately prior to this ablation. We will order BMP, CBC, PT/INR, and Type & Screen prior to the procedure. A cardiac CTA will be ordered for pulmonary vein mapping prior to this procedure.      Obstructive sleep apnea (JULITA)   Follows with [address] [facility] sleep clinic).     Hypertension  Managed by PCP.     Preoperative Risk Assessment   Presuming there are no clinical changes, the patient is considered at medium risk given her past medical history to undergo a medium risk left knee replacement surgery  according to the Revised Cardiac Risk Index (RCRI). No recommendations for cardiac testing is required at this time. The eliquis may be held for 3 days prior to the procedure and resumed as soon as safely possible.        Follow ups:  · Schedule an appointment to establish care with general cardiology to establish care for CAD.   · Follow up three months after procedure with Leah Perez CNP if he decides to proceed otherwise follow up in 1 year.     Thank you for allowing me to participate in the care of [facility]. All questions and concerns were addressed to the patient/family. Alternatives to my treatment were discussed.      I have reviewed the history and physical and examined the patient and find no relevant changes. I have reviewed with the patient and/or family the risks and benefits to the proposed procedure. The patient was presented with the option of postponing the proposed procedure. The patient was also presented reasonable alternatives to the proposed care, treatment, and services.  The discussion I have had with the patient encompassed risks, benefits, and side effects related to the alternatives and the risks related to not receiving the proposed care, treatment and services.      Lex Gee MD, [address], [place] [address] Electrophysiology  [address] [place] [address], [address] [place] [address]  Martin Loaiza [facility] [address]  [phone]

## 2021-12-28 NOTE — ANESTHESIA POSTPROCEDURE EVALUATION
Department of Anesthesiology  Postprocedure Note    Patient: Holden Francois  MRN: [id number]  [place]: 1955  Date of evaluation: 12/28/2021  Time:  12:02 PM     Procedure Summary     Date: 12/28/21 Room / Location: [facility] Cath Lab    Anesthesia Start: 0927 Anesthesia Stop:     Procedure: ECHOCARDIOGRAM TRANSESOPHAGEAL Diagnosis:       PAF (paroxysmal atrial fibrillation) (HCC)      (To check for clots in the ARISTIDES prior to ablation.)    Scheduled Providers:  Responsible Provider: Evgeny Raya MD    Anesthesia Type: General ASA Status: 3          Anesthesia Type: General    Valerie Phase I: Valerie: 9    Valerie Phase II:      Last vitals: Reviewed and per EMR flowsheets. Anesthesia Post Evaluation    Patient location during evaluation: PACU  Patient participation: waiting for patient participation  Level of consciousness: awake and alert and sleepy but conscious  Airway patency: patent  Nausea & Vomiting: no nausea and no vomiting  Complications: no  Cardiovascular status: hemodynamically stable and blood pressure returned to baseline  Respiratory status: spontaneous ventilation, nonlabored ventilation and nasal cannula  Hydration status: stable  Comments: Uneventful extubation and transport to PACU. Ms. Jed Hess comfortably conversing with staff following procedure. Wheeze noted on exam, duoneb ordered.       Evgeny Raya MD

## 2021-12-28 NOTE — LETTER
[address] [place] [address]  Phone: [phone]             December 27, 2021    Patient: Pietro Jackson   YOB: 1955   Date of Visit: (Not on file)       To Whom It May Concern:    Shon Tan was seen and treated in our facility  beginning (Not on file) until {DISCHARGE [facility]:[id number]}. She {Return to school/sport/work:23800}.       Sincerely,       Marge Ibrahim RN         Signature:__________________________________

## 2021-12-28 NOTE — PROCEDURES
Details of Procedure: The risks, benefits and alternatives of the ablation procedure were discussed with the patient. The risks including, but not limited to, the risks of bleeding, infection, radiation exposure, injury to vascular, cardiac and surrounding structures (including pneumothorax), stroke, cardiac perforation, tamponade, need for emergent open heart surgery, need for pacemaker implantation, esophageal injury and fistula, myocardial infarction and death were discussed in detail. The patient was also counseled at length about the risks of angie Covid-19 in the jamil-operative and post-operative states including the recovery window of their procedure. The patient was made aware that angie Covid-19 after a surgical procedure may worsen their prognosis for recovering from the virus and lend to a higher morbidity and or mortality risk. The patient was given the option of postponing their procedure. The patient was also presented reasonable alternatives to the proposed care, treatment, and services. The discussion I have had with the patient encompassed risks, benefits, and side effects related to the alternatives and the risks related to not receiving the proposed care, treatment and services. The patient opted to proceed with the ablation. Written informed consent was signed and placed in the chart. Patient was brought to the EP lab in a fasting non-sedate state. Patient underwent general anesthesia by anesthesia team. The patient was monitored continuously with ECG, pulse oximetry, blood pressure monitoring, and direct observation. No urinary carrington was placed in order to prevent any hematuria or urinary tract infection. Both groins were prepped in a sterile fashion. We gained access in the right femoral vein. One 8 Hungarian short sheath for ICE and subsequently for CS catheters were placed in the right femoral vein using modified seldinger technique.  Two 8.5F SLO sheaths were introduced into the right femoral vein using modified Seldinger technique. Then a CS cathter was placed inside the coronary sinus without fluoroscopy but with 3-D electroanatomic mapping for recording and mapping of the left atrium. Using ICE we delineated the left pulmonary vein, left atrial appendage,  mitral valve, and right superior and right inferior pulmonary veins, and the trivial amount of pericardial effusion prior to ablation. Two transeptal punctures were attempted but we found a patent foramen ovale through which, with blunt perforation using the mapping catheter, performed under intracardiac echocardiogram, pressure monitoring, and without fluoroscopic guidance, we entered the LA. Patient received a bolus of heparin shortly after each transseptal puncture followed by continuous monitoring of the ACT every 15-30 minutes, and additional boluses of heparin during the procedure to keep the ACT between 300-400 sec. We placed both SLO sheaths inside the left atrium. Also an esophageal temperature probe ([employer] Temperature Probe 12Fr) that was tied with sutures to an accompanying [employer] quadripolar 6 Mozambican 5-5-5 electrode spacing catheter was advanced into the esophagus for real-time mapping of the esophagus and careful monitoring of the esophageal temperature during ablation. Using the Penta ray cathter and Carto navigation system a three dimensional electro anatomical mapping of the left atrium, in addition to right and left sided pulmonary vein anatomy was created. During sinus rhythm, we found that all four pulmonary veins (left superior, left inferior, right superior, and right inferior) had PV fascicles, the triggers for the atrial fibrillation. With rapid atrial pacing at 250 ms, we were able to easily induce atrial fibrillation that vacillated with a left atrial flutter with TCL 268ms with straight up-down activation, most likely utilizing the roof of the LA.     We then proceeded with a pulmonary vein isolation via ablation. Wide area circumferential ablations for the left sided pulmonary veins were performed which resulted in electrical isolation of these pulmonary veins and eradication of the PV fascicles. Similarly, wide area circumferential ablations for the right sided pulmonary veins were performed which resulted in electrical isolation of these pulmonary veins and eradication of the PV fascicles. After isolating the pulmonary veins, the patient's atrial fibrillation solely organized into a left atrial flutter with TCL 268ms with straight up-down activation, most likely utilizing the roof of the LA. We therefore ablated a linear line along the roof of the left atrium. After completing such, the patient's roof-dependent left atrial flutter then organized into another left atrial flutter with TCL 255ms with distal-proximal activation, most likely utilizing the mitral isthmus. We therefore ablated a linear ablation on the mitral isthmus connecting the mitral annulus to the L posterolateral antrum lesions. As we did so, the patient's mitral-isthmus dependent left atrial flutter then degenerated into atrial fibrillation. In order to diminish the LA substrate so as to terminate the atrial fibrillation, we therefore ablated an anterior line from the mitral annulus to the roof to target a separate mechanism for the initiation and perpetuation of atrial fibrillation. As we were ablating the anterior wall, the atrial fibrillation organized into a left atrial flutter with TCL 271ms with proximal-distal activation, most likely utilizing the anterior wall of the LA. As we completed this line, the anterior wall dependent left atrial flutter terminated to sinus rhythm for the remainder of the procedure.     We then evaluated the left atrium for complex fractionated atrial electrogram, a separate mechanism that would initiate and/or perpetuate atrial fibrillation apart from the pulmonary vein fascicles and antral ablations. We found 2 sites on the septal aspect of the LA and ablated these foci. We then evaluated the base of the LA appendage for complex fractionated atrial electrogram, a seprate mechanism that would initiate and/or perpetuate atrial fibrillation apart from the pulmonary vein fascicles and antral ablations. We found 1 site on the anterior base of the LA appendage and ablated these foci. After ablation was complete, catheters were placed in the left and right atrium, His-position, right ventricle, and left ventricle for pacing and recording. Arrhythmia was attempted to be induced by rapid atrial and ventricular pacing, and there was no induction of atrial tachydysrhythmia. Maximum output (20mA) pacing in the L antrum and R antrum were also performed and showed dissociation from the rest of the left atrium. This was checked circumferentially around the antrums and verified many times. We evaluated the roof line and found that there was complete, bidirectional block. The anterior wall ablated line was evaluated and showed complete bidirectional block, as evidenced by a longer transit time on the medial aspect of the ablated line when compared to the lateral aspect of the ablated line when pacing from the LA appendage. The mitral isthmus was evaluated and showed incomplete, bidirectional block, as evidenced by a distal-proximal activation when pacing from the LA appendage. Adenosine bolus of 18mg was also given for each of the 4 pulmonary veins to assess for acute re-connection and to attempt to induce atrial fibrillation. We had adequate adenosine effect (AV blocks of > 3 seconds) and there were no pulmonary fascicles seen in any of the veins nor were there any atrial tachydysrhythmia induced.  We then administered dobutamine infusion up to 10 mcg/kg/min in order to achieve at least a 20% increase in heart rate from the basal heart rate to induce any atrial tachydysrhythmia, and there were no atrial tachydysrhythmia induced. We then performed an EP study and programmed stimulation using our CS catheter and ablation catheter to assess the cardiac conduction system and to attempt to induce atrial tachydysrhythmia. The ablation catheter were moved from the left atrium to the left ventricular apex and His bundle position. His bundle potentials was recorded and pacing and recordings were performed from right atrium, coronary sinus and LV apex with the following results:     Sinus cycle length was 826 msec  WV interval was 200 msec  QRS duration was 61 msec  QT interval was 420 msec  AH interval was 76 msec  HV interval was 43 msec  Pacing from left atrium, 1:1 conduction over AV node with (AV wenckebach) was 400  msec  Pacing from left atrium, AV radha ERP was 600/350 msec   Pacing from LV apex, 1:1 retrograde conduction over AV node (VA wenckebach) was attempted but showed VA dissociation     Then both the ablation, Pentarray, and CS catheters were removed from the body, and all 3 sheaths were removed from the right femoral vein with a figure-of-eight suture that was placed to provide hemostasis while awaiting the downtrending of the ACT. Protamine 150mg IV x 1 was administered to partially reverse the IV heparin that was administered during the atrial fibrillation ablation procedure. Under intracardiac ultrasound guidance, we evaluated for pericardial effusion, and there was no evidence of such. Specimen collected: none    Estimated blood loss: < 50 cc    The patient tolerated the procedure well and there were no complications. Patient was extubated and transferred to the floor in stable condition.      Conclusion:     - Pre- and post-procedure diagnoses were paroxysmal atrial fibrillation, roof-dependent left atrial flutter with  ms with straight up-down activation, anterior wall dependent left atrial flutter with  ms with

## 2022-01-12 PROBLEM — I25.10 CAD IN NATIVE ARTERY: Status: ACTIVE | Noted: 2022-01-12

## 2022-01-12 PROBLEM — E78.00 PURE HYPERCHOLESTEROLEMIA: Status: ACTIVE | Noted: 2022-01-12

## 2022-01-12 PROBLEM — I10 ESSENTIAL HYPERTENSION: Status: ACTIVE | Noted: 2022-01-12

## 2022-01-12 NOTE — PROGRESS NOTES
[facility]      Cardiology Consult    Anahi Schmitt  1955    January 14, 2022    Referring Physician: Dr. Brittany Tamayo  Reason for Referral: establish care, hx CAD, AF    CC: \"     \"    HPI:  The patient is 78 y.o. female with a past medical history significant for ? CAD (pt denies), DM, HTN, HLD and atrial fibrillation. Atrial fibrillation was first diagnosed in 6/14/2021 while hospitalized at [facility] for pancreatitis. Pt saw Dr. Luci Lizama and pt underwent ablation of her atrial fib 12/28/21. She has been diagnosed with macular degeneration. Pt denies tobacco use, Etoh use and rarely drinks caffeine. Her father had an MI at age 61. Today, she states that she denies episodes of racing heart beat. She continues to feel ROSS when walking post ablation which has not worsened. The patient denies exertional chest pain, palpitations, dizziness, syncope, worsening leg swelling and PND. She reports compliance with her medications.      Past Medical History:   Diagnosis Date    CAD (coronary artery disease)     Depression     Diabetes mellitus ([address]     Hyperlipidemia     Hypertension      Past Surgical History:   Procedure Laterality Date    HYSTERECTOMY       Family History   Problem Relation Age of Onset    Heart Attack Father      Social History     Tobacco Use    Smoking status: Never Smoker    Smokeless tobacco: Never Used   Vaping Use    Vaping Use: Never used   Substance Use Topics    Alcohol use: No    Drug use: No       Allergies   Allergen Reactions    Dulaglutide      Got pancreatitis from this    Hydrocodone Itching    Metformin Diarrhea     Current Outpatient Medications   Medication Sig Dispense Refill    flecainide (TAMBOCOR) 50 MG tablet Take 1 tablet by mouth every 12 hours 60 tablet 3    apixaban (ELIQUIS) 5 MG TABS tablet Take 1 tablet by mouth 2 times daily 60 tablet 11    acetaminophen (TYLENOL) 500 MG tablet Take 2 tablets by mouth every 6 hours as needed for Pain 180 tablet 0    dilTIAZem (CARDIZEM CD) 120 MG extended release capsule Take 1 capsule by mouth daily 30 capsule 3    vitamin D (CHOLECALCIFEROL) 25 MCG (1000 UT) TABS tablet Take 1,000 Units by mouth daily      diclofenac sodium (VOLTAREN) 1 % GEL Apply 2 g topically 4 times daily as needed for Pain      albuterol sulfate HFA (VENTOLIN HFA) 108 (90 Base) MCG/ACT inhaler Inhale 2 puffs into the lungs every 4 hours as needed for Wheezing or Shortness of Breath      melatonin 3 MG TABS tablet Take 3-6 mg by mouth nightly as needed (insomnia)      citalopram (CELEXA) 40 MG tablet Take 40 mg by mouth daily      fenofibrate (TRIGLIDE) 160 MG tablet Take 160 mg by mouth daily      meloxicam (MOBIC) 15 MG tablet Take 15 mg by mouth daily      pioglitazone (ACTOS) 30 MG tablet Take 30 mg by mouth daily      insulin aspart (NOVOLOG) 100 UNIT/ML injection vial Inject 25 Units into the skin 3 times daily (before meals)      empagliflozin (JARDIANCE) 25 MG tablet Take 25 mg by mouth daily      methocarbamol (ROBAXIN) 500 MG tablet Take 500 mg by mouth 3 times daily as needed      loratadine (CLARITIN) 10 MG tablet Take 10 mg by mouth daily      lisinopril-hydroCHLOROthiazide (ZESTORETIC) 20-12.5 MG per tablet Take 1 tablet by mouth 2 times daily      mirabegron (MYRBETRIQ) 25 MG TB24 Take 25 mg by mouth daily      insulin glargine (LANTUS) 100 UNIT/ML injection vial Inject 72 Units into the skin nightly       No current facility-administered medications for this visit. Review of Systems:  · Constitutional: no unanticipated weight loss. There's been no change in energy level, sleep pattern, or activity level. No fevers, chills. · Eyes: No visual changes or diplopia. No scleral icterus. · ENT: No Headaches, hearing loss or vertigo. No mouth sores or sore throat. · Cardiovascular: as reviewed in HPI  · Respiratory: No cough or wheezing, no sputum production. No hematemesis.     · Gastrointestinal: No abdominal pain, appetite loss, blood in stools. No change in bowel or bladder habits. · Genitourinary: No dysuria, trouble voiding, or hematuria. · Musculoskeletal:  No gait disturbance, no joint complaints. · Integumentary: No rash or pruritis. · Neurological: No headache, diplopia, change in muscle strength, numbness or tingling. · Psychiatric: No anxiety or depression. · Endocrine: No temperature intolerance. No excessive thirst, fluid intake, or urination. No tremor. · Hematologic/Lymphatic: No abnormal bruising or bleeding, blood clots or swollen lymph nodes. · Allergic/Immunologic: No nasal congestion or hives. Physical Exam:   /72   Pulse 50   Ht 4' 10.5\" (1.486 m)   Wt 177 lb (80.3 kg)   SpO2 96%   BMI 36.36 kg/m²   Wt Readings from Last 3 Encounters:   01/14/22 177 lb (80.3 kg)   12/28/21 178 lb (80.7 kg)   10/04/21 178 lb (80.7 kg)     Constitutional: She is oriented to person, place, and time. She appears well-developed and well-nourished. In no acute distress. Head: Normocephalic and atraumatic. Pupils equal and round. Neck: Neck supple. No JVP or carotid bruit appreciated. No mass and no thyromegaly present. No lymphadenopathy present. Cardiovascular: Bradycardic, regular. Normal heart sounds. Exam reveals no gallop and no friction rub. No murmur heard. Pulmonary/Chest: Effort normal and breath sounds normal. No respiratory distress. She has no wheezes, rhonchi or rales. Abdominal: Soft, non-tender. Bowel sounds are normal. She exhibits no organomegaly, mass or bruit. Extremities: No edema, cyanosis, or clubbing. Pulses are 2+ radial/dorsalis pedis/posterior tibial/carotid bilaterally. Neurological: No gross cranial nerve deficit. Coordination normal.   Skin: Skin is warm and dry. There is no rash or diaphoresis. Psychiatric: She has a normal mood and affect.  Her speech is normal and behavior is normal.     Lab Review:   FLP:    Lab Results   Component Value Date TRIG 163 06/14/2021    HDL 31 06/14/2021    HDL 33 03/06/2012    LDLCALC 45 06/14/2021    LDLDIRECT 74 03/06/2012    LABVLDL 33 06/14/2021     BUN/Creatinine:    Lab Results   Component Value Date    BUN 21 12/28/2021    CREATININE 0.8 12/28/2021       EKG Interpretation:   10/4/2021 probable sinus tachycardia with FL prolongation or else atrial tachycardia with v-rate of 118 bpm.   1/14/22 junctional bradycardia     Image Review:     Echo 9/2020  1. Left ventricle: The cavity size was normal. Wall thickness was      normal. Systolic function was normal. The estimated ejection      fraction was in the range of 55% to 60%. Wall motion was normal;      there were no regional wall motion abnormalities. Features are      consistent with a pseudonormal left ventricular filling pattern,      with concomitant abnormal relaxation and increased filling      pressure (grade 2 diastolic dysfunction). 2. Left atrium: The atrium was moderately dilated. 3. Right ventricle: The cavity size was normal. Wall thickness was      normal. Systolic function was normal.   4. Pericardium, extracardiac: There was no pericardial effusion    Echo: 6/15/2021  The left atrium is mildly dilated. Normal LV size and wall motion. EF is  60%. Indeterminate diastolic function. The right ventricle is normal in size and function. Trivial Mitral and Tricuspid regurgitation. LA volume/Index: 65.1 ml /37 ml/m2     3. Stress Test:  9/4/2020  1. No ECG changes to suggest ischemia following administration of  Lexiscan. 2. Stress and rest images demonstrate a moderate-sized region of mild reduction in counts involving the mid to basal anterior and anterolateral segments, relatively more apparent on the rest compared to the stress images, which demonstrate adequate wall motion and thickening on the gated images. This finding is compatible with breast attenuation artifact.  Otherwise, the other remaining segments of the left ventricular myocardium demonstrate homogeneous tracer distribution on the stress and rest images. Uniform wall motion and myocardial thickening. The left ventricular ejection fraction is 78%    Assessment/Plan:  PAF. Jerrell Brantley Patient is here as a new patient. He was diagnosed with atrial fibrillation and underwent successful cardioversion by Dr. Leah Puri on 12/28/2021. She currently denies having any chest pain shortness of breath dizziness syncope  her clinical examination is unremarkable except evidence of bradycardia. She is taking Eliquis, Flecainide and Cardizem EKG today shows junctional bradycardia. I will hold both cardizem and Flecainide. Advised to wear 7 day CAM monitor in 1 week to evaluate heart rate and rhythm. Advised to call is she has worsening symptoms. K normal.    HTN  Controlled    HLD. LDL 45 6/2021  Taking fenofibrate    DM. Managed by PCP    Chronic ROSS  Did not improve post ablation  H/H normal 12/2021  Echo showed normal LVEF. Obesity/JULITA  Noncompliant with CPAP due to cost.   Diet, exercise and weight loss discussed. F/u in office in 1 month. Complexity of medical decision making-high    Thank you very much for allowing me to participate in the care of your patient. Please do not hesitate to contact me if you have any questions. Sincerely,  Pepe Flowers MD      [address] [place] [address]  Ph: [phone]  Fax: [phone]    This note was scribed in the presence of the physician by Armani Dsouza RN.

## 2022-01-14 ENCOUNTER — OFFICE VISIT (OUTPATIENT)
Dept: CARDIOLOGY CLINIC | Age: 67
End: 2022-01-14
Payer: MEDICARE

## 2022-01-14 VITALS
DIASTOLIC BLOOD PRESSURE: 72 MMHG | HEART RATE: 50 BPM | WEIGHT: 177 LBS | SYSTOLIC BLOOD PRESSURE: 130 MMHG | HEIGHT: 59 IN | OXYGEN SATURATION: 96 % | BODY MASS INDEX: 35.68 KG/M2

## 2022-01-14 DIAGNOSIS — I48.0 PAF (PAROXYSMAL ATRIAL FIBRILLATION) (HCC): ICD-10-CM

## 2022-01-14 DIAGNOSIS — I10 ESSENTIAL HYPERTENSION: ICD-10-CM

## 2022-01-14 DIAGNOSIS — E78.00 PURE HYPERCHOLESTEROLEMIA: ICD-10-CM

## 2022-01-14 DIAGNOSIS — I25.10 CAD IN NATIVE ARTERY: Primary | ICD-10-CM

## 2022-01-14 DIAGNOSIS — R00.1 BRADYCARDIA: ICD-10-CM

## 2022-01-14 PROCEDURE — 1123F ACP DISCUSS/DSCN MKR DOCD: CPT | Performed by: INTERNAL MEDICINE

## 2022-01-14 PROCEDURE — G8427 DOCREV CUR MEDS BY ELIG CLIN: HCPCS | Performed by: INTERNAL MEDICINE

## 2022-01-14 PROCEDURE — 1036F TOBACCO NON-USER: CPT | Performed by: INTERNAL MEDICINE

## 2022-01-14 PROCEDURE — G8400 PT W/DXA NO RESULTS DOC: HCPCS | Performed by: INTERNAL MEDICINE

## 2022-01-14 PROCEDURE — 3017F COLORECTAL CA SCREEN DOC REV: CPT | Performed by: INTERNAL MEDICINE

## 2022-01-14 PROCEDURE — 99204 OFFICE O/P NEW MOD 45 MIN: CPT | Performed by: INTERNAL MEDICINE

## 2022-01-14 PROCEDURE — G8484 FLU IMMUNIZE NO ADMIN: HCPCS | Performed by: INTERNAL MEDICINE

## 2022-01-14 PROCEDURE — G8417 CALC BMI ABV UP PARAM F/U: HCPCS | Performed by: INTERNAL MEDICINE

## 2022-01-14 PROCEDURE — 1090F PRES/ABSN URINE INCON ASSESS: CPT | Performed by: INTERNAL MEDICINE

## 2022-01-14 PROCEDURE — 4040F PNEUMOC VAC/ADMIN/RCVD: CPT | Performed by: INTERNAL MEDICINE

## 2022-01-14 PROCEDURE — 93000 ELECTROCARDIOGRAM COMPLETE: CPT | Performed by: INTERNAL MEDICINE

## 2022-01-14 NOTE — PATIENT INSTRUCTIONS

## 2022-01-21 ENCOUNTER — TELEPHONE (OUTPATIENT)
Dept: CARDIOLOGY CLINIC | Age: 67
End: 2022-01-21

## 2022-01-21 ENCOUNTER — NURSE ONLY (OUTPATIENT)
Dept: CARDIOLOGY CLINIC | Age: 67
End: 2022-01-21

## 2022-01-21 DIAGNOSIS — I48.91 ATRIAL FIBRILLATION, UNSPECIFIED TYPE (HCC): Primary | ICD-10-CM

## 2022-01-21 PROCEDURE — 93242 EXT ECG>48HR<7D RECORDING: CPT | Performed by: INTERNAL MEDICINE

## 2022-01-21 NOTE — PROGRESS NOTES
Placed 7 day Bardycam monitor on patient. Explained use and return to both patient and daughter. Both verbalized and confirmed understanding.

## 2022-02-07 NOTE — PROGRESS NOTES
Patient only taking sips of clears. No nausea this shift.    AVSS    Upper ostomy bag didn't have output this shift. Lower ostomy bag had 225 ml output.   RN gave report to Pedro 5N RN.

## 2022-02-08 PROCEDURE — 93244 EXT ECG>48HR<7D REV&INTERPJ: CPT | Performed by: INTERNAL MEDICINE

## 2022-02-08 NOTE — TELEPHONE ENCOUNTER
Received CAM monitor report for Dr. Sigifrdeo Brandon review. Dr. Simran Handy has requested for Dr. Romana Bianchi (shared patient) to review for further recs. Place in Dr. Balaji Galvan folder.

## 2022-02-11 ENCOUNTER — TELEPHONE (OUTPATIENT)
Dept: CARDIOLOGY CLINIC | Age: 67
End: 2022-02-11

## 2022-02-11 NOTE — TELEPHONE ENCOUNTER
Spoke with patient advised of CAM patch results. She states she will speak with her  and if interested in seeing Dr. Lucille Hansen in clinic to discuss ILR implantation she will give us a call. Strong peripheral pulses

## 2022-02-11 NOTE — TELEPHONE ENCOUNTER
Called patient to discuss CAM results no answer left message instructing to call back regarding monitor results.

## 2022-02-11 NOTE — TELEPHONE ENCOUNTER
Spoke with patient advised of CAM patch results. She states she will speak with her  and if interested in seeing Dr. Hayes Speaker in clinic to discuss ILR implantation she will give us a call.

## 2022-04-02 ENCOUNTER — HOSPITAL ENCOUNTER (INPATIENT)
Age: 67
LOS: 5 days | Discharge: HOME | DRG: 439 | End: 2022-04-07
Attending: EMERGENCY MEDICINE | Admitting: INTERNAL MEDICINE
Payer: MEDICARE

## 2022-04-02 ENCOUNTER — APPOINTMENT (OUTPATIENT)
Dept: GENERAL RADIOLOGY | Age: 67
DRG: 439 | End: 2022-04-02
Payer: MEDICARE

## 2022-04-02 ENCOUNTER — APPOINTMENT (OUTPATIENT)
Dept: CT IMAGING | Age: 67
DRG: 439 | End: 2022-04-02
Payer: MEDICARE

## 2022-04-02 DIAGNOSIS — K85.90 ACUTE PANCREATITIS, UNSPECIFIED COMPLICATION STATUS, UNSPECIFIED PANCREATITIS TYPE: Primary | ICD-10-CM

## 2022-04-02 LAB
A/G RATIO: 1.4 (ref 1.1–2.2)
ALBUMIN SERPL-MCNC: 4.3 G/DL (ref 3.4–5)
ALP BLD-CCNC: 86 U/L (ref 40–129)
ALT SERPL-CCNC: 74 U/L (ref 10–40)
ANION GAP SERPL CALCULATED.3IONS-SCNC: 18 MMOL/L (ref 3–16)
AST SERPL-CCNC: 159 U/L (ref 15–37)
BASOPHILS ABSOLUTE: 0.2 K/UL (ref 0–0.2)
BASOPHILS RELATIVE PERCENT: 1.3 %
BILIRUB SERPL-MCNC: 0.9 MG/DL (ref 0–1)
BUN BLDV-MCNC: 33 MG/DL (ref 7–20)
CALCIUM SERPL-MCNC: 9.1 MG/DL (ref 8.3–10.6)
CHLORIDE BLD-SCNC: 99 MMOL/L (ref 99–110)
CO2: 23 MMOL/L (ref 21–32)
CREAT SERPL-MCNC: 0.9 MG/DL (ref 0.6–1.2)
EKG ATRIAL RATE: 82 BPM
EKG DIAGNOSIS: NORMAL
EKG P AXIS: 60 DEGREES
EKG P-R INTERVAL: 184 MS
EKG Q-T INTERVAL: 398 MS
EKG QRS DURATION: 72 MS
EKG QTC CALCULATION (BAZETT): 464 MS
EKG R AXIS: -28 DEGREES
EKG T AXIS: 70 DEGREES
EKG VENTRICULAR RATE: 82 BPM
EOSINOPHILS ABSOLUTE: 0.4 K/UL (ref 0–0.6)
EOSINOPHILS RELATIVE PERCENT: 2.3 %
GFR AFRICAN AMERICAN: >60
GFR NON-AFRICAN AMERICAN: >60
GLUCOSE BLD-MCNC: 115 MG/DL (ref 70–99)
GLUCOSE BLD-MCNC: 135 MG/DL (ref 70–99)
GLUCOSE BLD-MCNC: 184 MG/DL (ref 70–99)
GLUCOSE BLD-MCNC: 233 MG/DL (ref 70–99)
GLUCOSE BLD-MCNC: 255 MG/DL (ref 70–99)
HCT VFR BLD CALC: 40.6 % (ref 36–48)
HEMOGLOBIN: 13.4 G/DL (ref 12–16)
LIPASE: >3000 U/L (ref 13–60)
LYMPHOCYTES ABSOLUTE: 3 K/UL (ref 1–5.1)
LYMPHOCYTES RELATIVE PERCENT: 16.6 %
MCH RBC QN AUTO: 28 PG (ref 26–34)
MCHC RBC AUTO-ENTMCNC: 32.9 G/DL (ref 31–36)
MCV RBC AUTO: 85.1 FL (ref 80–100)
MONOCYTES ABSOLUTE: 1.3 K/UL (ref 0–1.3)
MONOCYTES RELATIVE PERCENT: 7.2 %
NEUTROPHILS ABSOLUTE: 13.4 K/UL (ref 1.7–7.7)
NEUTROPHILS RELATIVE PERCENT: 72.6 %
PDW BLD-RTO: 15.5 % (ref 12.4–15.4)
PERFORMED ON: ABNORMAL
PLATELET # BLD: 425 K/UL (ref 135–450)
PMV BLD AUTO: 7.3 FL (ref 5–10.5)
POTASSIUM REFLEX MAGNESIUM: 4.1 MMOL/L (ref 3.5–5.1)
PRO-BNP: 82 PG/ML (ref 0–124)
RBC # BLD: 4.77 M/UL (ref 4–5.2)
SODIUM BLD-SCNC: 140 MMOL/L (ref 136–145)
TOTAL PROTEIN: 7.3 G/DL (ref 6.4–8.2)
TRIGL SERPL-MCNC: 164 MG/DL (ref 0–150)
TROPONIN: <0.01 NG/ML
WBC # BLD: 18.4 K/UL (ref 4–11)

## 2022-04-02 PROCEDURE — 1200000000 HC SEMI PRIVATE

## 2022-04-02 PROCEDURE — 6360000002 HC RX W HCPCS: Performed by: INTERNAL MEDICINE

## 2022-04-02 PROCEDURE — 6370000000 HC RX 637 (ALT 250 FOR IP): Performed by: INTERNAL MEDICINE

## 2022-04-02 PROCEDURE — 36415 COLL VENOUS BLD VENIPUNCTURE: CPT

## 2022-04-02 PROCEDURE — 2580000003 HC RX 258: Performed by: INTERNAL MEDICINE

## 2022-04-02 PROCEDURE — 99222 1ST HOSP IP/OBS MODERATE 55: CPT | Performed by: SURGERY

## 2022-04-02 PROCEDURE — 96374 THER/PROPH/DIAG INJ IV PUSH: CPT

## 2022-04-02 PROCEDURE — 84484 ASSAY OF TROPONIN QUANT: CPT

## 2022-04-02 PROCEDURE — 93005 ELECTROCARDIOGRAM TRACING: CPT | Performed by: EMERGENCY MEDICINE

## 2022-04-02 PROCEDURE — 83880 ASSAY OF NATRIURETIC PEPTIDE: CPT

## 2022-04-02 PROCEDURE — 6360000002 HC RX W HCPCS: Performed by: EMERGENCY MEDICINE

## 2022-04-02 PROCEDURE — 74177 CT ABD & PELVIS W/CONTRAST: CPT

## 2022-04-02 PROCEDURE — 71045 X-RAY EXAM CHEST 1 VIEW: CPT

## 2022-04-02 PROCEDURE — 93010 ELECTROCARDIOGRAM REPORT: CPT | Performed by: INTERNAL MEDICINE

## 2022-04-02 PROCEDURE — 85025 COMPLETE CBC W/AUTO DIFF WBC: CPT

## 2022-04-02 PROCEDURE — 6360000002 HC RX W HCPCS: Performed by: SURGERY

## 2022-04-02 PROCEDURE — 80053 COMPREHEN METABOLIC PANEL: CPT

## 2022-04-02 PROCEDURE — 2580000003 HC RX 258: Performed by: EMERGENCY MEDICINE

## 2022-04-02 PROCEDURE — 96375 TX/PRO/DX INJ NEW DRUG ADDON: CPT

## 2022-04-02 PROCEDURE — 83690 ASSAY OF LIPASE: CPT

## 2022-04-02 PROCEDURE — 84478 ASSAY OF TRIGLYCERIDES: CPT

## 2022-04-02 PROCEDURE — 96376 TX/PRO/DX INJ SAME DRUG ADON: CPT

## 2022-04-02 PROCEDURE — 99284 EMERGENCY DEPT VISIT MOD MDM: CPT

## 2022-04-02 PROCEDURE — 6360000004 HC RX CONTRAST MEDICATION: Performed by: EMERGENCY MEDICINE

## 2022-04-02 RX ORDER — SODIUM CHLORIDE 9 MG/ML
INJECTION, SOLUTION INTRAVENOUS PRN
Status: DISCONTINUED | OUTPATIENT
Start: 2022-04-02 | End: 2022-04-07 | Stop reason: HOSPADM

## 2022-04-02 RX ORDER — MORPHINE SULFATE 4 MG/ML
4 INJECTION, SOLUTION INTRAMUSCULAR; INTRAVENOUS ONCE
Status: COMPLETED | OUTPATIENT
Start: 2022-04-02 | End: 2022-04-02

## 2022-04-02 RX ORDER — POTASSIUM CHLORIDE 7.45 MG/ML
10 INJECTION INTRAVENOUS PRN
Status: DISCONTINUED | OUTPATIENT
Start: 2022-04-02 | End: 2022-04-07 | Stop reason: HOSPADM

## 2022-04-02 RX ORDER — MAGNESIUM SULFATE IN WATER 40 MG/ML
2000 INJECTION, SOLUTION INTRAVENOUS PRN
Status: DISCONTINUED | OUTPATIENT
Start: 2022-04-02 | End: 2022-04-07 | Stop reason: HOSPADM

## 2022-04-02 RX ORDER — PROCHLORPERAZINE EDISYLATE 5 MG/ML
10 INJECTION INTRAMUSCULAR; INTRAVENOUS EVERY 6 HOURS PRN
Status: DISCONTINUED | OUTPATIENT
Start: 2022-04-02 | End: 2022-04-07 | Stop reason: HOSPADM

## 2022-04-02 RX ORDER — ONDANSETRON 2 MG/ML
4 INJECTION INTRAMUSCULAR; INTRAVENOUS ONCE
Status: COMPLETED | OUTPATIENT
Start: 2022-04-02 | End: 2022-04-02

## 2022-04-02 RX ORDER — SODIUM CHLORIDE, SODIUM LACTATE, POTASSIUM CHLORIDE, AND CALCIUM CHLORIDE .6; .31; .03; .02 G/100ML; G/100ML; G/100ML; G/100ML
1000 INJECTION, SOLUTION INTRAVENOUS ONCE
Status: COMPLETED | OUTPATIENT
Start: 2022-04-02 | End: 2022-04-02

## 2022-04-02 RX ORDER — DEXTROSE MONOHYDRATE 50 MG/ML
100 INJECTION, SOLUTION INTRAVENOUS PRN
Status: DISCONTINUED | OUTPATIENT
Start: 2022-04-02 | End: 2022-04-07 | Stop reason: HOSPADM

## 2022-04-02 RX ORDER — DEXTROSE MONOHYDRATE 25 G/50ML
12.5 INJECTION, SOLUTION INTRAVENOUS PRN
Status: DISCONTINUED | OUTPATIENT
Start: 2022-04-02 | End: 2022-04-07 | Stop reason: HOSPADM

## 2022-04-02 RX ORDER — MORPHINE SULFATE 2 MG/ML
2 INJECTION, SOLUTION INTRAMUSCULAR; INTRAVENOUS ONCE
Status: COMPLETED | OUTPATIENT
Start: 2022-04-02 | End: 2022-04-02

## 2022-04-02 RX ORDER — MORPHINE SULFATE 2 MG/ML
2 INJECTION, SOLUTION INTRAMUSCULAR; INTRAVENOUS
Status: DISCONTINUED | OUTPATIENT
Start: 2022-04-02 | End: 2022-04-02

## 2022-04-02 RX ORDER — SODIUM CHLORIDE, SODIUM LACTATE, POTASSIUM CHLORIDE, CALCIUM CHLORIDE 600; 310; 30; 20 MG/100ML; MG/100ML; MG/100ML; MG/100ML
INJECTION, SOLUTION INTRAVENOUS CONTINUOUS
Status: ACTIVE | OUTPATIENT
Start: 2022-04-02 | End: 2022-04-02

## 2022-04-02 RX ORDER — 0.9 % SODIUM CHLORIDE 0.9 %
500 INTRAVENOUS SOLUTION INTRAVENOUS ONCE
Status: COMPLETED | OUTPATIENT
Start: 2022-04-02 | End: 2022-04-02

## 2022-04-02 RX ORDER — BENZONATATE 200 MG/1
200 CAPSULE ORAL 3 TIMES DAILY PRN
COMMUNITY
Start: 2022-03-28

## 2022-04-02 RX ORDER — INSULIN GLARGINE 100 [IU]/ML
30 INJECTION, SOLUTION SUBCUTANEOUS NIGHTLY
Status: DISCONTINUED | OUTPATIENT
Start: 2022-04-02 | End: 2022-04-03

## 2022-04-02 RX ORDER — MORPHINE SULFATE 4 MG/ML
4 INJECTION, SOLUTION INTRAMUSCULAR; INTRAVENOUS
Status: DISCONTINUED | OUTPATIENT
Start: 2022-04-02 | End: 2022-04-02

## 2022-04-02 RX ORDER — SODIUM CHLORIDE 0.9 % (FLUSH) 0.9 %
5-40 SYRINGE (ML) INJECTION EVERY 12 HOURS SCHEDULED
Status: DISCONTINUED | OUTPATIENT
Start: 2022-04-02 | End: 2022-04-07 | Stop reason: HOSPADM

## 2022-04-02 RX ORDER — NICOTINE POLACRILEX 4 MG
15 LOZENGE BUCCAL PRN
Status: DISCONTINUED | OUTPATIENT
Start: 2022-04-02 | End: 2022-04-07 | Stop reason: HOSPADM

## 2022-04-02 RX ORDER — SCOLOPAMINE TRANSDERMAL SYSTEM 1 MG/1
1 PATCH, EXTENDED RELEASE TRANSDERMAL
Status: DISCONTINUED | OUTPATIENT
Start: 2022-04-02 | End: 2022-04-07 | Stop reason: HOSPADM

## 2022-04-02 RX ORDER — SODIUM CHLORIDE 0.9 % (FLUSH) 0.9 %
5-40 SYRINGE (ML) INJECTION PRN
Status: DISCONTINUED | OUTPATIENT
Start: 2022-04-02 | End: 2022-04-07 | Stop reason: HOSPADM

## 2022-04-02 RX ORDER — INSULIN GLARGINE 100 [IU]/ML
72 INJECTION, SOLUTION SUBCUTANEOUS NIGHTLY
Status: DISCONTINUED | OUTPATIENT
Start: 2022-04-02 | End: 2022-04-02

## 2022-04-02 RX ORDER — SODIUM CHLORIDE, SODIUM LACTATE, POTASSIUM CHLORIDE, CALCIUM CHLORIDE 600; 310; 30; 20 MG/100ML; MG/100ML; MG/100ML; MG/100ML
INJECTION, SOLUTION INTRAVENOUS CONTINUOUS
Status: DISCONTINUED | OUTPATIENT
Start: 2022-04-02 | End: 2022-04-07 | Stop reason: HOSPADM

## 2022-04-02 RX ORDER — ONDANSETRON 2 MG/ML
4 INJECTION INTRAMUSCULAR; INTRAVENOUS EVERY 6 HOURS PRN
Status: DISCONTINUED | OUTPATIENT
Start: 2022-04-02 | End: 2022-04-07 | Stop reason: HOSPADM

## 2022-04-02 RX ADMIN — MORPHINE SULFATE 4 MG: 4 INJECTION, SOLUTION INTRAMUSCULAR; INTRAVENOUS at 05:11

## 2022-04-02 RX ADMIN — HYDROMORPHONE HYDROCHLORIDE 1 MG: 1 INJECTION, SOLUTION INTRAMUSCULAR; INTRAVENOUS; SUBCUTANEOUS at 18:02

## 2022-04-02 RX ADMIN — SODIUM CHLORIDE, POTASSIUM CHLORIDE, SODIUM LACTATE AND CALCIUM CHLORIDE: 600; 310; 30; 20 INJECTION, SOLUTION INTRAVENOUS at 09:34

## 2022-04-02 RX ADMIN — APIXABAN 5 MG: 5 TABLET, FILM COATED ORAL at 09:46

## 2022-04-02 RX ADMIN — IOPAMIDOL 75 ML: 755 INJECTION, SOLUTION INTRAVENOUS at 04:03

## 2022-04-02 RX ADMIN — ONDANSETRON 4 MG: 2 INJECTION INTRAMUSCULAR; INTRAVENOUS at 09:46

## 2022-04-02 RX ADMIN — MORPHINE SULFATE 2 MG: 2 INJECTION, SOLUTION INTRAMUSCULAR; INTRAVENOUS at 02:55

## 2022-04-02 RX ADMIN — SODIUM CHLORIDE 500 ML: 9 INJECTION, SOLUTION INTRAVENOUS at 02:56

## 2022-04-02 RX ADMIN — ONDANSETRON 4 MG: 2 INJECTION INTRAMUSCULAR; INTRAVENOUS at 02:54

## 2022-04-02 RX ADMIN — APIXABAN 5 MG: 5 TABLET, FILM COATED ORAL at 20:15

## 2022-04-02 RX ADMIN — SODIUM CHLORIDE, POTASSIUM CHLORIDE, SODIUM LACTATE AND CALCIUM CHLORIDE 1000 ML: 600; 310; 30; 20 INJECTION, SOLUTION INTRAVENOUS at 05:11

## 2022-04-02 RX ADMIN — MORPHINE SULFATE 2 MG: 2 INJECTION, SOLUTION INTRAMUSCULAR; INTRAVENOUS at 04:21

## 2022-04-02 RX ADMIN — HYDROMORPHONE HYDROCHLORIDE 1 MG: 1 INJECTION, SOLUTION INTRAMUSCULAR; INTRAVENOUS; SUBCUTANEOUS at 09:46

## 2022-04-02 RX ADMIN — SODIUM CHLORIDE, POTASSIUM CHLORIDE, SODIUM LACTATE AND CALCIUM CHLORIDE: 600; 310; 30; 20 INJECTION, SOLUTION INTRAVENOUS at 06:40

## 2022-04-02 RX ADMIN — INSULIN LISPRO 3 UNITS: 100 INJECTION, SOLUTION INTRAVENOUS; SUBCUTANEOUS at 12:32

## 2022-04-02 RX ADMIN — INSULIN GLARGINE 30 UNITS: 100 INJECTION, SOLUTION SUBCUTANEOUS at 20:15

## 2022-04-02 ASSESSMENT — PAIN DESCRIPTION - ONSET
ONSET: ON-GOING

## 2022-04-02 ASSESSMENT — PAIN DESCRIPTION - FREQUENCY
FREQUENCY: CONTINUOUS

## 2022-04-02 ASSESSMENT — PAIN DESCRIPTION - PAIN TYPE
TYPE: ACUTE PAIN

## 2022-04-02 ASSESSMENT — PAIN SCALES - GENERAL
PAINLEVEL_OUTOF10: 7
PAINLEVEL_OUTOF10: 9
PAINLEVEL_OUTOF10: 5
PAINLEVEL_OUTOF10: 10
PAINLEVEL_OUTOF10: 8
PAINLEVEL_OUTOF10: 6
PAINLEVEL_OUTOF10: 0

## 2022-04-02 ASSESSMENT — PAIN DESCRIPTION - PROGRESSION
CLINICAL_PROGRESSION: NOT CHANGED
CLINICAL_PROGRESSION: GRADUALLY WORSENING
CLINICAL_PROGRESSION: GRADUALLY WORSENING
CLINICAL_PROGRESSION: GRADUALLY IMPROVING

## 2022-04-02 ASSESSMENT — PAIN DESCRIPTION - DESCRIPTORS
DESCRIPTORS: SORE
DESCRIPTORS: ACHING

## 2022-04-02 ASSESSMENT — PAIN DESCRIPTION - LOCATION
LOCATION: SHOULDER
LOCATION: ABDOMEN

## 2022-04-02 ASSESSMENT — PAIN DESCRIPTION - ORIENTATION
ORIENTATION: RIGHT
ORIENTATION: RIGHT;UPPER

## 2022-04-02 ASSESSMENT — PAIN - FUNCTIONAL ASSESSMENT
PAIN_FUNCTIONAL_ASSESSMENT: PREVENTS OR INTERFERES SOME ACTIVE ACTIVITIES AND ADLS
PAIN_FUNCTIONAL_ASSESSMENT: PREVENTS OR INTERFERES SOME ACTIVE ACTIVITIES AND ADLS
PAIN_FUNCTIONAL_ASSESSMENT: 0-10
PAIN_FUNCTIONAL_ASSESSMENT: PREVENTS OR INTERFERES SOME ACTIVE ACTIVITIES AND ADLS
PAIN_FUNCTIONAL_ASSESSMENT: PREVENTS OR INTERFERES WITH MANY ACTIVE NOT PASSIVE ACTIVITIES

## 2022-04-02 NOTE — PROGRESS NOTES
Patient resting in bed this morning w/ c/o increased 8/10 RUQ pain and nausea. PRN Zofran and Dilaudid administered for management in addition to scheduled Eliquis. Patient reports satisfaction w/ these interventions. Admission questions completed and charted w/ patient and assist of daughter Clari Stewart at the bedside. IV to L AC removed as IV tubing was damaged and unable to be disconnected from existing IV. New 20g IV placed to patient's lateral R AC. Patient tolerated placement well. LR infusing at 250 mL/hour per orders. New IV functioning w/o complication. Patient and daughter deny physical/emotional needs at this time. Call light, telephone, and bed side table are within reach. Fall precautions in place. Will continue to monitor and assess.

## 2022-04-02 NOTE — H&P
[address] [place] [address]                              HISTORY AND PHYSICAL    PATIENT NAME: Gretta Dakins                   :        1955  MED REC NO:   [id number]                          ROOM:       3105  ACCOUNT NO:   [de-identified]                           ADMIT DATE: 2022  PROVIDER:     Ehsan Cardona MD    I examined the patient on 2022 in the emergency room. CHIEF COMPLAINT:  Abdominal pain. HISTORY OF PRESENT ILLNESS:  The patient is a 54-year-old   female presenting into the hospital with chief complaints of one-day  history of sudden onset of acute rapidly increasing severe periumbilical  and epigastric abdominal pain with nausea and vomiting and radiation of  the pain to the back and to the shoulder on the right side, with pain  coming in paroxysm and causing the patient to not be able to eat or  drink anything at home. The patient notes that she has had pancreatitis  before and this pain is similar to what she had the first time. PAST MEDICAL/PAST SURGICAL HISTORY:  1. Type 2 diabetes. 2.  Hypertension. 3.  Atrial fibrillation/flutter. 4.  Coronary artery disease. 5.  Dyslipidemia. 6.  Obesity with a BMI of 36.36 kg/m2. PAST SURGICAL HISTORY:  No major recent surgical procedures. ALLERGIC HISTORY:  DULAGLUTIDE, HYDROCODONE, METFORMIN. FAMILY HISTORY:  Reviewed by me and is currently noncontributory. SOCIAL HISTORY:  Lives at home. Nonsmoker. No illicit substance use. Denies alcohol use. MEDICATIONS:  The patient's home medication list reviewed and documented  in the EMR. The patient is noted to be on Eliquis and Lantus in  addition to other medications. REVIEW OF SYSTEMS:  Significant for the abdominal pain and per the  history of present illness.   All other systems have been reviewed and  are negative except for the history of present illness. PHYSICAL EXAMINATION:  The patient was examined by me in the emergency  room. VITAL SIGNS:  Temperature is 98, respiratory rate is 16, pulse 85, blood  pressure 135/114, saturating 97%. CNS:  Alert, awake and oriented. PSYCH:  The patient is cooperative, answering questions appropriately. HEENT:  Eyes:  Pupils are bilaterally equal, reactive to light. ENT:   Extraocular muscle movements are intact. RESPIRATORY:  Clear. CARDIOVASCULAR:  S1, S2 are heard. No murmurs or rubs. ABDOMEN:  Moderately distended. Significant tenderness to palpation in  the epigastric and periumbilical region. Bowel sounds are very  sluggish. MUSCULOSKELETAL:  No acute obvious musculoskeletal deformities. SKIN:  Appears without obvious rashes or lesions. DIAGNOSTIC DATA:  CT abdomen and pelvis is currently showing evidence of  acute pancreatitis. Lipase greater than 3000. BUN 33, creatinine 0.9. Anion gap 18. ALT  74, . Troponin less than 0.01. BNP 82.  White count 18.4 with  platelets of 931, neutrophilic shift. Triglyceride levels requested by me are currently pending. REVIEW OF PREVIOUS MEDICAL RECORDS:  Shows 2D echo from 06/2021 that  showed LV ejection fraction of 55-60% with no regional wall motion  abnormalities. CONSULTATIONS REQUESTED:  General Surgery. ASSESSMENT:  1. Acute pancreatitis without infection or necrosis. 2.  Uncontrolled type 2 diabetes mellitus. 3.  Obesity with a BMI of 36.36 kg/m2. 4.  Dyslipidemia. 5.  Paroxysmal atrial fibrillation. 6.  Hypertension. PLAN OF CARE:  The patient is admitted to Internal Medicine service. Clear liquid diet initiated. IV lactated Ringers will be used as the  fluid of choice for resuscitation. IV narcotic analgesia will be used  for adequate pain control. General Surgery consult requested and their  recommendations will be followed.   The patient's betablocker and Eliquis  will be continued for her diagnosis of paroxysmal atrial fibrillation  with sips of water. CODE STATUS:  Full. EXPECTED LENGTH OF STAY:  More than two midnights based on the plan of  care above. RISK:  High due to the patient's presentation with the severe acute  pancreatitis requiring intravenous narcotic analgesia for pain control. DISPOSITION: Admitted to Internal Medicine Service.         Raine Slater MD    D: 04/02/2022 6:00:30       T: 04/02/2022 7:24:23     [id number]  Job#: [id number]     Doc#: [id number]    CC:

## 2022-04-02 NOTE — PROGRESS NOTES
Patient arrived to room [id number] [id number] from [place] ED. Patient is A/Ox4. Oriented patient to room and call light. Fall assessment completed, patient denies any fall history. Instructed patient to call for help out of bed, patient verbalized understanding. Patient denies any further needs at this time, will continue to monitor and assess for needs and comfort.

## 2022-04-02 NOTE — PROGRESS NOTES
Patient resting in bed this evening w/ c/o ongoing pain to the abdomen and R shoulder. Patient medicated w/ PRN Dilaudid for management, patient reports satisfaction w/ this intervention. Patient requesting steroids be restarted from her home dose for management of R shoulder pain (patient states she had 1 day of steroids left), or would like to be seen by PT/OT and ortho. Patient encouraged to discuss these requests w/ hospitalist tomorrow during rounds. Patient reports understanding and is in agreement. Patient denies physical/emotional needs at this time. Call light, telephone, and bed side table are within reach. Fall precautions in place. Will continue to monitor and assess.

## 2022-04-02 NOTE — LETTER
[address] [facility]  Phone: [phone]    No name on file. April 7, 2022     Patient: Bryan Avendaño   YOB: 1955   Date of Visit: 4/2/2022       To Whom It May Concern: It is my medical opinion that Rachele Nye may return to work on April 18, 2022. She may only work 2-3 days a week, with limited hours. If you have any questions or concerns, please don't hesitate to call. [phone]    Sincerely,  Juju Geiger RN       No name on file.

## 2022-04-02 NOTE — PROGRESS NOTES
Patient in bed, awake, a&ox4. Morning medications took whole with no complications. Patient assessment completed. Patient having pain in right upper shoulder, PRN pain medication given as ordered. Patient also had a couple episodes of emesis, PRN Zofran was given as prescribed. Patient still vomiting and nausea, MD made aware, new orders placed and implemented. Patient now NPO. Patient voiding adequately, no c/o of flank pain or dysuria. Patient IV fluids infusing as ordered. Patient currently on 3 liters of oxygen after vomiting and becoming SOB, oxygen level dropped to 84%, and now stable. Patient able to make needs known. No needs mentioned at this time. Call light and bedside table within reach. Will continue to monitor and reassess.

## 2022-04-02 NOTE — PLAN OF CARE
Problem: Infection:  Goal: Will remain free from infection  Description: Will remain free from infection  Outcome: Ongoing     Problem: Infection:  Goal: Discharge to appropriate level of care  Outcome: Ongoing     Problem: Safety:  Goal: Free from accidental physical injury  Description: Free from accidental physical injury  Outcome: Ongoing     Problem: Safety:  Goal: Free from intentional harm  Description: Free from intentional harm  Outcome: Ongoing     Problem: Daily Care:  Goal: Daily care needs are met  Description: Daily care needs are met  Outcome: Ongoing     Problem: Pain:  Goal: Patient's pain/discomfort is manageable  Description: Patient's pain/discomfort is manageable  Outcome: Ongoing     Problem: Skin Integrity:  Goal: Skin integrity will stabilize  Description: Skin integrity will stabilize  Outcome: Ongoing     Problem: Discharge Planning:  Goal: Patients continuum of care needs are met  Description: Patients continuum of care needs are met  Outcome: Ongoing

## 2022-04-02 NOTE — PROGRESS NOTES
This RN (charge) called to patient room by Olga De La Torre for concerns w/ increased SOB after patient vomited. Vitals obtained, patient O2 noted to be 83% on RA. Patient placed on 3.5L of O2 via NC to maintain O2 saturation > 90%. Patient reports improvement in respiratory function after application of oxygen. Patient now sleeping soundly in bed w/ O2 on. IVF infusing w/o complication. Patient denies needs. Message sent to MD Akilah Lopez about increased oxygen demand and vomiting. Request made for a scopolamine patch and chest xray to assess for possible aspiration. Waiting for response at this time.      Update provided to patient's primary RN Severo Chaparro

## 2022-04-02 NOTE — CONSULTS
General Surgery Consult Note      Elmo Lundberg   : 1955 MRN: [id number]  Date of Admission: 2022  Admitting Rissa Fuentes MD  Primary Care Physician: Landry Philippe    Chief Complaint   Patient presents with    Abdominal Pain     pt woke up with abd pain yesterday moring and has had emesis as well       Reason for Consult: acute pancreatitis    Diagnosis Present on Admission:   Acute pancreatitis without necrosis or infection, unspecified      History of Present Illness  Elmo Lundberg is a 75 y.o. female with hx of afib, DM, HTN, previous hospitalization of acute pancreatitis in 2021 that was attributed to a diabetic medication change who now presents with a 2 day history of upper abdominal pain, nausea, and vomiting. This is similar to her prior episode. No fevers or chills. Hx of robotic hysterectomy. She did have a cardiac ablation. Past Medical History:   Diagnosis Date    CAD (coronary artery disease)     Depression     Diabetes mellitus ([address]     Hyperlipidemia     Hypertension      Past Surgical History:   Procedure Laterality Date    HYSTERECTOMY       Family history reviewed and otherwise negative.   Family History   Problem Relation Age of Onset    Heart Attack Father      Social History     Socioeconomic History    Marital status: Single     Spouse name: Not on file    Number of children: Not on file    Years of education: Not on file    Highest education level: Not on file   Occupational History    Not on file   Tobacco Use    Smoking status: Never Smoker    Smokeless tobacco: Never Used   Vaping Use    Vaping Use: Never used   Substance and Sexual Activity    Alcohol use: No    Drug use: No    Sexual activity: Not Currently   Other Topics Concern    Not on file   Social History Narrative    Not on file     Social Determinants of Health     Financial Resource Strain:     Difficulty of Paying Living Expenses: Not on file Food Insecurity:     Worried About Running Out of Food in the Last Year: Not on file    [place] of Food in the Last Year: Not on file   Transportation Needs:     Lack of Transportation (Medical): Not on file    Lack of Transportation (Non-Medical): Not on file   Physical Activity:     Days of Exercise per Week: Not on file    Minutes of Exercise per Session: Not on file   Stress:     Feeling of Stress : Not on file   Social Connections:     Frequency of Communication with Friends and Family: Not on file    Frequency of Social Gatherings with Friends and Family: Not on file    Attends Latter-day Services: Not on file    Active Member of [address] [place] [address] AM Technology or Organizations: Not on file    Attends Club or Organization Meetings: Not on file    Marital Status: Not on file   Intimate Partner Violence:     Fear of Current or Ex-Partner: Not on file    Emotionally Abused: Not on file    Physically Abused: Not on file    Sexually Abused: Not on file   Housing Stability:     Unable to Pay for Housing in the Last Year: Not on file    Number of [place] in the Last Year: Not on file    Unstable Housing in the Last Year: Not on file     Allergies   Allergen Reactions    Dulaglutide      Got pancreatitis from this    Hydrocodone Itching    Metformin Diarrhea     Prior to Admission medications    Medication Sig Start Date End Date Taking?  Authorizing Provider   apixaban (ELIQUIS) 5 MG TABS tablet Take 1 tablet by mouth 2 times daily 10/4/21   Ambreen Yap MD   acetaminophen (TYLENOL) 500 MG tablet Take 2 tablets by mouth every 6 hours as needed for Pain 7/21/21   Sophia Collins MD   vitamin D (CHOLECALCIFEROL) 25 MCG (1000 UT) TABS tablet Take 1,000 Units by mouth daily    Historical Provider, MD   diclofenac sodium (VOLTAREN) 1 % GEL Apply 2 g topically 4 times daily as needed for Pain    Historical Provider, MD   albuterol sulfate HFA (VENTOLIN HFA) 108 (90 Base) MCG/ACT inhaler Inhale 2 puffs into the lungs every 4 hours as needed for Wheezing or Shortness of Breath    Historical Provider, MD   melatonin 3 MG TABS tablet Take 3-6 mg by mouth nightly as needed (insomnia)    Historical Provider, MD   citalopram (CELEXA) 40 MG tablet Take 40 mg by mouth daily    Historical Provider, MD   fenofibrate (TRIGLIDE) 160 MG tablet Take 160 mg by mouth daily    Historical Provider, MD   meloxicam (MOBIC) 15 MG tablet Take 15 mg by mouth daily    Historical Provider, MD   pioglitazone (ACTOS) 30 MG tablet Take 30 mg by mouth daily    Historical Provider, MD   insulin aspart (NOVOLOG) 100 UNIT/ML injection vial Inject 25 Units into the skin 3 times daily (before meals)    Historical Provider, MD   empagliflozin (JARDIANCE) 25 MG tablet Take 25 mg by mouth daily    Historical Provider, MD   methocarbamol (ROBAXIN) 500 MG tablet Take 500 mg by mouth 3 times daily as needed    Historical Provider, MD   loratadine (CLARITIN) 10 MG tablet Take 10 mg by mouth daily    Historical Provider, MD   lisinopril-hydroCHLOROthiazide (ZESTORETIC) 20-12.5 MG per tablet Take 1 tablet by mouth 2 times daily    Historical Provider, MD   mirabegron (MYRBETRIQ) 25 MG TB24 Take 25 mg by mouth daily    Historical Provider, MD   insulin glargine (LANTUS) 100 UNIT/ML injection vial Inject 72 Units into the skin nightly    Historical Provider, MD       Review of Systems  Pertinent positives and negatives are in HPI, otherwise all systems reviewed and negative    Physical Exam  Vitals:    04/02/22 0725   BP: 120/66   Pulse: 79   Resp: 16   Temp: 98.1 °F (36.7 °C)   SpO2: 91%       No intake or output data in the 24 hours ending 04/02/22 0925    Body mass index is 35.19 kg/m². General/Appearance: mild distress, appears stated age, moderately obese with BMI of 35  HEENT: PERRLA, Conjunctiva non injected, no scleral icterus. Mucous membranes pink and moist.  Neck is symmetrical. Trachea appears midline.   Lung: normal respiratory effort, no accessory muscle use  Cardiac: Regular rate and rhythm  Abdomen: soft, moderately distended, moderate epigastric tenderness without peritonitis  Neuro: No gross motor or sensory deficits. Skin: No open wounds or rashes. Labs  Recent Labs     04/02/22 0249   WBC 18.4*   HGB 13.4   HCT 40.6        Recent Labs     04/02/22 0249      K 4.1   CL 99   CO2 23   BUN 33*   GFRAA >60     Recent Labs     04/02/22 0249   *   ALT 74*     No results for input(s): UOSM in the last 72 hours. Invalid input(s): UAPR, Formerly named [facility], [facility], [place], [facility], [place], [place], [facility]    Imaging  CT ABDOMEN PELVIS W IV CONTRAST Additional Contrast? None   Final Result   1. Mild inflammation around the pancreatic head and body suspected to   represent acute pancreatitis. Correlate with clinical and laboratory workup. 2. Airspace changes in the lower chest are partially visualized. Correlate   with any pulmonary symptoms. CT abd/pelvis personally reviewed, showing pancreatitis with distended gallbladder, no gallstones    Assessment  Kota Huber is a 75 y.o. female admitted for acute pancreatitis    Plan    1. Acute pancreatitis  · Unclear etiology  · No EtOH use, RUQ US negative for gallstones on 6/14/21  · LR at 250 ml/hr x 12 hours, then down to 150 ml/hr. Monitor UOP. Cr 0.9  · NPO  · Trend leukocytosis, lipase/LFTs  · No plans for surgical intervention with gallbladder currently given lack of gallstones on prior imaging. Consider repeat RUQ US over next few days  2. DM, HTN  · Per primary team  3.  Paroxysmal afib  · On Kurt Burks MD

## 2022-04-02 NOTE — PROGRESS NOTES
Pharmacy Medication Reconciliation Note     List of medications patient is currently taking is complete. Source of information:   1. Rx disp history  2. MD office notes  3. [employer]  4. Patient interview    Notes regarding home medications:   1. Patient not too familiar with medications. States she is still on Eliquis no recent dispenses(samples?)  2. Removed Meloxicam  3. Added Benzonatate 200mg tid prn  4. [facility] is dispensing Flecainide to patient (auto refill) med listed as dc'd by [facility] in January. Will clarify with them on Monday.      Denies taking any other OTC or herbal medications    Silvia Strauss, [place] [facility], [address] 4/2/2022 12:17 PM

## 2022-04-03 LAB
ALBUMIN SERPL-MCNC: 3.4 G/DL (ref 3.4–5)
ALP BLD-CCNC: 97 U/L (ref 40–129)
ALT SERPL-CCNC: 83 U/L (ref 10–40)
ANION GAP SERPL CALCULATED.3IONS-SCNC: 14 MMOL/L (ref 3–16)
AST SERPL-CCNC: 86 U/L (ref 15–37)
BASOPHILS ABSOLUTE: 0 K/UL (ref 0–0.2)
BASOPHILS RELATIVE PERCENT: 0.4 %
BILIRUB SERPL-MCNC: 1.1 MG/DL (ref 0–1)
BILIRUBIN DIRECT: 0.4 MG/DL (ref 0–0.3)
BILIRUBIN URINE: NEGATIVE
BILIRUBIN, INDIRECT: 0.7 MG/DL (ref 0–1)
BLOOD, URINE: NEGATIVE
BUN BLDV-MCNC: 19 MG/DL (ref 7–20)
CALCIUM SERPL-MCNC: 8.3 MG/DL (ref 8.3–10.6)
CHLORIDE BLD-SCNC: 103 MMOL/L (ref 99–110)
CLARITY: ABNORMAL
CO2: 21 MMOL/L (ref 21–32)
COLOR: YELLOW
CREAT SERPL-MCNC: 0.6 MG/DL (ref 0.6–1.2)
EOSINOPHILS ABSOLUTE: 0.3 K/UL (ref 0–0.6)
EOSINOPHILS RELATIVE PERCENT: 4.2 %
EPITHELIAL CELLS, UA: 1 /HPF (ref 0–5)
GFR AFRICAN AMERICAN: >60
GFR NON-AFRICAN AMERICAN: >60
GLUCOSE BLD-MCNC: 102 MG/DL (ref 70–99)
GLUCOSE BLD-MCNC: 127 MG/DL (ref 70–99)
GLUCOSE BLD-MCNC: 63 MG/DL (ref 70–99)
GLUCOSE BLD-MCNC: 69 MG/DL (ref 70–99)
GLUCOSE BLD-MCNC: 73 MG/DL (ref 70–99)
GLUCOSE BLD-MCNC: 73 MG/DL (ref 70–99)
GLUCOSE BLD-MCNC: 75 MG/DL (ref 70–99)
GLUCOSE BLD-MCNC: 80 MG/DL (ref 70–99)
GLUCOSE BLD-MCNC: 81 MG/DL (ref 70–99)
GLUCOSE URINE: 500 MG/DL
HCT VFR BLD CALC: 35.1 % (ref 36–48)
HEMOGLOBIN: 11.5 G/DL (ref 12–16)
HYALINE CASTS: 0 /LPF (ref 0–8)
KETONES, URINE: ABNORMAL MG/DL
LEUKOCYTE ESTERASE, URINE: NEGATIVE
LIPASE: 845 U/L (ref 13–60)
LYMPHOCYTES ABSOLUTE: 1.1 K/UL (ref 1–5.1)
LYMPHOCYTES RELATIVE PERCENT: 16.7 %
MAGNESIUM: 2 MG/DL (ref 1.8–2.4)
MCH RBC QN AUTO: 28.4 PG (ref 26–34)
MCHC RBC AUTO-ENTMCNC: 32.7 G/DL (ref 31–36)
MCV RBC AUTO: 86.8 FL (ref 80–100)
MICROSCOPIC EXAMINATION: ABNORMAL
MONOCYTES ABSOLUTE: 0.5 K/UL (ref 0–1.3)
MONOCYTES RELATIVE PERCENT: 7.7 %
NEUTROPHILS ABSOLUTE: 4.8 K/UL (ref 1.7–7.7)
NEUTROPHILS RELATIVE PERCENT: 71 %
NITRITE, URINE: NEGATIVE
PDW BLD-RTO: 15.9 % (ref 12.4–15.4)
PERFORMED ON: ABNORMAL
PERFORMED ON: NORMAL
PH UA: 7.5 (ref 5–8)
PLATELET # BLD: 237 K/UL (ref 135–450)
PMV BLD AUTO: 7.4 FL (ref 5–10.5)
POTASSIUM SERPL-SCNC: 4.2 MMOL/L (ref 3.5–5.1)
PROTEIN UA: NEGATIVE MG/DL
RBC # BLD: 4.04 M/UL (ref 4–5.2)
RBC UA: 1 /HPF (ref 0–4)
SODIUM BLD-SCNC: 138 MMOL/L (ref 136–145)
SPECIFIC GRAVITY UA: 1.02 (ref 1–1.03)
TOTAL PROTEIN: 5.7 G/DL (ref 6.4–8.2)
URINE TYPE: ABNORMAL
UROBILINOGEN, URINE: 1 E.U./DL
WBC # BLD: 6.7 K/UL (ref 4–11)
WBC UA: 2 /HPF (ref 0–5)

## 2022-04-03 PROCEDURE — 85025 COMPLETE CBC W/AUTO DIFF WBC: CPT

## 2022-04-03 PROCEDURE — 36415 COLL VENOUS BLD VENIPUNCTURE: CPT

## 2022-04-03 PROCEDURE — 81001 URINALYSIS AUTO W/SCOPE: CPT

## 2022-04-03 PROCEDURE — 80048 BASIC METABOLIC PNL TOTAL CA: CPT

## 2022-04-03 PROCEDURE — 80076 HEPATIC FUNCTION PANEL: CPT

## 2022-04-03 PROCEDURE — 2580000003 HC RX 258: Performed by: INTERNAL MEDICINE

## 2022-04-03 PROCEDURE — 6360000002 HC RX W HCPCS: Performed by: SURGERY

## 2022-04-03 PROCEDURE — 2500000003 HC RX 250 WO HCPCS: Performed by: INTERNAL MEDICINE

## 2022-04-03 PROCEDURE — 83735 ASSAY OF MAGNESIUM: CPT

## 2022-04-03 PROCEDURE — 6370000000 HC RX 637 (ALT 250 FOR IP): Performed by: INTERNAL MEDICINE

## 2022-04-03 PROCEDURE — 99232 SBSQ HOSP IP/OBS MODERATE 35: CPT | Performed by: SURGERY

## 2022-04-03 PROCEDURE — 1200000000 HC SEMI PRIVATE

## 2022-04-03 PROCEDURE — 83690 ASSAY OF LIPASE: CPT

## 2022-04-03 RX ORDER — INSULIN GLARGINE 100 [IU]/ML
20 INJECTION, SOLUTION SUBCUTANEOUS NIGHTLY
Status: DISCONTINUED | OUTPATIENT
Start: 2022-04-03 | End: 2022-04-07 | Stop reason: HOSPADM

## 2022-04-03 RX ADMIN — APIXABAN 5 MG: 5 TABLET, FILM COATED ORAL at 20:30

## 2022-04-03 RX ADMIN — HYDROMORPHONE HYDROCHLORIDE 1 MG: 1 INJECTION, SOLUTION INTRAMUSCULAR; INTRAVENOUS; SUBCUTANEOUS at 08:11

## 2022-04-03 RX ADMIN — APIXABAN 5 MG: 5 TABLET, FILM COATED ORAL at 08:11

## 2022-04-03 RX ADMIN — Medication 12.5 G: at 19:35

## 2022-04-03 RX ADMIN — SODIUM CHLORIDE, POTASSIUM CHLORIDE, SODIUM LACTATE AND CALCIUM CHLORIDE: 600; 310; 30; 20 INJECTION, SOLUTION INTRAVENOUS at 05:29

## 2022-04-03 RX ADMIN — HYDROMORPHONE HYDROCHLORIDE 0.5 MG: 1 INJECTION, SOLUTION INTRAMUSCULAR; INTRAVENOUS; SUBCUTANEOUS at 20:35

## 2022-04-03 RX ADMIN — HYDROMORPHONE HYDROCHLORIDE 1 MG: 1 INJECTION, SOLUTION INTRAMUSCULAR; INTRAVENOUS; SUBCUTANEOUS at 00:20

## 2022-04-03 ASSESSMENT — PAIN DESCRIPTION - DESCRIPTORS
DESCRIPTORS: SORE
DESCRIPTORS: ACHING

## 2022-04-03 ASSESSMENT — PAIN DESCRIPTION - ORIENTATION
ORIENTATION: RIGHT;UPPER
ORIENTATION: RIGHT;UPPER
ORIENTATION: RIGHT
ORIENTATION: RIGHT

## 2022-04-03 ASSESSMENT — PAIN - FUNCTIONAL ASSESSMENT
PAIN_FUNCTIONAL_ASSESSMENT: PREVENTS OR INTERFERES SOME ACTIVE ACTIVITIES AND ADLS

## 2022-04-03 ASSESSMENT — PAIN DESCRIPTION - LOCATION
LOCATION: ELBOW;SHOULDER
LOCATION: SHOULDER;ELBOW

## 2022-04-03 ASSESSMENT — PAIN SCALES - GENERAL
PAINLEVEL_OUTOF10: 8
PAINLEVEL_OUTOF10: 7
PAINLEVEL_OUTOF10: 0
PAINLEVEL_OUTOF10: 0
PAINLEVEL_OUTOF10: 9
PAINLEVEL_OUTOF10: 6

## 2022-04-03 ASSESSMENT — PAIN DESCRIPTION - PAIN TYPE
TYPE: ACUTE PAIN

## 2022-04-03 ASSESSMENT — PAIN DESCRIPTION - FREQUENCY
FREQUENCY: CONTINUOUS

## 2022-04-03 ASSESSMENT — PAIN DESCRIPTION - PROGRESSION
CLINICAL_PROGRESSION: NOT CHANGED
CLINICAL_PROGRESSION: NOT CHANGED
CLINICAL_PROGRESSION: GRADUALLY IMPROVING
CLINICAL_PROGRESSION: NOT CHANGED

## 2022-04-03 ASSESSMENT — PAIN DESCRIPTION - ONSET
ONSET: ON-GOING

## 2022-04-03 NOTE — PROGRESS NOTES
General Surgery  Daily Progress Note    Pt Name: Keily Shrestha  Medical Record Number: [id number]  Date of Birth 1955   Today's Date: 4/3/2022    Chief Complaint   Patient presents with    Abdominal Pain     pt woke up with abd pain yesterday moring and has had emesis as well       ASSESSMENT/PLAN  1. Acute pancreatitis - unclear etiology. Pain unchanged in epigastrium/RUQ. Moderately tender on exam without peritonitis. Continue NPO until improving. Check labs. Continue IVF at 150 ml/hr, and will modify as needed based on labs. May repeat US over next day or two but no plans for cholecystectomy currently given negative US last year. 2. DM, HTN - per primary team  3. Paroxysmal afib - on eliquis per primary team      Hutton Setter is unchanged from yesterday. Pain is somewhat controlled. She had nausea yesterday but feels better today. She is tolerating NPO. Current activity is ad yordan    OBJECTIVE  VITALS:  height is 4' 11\" (1.499 m) and weight is 173 lb 11.6 oz (78.8 kg). Her oral temperature is 98.4 °F (36.9 °C). Her blood pressure is 156/87 (abnormal) and her pulse is 73. Her respiration is 16 and oxygen saturation is 99%. GENERAL: alert, no distress  LUNGS: normal respiratory effort, no accessory muscle use  HEART: normal rate and regular rhythm  ABDOMEN: soft, minimally distended, moderately tender in epigastrium and RUQ, no peritonitis  EXTREMITY: no cyanosis and no clubbing  I/O last 3 completed shifts: In: 160 [P.O.:160]  Out: 575 [Urine:575]  No intake/output data recorded.     LABS  Recent Labs     04/02/22  0249   WBC 18.4*   HGB 13.4   HCT 40.6         K 4.1   CL 99   CO2 23   BUN 33*   CREATININE 0.9   CALCIUM 9.1   *   ALT 74*   BILITOT 0.9     CBC with Differential:    Lab Results   Component Value Date    WBC 18.4 04/02/2022    RBC 4.77 04/02/2022    HGB 13.4 04/02/2022    HCT 40.6 04/02/2022     04/02/2022    MCV 85.1 04/02/2022    MCH 28.0 04/02/2022    MCHC 32.9 04/02/2022    RDW 15.5 04/02/2022    SEGSPCT 60.3 02/26/2013    LYMPHOPCT 16.6 04/02/2022    MONOPCT 7.2 04/02/2022    EOSPCT 3.9 03/06/2012    BASOPCT 1.3 04/02/2022    MONOSABS 1.3 04/02/2022    LYMPHSABS 3.0 04/02/2022    EOSABS 0.4 04/02/2022    BASOSABS 0.2 04/02/2022    DIFFTYPE Auto-K 02/26/2013     BMP:    Lab Results   Component Value Date     04/02/2022    K 4.1 04/02/2022    CL 99 04/02/2022    CO2 23 04/02/2022    BUN 33 04/02/2022    LABALBU 4.3 04/02/2022    CREATININE 0.9 04/02/2022    CALCIUM 9.1 04/02/2022    GFRAA >60 04/02/2022    GFRAA >60 05/28/2013    LABGLOM >60 04/02/2022    GLUCOSE 255 04/02/2022     Hepatic Function Panel:    Lab Results   Component Value Date    ALKPHOS 86 04/02/2022    ALT 74 04/02/2022     04/02/2022    PROT 7.3 04/02/2022    PROT 7.1 02/26/2013    BILITOT 0.9 04/02/2022    BILIDIR 0.3 06/15/2021    IBILI 0.4 06/15/2021    LABALBU 4.3 04/02/2022         Flavia Richey MD  Electronically signed 4/3/2022 at 9:37 AM

## 2022-04-03 NOTE — PROGRESS NOTES
4 Eyes Admission Assessment     I agree as the admission nurse that 2 RN's have performed a thorough Head to Toe Skin Assessment on the patient. ALL assessment sites listed below have been assessed on admission. Areas assessed by both nurses:  []   Head, Face, and Ears   []   Shoulders, Back, and Chest  []   Arms, Elbows, and Hands   []   Coccyx, Sacrum, and Ischum  []   Legs, Feet, and Heels        Does the Patient have Skin Breakdown?   No         Nasim Prevention initiated:  No   Wound Care Orders initiated:  No      [facility] nurse consulted for Pressure Injury (Stage 3,4, Unstageable, DTI, NWPT, and Complex wounds):  No      Nurse 1 eSignature: Electronically signed by Shar Norton RN on 4/3/22 at 10:46 AM EDT    **SHARE this note so that the co-signing nurse is able to place an eSignature**    Nurse 2 eSignature: {Esignature:[id number]}

## 2022-04-03 NOTE — PROGRESS NOTES
Patient BS checked before change of shift, BS 69. Patient is NPO. Dextrose 50% 25 ml given as ordered. Will continue to monitor and reassess.   Electronically signed by Pollo Tilley RN on 4/3/2022 at 7:39 PM

## 2022-04-03 NOTE — PROGRESS NOTES
POST FALL MANAGEMENT    Eda Clement  MEDICAL RECORD NUMBER:  [id number]  AGE: 75 y.o. GENDER: female  : 1955  TODAYS DATE:  4/3/2022    Details: Patient was being assisted out of bed with PCA. IV and oxygen tubing were wrapped around legs and she tripped. The PCA attempted to keep her upright but was unable to and she was assisted to the ground. Fall Occurred: Yes    Was the Fall Witnessed:  Yes with PCA    Brief Review of Event:  Patient was being assisted out of bed with PCA. IV and oxygen tubing were wrapped around legs and she tripped. The PCA attempted to keep her upright but was unable to and she was assisted to the ground. Who found the patient: Sheree George      Where was the patient at the time of the fall: At bedside      Patient Comments: \"I just lost my balance, she bent down to get the wires fixed and then I lost my balance. \"    Date Fall Occurred:  April 3, 2022 . Time Fall Occurred: 12:20a.m. Assessment     Post Fall Head to Toe Assessment Completed: Yes    Post Tania Bumpers and ABCDS Completed: Yes     Post Fall Vitals Completed: Yes    Post Fall Neuro Checks Completed: Yes    Injury Occurred(if yes, describe injury):  yes - right elbow pain, patient complained of right shoulder pain on admission.             Did the Patient Experience:(Check Haydee Theodore all that apply)    [] Patient hit head  [] Loss of consciousness  [] Change in mental status following the fall  [x] Patient is on an anticoagulant medication      CT Performed:  no    Follow-up     Persons Notified of Fall:  (Provide names of persons notified)   [x] Physician: Nedra Perez NP   [] REID:  [x] Nursing Supervisior: Arabella Coreas  [] Manager:  [] Pharmacist:  [x] Family: Aydin Qureshi- daughter  [] Other:      Electronically signed by Liane Byrne RN 4/3/2022 at 12:20 AM

## 2022-04-03 NOTE — PLAN OF CARE
Problem: Infection:  Goal: Will remain free from infection  Description: Will remain free from infection  4/3/2022 0747 by Jeni Lara RN  Outcome: Ongoing     Problem: Infection:  Goal: Discharge to appropriate level of care  4/3/2022 0747 by Jeni Lara RN  Outcome: Ongoing     Problem: Safety:  Goal: Free from accidental physical injury  Description: Free from accidental physical injury  4/3/2022 0747 by Jeni Lara RN  Outcome: Ongoing     Problem: Safety:  Goal: Free from intentional harm  Description: Free from intentional harm  4/3/2022 0747 by Jeni Lara RN  Outcome: Ongoing     Problem: Daily Care:  Goal: Daily care needs are met  Description: Daily care needs are met  4/3/2022 0747 by Jeni Lara RN  Outcome: Ongoing     Problem: Pain:  Goal: Patient's pain/discomfort is manageable  Description: Patient's pain/discomfort is manageable  4/3/2022 0747 by Jeni Lara RN  Outcome: Ongoing     Problem: Pain:  Goal: Pain level will decrease  Description: Pain level will decrease  4/3/2022 0747 by Jeni Lara RN  Outcome: Ongoing     Problem: Pain:  Goal: Control of acute pain  Description: Control of acute pain  4/3/2022 0747 by Jeni Lara RN  Outcome: Ongoing     Problem: Pain:  Goal: Control of chronic pain  Description: Control of chronic pain  4/3/2022 0747 by Jeni Lara RN  Outcome: Ongoing     Problem: Skin Integrity:  Goal: Skin integrity will stabilize  Description: Skin integrity will stabilize  4/3/2022 0747 by Jeni Lara RN  Outcome: Ongoing     Problem: Discharge Planning:  Goal: Patients continuum of care needs are met  Description: Patients continuum of care needs are met  4/3/2022 0747 by Jeni Lara RN  Outcome: Ongoing     Problem: Falls - Risk of:  Goal: Will remain free from falls  Description: Will remain free from falls  4/3/2022 0747 by Jeni Lara RN  Outcome: Ongoing     Problem: Falls - Risk of:  Goal: Absence of physical injury  Description: Absence of physical injury  4/3/2022 0747 by Rosalva Piper, RN  Outcome: Ongoing

## 2022-04-03 NOTE — PROGRESS NOTES
Patient in bed, awake, a&ox4. Patient took morning medications whole, no complications. Patient morning assessment completed. Patient complaining of pain in right upper shoulder and abdominal pain. Rating pain 7/10. Patient had PRN pain medication as scheduled. Patient IV fluids infusing as ordered. Patient voiding adequately. Patient NPO at this time. Patient able to make needs known, no needs voiced at this time. Call light and bedside table within reach.  Electronically signed by Lcuy Pereyra RN on 4/3/2022 at 10:39 AM

## 2022-04-03 NOTE — PROGRESS NOTES
Patient in bed, has been in bed most of the day. Patient c/o right shoulder and elbow pain. Patient has family at bedside at this time. Patient continues to eat ice chips, no c/o n/v at this time. Patient voiding adequately and ambulating to restroom with assistance. Patient able to make needs known, no other needs voiced at this time. Call light and bedside table within reach. Will continue to monitor and reassess.   Electronically signed by Karla Petersen RN on 4/3/2022 at 4:49 PM

## 2022-04-03 NOTE — PROGRESS NOTES
Physician Progress Note      Augustina Green  [facility] #:                  [id number]  :                       1955  ADMIT DATE:       2022 2:36 AM  DISCH DATE:  RESPONDING  PROVIDER #:        Ml Fairchild          QUERY TEXT:    Patient admitted with Acute pancreatitis, noted to have Paroxysmal atrial   fibrillation. If possible, please document in progress notes and discharge   summary further specificity regarding the type of atrial fibrillation: The medical record reflects the following:  Risk Factors: Paroxysmal Atrial Fibrillation  Clinical Indicators: per H&P \"The patient's betablocker and Eliquis  will be   continued for her diagnosis of paroxysmal atrial fibrillation\"  Treatment: Eliquis    Chronic: nonspecific term that could be referring to paroxysmal, persistent,   or permanent  Longstanding persistent: persistent and continuous, lasting > 1 year. Paroxysmal - self-terminating or intermittent; resolves with or without   intervention within 7 days of onset; may recur with various frequency. Persistent - Fails to terminate within 7 days; Often requires meds or   cardioversion to restore to NSR. Permanent - longstanding & persistent; Medication has been ineffective in   restoring NSR &/or cardioversion is contraindicated    Definitions per [employer] Training Guide and Quick Reference Guide, Salma Durhammar 112 5   Diseases and Disorders of the Circulatory System; 2019; [employer]. Software content   from the [employer]?  [employer] Transformation Program  Options provided:  -- Chronic Atrial Fibrillation, unspecified  -- Longstanding Persistent Atrial Fibrillation  -- Permanent Atrial Fibrillation  -- Persistent Atrial Fibrillation  -- Chronic Atrial Fibrillation, unspecified  -- Paroxysmal Atrial Fibrillation  -- Other - I will add my own diagnosis  -- Disagree - Not applicable / Not valid  -- Disagree - Clinically unable to determine / Unknown  -- Refer to Clinical Documentation Reviewer    PROVIDER RESPONSE TEXT:    This patient has unspecified chronic atrial fibrillation.     Query created by: Jordi Cantu on 4/2/2022 8:04 AM      Electronically signed by:  Sasha Santos 4/3/2022 8:34 AM

## 2022-04-03 NOTE — PLAN OF CARE
Problem: Infection:  Goal: Will remain free from infection  Description: Will remain free from infection  Outcome: Ongoing  Note: Patient will remain free from infection. Goal: Discharge to appropriate level of care  Outcome: Ongoing  Note: Assessed patients knowledge of discharge. Will continue to work with patient on discharge planning and answer patient questions. Will consult case management and  as necessary. Problem: Safety:  Goal: Free from accidental physical injury  Description: Free from accidental physical injury  Outcome: Ongoing  Note: Pt assessed for fall risk and fall precautions put into place. Bed in lowest position and wheels locked, call light within reach. Nonskid footwear in place. Patient educated on appropriate method of transfer and to call for assistance. Goal: Free from intentional harm  Description: Free from intentional harm  Outcome: Ongoing  Note: Patient remains free from intentional harm, no signs or symptoms of intention to harm noted. Will continue to monitor patient throughout shift. Problem: Daily Care:  Goal: Daily care needs are met  Description: Daily care needs are met  Outcome: Ongoing  Note: Patient assessed to determine ability to perform daily needs and ADLs. Patient assisted with any daily needs that were not able to be met individually by patient. [place] encouraged, although patient educated to ask for assistance when needed. Will continue to monitor and assist patient with meeting daily care needs as needed. Problem: Pain:  Goal: Patient's pain/discomfort is manageable  Description: Patient's pain/discomfort is manageable  Outcome: Ongoing  Note: Educated patient on pain management. Will assess patients pain level per unit protocol, and provide pain management measures as needed. Goal: Pain level will decrease  Description: Pain level will decrease  Outcome: Ongoing  Note: Educated patient on pain management. Will assess patients pain level per unit protocol, and provide pain management measures as needed. Goal: Control of acute pain  Description: Control of acute pain  Outcome: Ongoing  Note: Patient educated on acute pain. Taught patient to use call light to ask for pain medication. PRN pain medication given for acute pain. Will continue to monitor pain per unit protocol. Goal: Control of chronic pain  Description: Control of chronic pain  Outcome: Ongoing  Note: Patient educated on chronic pain. Taught patient to use call light to ask for pain medication. Will continue to monitor pain per unit protocol. Problem: Skin Integrity:  Goal: Skin integrity will stabilize  Description: Skin integrity will stabilize  Outcome: Ongoing  Note: Will monitor skin and mucous members. Will turn patient every 2 hours, monitor for friction and sheering, and change dressings as needed. Will preform skin assessment every shift. Problem: Discharge Planning:  Goal: Patients continuum of care needs are met  Description: Patients continuum of care needs are met  Outcome: Ongoing  Note: Assessed patients knowledge of discharge. Will continue to work with patient on discharge planning and answer patient questions. Will consult case management and  as necessary. Problem: Falls - Risk of:  Goal: Will remain free from falls  Description: Will remain free from falls  Outcome: Ongoing  Note: Patient educated on fall prevention. Call light is within reach, bed locked in lowest position, personal items within reach, and bed alarm is on. Will round on patient per unit guidelines. Goal: Absence of physical injury  Description: Absence of physical injury  Outcome: Ongoing  Note: Pt assessed for fall risk and fall precautions put into place. Bed in lowest position and wheels locked, call light within reach. Nonskid footwear in place.  Patient educated on appropriate method of transfer and to call for assistance.

## 2022-04-03 NOTE — PROGRESS NOTES
Hospitalist Progress Note      PCP: Yosi Avila    Date of Admission: 4/2/2022    Chief Complaint: abdominal pain. Hospital Course:     57-year-old female, with a past medical history of DM, hypertension, AF, CAD, hyperlipidemia . presenting into the hospital with chief complaints of one-day  history of sudden onset of acute rapidly increasing severe periumbilical  and epigastric abdominal pain with nausea and vomiting and radiation of  the pain to the back and to the shoulder on the right side. Admitted with acute pancreatitis    Subjective:   Patient continues to report abdominal pain, most apparent in the right upper quadrant. Medications:  Reviewed    Infusion Medications    lactated ringers 150 mL/hr at 04/03/22 0529    sodium chloride      dextrose       Scheduled Medications    apixaban  5 mg Oral BID    sodium chloride flush  5-40 mL IntraVENous 2 times per day    insulin lispro  0-18 Units SubCUTAneous Q4H    scopolamine  1 patch TransDERmal Q72H    insulin glargine  30 Units SubCUTAneous Nightly     PRN Meds: sodium chloride flush, sodium chloride, potassium chloride, magnesium sulfate, ondansetron, glucose, dextrose, glucagon (rDNA), dextrose, HYDROmorphone **OR** HYDROmorphone, prochlorperazine      Intake/Output Summary (Last 24 hours) at 4/3/2022 0836  Last data filed at 4/3/2022 0404  Gross per 24 hour   Intake 120 ml   Output 575 ml   Net -455 ml       Physical Exam Performed:    BP (!) 156/87   Pulse 73   Temp 98.4 °F (36.9 °C) (Oral)   Resp 16   Ht 4' 11\" (1.499 m)   Wt 173 lb 11.6 oz (78.8 kg)   SpO2 99%   BMI 35.09 kg/m²     General appearance: No apparent distress, appears stated age and cooperative. HEENT: Pupils equal, round, and reactive to light. Conjunctivae/corneas clear. Neck: Supple, with full range of motion. No jugular venous distention. Trachea midline. Respiratory:  Normal respiratory effort.  Clear to auscultation, bilaterally without Rales/Wheezes/Rhonchi. Cardiovascular: Regular rate and rhythm with normal S1/S2 without murmurs, rubs or gallops. Abdomen: Tenderness noted right upper quadrant and epigastric area. Musculoskeletal: No clubbing, cyanosis or edema bilaterally. Full range of motion without deformity. Skin: Skin color, texture, turgor normal.  No rashes or lesions. Neurologic:  Neurovascularly intact without any focal sensory/motor deficits. Cranial nerves: II-XII intact, grossly non-focal.  Psychiatric: Alert and oriented, thought content appropriate, normal insight  Capillary Refill: Brisk,3 seconds, normal   Peripheral Pulses: +2 palpable, equal bilaterally       Labs:   Recent Labs     04/02/22 0249   WBC 18.4*   HGB 13.4   HCT 40.6        Recent Labs     04/02/22 0249      K 4.1   CL 99   CO2 23   BUN 33*   CREATININE 0.9   CALCIUM 9.1     Recent Labs     04/02/22 0249   *   ALT 74*   BILITOT 0.9   ALKPHOS 86     No results for input(s): INR in the last 72 hours. Recent Labs     04/02/22 0249   TROPONINI <0.01       Urinalysis:      Lab Results   Component Value Date    NITRU Negative 06/14/2021    WBCUA 6-10 06/16/2017    BACTERIA 2+ 04/27/2015    RBCUA 3-5 06/16/2017    BLOODU Negative 06/14/2021    SPECGRAV 1.021 06/14/2021    GLUCOSEU >=1000 06/14/2021       Radiology:  XR CHEST PORTABLE   Final Result   No acute process. CT ABDOMEN PELVIS W IV CONTRAST Additional Contrast? None   Final Result   1. Mild inflammation around the pancreatic head and body suspected to   represent acute pancreatitis. Correlate with clinical and laboratory workup. 2. Airspace changes in the lower chest are partially visualized. Correlate   with any pulmonary symptoms.                  Assessment/Plan:    Active Hospital Problems    Diagnosis     Acute pancreatitis without necrosis or infection, unspecified [K85.90]      Acute pancreatitis  Patient presented to emergency with right upper quadrant pain, CT did not show any changes in the gallbladder, case discussed with general surgery. Plan.  -Keep n.p.o. for now.  -Continue IV hydration with LR.  -Continue pain management. -May need repeat ultrasound in the next couple of days      Diabetes mellitus. Patient is on aspart 25 units 3 times daily as well as glargine 72 units nightly. Was noted to be hypoglycemic. We will hold glargine for now and keep on sliding scale while n.p.o. Obesity. BMI 36, complicating course    Paroxysmal atrial fibrillation  On apixaban. Hypertension. Withhold antihypertensive medications for now    DVT Prophylaxis: Apixaban  Diet: Diet NPO  Code Status: Full Code    PT/OT Eval Status: ambulatory.      Dispo - pending clinical improvement      Brain Bowles MD

## 2022-04-04 ENCOUNTER — APPOINTMENT (OUTPATIENT)
Dept: GENERAL RADIOLOGY | Age: 67
DRG: 439 | End: 2022-04-04
Payer: MEDICARE

## 2022-04-04 ENCOUNTER — APPOINTMENT (OUTPATIENT)
Dept: ULTRASOUND IMAGING | Age: 67
DRG: 439 | End: 2022-04-04
Payer: MEDICARE

## 2022-04-04 LAB
ALBUMIN SERPL-MCNC: 3 G/DL (ref 3.4–5)
ALP BLD-CCNC: 140 U/L (ref 40–129)
ALT SERPL-CCNC: 62 U/L (ref 10–40)
ANION GAP SERPL CALCULATED.3IONS-SCNC: 11 MMOL/L (ref 3–16)
AST SERPL-CCNC: 49 U/L (ref 15–37)
BASOPHILS ABSOLUTE: 0.1 K/UL (ref 0–0.2)
BASOPHILS RELATIVE PERCENT: 0.7 %
BILIRUB SERPL-MCNC: 1.1 MG/DL (ref 0–1)
BILIRUBIN DIRECT: 0.5 MG/DL (ref 0–0.3)
BILIRUBIN, INDIRECT: 0.6 MG/DL (ref 0–1)
BUN BLDV-MCNC: 13 MG/DL (ref 7–20)
CALCIUM SERPL-MCNC: 8.3 MG/DL (ref 8.3–10.6)
CHLORIDE BLD-SCNC: 100 MMOL/L (ref 99–110)
CO2: 21 MMOL/L (ref 21–32)
CREAT SERPL-MCNC: 0.6 MG/DL (ref 0.6–1.2)
EOSINOPHILS ABSOLUTE: 0.2 K/UL (ref 0–0.6)
EOSINOPHILS RELATIVE PERCENT: 2.6 %
GFR AFRICAN AMERICAN: >60
GFR NON-AFRICAN AMERICAN: >60
GLUCOSE BLD-MCNC: 73 MG/DL (ref 70–99)
GLUCOSE BLD-MCNC: 76 MG/DL (ref 70–99)
GLUCOSE BLD-MCNC: 78 MG/DL (ref 70–99)
GLUCOSE BLD-MCNC: 80 MG/DL (ref 70–99)
GLUCOSE BLD-MCNC: 80 MG/DL (ref 70–99)
GLUCOSE BLD-MCNC: 81 MG/DL (ref 70–99)
GLUCOSE BLD-MCNC: 83 MG/DL (ref 70–99)
HCT VFR BLD CALC: 34.2 % (ref 36–48)
HEMOGLOBIN: 11.5 G/DL (ref 12–16)
LYMPHOCYTES ABSOLUTE: 1.3 K/UL (ref 1–5.1)
LYMPHOCYTES RELATIVE PERCENT: 15.5 %
MCH RBC QN AUTO: 29.3 PG (ref 26–34)
MCHC RBC AUTO-ENTMCNC: 33.7 G/DL (ref 31–36)
MCV RBC AUTO: 86.9 FL (ref 80–100)
MONOCYTES ABSOLUTE: 0.4 K/UL (ref 0–1.3)
MONOCYTES RELATIVE PERCENT: 5 %
NEUTROPHILS ABSOLUTE: 6.4 K/UL (ref 1.7–7.7)
NEUTROPHILS RELATIVE PERCENT: 76.2 %
PDW BLD-RTO: 15.3 % (ref 12.4–15.4)
PERFORMED ON: NORMAL
PLATELET # BLD: 240 K/UL (ref 135–450)
PMV BLD AUTO: 7.3 FL (ref 5–10.5)
POTASSIUM SERPL-SCNC: 3.9 MMOL/L (ref 3.5–5.1)
RBC # BLD: 3.93 M/UL (ref 4–5.2)
SODIUM BLD-SCNC: 132 MMOL/L (ref 136–145)
TOTAL PROTEIN: 6 G/DL (ref 6.4–8.2)
WBC # BLD: 8.3 K/UL (ref 4–11)

## 2022-04-04 PROCEDURE — 76705 ECHO EXAM OF ABDOMEN: CPT

## 2022-04-04 PROCEDURE — 36415 COLL VENOUS BLD VENIPUNCTURE: CPT

## 2022-04-04 PROCEDURE — 73030 X-RAY EXAM OF SHOULDER: CPT

## 2022-04-04 PROCEDURE — 1200000000 HC SEMI PRIVATE

## 2022-04-04 PROCEDURE — 2580000003 HC RX 258: Performed by: INTERNAL MEDICINE

## 2022-04-04 PROCEDURE — 99232 SBSQ HOSP IP/OBS MODERATE 35: CPT | Performed by: SURGERY

## 2022-04-04 PROCEDURE — 71046 X-RAY EXAM CHEST 2 VIEWS: CPT

## 2022-04-04 PROCEDURE — 80048 BASIC METABOLIC PNL TOTAL CA: CPT

## 2022-04-04 PROCEDURE — 2700000000 HC OXYGEN THERAPY PER DAY

## 2022-04-04 PROCEDURE — 94761 N-INVAS EAR/PLS OXIMETRY MLT: CPT

## 2022-04-04 PROCEDURE — 6370000000 HC RX 637 (ALT 250 FOR IP): Performed by: INTERNAL MEDICINE

## 2022-04-04 PROCEDURE — APPNB45 APP NON BILLABLE 31-45 MINUTES: Performed by: PHYSICIAN ASSISTANT

## 2022-04-04 PROCEDURE — 80076 HEPATIC FUNCTION PANEL: CPT

## 2022-04-04 PROCEDURE — 6370000000 HC RX 637 (ALT 250 FOR IP): Performed by: STUDENT IN AN ORGANIZED HEALTH CARE EDUCATION/TRAINING PROGRAM

## 2022-04-04 PROCEDURE — 6360000002 HC RX W HCPCS: Performed by: SURGERY

## 2022-04-04 PROCEDURE — 85025 COMPLETE CBC W/AUTO DIFF WBC: CPT

## 2022-04-04 RX ORDER — ACETAMINOPHEN 325 MG/1
650 TABLET ORAL ONCE
Status: COMPLETED | OUTPATIENT
Start: 2022-04-04 | End: 2022-04-04

## 2022-04-04 RX ADMIN — SODIUM CHLORIDE, PRESERVATIVE FREE 10 ML: 5 INJECTION INTRAVENOUS at 08:13

## 2022-04-04 RX ADMIN — HYDROMORPHONE HYDROCHLORIDE 1 MG: 1 INJECTION, SOLUTION INTRAMUSCULAR; INTRAVENOUS; SUBCUTANEOUS at 02:48

## 2022-04-04 RX ADMIN — APIXABAN 5 MG: 5 TABLET, FILM COATED ORAL at 20:52

## 2022-04-04 RX ADMIN — APIXABAN 5 MG: 5 TABLET, FILM COATED ORAL at 08:13

## 2022-04-04 RX ADMIN — SODIUM CHLORIDE, POTASSIUM CHLORIDE, SODIUM LACTATE AND CALCIUM CHLORIDE: 600; 310; 30; 20 INJECTION, SOLUTION INTRAVENOUS at 02:49

## 2022-04-04 RX ADMIN — ACETAMINOPHEN 650 MG: 325 TABLET ORAL at 20:52

## 2022-04-04 RX ADMIN — HYDROMORPHONE HYDROCHLORIDE 1 MG: 1 INJECTION, SOLUTION INTRAMUSCULAR; INTRAVENOUS; SUBCUTANEOUS at 08:13

## 2022-04-04 ASSESSMENT — PAIN DESCRIPTION - PROGRESSION
CLINICAL_PROGRESSION: NOT CHANGED
CLINICAL_PROGRESSION: GRADUALLY WORSENING
CLINICAL_PROGRESSION: NOT CHANGED
CLINICAL_PROGRESSION: GRADUALLY WORSENING
CLINICAL_PROGRESSION: GRADUALLY IMPROVING
CLINICAL_PROGRESSION: NOT CHANGED

## 2022-04-04 ASSESSMENT — PAIN DESCRIPTION - FREQUENCY
FREQUENCY: INTERMITTENT
FREQUENCY: CONTINUOUS
FREQUENCY: INTERMITTENT

## 2022-04-04 ASSESSMENT — PAIN DESCRIPTION - PAIN TYPE
TYPE: ACUTE PAIN

## 2022-04-04 ASSESSMENT — PAIN DESCRIPTION - ORIENTATION
ORIENTATION: RIGHT

## 2022-04-04 ASSESSMENT — PAIN DESCRIPTION - LOCATION
LOCATION: ELBOW
LOCATION: ELBOW;ABDOMEN

## 2022-04-04 ASSESSMENT — PAIN SCALES - GENERAL
PAINLEVEL_OUTOF10: 8
PAINLEVEL_OUTOF10: 0
PAINLEVEL_OUTOF10: 8
PAINLEVEL_OUTOF10: 8
PAINLEVEL_OUTOF10: 5
PAINLEVEL_OUTOF10: 3
PAINLEVEL_OUTOF10: 3

## 2022-04-04 ASSESSMENT — PAIN DESCRIPTION - ONSET
ONSET: ON-GOING

## 2022-04-04 ASSESSMENT — ENCOUNTER SYMPTOMS
VOMITING: 1
ABDOMINAL PAIN: 1
NAUSEA: 1
SHORTNESS OF BREATH: 0

## 2022-04-04 ASSESSMENT — PAIN DESCRIPTION - DESCRIPTORS
DESCRIPTORS: SHARP
DESCRIPTORS: ACHING
DESCRIPTORS: SHARP
DESCRIPTORS: SHARP
DESCRIPTORS: ACHING
DESCRIPTORS: SHARP

## 2022-04-04 NOTE — PLAN OF CARE
Problem: Infection:  Goal: Will remain free from infection  Description: Will remain free from infection  4/3/2022 2047 by Awa Patel RN  Outcome: Ongoing  Note: Pt is free of signs and symptoms of infection. Incision and dressing are clean, dry and intact. Vital signs stable. Will monitor. 4/3/2022 0747 by Naomi Aopnte RN  Outcome: Ongoing  Goal: Discharge to appropriate level of care  4/3/2022 2047 by Awa Patel RN  Outcome: Ongoing  Note: Patient will be discharged to appropriate level of care    4/3/2022 0747 by Naomi Aponte RN  Outcome: Ongoing     Problem: Safety:  Goal: Free from accidental physical injury  Description: Free from accidental physical injury  4/3/2022 2047 by Awa Patel RN  Outcome: Ongoing  Note: Pt is free of injury. No injury noted. Fall precautions in place. Call light within reach. Will monitor. 4/3/2022 0747 by Naomi Aponte RN  Outcome: Ongoing  Goal: Free from intentional harm  Description: Free from intentional harm  4/3/2022 2047 by Awa Patel RN  Outcome: Ongoing  Note: Pt is free of intentional harm. No intentions noted. Will monitor.      4/3/2022 0747 by Naomi Aponte RN  Outcome: Ongoing     Problem: Daily Care:  Goal: Daily care needs are met  Description: Daily care needs are met  4/3/2022 2047 by Awa Patel RN  Outcome: Ongoing  4/3/2022 0747 by Naomi Aponte RN  Outcome: Ongoing     Problem: Pain:  Goal: Patient's pain/discomfort is manageable  Description: Patient's pain/discomfort is manageable  4/3/2022 2047 by Awa Patel RN  Outcome: Ongoing  4/3/2022 0747 by Naomi Aponte RN  Outcome: Ongoing  Goal: Pain level will decrease  Description: Pain level will decrease  4/3/2022 2047 by Awa Patel RN  Outcome: Ongoing  4/3/2022 0747 by Naomi Aponte RN  Outcome: Ongoing  Goal: Control of acute pain  Description: Control of acute pain  4/3/2022 2047 by Awa Patel RN  Outcome: Ongoing  Note: Pt assessed for pain. Pt in pain and assessed with 0-10 pain rating scale. Pt given prescribed analgesic for pain. (See eMar) Pt satisfied with pain relief thus far. Will reassess and continue to monitor. 4/3/2022 0747 by Rj Corley RN  Outcome: Ongoing  Goal: Control of chronic pain  Description: Control of chronic pain  4/3/2022 2047 by Gopal Oviedo RN  Outcome: Ongoing  4/3/2022 0747 by Rj Corley RN  Outcome: Ongoing     Problem: Skin Integrity:  Goal: Skin integrity will stabilize  Description: Skin integrity will stabilize  4/3/2022 2047 by Gopal Oviedo RN  Outcome: Ongoing  Note: Patient will be absent of new skin breakdown    4/3/2022 0747 by Rj Corley RN  Outcome: Ongoing     Problem: Discharge Planning:  Goal: Patients continuum of care needs are met  Description: Patients continuum of care needs are met  4/3/2022 2047 by Gopal Oviedo RN  Outcome: Ongoing  Note: Patients care needs will be met this shift    4/3/2022 0747 by Rj Corley RN  Outcome: Ongoing     Problem: Falls - Risk of:  Goal: Will remain free from falls  Description: Will remain free from falls  4/3/2022 2047 by Gopal Oviedo RN  Outcome: Ongoing  Note: Fall risk assessment completed. Fall precautions in place. Call light within reach. Pt educated on calling for assistance before getting up. Walkway free of clutter. Will continue to monitor. 4/3/2022 0747 by Rj Corley RN  Outcome: Ongoing  Goal: Absence of physical injury  Description: Absence of physical injury  4/3/2022 2047 by Gopal Oviedo RN  Outcome: Ongoing  Note: Pt is free of injury. No injury noted. Fall precautions in place. Call light within reach. Will monitor.      4/3/2022 0747 by Rj Corley RN  Outcome: Ongoing   Electronically signed by Gopal Oviedo RN on 4/3/2022 at 8:48 PM

## 2022-04-04 NOTE — PROGRESS NOTES
Hospitalist Progress Note      PCP: Tito Aj    Date of Admission: 4/2/2022    Chief Complaint: abdominal pain. Hospital Course:     56-year-old female, with a past medical history of DM, hypertension, AF, CAD, hyperlipidemia . presenting into the hospital with chief complaints of one-day  history of sudden onset of acute rapidly increasing severe periumbilical  and epigastric abdominal pain with nausea and vomiting and radiation of  the pain to the back and to the shoulder on the right side. Admitted with acute pancreatitis    Subjective:   Patient continues to report severe abdominal pain as well as right shoulder pain, continues to utilize as needed IV Dilaudid    Medications:  Reviewed    Infusion Medications    lactated ringers 150 mL/hr at 04/04/22 0249    sodium chloride      dextrose       Scheduled Medications    [Held by provider] insulin glargine  20 Units SubCUTAneous Nightly    apixaban  5 mg Oral BID    sodium chloride flush  5-40 mL IntraVENous 2 times per day    insulin lispro  0-18 Units SubCUTAneous Q4H    scopolamine  1 patch TransDERmal Q72H     PRN Meds: sodium chloride flush, sodium chloride, potassium chloride, magnesium sulfate, ondansetron, glucose, dextrose, glucagon (rDNA), dextrose, HYDROmorphone **OR** HYDROmorphone, prochlorperazine      Intake/Output Summary (Last 24 hours) at 4/4/2022 1135  Last data filed at 4/4/2022 0946  Gross per 24 hour   Intake 300 ml   Output 200 ml   Net 100 ml       Physical Exam Performed:    BP (!) 167/90   Pulse 74   Temp 97.9 °F (36.6 °C) (Oral)   Resp 17   Ht 4' 11\" (1.499 m)   Wt 176 lb 5.9 oz (80 kg)   SpO2 90%   BMI 35.62 kg/m²     General appearance: No apparent distress, appears stated age and cooperative. HEENT: Pupils equal, round, and reactive to light. Conjunctivae/corneas clear. Neck: Supple, with full range of motion. No jugular venous distention. Trachea midline. Respiratory:  Normal respiratory effort. Clear to auscultation, bilaterally without Rales/Wheezes/Rhonchi. Cardiovascular: Regular rate and rhythm with normal S1/S2 without murmurs, rubs or gallops. Abdomen: Tenderness noted right upper quadrant and epigastric area. Musculoskeletal: Palpation of right shoulder pain causes severe pain as well as passive and active motion. Skin: Skin color, texture, turgor normal.  No rashes or lesions. Neurologic:  Neurovascularly intact without any focal sensory/motor deficits. Cranial nerves: II-XII intact, grossly non-focal.  Psychiatric: Alert and oriented, thought content appropriate, normal insight  Capillary Refill: Brisk,3 seconds, normal   Peripheral Pulses: +2 palpable, equal bilaterally       Labs:   Recent Labs     04/02/22 0249 04/03/22  0945 04/04/22  0838   WBC 18.4* 6.7 8.3   HGB 13.4 11.5* 11.5*   HCT 40.6 35.1* 34.2*    237 240     Recent Labs     04/02/22 0249 04/03/22  0945 04/04/22  0838    138 132*   K 4.1 4.2 3.9   CL 99 103 100   CO2 23 21 21   BUN 33* 19 13   CREATININE 0.9 0.6 0.6   CALCIUM 9.1 8.3 8.3     Recent Labs     04/02/22 0249 04/03/22  0945 04/04/22  0838   * 86* 49*   ALT 74* 83* 62*   BILIDIR  --  0.4* 0.5*   BILITOT 0.9 1.1* 1.1*   ALKPHOS 86 97 140*     No results for input(s): INR in the last 72 hours. Recent Labs     04/02/22 0249   TROPONINI <0.01       Urinalysis:      Lab Results   Component Value Date    NITRU Negative 04/03/2022    WBCUA 2 04/03/2022    BACTERIA 2+ 04/27/2015    RBCUA 1 04/03/2022    BLOODU Negative 04/03/2022    SPECGRAV 1.020 04/03/2022    GLUCOSEU 500 04/03/2022       Radiology:  XR CHEST PORTABLE   Final Result   No acute process. CT ABDOMEN PELVIS W IV CONTRAST Additional Contrast? None   Final Result   1. Mild inflammation around the pancreatic head and body suspected to   represent acute pancreatitis. Correlate with clinical and laboratory workup. 2. Airspace changes in the lower chest are partially visualized. Correlate   with any pulmonary symptoms. XR SHOULDER RIGHT (MIN 2 VIEWS)    (Results Pending)   US ABDOMEN LIMITED Specify organ? LIVER, GALLBLADDER    (Results Pending)           Assessment/Plan:    Active Hospital Problems    Diagnosis     Acute pancreatitis without necrosis or infection, unspecified [K85.90]      Acute pancreatitis  Patient presented to emergency with right upper quadrant pain, lipase on presentation more than 3000, CT did not show any changes in the gallbladder, case discussed with general surgery. We will acquire ultrasound to rule out any biliary cause. Plan.  -Keep n.p.o.  -Continue IV hydration with LR.  -Continue pain management.  -Ultrasound abdomen    Diabetes mellitus. Patient is on aspart 25 units 3 times daily as well as glargine 72 units nightly. Was noted to be hypoglycemic. We will hold glargine for now and keep on sliding scale while n.p.o. Right shoulder pain. X-ray ordered. Obesity. BMI 36, complicating course    Paroxysmal atrial fibrillation  On apixaban. Hypertension. Withhold antihypertensive medications for now    DVT Prophylaxis: Apixaban  Diet: Diet NPO  Code Status: Full Code    PT/OT Eval Status: ambulatory.      Dispo - pending clinical improvement      Cm Dixon MD

## 2022-04-04 NOTE — PROGRESS NOTES
[facility]  Diabetes Education   Progress Note       NAME:  [address] [place] [address] RECORD NUMBER:  [id number]  AGE: 75 y.o. GENDER: female  : 1955  TODAY'S DATE:  2022    Subjective   Reason for Diabetic Education Evaluation and Assessment: hypoglycemia     Gabrielle Patel has been limited in her intake - currently npo. She denies any low blood sugar symptoms yesterday.       Visit Type: evaluation      Juan David Nathan is a 75 y.o. female referred by:     [] Physician  [] Nursing  [x] Chart Review   [] Other:     PAST MEDICAL HISTORY        Diagnosis Date    CAD (coronary artery disease)     Depression     Diabetes mellitus ([address]     Hyperlipidemia     Hypertension        PAST SURGICAL HISTORY    Past Surgical History:   Procedure Laterality Date    HYSTERECTOMY         FAMILY HISTORY    Family History   Problem Relation Age of Onset    Heart Attack Father        SOCIAL HISTORY    Social History     Tobacco Use    Smoking status: Never Smoker    Smokeless tobacco: Never Used   Vaping Use    Vaping Use: Never used   Substance Use Topics    Alcohol use: No    Drug use: No       ALLERGIES    Allergies   Allergen Reactions    Dulaglutide      Got pancreatitis from this    Hydrocodone Itching    Metformin Diarrhea       MEDICATIONS     [Held by provider] insulin glargine  20 Units SubCUTAneous Nightly    apixaban  5 mg Oral BID    sodium chloride flush  5-40 mL IntraVENous 2 times per day    insulin lispro  0-18 Units SubCUTAneous Q4H    scopolamine  1 patch TransDERmal Q72H       Objective        Patient Active Problem List   Diagnosis Code    Pelvic pain in female R10.2    Adnexal mass N94.89    Hypersomnia G47.10    DM (diabetes mellitus), type 2, uncontrolled ([address] E11.65    Hypoxia R09.02    Idiopathic acute pancreatitis without infection or necrosis K85.00    PAF (paroxysmal atrial fibrillation) ([facility]) I48.0    Left atrial flutter by electrocardiogram ([facility]) I48.92  CAD in native artery I25.10    Essential hypertension I10    Pure hypercholesterolemia E78.00    Acute pancreatitis K85.90        BP (!) 167/90   Pulse 74   Temp 97.9 °F (36.6 °C) (Oral)   Resp 17   Ht 4' 11\" (1.499 m)   Wt 176 lb 5.9 oz (80 kg)   SpO2 90%   BMI 35.62 kg/m²     HgBA1c:    Lab Results   Component Value Date    LABA1C 9.3 06/14/2021       Recent Labs     04/04/22  0005 04/04/22  0324 04/04/22  0734 04/04/22  1211   POCGLU 73 80 76 78       BUN/Creatinine:    Lab Results   Component Value Date    BUN 13 04/04/2022    CREATININE 0.6 04/04/2022       Assessment        Diabetes Management and Education    Does the patient have a Primary Care Physician? Yes, Sasha Veloz       Does the patient require new medication instruction? TBD - scheduled insulin on hold. Reviewed current correction scale insulin plan. Person responsible for administration of Insulin/Medication:        [x] Self     [] Caregiver       [] Spouse       [] Other Family Member   []  Other      Level of patient/caregiver understanding able to:       [x] Verbalized Understanding   [] Demonstrated Understanding       [] Teach Back       [] Needs Reinforcement     []  Other:        Does the patient/caregiver monitor Blood Glucoses? Yes    Does the patient/caregiver follow a Meal Plan? No: npo      Does the patient/caregiver understand S/S of Hypoglycemia? Yes  Reviewed symptoms, prevention and treatment. Level of patient/caregiver understanding able to:       [x] Verbalized Understanding   [] Demonstrated Understanding       [] Teach Back       [] Needs Reinforcement     [x]  Other:  Agrees to notify staff if she has any symptoms                Plan        Ongoing diabetes education and blood glucose monitoring.          Teaching Time Diabetes Education:  10 minutes     Electronically signed by Gonzalo Levy on 4/4/2022 at 2:54 PM

## 2022-04-04 NOTE — PLAN OF CARE
Problem: Infection:  Goal: Will remain free from infection  Description: Will remain free from infection  Outcome: Ongoing  Note: Pt will remain free from signs and symptoms of infection. Vital signs stable. Will monitor   Goal: Discharge to appropriate level of care  Outcome: Ongoing  Note: Pt will discharge to appropriate level of care      Problem: Safety:  Goal: Free from accidental physical injury  Description: Free from accidental physical injury  Outcome: Ongoing  Note: Pt is free of injury. Fall precautions in place. Call light within reach. Will monitor  Goal: Free from intentional harm  Description: Free from intentional harm  Outcome: Ongoing  Note: Pt is free from intentional harm. Will monitor     Problem: Daily Care:  Goal: Daily care needs are met  Description: Daily care needs are met  Outcome: Ongoing  Note: Pt daily care needs are met     Problem: Pain:  Goal: Patient's pain/discomfort is manageable  Description: Patient's pain/discomfort is manageable  Outcome: Ongoing  Note: Pt pain/discomfort is manageable   Goal: Pain level will decrease  Description: Pain level will decrease  Outcome: Ongoing  Note: Pt pain level will decrease   Goal: Control of acute pain  Description: Control of acute pain  Outcome: Ongoing  Note: Pt will control acute pain.    Goal: Control of chronic pain  Description: Control of chronic pain  Outcome: Ongoing  Note: Pt will have control of chronic pain     Problem: Skin Integrity:  Goal: Skin integrity will stabilize  Description: Skin integrity will stabilize  Outcome: Ongoing  Note: Pt skin integrity will stabilize and will have no new skin breakdown      Problem: Discharge Planning:  Goal: Patients continuum of care needs are met  Description: Patients continuum of care needs are met  Outcome: Ongoing  Note: Pt continuum of care needs are met      Problem: Falls - Risk of:  Goal: Will remain free from falls  Description: Will remain free from falls  Outcome: Ongoing  Note: Pt will remain free from falls  Goal: Absence of physical injury  Description: Absence of physical injury  Outcome: Ongoing  Note: Pt will be absent of physical injury

## 2022-04-04 NOTE — ACP (ADVANCE CARE PLANNING)
Advance Care Planning     Advance Care Planning Activator (Inpatient)  Conversation Note      Date of ACP Conversation: 4/4/2022     Conversation Conducted with: Patient with Decision Making Capacity    ACP Activator: [address] Decision Maker:     Current Designated Health Care Decision Maker:     Primary Decision Maker: Jessy Gregory - Child - [phone]    Secondary Decision Maker: Jimmy Dumont - Child - [phone]  Click here to complete Healthcare Decision Makers including section of the Healthcare Decision Maker Relationship (ie \"Primary\")      Care Preferences    Ventilation: \"If you were in your present state of health and suddenly became very ill and were unable to breathe on your own, what would your preference be about the use of a ventilator (breathing machine) if it were available to you? \"      Would the patient desire the use of ventilator (breathing machine)?: yes    \"If your health worsens and it becomes clear that your chance of recovery is unlikely, what would your preference be about the use of a ventilator (breathing machine) if it were available to you? \"     Would the patient desire the use of ventilator (breathing machine)?: No      Resuscitation  \"CPR works best to restart the heart when there is a sudden event, like a heart attack, in someone who is otherwise healthy. Unfortunately, CPR does not typically restart the heart for people who have serious health conditions or who are very sick. \"    \"In the event your heart stopped as a result of an underlying serious health condition, would you want attempts to be made to restart your heart (answer \"yes\" for attempt to resuscitate) or would you prefer a natural death (answer \"no\" for do not attempt to resuscitate)? \" yes       [] Yes   [] No   Educated Patient / Shaina Bhandari regarding differences between Advance Directives and portable DNR orders.     Length of ACP Conversation in minutes:      Roshan Carranza Outcomes:  [x] ACP discussion completed  [] Existing advance directive reviewed with patient; no changes to patient's previously recorded wishes  [] New Advance Directive completed  [] Portable Do Not Rescitate prepared for Provider review and signature  [] POLST/POST/MOLST/MOST prepared for Provider review and signature      Follow-up plan:    [] Schedule follow-up conversation to continue planning  [] Referred individual to Provider for additional questions/concerns   [] Advised patient/agent/surrogate to review completed ACP document and update if needed with changes in condition, patient preferences or care setting    [x] This note routed to one or more involved healthcare providers        Electronically signed by Hanna Eugene on 4/4/2022 at 4:27 PM  #[phone]

## 2022-04-04 NOTE — FLOWSHEET NOTE
04/04/22 1058   Encounter Summary   Services provided to: Patient   Referral/Consult From: [address] Family members   Place of [address] Visiting Yes   Complexity of Encounter Moderate   Length of Encounter 15 minutes   Spiritual Assessment Completed Yes  (Zoraida Lara 4/4)   Routine   Type Initial   Assessment Calm;Peaceful   Intervention Active listening;Explored feelings, thoughts, concerns;Prayer   Outcome Acceptance; Hopeful   Spiritual/Mandaeism   Type Spiritual support   Assessment Calm;Peaceful   Intervention Active listening;Explored feelings, thoughts, concerns;Prayer   Outcome Connection/belonging;Coping; Hopeful

## 2022-04-04 NOTE — PROGRESS NOTES
Hospitalist Progress Note      PCP: Leann Qureshi    Date of Admission: 4/2/2022    Chief Complaint: abdominal pain. Hospital Course:     68-year-old female, with a past medical history of DM, hypertension, AF, CAD, hyperlipidemia . presenting into the hospital with chief complaints of one-day  history of sudden onset of acute rapidly increasing severe periumbilical  and epigastric abdominal pain with nausea and vomiting and radiation of  the pain to the back and to the shoulder on the right side. Admitted with acute pancreatitis    Subjective:   Patient continues to report abdominal pain, most apparent in the right upper quadrant. Medications:  Reviewed    Infusion Medications    lactated ringers 150 mL/hr at 04/04/22 0249    sodium chloride      dextrose       Scheduled Medications    [Held by provider] insulin glargine  20 Units SubCUTAneous Nightly    apixaban  5 mg Oral BID    sodium chloride flush  5-40 mL IntraVENous 2 times per day    insulin lispro  0-18 Units SubCUTAneous Q4H    scopolamine  1 patch TransDERmal Q72H     PRN Meds: sodium chloride flush, sodium chloride, potassium chloride, magnesium sulfate, ondansetron, glucose, dextrose, glucagon (rDNA), dextrose, HYDROmorphone **OR** HYDROmorphone, prochlorperazine      Intake/Output Summary (Last 24 hours) at 4/4/2022 0808  Last data filed at 4/4/2022 0035  Gross per 24 hour   Intake 410 ml   Output 200 ml   Net 210 ml       Physical Exam Performed:    BP (!) 167/90   Pulse 74   Temp 97.9 °F (36.6 °C) (Oral)   Resp 17   Ht 4' 11\" (1.499 m)   Wt 176 lb 5.9 oz (80 kg)   SpO2 90%   BMI 35.62 kg/m²     General appearance: No apparent distress, appears stated age and cooperative. HEENT: Pupils equal, round, and reactive to light. Conjunctivae/corneas clear. Neck: Supple, with full range of motion. No jugular venous distention. Trachea midline. Respiratory:  Normal respiratory effort.  Clear to auscultation, bilaterally without Rales/Wheezes/Rhonchi. Cardiovascular: Regular rate and rhythm with normal S1/S2 without murmurs, rubs or gallops. Abdomen: Tenderness noted right upper quadrant and epigastric area. Musculoskeletal: No clubbing, cyanosis or edema bilaterally. Full range of motion without deformity. Skin: Skin color, texture, turgor normal.  No rashes or lesions. Neurologic:  Neurovascularly intact without any focal sensory/motor deficits. Cranial nerves: II-XII intact, grossly non-focal.  Psychiatric: Alert and oriented, thought content appropriate, normal insight  Capillary Refill: Brisk,3 seconds, normal   Peripheral Pulses: +2 palpable, equal bilaterally       Labs:   Recent Labs     04/02/22 0249 04/03/22  0945   WBC 18.4* 6.7   HGB 13.4 11.5*   HCT 40.6 35.1*    237     Recent Labs     04/02/22 0249 04/03/22  0945    138   K 4.1 4.2   CL 99 103   CO2 23 21   BUN 33* 19   CREATININE 0.9 0.6   CALCIUM 9.1 8.3     Recent Labs     04/02/22 0249 04/03/22  0945   * 86*   ALT 74* 83*   BILIDIR  --  0.4*   BILITOT 0.9 1.1*   ALKPHOS 86 97     No results for input(s): INR in the last 72 hours. Recent Labs     04/02/22 0249   TROPONINI <0.01       Urinalysis:      Lab Results   Component Value Date    NITRU Negative 04/03/2022    WBCUA 2 04/03/2022    BACTERIA 2+ 04/27/2015    RBCUA 1 04/03/2022    BLOODU Negative 04/03/2022    SPECGRAV 1.020 04/03/2022    GLUCOSEU 500 04/03/2022       Radiology:  XR CHEST PORTABLE   Final Result   No acute process. CT ABDOMEN PELVIS W IV CONTRAST Additional Contrast? None   Final Result   1. Mild inflammation around the pancreatic head and body suspected to   represent acute pancreatitis. Correlate with clinical and laboratory workup. 2. Airspace changes in the lower chest are partially visualized. Correlate   with any pulmonary symptoms.                  Assessment/Plan:    Active Hospital Problems    Diagnosis     Acute pancreatitis without necrosis or infection, unspecified [K85.90]      Acute pancreatitis  Patient presented to emergency with right upper quadrant pain, CT did not show any changes in the gallbladder, case discussed with general surgery. Plan.  -Keep n.p.o. for now.  -Continue IV hydration with LR.  -Continue pain management. -May need repeat ultrasound in the next couple of days      Diabetes mellitus. Patient is on aspart 25 units 3 times daily as well as glargine 72 units nightly. Was noted to be hypoglycemic. We will hold glargine for now and keep on sliding scale while n.p.o. Obesity. BMI 36, complicating course    Paroxysmal atrial fibrillation  On apixaban. Hypertension. Withhold antihypertensive medications for now    DVT Prophylaxis: Apixaban  Diet: Diet NPO  Code Status: Full Code    PT/OT Eval Status: ambulatory.      Dispo - pending clinical improvement      Anup Capps MD

## 2022-04-04 NOTE — PROGRESS NOTES
Pt A&O in bed. Pt took AM meds without complication. Complaints of pain, PRN pain medication given per order. Pt NPO with sips for meds. Call light within reach. Able to make needs known. Fall precautions in place. Will monitor.  Electronically signed by Gissel Mccoy on 4/4/2022 at 1:10 PM

## 2022-04-04 NOTE — ED PROVIDER NOTES
[address] ENCOUNTER      Pt Name: Natalie Moore  MRN: [id number]  [place] 1955  Date of evaluation: 4/2/2022  Provider: German Matson MD    CHIEF COMPLAINT       Chief Complaint   Patient presents with    Abdominal Pain     pt woke up with abd pain yesterday moring and has had emesis as well         HISTORY OF PRESENT ILLNESS   (Location/Symptom, Timing/Onset, Context/Setting, Quality, Duration, Modifying Factors, Severity)  Note limiting factors. Natalie Moore is a 75 y.o. female who presents to the emergency department who presents with relatively sudden onset this evening of epigastric abdominal pain. She has had (and continues to have ) profuse retching with no relief. The pain is sharp, essentially unremitting and moderate 5/10. No other aggravating or relieving factors to the pain. HPI    Nursing Notes were reviewed. REVIEW OF SYSTEMS    (2-9 systems for level 4, 10 or more for level 5)     Review of Systems   Constitutional: Negative for chills and fever. Respiratory: Negative for shortness of breath. Cardiovascular: Negative for chest pain. Gastrointestinal: Positive for abdominal pain, nausea and vomiting. Genitourinary: Negative for flank pain. All other systems reviewed and are negative. Except as noted above the remainder of the review of systems was reviewed and negative.        PAST MEDICAL HISTORY     Past Medical History:   Diagnosis Date    CAD (coronary artery disease)     Depression     Diabetes mellitus ([address]     Hyperlipidemia     Hypertension          SURGICAL HISTORY       Past Surgical History:   Procedure Laterality Date    HYSTERECTOMY           CURRENT MEDICATIONS       Current Discharge Medication List      CONTINUE these medications which have NOT CHANGED    Details   Multiple Vitamins-Minerals (EYE VITAMINS PO) Take by mouth      benzonatate (TESSALON) 200 MG capsule Take 200 mg by mouth 3 times daily as Smokeless tobacco: Never Used   Vaping Use    Vaping Use: Never used   Substance and Sexual Activity    Alcohol use: No    Drug use: No    Sexual activity: Not Currently   Other Topics Concern    None   Social History Narrative    None     Social Determinants of Health     Financial Resource Strain:     Difficulty of Paying Living Expenses: Not on file   Food Insecurity:     Worried About Running Out of Food in the Last Year: Not on file    [place] of Food in the Last Year: Not on file   Transportation Needs:     Lack of Transportation (Medical): Not on file    Lack of Transportation (Non-Medical):  Not on file   Physical Activity:     Days of Exercise per Week: Not on file    Minutes of Exercise per Session: Not on file   Stress:     Feeling of Stress : Not on file   Social Connections:     Frequency of Communication with Friends and Family: Not on file    Frequency of Social Gatherings with Friends and Family: Not on file    Attends Christianity Services: Not on file    Active Member of [address] [place] [address] or Organizations: Not on file    Attends Club or Organization Meetings: Not on file    Marital Status: Not on file   Intimate Partner Violence:     Fear of Current or Ex-Partner: Not on file    Emotionally Abused: Not on file    Physically Abused: Not on file    Sexually Abused: Not on file   Housing Stability:     Unable to Pay for Housing in the Last Year: Not on file    Number of [place] in the Last Year: Not on file    Unstable Housing in the Last Year: Not on file       SCREENINGS         Lauren Coma Scale  Eye Opening: Spontaneous  Best Verbal Response: Oriented  Best Motor Response: Obeys commands  Lauren Coma Scale Score: 15                     CIWA Assessment  BP: (!) 167/90  Pulse: 74                 PHYSICAL EXAM    (up to 7 for level 4, 8 or more for level 5)     ED Triage Vitals   BP Temp Temp Source Pulse Resp SpO2 Height Weight   04/02/22 0242 04/02/22 0242 04/02/22 0242 04/02/22 1039 04/02/22 0244 04/02/22 0244 04/02/22 1200 04/02/22 0627   (!) 135/114 98 °F (36.7 °C) Oral 85 16 97 % 4' 11\" (1.499 m) 171 lb 4.8 oz (77.7 kg)       Physical Exam  Constitutional:       General: She is not in acute distress. Appearance: She is well-developed. She is ill-appearing. HENT:      Head: Normocephalic and atraumatic. Mouth/Throat:      Mouth: Mucous membranes are moist.      Pharynx: No pharyngeal swelling. Eyes:      Extraocular Movements: Extraocular movements intact. Pupils: Pupils are equal, round, and reactive to light. Cardiovascular:      Rate and Rhythm: Normal rate. Heart sounds: No murmur heard. No friction rub. No gallop. Pulmonary:      Effort: Pulmonary effort is normal.      Breath sounds: Normal breath sounds. Abdominal:      General: Bowel sounds are decreased. There is no distension. Palpations: Abdomen is soft. Tenderness: There is abdominal tenderness in the epigastric area. There is no guarding or rebound. Negative signs include Cash's sign and McBurney's sign. Skin:     General: Skin is warm and dry. Capillary Refill: Capillary refill takes less than 2 seconds. Neurological:      Mental Status: She is alert and oriented to person, place, and time. Psychiatric:         Mood and Affect: Mood normal.         Behavior: Behavior normal.         DIAGNOSTIC RESULTS     EKG: All EKG's are interpreted by the Emergency Department Physician who either signs or Co-signs this chart in the absence of a cardiologist.        RADIOLOGY:   Non-plain film images such as CT, Ultrasound and MRI are read by the radiologist. Plain radiographic images are visualized and preliminarily interpreted by the emergency physician with the below findings:        Interpretation per the Radiologist below, if available at the time of this note:    XR CHEST PORTABLE   Final Result   No acute process.          CT ABDOMEN PELVIS W IV CONTRAST Additional Contrast? None   Final Result   1. Mild inflammation around the pancreatic head and body suspected to   represent acute pancreatitis. Correlate with clinical and laboratory workup. 2. Airspace changes in the lower chest are partially visualized. Correlate   with any pulmonary symptoms. ED BEDSIDE ULTRASOUND:   Performed by ED Physician - none    LABS:  Labs Reviewed   CBC WITH AUTO DIFFERENTIAL - Abnormal; Notable for the following components:       Result Value    WBC 18.4 (*)     RDW 15.5 (*)     Neutrophils Absolute 13.4 (*)     All other components within normal limits   COMPREHENSIVE METABOLIC PANEL W/ REFLEX TO MG FOR LOW K - Abnormal; Notable for the following components:    Anion Gap 18 (*)     Glucose 255 (*)     BUN 33 (*)     ALT 74 (*)      (*)     All other components within normal limits   LIPASE - Abnormal; Notable for the following components:    Lipase >3,000.0 (*)     All other components within normal limits   URINALYSIS WITH MICROSCOPIC - Abnormal; Notable for the following components:    Clarity, UA SL CLOUDY (*)     Glucose, Ur 500 (*)     Ketones, Urine TRACE (*)     All other components within normal limits   TRIGLYCERIDES - Abnormal; Notable for the following components:    Triglycerides 164 (*)     All other components within normal limits   CBC WITH AUTO DIFFERENTIAL - Abnormal; Notable for the following components:    Hemoglobin 11.5 (*)     Hematocrit 35.1 (*)     RDW 15.9 (*)     All other components within normal limits   HEPATIC FUNCTION PANEL - Abnormal; Notable for the following components:     Total Protein 5.7 (*)     ALT 83 (*)     AST 86 (*)     Total Bilirubin 1.1 (*)     Bilirubin, Direct 0.4 (*)     All other components within normal limits   LIPASE - Abnormal; Notable for the following components:    Lipase 845.0 (*)     All other components within normal limits   POCT GLUCOSE - Abnormal; Notable for the following components:    POC Glucose 233 (*)     All other components within normal limits   POCT GLUCOSE - Abnormal; Notable for the following components:    POC Glucose 184 (*)     All other components within normal limits   POCT GLUCOSE - Abnormal; Notable for the following components:    POC Glucose 135 (*)     All other components within normal limits   POCT GLUCOSE - Abnormal; Notable for the following components:    POC Glucose 115 (*)     All other components within normal limits   POCT GLUCOSE - Abnormal; Notable for the following components:    POC Glucose 102 (*)     All other components within normal limits   POCT GLUCOSE - Abnormal; Notable for the following components:    POC Glucose 63 (*)     All other components within normal limits   POCT GLUCOSE - Abnormal; Notable for the following components:    POC Glucose 69 (*)     All other components within normal limits   POCT GLUCOSE - Abnormal; Notable for the following components:    POC Glucose 127 (*)     All other components within normal limits   TROPONIN   BRAIN NATRIURETIC PEPTIDE   BASIC METABOLIC PANEL   MAGNESIUM   CBC WITH AUTO DIFFERENTIAL   BASIC METABOLIC PANEL   HEPATIC FUNCTION PANEL   POCT GLUCOSE   POCT GLUCOSE   POCT GLUCOSE   POCT GLUCOSE   POCT GLUCOSE   POCT GLUCOSE   POCT GLUCOSE   POCT GLUCOSE   POCT GLUCOSE   POCT GLUCOSE   POCT GLUCOSE   POCT GLUCOSE   POCT GLUCOSE   POCT GLUCOSE   POCT GLUCOSE   POCT GLUCOSE   POCT GLUCOSE   POCT GLUCOSE       All other labs were within normal range or not returned as of this dictation. EMERGENCY DEPARTMENT COURSE and DIFFERENTIAL DIAGNOSIS/MDM:   Vitals:    Vitals:    04/04/22 0002 04/04/22 0341 04/04/22 0549 04/04/22 0724   BP: (!) 172/77 (!) 155/81  (!) 167/90   Pulse: 82 91  74   Resp: 18 17  17   Temp: 98.1 °F (36.7 °C) 98.2 °F (36.8 °C)  97.9 °F (36.6 °C)   TempSrc: Oral Oral  Oral   SpO2: 92% 94%  90%   Weight:   176 lb 5.9 oz (80 kg)    Height:           The above H& P were performed.  A 12 point ROS was done and is negative unless otherwise noted. I discussed the case with Surgery, and it was noted that a RUQ US showed no evidence of cholelithiasis several months prior so this was unlikely to be gallstone pancreatitis. She was given appropriate IVF, antiemetics and pain control in the ED. MDM   I considered the dx of gallstone pancreatitis, however her RUQ US last year was negative for stones. She had no RLQ tenderness, so appendicitis is unlikely. This is overall consistent with pancreatitiis. She will be admitted for further workup and evaluation       REASSESSMENT          CRITICAL CARE TIME   Total Critical Care time was 16 minutes, excluding separately reportable procedures. There was a high probability of clinically significant/life threatening deterioration in the patient's condition which required my urgent intervention. Pancreatitis. CONSULTS:  IP CONSULT TO GENERAL SURGERY    PROCEDURES:  Unless otherwise noted below, none     Procedures        FINAL IMPRESSION      1. Acute pancreatitis, unspecified complication status, unspecified pancreatitis type          DISPOSITION/PLAN   DISPOSITION Admitted 04/02/2022 05:09:29 AM      PATIENT REFERRED TO:  No follow-up provider specified. DISCHARGE MEDICATIONS:  Current Discharge Medication List        Controlled Substances Monitoring:     No flowsheet data found.     (Please note that portions of this note were completed with a voice recognition program.  Efforts were made to edit the dictations but occasionally words are mis-transcribed.)    German Matson MD (electronically signed)  Attending Emergency Physician         German Matson MD  04/04/22 3132

## 2022-04-04 NOTE — PROGRESS NOTES
General Surgery  Daily Progress Note    Pt Name: Nighat Jones Record Number: [id number]  Date of Birth 1955   Today's Date: 4/4/2022    Chief Complaint   Patient presents with    Abdominal Pain     pt woke up with abd pain yesterday moring and has had emesis as well       ASSESSMENT/PLAN  1. Acute pancreatitis - unclear etiology. Pain unchanged in epigastrium/RUQ. Moderately tender on exam without peritonitis. Nausea improved. OK for sips/chips. Leukocytosis resolved. Lipase trending down yesterday. Continue IVF at 150 ml/hr, and will modify as needed based on labs. RUQ US repeated today. No plans for cholecystectomy based on negative US last year but will see what current imaging shows. 2. DM, HTN - per primary team  3. Paroxysmal afib - on eliquis per primary team      Kwon Dry is unchanged from yesterday. Pain is somewhat controlled. She denies any nausea or vomiting. She is tolerating NPO. Current activity is ad yordan    OBJECTIVE  VITALS:  height is 4' 11\" (1.499 m) and weight is 176 lb 5.9 oz (80 kg). Her oral temperature is 97.9 °F (36.6 °C). Her blood pressure is 167/90 (abnormal) and her pulse is 74. Her respiration is 17 and oxygen saturation is 90%. GENERAL: alert, no distress  LUNGS: normal respiratory effort, no accessory muscle use  HEART: normal rate and regular rhythm  ABDOMEN: soft, minimally distended, moderately tender in epigastrium and RUQ, no peritonitis  EXTREMITY: no cyanosis and no clubbing  I/O last 3 completed shifts: In: 530 [P.O.:530]  Out: 300 [Urine:300]  I/O this shift:   In: 48 [P.O.:50]  Out: -     LABS  Recent Labs     04/03/22  0945 04/03/22  0945 04/03/22  2300 04/04/22  0838   WBC 6.7   < >  --  8.3   HGB 11.5*   < >  --  11.5*   HCT 35.1*   < >  --  34.2*      < >  --  240      < >  --  132*   K 4.2   < >  --  3.9      < >  --  100   CO2 21   < >  --  21   BUN 19   < >  --  13   CREATININE 0.6   < >  --  0.6   MG 2.00  --   --   --    CALCIUM 8.3   < >  --  8.3   AST 86*   < >  --  49*   ALT 83*   < >  --  62*   BILITOT 1.1*   < >  --  1.1*   BILIDIR 0.4*   < >  --  0.5*   NITRU  --   --  Negative  --    COLORU  --   --  Yellow  --     < > = values in this interval not displayed.      CBC with Differential:    Lab Results   Component Value Date    WBC 8.3 04/04/2022    RBC 3.93 04/04/2022    HGB 11.5 04/04/2022    HCT 34.2 04/04/2022     04/04/2022    MCV 86.9 04/04/2022    MCH 29.3 04/04/2022    MCHC 33.7 04/04/2022    RDW 15.3 04/04/2022    SEGSPCT 60.3 02/26/2013    LYMPHOPCT 15.5 04/04/2022    MONOPCT 5.0 04/04/2022    EOSPCT 3.9 03/06/2012    BASOPCT 0.7 04/04/2022    MONOSABS 0.4 04/04/2022    LYMPHSABS 1.3 04/04/2022    EOSABS 0.2 04/04/2022    BASOSABS 0.1 04/04/2022    DIFFTYPE Auto-K 02/26/2013     BMP:    Lab Results   Component Value Date     04/04/2022    K 3.9 04/04/2022    K 4.1 04/02/2022     04/04/2022    CO2 21 04/04/2022    BUN 13 04/04/2022    LABALBU 3.0 04/04/2022    CREATININE 0.6 04/04/2022    CALCIUM 8.3 04/04/2022    GFRAA >60 04/04/2022    GFRAA >60 05/28/2013    LABGLOM >60 04/04/2022    GLUCOSE 81 04/04/2022     Hepatic Function Panel:    Lab Results   Component Value Date    ALKPHOS 140 04/04/2022    ALT 62 04/04/2022    AST 49 04/04/2022    PROT 6.0 04/04/2022    PROT 7.1 02/26/2013    BILITOT 1.1 04/04/2022    BILIDIR 0.5 04/04/2022    IBILI 0.6 04/04/2022    LABALBU 3.0 04/04/2022         Marisela Craig MD  Electronically signed 4/4/2022 at 1:14 PM

## 2022-04-04 NOTE — CARE COORDINATION
INITIAL CASE MANAGEMENT ASSESSMENT    Reviewed chart, met with patient to assess possible discharge needs. Explained Case Management role/services. Living Situation: lives at home- her 15 yr old granddaughter lives with her - there are [address] up to her apt    ADLs: independent     DME: uses a cane occasionally    PT/OT Recs: not ordered at present      Active Services: none     Transportation: active - daughter can transport home at [facility]      Medications: no issues--uses [employer] off [address]    PCP: Nathan Alvarado      HD/PD: n/a    PLAN/COMMENTS: plans to return home at [facility]-- denies dc needd      SW/CM provided contact information for patient or family to call with any questions. SW/CM will follow and assist as needed.   Electronically signed by Omer Villalobos on 4/4/2022 at 4:52 PM  #[phone]

## 2022-04-04 NOTE — PROGRESS NOTES
Patient is resting in bed. Alert and oriented X4. Complaining of pain, PRN pain medication given per order (see MAR). IV in place and infusing. Assessment complete. All patient needs are met at this time. Fall precautions are in place. Call light is in reach. Will continue to monitor.    Electronically signed by Jc Lomas RN on 4/3/2022 at 8:49 PM

## 2022-04-04 NOTE — PROGRESS NOTES
Pt daughter would like to be contacted about plan of care. Her name and number are [facility] [phone]. MD notified. Will return her call when he has the opportunity.  Electronically signed by Gissel Mccoy on 4/4/2022 at 4:02 PM

## 2022-04-04 NOTE — PROGRESS NOTES
Pt temperature noted to be 101. Incentive spirometer provided to patient. Encouraged to get up to chair. No tylenol ordered. MD notified. New order for one time dose of tylenol 650 mg PO and chest x-ray. Orders placed. Will monitor.  Electronically signed by Katerin Bazan on 4/4/2022 at 6:01 PM

## 2022-04-05 LAB
ANION GAP SERPL CALCULATED.3IONS-SCNC: 14 MMOL/L (ref 3–16)
BASOPHILS ABSOLUTE: 0 K/UL (ref 0–0.2)
BASOPHILS RELATIVE PERCENT: 0.5 %
BUN BLDV-MCNC: 11 MG/DL (ref 7–20)
CALCIUM SERPL-MCNC: 8.5 MG/DL (ref 8.3–10.6)
CHLORIDE BLD-SCNC: 102 MMOL/L (ref 99–110)
CO2: 23 MMOL/L (ref 21–32)
CREAT SERPL-MCNC: 0.5 MG/DL (ref 0.6–1.2)
EOSINOPHILS ABSOLUTE: 0.3 K/UL (ref 0–0.6)
EOSINOPHILS RELATIVE PERCENT: 5.1 %
GFR AFRICAN AMERICAN: >60
GFR NON-AFRICAN AMERICAN: >60
GLUCOSE BLD-MCNC: 106 MG/DL (ref 70–99)
GLUCOSE BLD-MCNC: 120 MG/DL (ref 70–99)
GLUCOSE BLD-MCNC: 143 MG/DL (ref 70–99)
GLUCOSE BLD-MCNC: 80 MG/DL (ref 70–99)
GLUCOSE BLD-MCNC: 81 MG/DL (ref 70–99)
GLUCOSE BLD-MCNC: 87 MG/DL (ref 70–99)
GLUCOSE BLD-MCNC: 94 MG/DL (ref 70–99)
GLUCOSE BLD-MCNC: 96 MG/DL (ref 70–99)
HCT VFR BLD CALC: 34.4 % (ref 36–48)
HEMOGLOBIN: 11.5 G/DL (ref 12–16)
LYMPHOCYTES ABSOLUTE: 1.4 K/UL (ref 1–5.1)
LYMPHOCYTES RELATIVE PERCENT: 26 %
MCH RBC QN AUTO: 28.6 PG (ref 26–34)
MCHC RBC AUTO-ENTMCNC: 33.4 G/DL (ref 31–36)
MCV RBC AUTO: 85.7 FL (ref 80–100)
MONOCYTES ABSOLUTE: 0.4 K/UL (ref 0–1.3)
MONOCYTES RELATIVE PERCENT: 7.4 %
NEUTROPHILS ABSOLUTE: 3.4 K/UL (ref 1.7–7.7)
NEUTROPHILS RELATIVE PERCENT: 61 %
PDW BLD-RTO: 15.3 % (ref 12.4–15.4)
PERFORMED ON: ABNORMAL
PERFORMED ON: NORMAL
PLATELET # BLD: 264 K/UL (ref 135–450)
PMV BLD AUTO: 7.3 FL (ref 5–10.5)
POTASSIUM SERPL-SCNC: 3.6 MMOL/L (ref 3.5–5.1)
RBC # BLD: 4.02 M/UL (ref 4–5.2)
SODIUM BLD-SCNC: 139 MMOL/L (ref 136–145)
TRIGL SERPL-MCNC: 115 MG/DL (ref 0–150)
TROPONIN: <0.01 NG/ML
WBC # BLD: 5.5 K/UL (ref 4–11)

## 2022-04-05 PROCEDURE — 99232 SBSQ HOSP IP/OBS MODERATE 35: CPT | Performed by: SURGERY

## 2022-04-05 PROCEDURE — 80048 BASIC METABOLIC PNL TOTAL CA: CPT

## 2022-04-05 PROCEDURE — 36415 COLL VENOUS BLD VENIPUNCTURE: CPT

## 2022-04-05 PROCEDURE — 2700000000 HC OXYGEN THERAPY PER DAY

## 2022-04-05 PROCEDURE — 6370000000 HC RX 637 (ALT 250 FOR IP): Performed by: INTERNAL MEDICINE

## 2022-04-05 PROCEDURE — 1200000000 HC SEMI PRIVATE

## 2022-04-05 PROCEDURE — 84478 ASSAY OF TRIGLYCERIDES: CPT

## 2022-04-05 PROCEDURE — APPNB45 APP NON BILLABLE 31-45 MINUTES: Performed by: NURSE PRACTITIONER

## 2022-04-05 PROCEDURE — 94761 N-INVAS EAR/PLS OXIMETRY MLT: CPT

## 2022-04-05 PROCEDURE — 85025 COMPLETE CBC W/AUTO DIFF WBC: CPT

## 2022-04-05 PROCEDURE — APPSS45 APP SPLIT SHARED TIME 31-45 MINUTES: Performed by: NURSE PRACTITIONER

## 2022-04-05 PROCEDURE — 6360000002 HC RX W HCPCS: Performed by: NURSE PRACTITIONER

## 2022-04-05 PROCEDURE — 6370000000 HC RX 637 (ALT 250 FOR IP): Performed by: NURSE PRACTITIONER

## 2022-04-05 PROCEDURE — 2580000003 HC RX 258: Performed by: INTERNAL MEDICINE

## 2022-04-05 PROCEDURE — 84484 ASSAY OF TROPONIN QUANT: CPT

## 2022-04-05 RX ORDER — LACTOBACILLUS RHAMNOSUS GG 10B CELL
1 CAPSULE ORAL
Status: DISCONTINUED | OUTPATIENT
Start: 2022-04-06 | End: 2022-04-07 | Stop reason: HOSPADM

## 2022-04-05 RX ORDER — LISINOPRIL 20 MG/1
20 TABLET ORAL DAILY
Status: DISCONTINUED | OUTPATIENT
Start: 2022-04-05 | End: 2022-04-07 | Stop reason: HOSPADM

## 2022-04-05 RX ORDER — LACTOBACILLUS RHAMNOSUS GG 10B CELL
2 CAPSULE ORAL ONCE
Status: COMPLETED | OUTPATIENT
Start: 2022-04-05 | End: 2022-04-05

## 2022-04-05 RX ORDER — TRAMADOL HYDROCHLORIDE 50 MG/1
50 TABLET ORAL EVERY 6 HOURS PRN
Status: DISCONTINUED | OUTPATIENT
Start: 2022-04-05 | End: 2022-04-05

## 2022-04-05 RX ORDER — PANTOPRAZOLE SODIUM 40 MG/1
40 TABLET, DELAYED RELEASE ORAL
Status: DISCONTINUED | OUTPATIENT
Start: 2022-04-06 | End: 2022-04-07 | Stop reason: HOSPADM

## 2022-04-05 RX ORDER — POTASSIUM CHLORIDE 750 MG/1
20 TABLET, FILM COATED, EXTENDED RELEASE ORAL ONCE
Status: COMPLETED | OUTPATIENT
Start: 2022-04-05 | End: 2022-04-05

## 2022-04-05 RX ORDER — OXYCODONE HYDROCHLORIDE 5 MG/1
5 TABLET ORAL EVERY 6 HOURS PRN
Status: DISCONTINUED | OUTPATIENT
Start: 2022-04-05 | End: 2022-04-05

## 2022-04-05 RX ORDER — KETOROLAC TROMETHAMINE 15 MG/ML
15 INJECTION, SOLUTION INTRAMUSCULAR; INTRAVENOUS EVERY 6 HOURS PRN
Status: DISCONTINUED | OUTPATIENT
Start: 2022-04-05 | End: 2022-04-07 | Stop reason: HOSPADM

## 2022-04-05 RX ORDER — OXYCODONE HYDROCHLORIDE AND ACETAMINOPHEN 5; 325 MG/1; MG/1
1 TABLET ORAL EVERY 4 HOURS PRN
Status: DISCONTINUED | OUTPATIENT
Start: 2022-04-05 | End: 2022-04-07 | Stop reason: HOSPADM

## 2022-04-05 RX ORDER — FUROSEMIDE 10 MG/ML
20 INJECTION INTRAMUSCULAR; INTRAVENOUS ONCE
Status: COMPLETED | OUTPATIENT
Start: 2022-04-05 | End: 2022-04-05

## 2022-04-05 RX ADMIN — LISINOPRIL 20 MG: 20 TABLET ORAL at 18:10

## 2022-04-05 RX ADMIN — APIXABAN 5 MG: 5 TABLET, FILM COATED ORAL at 20:22

## 2022-04-05 RX ADMIN — APIXABAN 5 MG: 5 TABLET, FILM COATED ORAL at 08:14

## 2022-04-05 RX ADMIN — FUROSEMIDE 20 MG: 10 INJECTION, SOLUTION INTRAMUSCULAR; INTRAVENOUS at 11:15

## 2022-04-05 RX ADMIN — Medication 2 CAPSULE: at 23:01

## 2022-04-05 RX ADMIN — SODIUM CHLORIDE, POTASSIUM CHLORIDE, SODIUM LACTATE AND CALCIUM CHLORIDE: 600; 310; 30; 20 INJECTION, SOLUTION INTRAVENOUS at 10:15

## 2022-04-05 RX ADMIN — POTASSIUM CHLORIDE 20 MEQ: 750 TABLET, EXTENDED RELEASE ORAL at 11:15

## 2022-04-05 ASSESSMENT — PAIN SCALES - GENERAL
PAINLEVEL_OUTOF10: 0
PAINLEVEL_OUTOF10: 0

## 2022-04-05 NOTE — PLAN OF CARE
Problem: Infection:  Goal: Will remain free from infection  Description: Will remain free from infection  Outcome: Ongoing  Goal: Discharge to appropriate level of care  Outcome: Ongoing     Problem: Safety:  Goal: Free from accidental physical injury  Description: Free from accidental physical injury  Outcome: Ongoing  Goal: Free from intentional harm  Description: Free from intentional harm  Outcome: Ongoing     Problem: Daily Care:  Goal: Daily care needs are met  Description: Daily care needs are met  Outcome: Ongoing     Problem: Pain:  Goal: Patient's pain/discomfort is manageable  Description: Patient's pain/discomfort is manageable  Outcome: Ongoing  Goal: Pain level will decrease  Description: Pain level will decrease  Outcome: Ongoing  Goal: Control of acute pain  Description: Control of acute pain  Outcome: Ongoing  Goal: Control of chronic pain  Description: Control of chronic pain  Outcome: Ongoing     Problem: Skin Integrity:  Goal: Skin integrity will stabilize  Description: Skin integrity will stabilize  Outcome: Ongoing     Problem: Discharge Planning:  Goal: Patients continuum of care needs are met  Description: Patients continuum of care needs are met  Outcome: Ongoing     Problem: Falls - Risk of:  Goal: Will remain free from falls  Description: Will remain free from falls  Outcome: Ongoing  Goal: Absence of physical injury  Description: Absence of physical injury  Outcome: Ongoing

## 2022-04-05 NOTE — PLAN OF CARE
Problem: Infection:  Goal: Will remain free from infection  Description: Will remain free from infection  4/4/2022 2307 by Chris Rizzo RN  Outcome: Ongoing  Note: Monitoring pt for signs and symptoms of infection. Will observe for any redness, warmth, or pus. Problem: Infection:  Goal: Discharge to appropriate level of care  4/4/2022 2307 by Chris Rizzo RN  Outcome: Ongoing     Problem: Safety:  Goal: Free from accidental physical injury  Description: Free from accidental physical injury  4/4/2022 2307 by Chris Rizzo RN  Outcome: Ongoing  Note: Bed in lowest position, wheels locked, bed/chair exit alarm in place, call light within reach, and non skid footwear on. Walkway free of clutter. Pt alert and oriented and able to make needs known. Pt educated to use call light when needing to get up, and pt utilizes call light to make needs known. Will continue to monitor. Problem: Safety:  Goal: Free from intentional harm  Description: Free from intentional harm  4/4/2022 2307 by Chris Rizzo RN  Outcome: Ongoing     Problem: Daily Care:  Goal: Daily care needs are met  Description: Daily care needs are met  4/4/2022 2307 by Chris Rizzo RN  Outcome: Ongoing  Note: Pts daily care needs being met. Pt able to call out for needs. Problem: Pain:  Goal: Patient's pain/discomfort is manageable  Description: Patient's pain/discomfort is manageable  4/4/2022 2307 by Chris Rizzo RN  Outcome: Ongoing  Note: Assessing and monitoring pt's pain. PRN pain medication being given if ordered. Educating pt on nonpharmacologic interventions for pain control. Will continue to monitor and reassess.       Problem: Pain:  Goal: Pain level will decrease  Description: Pain level will decrease  4/4/2022 2307 by Chris Rizzo RN  Outcome: Ongoing     Problem: Pain:  Goal: Control of acute pain  Description: Control of acute pain  4/4/2022 2307 by Chris Rizzo RN  Outcome: Ongoing     Problem: Pain:  Goal: Control of chronic pain  Description: Control of chronic pain  4/4/2022 2307 by Tonio Oseguera RN  Outcome: Ongoing     Problem: Skin Integrity:  Goal: Skin integrity will stabilize  Description: Skin integrity will stabilize  4/4/2022 2307 by Tonio Oseguera RN  Outcome: Ongoing  Note: Pt assessed for skin break down. Skin was warm and dry to touch. Will continue to monitor and assess. Problem: Discharge Planning:  Goal: Patients continuum of care needs are met  Description: Patients continuum of care needs are met  4/4/2022 2307 by Tonio Oseguera RN  Outcome: Ongoing  Note: Patient educated on discharge plan and assessed to determine that needs are being met. Questions answered for patient. Patient encouraged to ask questions and voice concerns/comments in regards to barriers for discharge and any other questions about the plan of care. Problem: Falls - Risk of:  Goal: Will remain free from falls  Description: Will remain free from falls  4/4/2022 2307 by Tonio Oseguera RN  Outcome: Ongoing  Note: Fall risk assessment completed. Fall precautions in place. Bed in lowest position, wheels locked, bed/chair exit alarm in place, call light within reach, and non skid footwear on. Walkway free of clutter. Pt alert and oriented and able to make needs known. Pt educated to use call light when needing to get up, and pt utilizes call light to make needs known. Will continue to monitor.        Problem: Falls - Risk of:  Goal: Absence of physical injury  Description: Absence of physical injury  4/4/2022 2307 by Tonio Oseguera RN  Outcome: Ongoing

## 2022-04-05 NOTE — PROGRESS NOTES
Patient A&O. Patient is currently NPO. Call light within reach. Will continue to monitor and reassess.  Electronically signed by Vinicio Estrada RN on 4/5/2022

## 2022-04-05 NOTE — PROGRESS NOTES
Pt is alert and oriented x4, resting quietly in bed. Nightly medications and intake tolerated well. No complaints of nausea, vomiting, or pain. Tylenol given for high temperature- will reassess. No other needs made known at this time. Fall precautions in place. Call light within reach. Will continue to monitor.     Electronically signed by Chris Rizzo RN on 4/4/2022 at 11:07 PM

## 2022-04-05 NOTE — PROGRESS NOTES
General Surgery  Daily Progress Note    Pt Name: Nighat Jones Record Number: [id number]  Date of Birth 1955   Today's Date: 4/5/2022    Chief Complaint   Patient presents with    Abdominal Pain     pt woke up with abd pain yesterday moring and has had emesis as well       ASSESSMENT/PLAN  1. Acute pancreatitis - unclear etiology. Pain improved epigastrium/RUQ. Nausea improved. Trialing liquids now and going well so far. Leukocytosis resolved. Lipase trending down yesterday. Continue IVF. RUQ US repeated and remains negative for stones. No plans for cholecystectomy based on negative US. 2. DM, HTN - per primary team  3. Paroxysmal afib - on eliquis per primary team       Connie Nuno is improved from yesterday. Pain is controlled and improving. She denies any nausea or vomiting. She is tolerating clear liquids. Current activity is ad yordan    OBJECTIVE  VITALS:  height is 4' 11\" (1.499 m) and weight is 176 lb 12.9 oz (80.2 kg). Her oral temperature is 97.9 °F (36.6 °C). Her blood pressure is 182/75 (abnormal) and her pulse is 70. Her respiration is 14 and oxygen saturation is 95%. GENERAL: alert, no distress  LUNGS: normal respiratory effort, no accessory muscle use  HEART: normal rate and regular rhythm  ABDOMEN: soft, minimally distended, mildly tender in epigastrium and RUQ, no peritonitis  EXTREMITY: no cyanosis and no clubbing  I/O last 3 completed shifts: In: 100 [P.O.:100]  Out: 200 [Urine:200]  No intake/output data recorded.     LABS  Recent Labs     04/03/22  0945 04/03/22  0945 04/03/22  2300 04/04/22  0838 04/04/22  0838 04/05/22  0455   WBC 6.7   < >  --  8.3   < > 5.5   HGB 11.5*   < >  --  11.5*   < > 11.5*   HCT 35.1*   < >  --  34.2*   < > 34.4*      < >  --  240   < > 264      < >  --  132*   < > 139   K 4.2   < >  --  3.9   < > 3.6      < >  --  100   < > 102   CO2 21   < >  --  21   < > 23   BUN 19   < >  --  13   < > 11   CREATININE 0.6   < >  --  0.6   < > 0.5*   MG 2.00  --   --   --   --   --    CALCIUM 8.3   < >  --  8.3   < > 8.5   AST 86*   < >  --  49*  --   --    ALT 83*   < >  --  62*  --   --    BILITOT 1.1*   < >  --  1.1*  --   --    BILIDIR 0.4*   < >  --  0.5*  --   --    NITRU  --   --  Negative  --   --   --    COLORU  --   --  Yellow  --   --   --     < > = values in this interval not displayed. CBC with Differential:    Lab Results   Component Value Date    WBC 5.5 04/05/2022    RBC 4.02 04/05/2022    HGB 11.5 04/05/2022    HCT 34.4 04/05/2022     04/05/2022    MCV 85.7 04/05/2022    MCH 28.6 04/05/2022    MCHC 33.4 04/05/2022    RDW 15.3 04/05/2022    SEGSPCT 60.3 02/26/2013    LYMPHOPCT 26.0 04/05/2022    MONOPCT 7.4 04/05/2022    EOSPCT 3.9 03/06/2012    BASOPCT 0.5 04/05/2022    MONOSABS 0.4 04/05/2022    LYMPHSABS 1.4 04/05/2022    EOSABS 0.3 04/05/2022    BASOSABS 0.0 04/05/2022    DIFFTYPE Auto-K 02/26/2013     BMP:    Lab Results   Component Value Date     04/05/2022    K 3.6 04/05/2022    K 4.1 04/02/2022     04/05/2022    CO2 23 04/05/2022    BUN 11 04/05/2022    LABALBU 3.0 04/04/2022    CREATININE 0.5 04/05/2022    CALCIUM 8.5 04/05/2022    GFRAA >60 04/05/2022    GFRAA >60 05/28/2013    LABGLOM >60 04/05/2022    GLUCOSE 81 04/05/2022     Hepatic Function Panel:    Lab Results   Component Value Date    ALKPHOS 140 04/04/2022    ALT 62 04/04/2022    AST 49 04/04/2022    PROT 6.0 04/04/2022    PROT 7.1 02/26/2013    BILITOT 1.1 04/04/2022    BILIDIR 0.5 04/04/2022    IBILI 0.6 04/04/2022    LABALBU 3.0 04/04/2022         [facility], APRN - CNP  Electronically signed 4/5/2022 at 11:45 AM      Agree with above note. The patient was personally seen and examined. Eileen Bingham is doing better. Pain improving. Denies nausea. Having multiple loose BMs.     Alert, NAD  Normal respiratory effort, no accessory muscle use  Abd soft, minimal RUQ tenderness, ND, no peritonitis  Ext: no cyanosis or clubbing    WBC 5.5  Cr 0.5    RUQ US negative for cholelithiasis or GB wall thickening    A/P: 76 yo female with acute pancreatitis    Continue IV hydration, decreased to 75 ml/hr  Advance to clears  Ambulate/OOB  With repeat US negative for cholelithiasis, no plans for cholecystectomy  Will follow    Lázaro Martinez MD

## 2022-04-05 NOTE — PROGRESS NOTES
Patient can not tolerate oxycodone. ..she says it \"makes her itch\". Ortiz Hartman NP, aware and notified. See new order for PRN tramadol.   Electronically signed by Dick Stout RN on 4/5/2022

## 2022-04-05 NOTE — PROGRESS NOTES
Hospitalist Progress Note      PCP: Leann Qureshi    Date of Admission: 4/2/2022    Chief Complaint: abd pain    Hospital Course: This is a 43-year-old female who presented to the ED with epigastric, periumbilical abdominal pain with associated nausea, and vomiting. She was found with an elevated lipase and CT findings concerning for pancreatitis. She had a recent history of steroid and muscle relaxer use. She states that she has had a previous episode of pancreatitis secondary to her diabetic medications. Thus far episode seems idiopathic. She was also found to be mildly hypoxic with a negative chest x-ray. Given h/o atrial fib, lasix was given today. Subjective:   Continues to complain of abdominal pain but states that nausea is improved. No other symptoms noted at present.       Medications:  Reviewed    Infusion Medications    lactated ringers 75 mL/hr at 04/05/22 1035    sodium chloride      dextrose       Scheduled Medications    lisinopril  20 mg Oral Daily    [START ON 4/6/2022] pantoprazole  40 mg Oral QAM AC    [Held by provider] insulin glargine  20 Units SubCUTAneous Nightly    apixaban  5 mg Oral BID    sodium chloride flush  5-40 mL IntraVENous 2 times per day    insulin lispro  0-18 Units SubCUTAneous Q4H    scopolamine  1 patch TransDERmal Q72H     PRN Meds: HYDROmorphone, ketorolac, oxyCODONE-acetaminophen, sodium chloride flush, sodium chloride, potassium chloride, magnesium sulfate, ondansetron, glucose, dextrose, glucagon (rDNA), dextrose, prochlorperazine      Intake/Output Summary (Last 24 hours) at 4/5/2022 1736  Last data filed at 4/5/2022 1231  Gross per 24 hour   Intake 125 ml   Output --   Net 125 ml       Physical Exam Performed:    BP (!) 182/75   Pulse 70   Temp 99.1 °F (37.3 °C) (Oral)   Resp 14   Ht 4' 11\" (1.499 m)   Wt 176 lb 12.9 oz (80.2 kg)   SpO2 91%   BMI 35.71 kg/m²     General appearance: No apparent distress, appears stated age and cooperative. HEENT: Pupils equal, round, and reactive to light. Conjunctivae/corneas clear. Neck: Supple, with full range of motion. Trachea midline. Respiratory:  Normal respiratory effort. Clear to auscultation, bilaterally without Rales/Wheezes/Rhonchi. Cardiovascular: Irregular rhythm with normal rate, S1/S2 without murmurs, rubs or gallops. Abdomen: Soft, non-distended with normal bowel sounds, tenderness noted in the epigastric/right middle abd area  Musculoskeletal: No clubbing, cyanosis or edema bilaterally. Full range of motion without deformity. Skin: Skin color, texture, turgor normal.  No rashes or lesions. Neurologic:  Neurovascularly intact without any focal sensory/motor deficits. No gross deficits   Psychiatric: Alert and oriented, thought content appropriate, normal insight  Capillary Refill: Brisk,3 seconds, normal   Peripheral Pulses: +2 palpable, equal bilaterally       Labs:   Recent Labs     04/03/22  0945 04/04/22  0838 04/05/22  0455   WBC 6.7 8.3 5.5   HGB 11.5* 11.5* 11.5*   HCT 35.1* 34.2* 34.4*    240 264     Recent Labs     04/03/22  0945 04/04/22  0838 04/05/22  0455    132* 139   K 4.2 3.9 3.6    100 102   CO2 21 21 23   BUN 19 13 11   CREATININE 0.6 0.6 0.5*   CALCIUM 8.3 8.3 8.5     Recent Labs     04/03/22  0945 04/04/22  0838   AST 86* 49*   ALT 83* 62*   BILIDIR 0.4* 0.5*   BILITOT 1.1* 1.1*   ALKPHOS 97 140*     No results for input(s): INR in the last 72 hours. Recent Labs     04/05/22  0455   TROPONINI <0.01       Urinalysis:      Lab Results   Component Value Date    NITRU Negative 04/03/2022    WBCUA 2 04/03/2022    BACTERIA 2+ 04/27/2015    RBCUA 1 04/03/2022    BLOODU Negative 04/03/2022    SPECGRAV 1.020 04/03/2022    GLUCOSEU 500 04/03/2022       Radiology:  XR CHEST (2 VW)   Final Result   Subtle prominence of the pulmonary interstitium.   This is nonspecific but can   be seen in interstitial pulmonary edema as well as atypical/viral infections. Correlate clinically         XR SHOULDER RIGHT (MIN 2 VIEWS)   Final Result   1. No acute osseous findings. 2.  Mild degenerative change of the acromioclavicular joint. 3.  Subtle interstitial opacities are seen in the visualized lungs. This is   nonspecific but can be seen in interstitial edema versus atypical/viral   infection. Correlate clinically         US ABDOMEN LIMITED Specify organ? LIVER, GALLBLADDER   Final Result   1. Hepatic steatosis. XR CHEST PORTABLE   Final Result   No acute process. CT ABDOMEN PELVIS W IV CONTRAST Additional Contrast? None   Final Result   1. Mild inflammation around the pancreatic head and body suspected to   represent acute pancreatitis. Correlate with clinical and laboratory workup. 2. Airspace changes in the lower chest are partially visualized. Correlate   with any pulmonary symptoms. Assessment/Plan:    Active Hospital Problems    Diagnosis     Acute pancreatitis [K85.90]      Acute pancreatitis  -Lipase >1000<800  - triglycerides 164, 115  - abd u/s unremarkable  - decrease IVF  - increase diet to clears  - adjusted pain meds  - surgery following  - was on thiazide diuretic, consider stopping at discharge  -h/o Chronic NSAID use, start on ppi    Accelerated hypertension  - likely related to pain  - monitor BP q4h  -resume home dose of LIsinopril    Transaminitis  - related to pancreatitis?  - improvoing slowly  - u/s as above without acute pathology    Paroxysmal atrial fibrillation s/p ablation   -on chronic ac w eliquis  -given ongoing hypoxia and use of thiazide diuretic as outpatient, lasix given today with KCL  - monitor on tele    Right shoulder pain  - xray with osteo changes only  - improved mobility     Other comorbidities:  Diabetes mellitus type 2, Aic 9.3 ~1 yr ago cont ssi  Essential hypertension- accelerated as above  CAD ?  Normal stress test 2020  Hyperlipidemia, unspecified- previously on fenofibrate, not currently taking    DVT Prophylaxis: eliquis  Diet: ADULT DIET; Clear Liquid  Code Status: Full Code    PT/OT Eval Status: not indicated    Dispo - cont current level of care.      Silvia Morales, APRN - CNP

## 2022-04-06 LAB
ALBUMIN SERPL-MCNC: 3.5 G/DL (ref 3.4–5)
ALBUMIN SERPL-MCNC: 3.8 G/DL (ref 3.4–5)
ALP BLD-CCNC: 160 U/L (ref 40–129)
ALT SERPL-CCNC: 39 U/L (ref 10–40)
ANION GAP SERPL CALCULATED.3IONS-SCNC: 11 MMOL/L (ref 3–16)
AST SERPL-CCNC: 27 U/L (ref 15–37)
BASOPHILS ABSOLUTE: 0.1 K/UL (ref 0–0.2)
BASOPHILS RELATIVE PERCENT: 1 %
BILIRUB SERPL-MCNC: 1 MG/DL (ref 0–1)
BILIRUBIN DIRECT: 0.3 MG/DL (ref 0–0.3)
BILIRUBIN, INDIRECT: 0.7 MG/DL (ref 0–1)
BUN BLDV-MCNC: 12 MG/DL (ref 7–20)
CALCIUM SERPL-MCNC: 9.5 MG/DL (ref 8.3–10.6)
CHLORIDE BLD-SCNC: 98 MMOL/L (ref 99–110)
CO2: 27 MMOL/L (ref 21–32)
CREAT SERPL-MCNC: 0.7 MG/DL (ref 0.6–1.2)
EOSINOPHILS ABSOLUTE: 0.5 K/UL (ref 0–0.6)
EOSINOPHILS RELATIVE PERCENT: 7.4 %
GFR AFRICAN AMERICAN: >60
GFR NON-AFRICAN AMERICAN: >60
GLUCOSE BLD-MCNC: 121 MG/DL (ref 70–99)
GLUCOSE BLD-MCNC: 139 MG/DL (ref 70–99)
GLUCOSE BLD-MCNC: 140 MG/DL (ref 70–99)
GLUCOSE BLD-MCNC: 160 MG/DL (ref 70–99)
GLUCOSE BLD-MCNC: 178 MG/DL (ref 70–99)
HCT VFR BLD CALC: 37.9 % (ref 36–48)
HEMOGLOBIN: 12.6 G/DL (ref 12–16)
LIPASE: 107 U/L (ref 13–60)
LYMPHOCYTES ABSOLUTE: 1.8 K/UL (ref 1–5.1)
LYMPHOCYTES RELATIVE PERCENT: 29.5 %
MCH RBC QN AUTO: 28.4 PG (ref 26–34)
MCHC RBC AUTO-ENTMCNC: 33.3 G/DL (ref 31–36)
MCV RBC AUTO: 85.2 FL (ref 80–100)
MONOCYTES ABSOLUTE: 0.5 K/UL (ref 0–1.3)
MONOCYTES RELATIVE PERCENT: 8.2 %
NEUTROPHILS ABSOLUTE: 3.3 K/UL (ref 1.7–7.7)
NEUTROPHILS RELATIVE PERCENT: 53.9 %
PDW BLD-RTO: 14.7 % (ref 12.4–15.4)
PERFORMED ON: ABNORMAL
PHOSPHORUS: 3.4 MG/DL (ref 2.5–4.9)
PLATELET # BLD: 328 K/UL (ref 135–450)
PMV BLD AUTO: 7.2 FL (ref 5–10.5)
POTASSIUM SERPL-SCNC: 3.6 MMOL/L (ref 3.5–5.1)
RBC # BLD: 4.45 M/UL (ref 4–5.2)
SODIUM BLD-SCNC: 136 MMOL/L (ref 136–145)
TOTAL PROTEIN: 6.6 G/DL (ref 6.4–8.2)
WBC # BLD: 6.1 K/UL (ref 4–11)

## 2022-04-06 PROCEDURE — 80076 HEPATIC FUNCTION PANEL: CPT

## 2022-04-06 PROCEDURE — 2580000003 HC RX 258: Performed by: INTERNAL MEDICINE

## 2022-04-06 PROCEDURE — 94760 N-INVAS EAR/PLS OXIMETRY 1: CPT

## 2022-04-06 PROCEDURE — 83690 ASSAY OF LIPASE: CPT

## 2022-04-06 PROCEDURE — 1200000000 HC SEMI PRIVATE

## 2022-04-06 PROCEDURE — 36415 COLL VENOUS BLD VENIPUNCTURE: CPT

## 2022-04-06 PROCEDURE — 80069 RENAL FUNCTION PANEL: CPT

## 2022-04-06 PROCEDURE — 6370000000 HC RX 637 (ALT 250 FOR IP): Performed by: INTERNAL MEDICINE

## 2022-04-06 PROCEDURE — APPNB45 APP NON BILLABLE 31-45 MINUTES: Performed by: PHYSICIAN ASSISTANT

## 2022-04-06 PROCEDURE — 2580000003 HC RX 258: Performed by: NURSE PRACTITIONER

## 2022-04-06 PROCEDURE — 6370000000 HC RX 637 (ALT 250 FOR IP): Performed by: NURSE PRACTITIONER

## 2022-04-06 PROCEDURE — APPSS45 APP SPLIT SHARED TIME 31-45 MINUTES: Performed by: NURSE PRACTITIONER

## 2022-04-06 PROCEDURE — 99232 SBSQ HOSP IP/OBS MODERATE 35: CPT | Performed by: SURGERY

## 2022-04-06 PROCEDURE — APPNB45 APP NON BILLABLE 31-45 MINUTES: Performed by: NURSE PRACTITIONER

## 2022-04-06 PROCEDURE — 85025 COMPLETE CBC W/AUTO DIFF WBC: CPT

## 2022-04-06 RX ORDER — AZELASTINE 1 MG/ML
1 SPRAY, METERED NASAL 2 TIMES DAILY PRN
COMMUNITY

## 2022-04-06 RX ORDER — MELOXICAM 15 MG/1
15 TABLET ORAL DAILY
COMMUNITY

## 2022-04-06 RX ORDER — CHLORHEXIDINE GLUCONATE 4 G/100ML
1 SOLUTION TOPICAL
COMMUNITY
Start: 2022-03-28 | End: 2022-04-28

## 2022-04-06 RX ORDER — HYDRALAZINE HYDROCHLORIDE 20 MG/ML
5 INJECTION INTRAMUSCULAR; INTRAVENOUS EVERY 6 HOURS PRN
Status: DISCONTINUED | OUTPATIENT
Start: 2022-04-06 | End: 2022-04-07 | Stop reason: HOSPADM

## 2022-04-06 RX ORDER — FENOFIBRATE 160 MG/1
160 TABLET ORAL DAILY
COMMUNITY

## 2022-04-06 RX ORDER — LOPERAMIDE HYDROCHLORIDE 2 MG/1
2 CAPSULE ORAL 4 TIMES DAILY PRN
Status: DISCONTINUED | OUTPATIENT
Start: 2022-04-06 | End: 2022-04-07 | Stop reason: HOSPADM

## 2022-04-06 RX ORDER — PANTOPRAZOLE SODIUM 40 MG/1
40 TABLET, DELAYED RELEASE ORAL DAILY
COMMUNITY

## 2022-04-06 RX ORDER — ATORVASTATIN CALCIUM 10 MG/1
10 TABLET, FILM COATED ORAL DAILY
COMMUNITY

## 2022-04-06 RX ADMIN — SODIUM CHLORIDE, POTASSIUM CHLORIDE, SODIUM LACTATE AND CALCIUM CHLORIDE: 600; 310; 30; 20 INJECTION, SOLUTION INTRAVENOUS at 19:36

## 2022-04-06 RX ADMIN — LOPERAMIDE HYDROCHLORIDE 2 MG: 2 CAPSULE ORAL at 19:36

## 2022-04-06 RX ADMIN — APIXABAN 5 MG: 5 TABLET, FILM COATED ORAL at 08:13

## 2022-04-06 RX ADMIN — SODIUM CHLORIDE, PRESERVATIVE FREE 10 ML: 5 INJECTION INTRAVENOUS at 08:24

## 2022-04-06 RX ADMIN — INSULIN LISPRO 3 UNITS: 100 INJECTION, SOLUTION INTRAVENOUS; SUBCUTANEOUS at 16:36

## 2022-04-06 RX ADMIN — Medication 1 CAPSULE: at 08:13

## 2022-04-06 RX ADMIN — SODIUM CHLORIDE, POTASSIUM CHLORIDE, SODIUM LACTATE AND CALCIUM CHLORIDE: 600; 310; 30; 20 INJECTION, SOLUTION INTRAVENOUS at 13:19

## 2022-04-06 RX ADMIN — LISINOPRIL 20 MG: 20 TABLET ORAL at 08:13

## 2022-04-06 RX ADMIN — INSULIN LISPRO 3 UNITS: 100 INJECTION, SOLUTION INTRAVENOUS; SUBCUTANEOUS at 19:46

## 2022-04-06 RX ADMIN — OXYCODONE HYDROCHLORIDE AND ACETAMINOPHEN 1 TABLET: 5; 325 TABLET ORAL at 19:46

## 2022-04-06 RX ADMIN — SODIUM CHLORIDE, PRESERVATIVE FREE 10 ML: 5 INJECTION INTRAVENOUS at 19:37

## 2022-04-06 RX ADMIN — PANTOPRAZOLE SODIUM 40 MG: 40 TABLET, DELAYED RELEASE ORAL at 05:29

## 2022-04-06 RX ADMIN — OXYCODONE HYDROCHLORIDE AND ACETAMINOPHEN 1 TABLET: 5; 325 TABLET ORAL at 08:12

## 2022-04-06 RX ADMIN — APIXABAN 5 MG: 5 TABLET, FILM COATED ORAL at 19:36

## 2022-04-06 ASSESSMENT — PAIN DESCRIPTION - LOCATION
LOCATION: ABDOMEN;SHOULDER
LOCATION: SHOULDER
LOCATION: SHOULDER

## 2022-04-06 ASSESSMENT — PAIN SCALES - GENERAL
PAINLEVEL_OUTOF10: 8
PAINLEVEL_OUTOF10: 0
PAINLEVEL_OUTOF10: 6
PAINLEVEL_OUTOF10: 5

## 2022-04-06 ASSESSMENT — PAIN DESCRIPTION - FREQUENCY
FREQUENCY: CONTINUOUS
FREQUENCY: INTERMITTENT
FREQUENCY: CONTINUOUS

## 2022-04-06 ASSESSMENT — PAIN DESCRIPTION - PROGRESSION
CLINICAL_PROGRESSION: GRADUALLY WORSENING
CLINICAL_PROGRESSION: GRADUALLY IMPROVING
CLINICAL_PROGRESSION: GRADUALLY WORSENING

## 2022-04-06 ASSESSMENT — PAIN DESCRIPTION - DESCRIPTORS
DESCRIPTORS: ACHING
DESCRIPTORS: ACHING;DISCOMFORT
DESCRIPTORS: ACHING

## 2022-04-06 ASSESSMENT — PAIN DESCRIPTION - ORIENTATION
ORIENTATION: RIGHT

## 2022-04-06 ASSESSMENT — PAIN DESCRIPTION - ONSET
ONSET: ON-GOING

## 2022-04-06 ASSESSMENT — PAIN DESCRIPTION - PAIN TYPE
TYPE: ACUTE PAIN

## 2022-04-06 ASSESSMENT — PAIN - FUNCTIONAL ASSESSMENT
PAIN_FUNCTIONAL_ASSESSMENT: PREVENTS OR INTERFERES SOME ACTIVE ACTIVITIES AND ADLS

## 2022-04-06 ASSESSMENT — PAIN SCALES - WONG BAKER: WONGBAKER_NUMERICALRESPONSE: 0

## 2022-04-06 NOTE — PROGRESS NOTES
Pt is having bouts of diarrhea. Culturelle given this AM. No orders for anything PRN. This RN messaged Dr. Dinah Moreira to see if the pt can have anything else. Will monitor.  Electronically signed by Alfred Salguero RN on 4/6/2022 at 6:26 PM

## 2022-04-06 NOTE — PROGRESS NOTES
Patient is alert and oriented. Supine in bed. Patient is tolerating clear liquids. IVF infusing. No complaint of nausea vomiting. Patient complains of abdominal pain. Repositioned. RN notified. Call light within reach, able to make needs known, fall precautions in place, will monitor.  Electronically signed by Alon Archer on 4/6/2022 at 8:05 AM

## 2022-04-06 NOTE — PLAN OF CARE
Problem: Infection:  Goal: Will remain free from infection  Description: Will remain free from infection  4/6/2022 0341 by Ann Gabriel RN  Outcome: Ongoing  Note: Monitoring pt for signs and symptoms of infection. Will observe for any redness, warmth, or pus. Problem: Infection:  Goal: Discharge to appropriate level of care  4/6/2022 0341 by Ann Gabriel RN  Outcome: Ongoing     Problem: Safety:  Goal: Free from accidental physical injury  Description: Free from accidental physical injury  4/6/2022 0341 by Ann Gabriel RN  Outcome: Ongoing  Note: Bed in lowest position, wheels locked, bed/chair exit alarm in place, call light within reach, and non skid footwear on. Walkway free of clutter. Pt alert and oriented and able to make needs known. Pt educated to use call light when needing to get up, and pt utilizes call light to make needs known. Will continue to monitor. Problem: Safety:  Goal: Free from intentional harm  Description: Free from intentional harm  4/6/2022 0341 by Ann Gabriel RN  Outcome: Ongoing     Problem: Daily Care:  Goal: Daily care needs are met  Description: Daily care needs are met  4/6/2022 0341 by Ann Gabriel RN  Outcome: Ongoing  Note: Pt able to perform daily cares independently - pt standby for bathroom privileges. Problem: Pain:  Goal: Patient's pain/discomfort is manageable  Description: Patient's pain/discomfort is manageable  4/6/2022 0341 by Ann Gabriel RN  Outcome: Ongoing  Note: Assessing and monitoring pt's pain. PRN pain medication being given if ordered. Educating pt on nonpharmacologic interventions for pain control. Will continue to monitor and reassess.        Problem: Pain:  Goal: Pain level will decrease  Description: Pain level will decrease  4/6/2022 0341 by Ann Gabriel RN  Outcome: Ongoing     Problem: Pain:  Goal: Control of acute pain  Description: Control of acute pain  4/6/2022 0341 by Ann Gabriel RN  Outcome: Ongoing     Problem: Pain:  Goal: Control of chronic pain  Description: Control of chronic pain  4/6/2022 0341 by Skyler Cruz RN  Outcome: Ongoing     Problem: Skin Integrity:  Goal: Skin integrity will stabilize  Description: Skin integrity will stabilize  4/6/2022 0341 by Skyler Cruz RN  Outcome: Ongoing  Note: Pt assessed for skin break down. Skin was warm and dry to touch. Will continue to monitor and assess. Problem: Discharge Planning:  Goal: Patients continuum of care needs are met  Description: Patients continuum of care needs are met  4/6/2022 0341 by Skyler Cruz RN  Outcome: Ongoing  Note: Patient educated on discharge plan and assessed to determine that needs are being met. Questions answered for patient. Patient encouraged to ask questions and voice concerns/comments in regards to barriers for discharge and any other questions about the plan of care. Problem: Falls - Risk of:  Goal: Will remain free from falls  Description: Will remain free from falls  4/6/2022 0341 by Skyler Cruz RN  Outcome: Ongoing  Note: Fall risk assessment completed. Fall precautions in place. Bed in lowest position, wheels locked, bed/chair exit alarm in place, call light within reach, and non skid footwear on. Walkway free of clutter. Pt alert and oriented and able to make needs known. Pt educated to use call light when needing to get up, and pt utilizes call light to make needs known. Will continue to monitor. Problem: Falls - Risk of:  Goal: Absence of physical injury  Description: Absence of physical injury  4/6/2022 0341 by Skyler Cruz RN  Outcome: Ongoing     Problem: Skin Integrity:  Goal: Will show no infection signs and symptoms  Description: Will show no infection signs and symptoms  Outcome: Ongoing  Note: Pt assessed for skin break down. Skin was warm and dry to touch. Will continue to monitor and assess.        Problem: Skin Integrity:  Goal: Absence of new skin breakdown  Description: Absence of new skin breakdown  Outcome: Ongoing

## 2022-04-06 NOTE — PROGRESS NOTES
Pt is alert and oriented x4, resting quietly in bed. Nightly medications and intake tolerated well. No complaints of nausea, vomiting, or pain. Pt complaining of some loose stools - NP notified and culturelle started. No other needs made known at this time. Fall precautions in place. Call light within reach. Will continue to monitor.     Electronically signed by Anne Velasco RN on 4/6/2022 at 3:41 AM

## 2022-04-06 NOTE — PROGRESS NOTES
Pt resting in bed, A&Ox4. C/o shoulder pain this AM, see MAR for intervention. Tolerating PO medications whole with thin liquids. Pt refused insulin this AM, pt wants to wait for lunch B/S before deciding to get insulin. SBA when ambulating. Able to make needs known. Call light within reach. Fall precautions in place. Will monitor per unit protocol.  Electronically signed by Sharyle Meyers, RN on 4/6/2022 at 11:48 AM

## 2022-04-06 NOTE — PROGRESS NOTES
Pt resting in bed, SBA when ambulating. Diet advanced to regular, low fat/high chol/high fiber/LIAM. Pain has reduced, no needs for pain medication at this time. Able to make needs known. Call light within reach. Fall precautions in place. Will monitor.  Electronically signed by Karlene Mac RN on 4/6/2022 at 5:00 PM

## 2022-04-06 NOTE — PROGRESS NOTES
Hospital Medicine Progress Note      Admit Date: 4/2/2022       CC: F/U for abd pain    HPI:   This is a 25-year-old female who presented to the ED with epigastric, periumbilical abdominal pain with associated nausea, and vomiting. She was found with an elevated lipase and CT findings concerning for pancreatitis. She had a recent history of steroid and muscle relaxer use. She states that she has had a previous episode of pancreatitis secondary to her diabetic medications. Thus far episode seems idiopathic. She was also found to be mildly hypoxic with a negative chest x-ray. Given h/o atrial fib, lasix was given today. Interval History/Subjective: patient states her pain started in her abdomen with pain on Thurs last week. She has had pancreatitis in the past due to trulicity. Ultrasound neg for stones. Her pain is improving continuing IVF. Clears for now and ADAT per Gen surg. Review of Systems:       The patient denied headaches, visual changes, LOC, SOB, CP, ABD pain, N/V/D, skin changes, new or worsening weakness or neuromuscular deficits. Comprehensive ROS negative except as mentioned above. Past Medical History:        Diagnosis Date    CAD (coronary artery disease)     Depression     Diabetes mellitus ([facility] [address]     Hyperlipidemia     Hypertension        Past Surgical History:        Procedure Laterality Date    HYSTERECTOMY         Allergies:  Dulaglutide, Hydrocodone, and Metformin    Past medical and surgical history reviewed. Any changes have been noted. PHYSICAL EXAM:  BP (!) 185/78   Pulse 73   Temp 97.9 °F (36.6 °C) (Oral)   Resp 16   Ht 4' 11\" (1.499 m)   Wt 168 lb 14 oz (76.6 kg)   SpO2 93%   BMI 34.11 kg/m²       Intake/Output Summary (Last 24 hours) at 4/6/2022 0835  Last data filed at 4/6/2022 0800  Gross per 24 hour   Intake 605 ml   Output --   Net 605 ml        General appearance:   No apparent distress, appears stated age. Cooperative.   HEENT: Normocephalic, atraumatic. PERRLA. EOMi. Conjunctivae/corneas clear, no icterus, non-injected. Neck: Supple, with full range of motion. No jugular venous distention. Trachea midline. Respiratory:  Normal respiratory effort. Clear to auscultation, bilaterally without Rales/Wheezes/Rhonchi. Cardiovascular:  Regular rate and rhythm without murmurs, rubs or gallops. Abdomen: Soft, non-tender, non-distended, without rebound or guarding. Normal bowel sounds. Musculoskeletal:  No clubbing, cyanosis or edema bilaterally. Full range of motion without deformity. Skin: Skin color, texture, turgor normal.  No rashes or lesions. Neurologic:  Neurovascularly intact without any focal sensory/motor deficits. Cranial nerves: II-XII intact, grossly intact. No facial asymmetry, tongue midline. Psychiatric:  Alert and oriented, thought content appropriate  Capillary Refill: Brisk,< 3 seconds   Peripheral Pulses: +2 palpable, equal bilaterally       LABS:    Lab Results   Component Value Date    WBC 6.1 04/06/2022    HGB 12.6 04/06/2022    HCT 37.9 04/06/2022    MCV 85.2 04/06/2022     04/06/2022    LYMPHOPCT 29.5 04/06/2022    RBC 4.45 04/06/2022    MCH 28.4 04/06/2022    MCHC 33.3 04/06/2022    RDW 14.7 04/06/2022       Lab Results   Component Value Date    CREATININE 0.7 04/06/2022    BUN 12 04/06/2022     04/06/2022    K 3.6 04/06/2022    CL 98 (L) 04/06/2022    CO2 27 04/06/2022       Lab Results   Component Value Date    MG 2.00 04/03/2022       Lab Results   Component Value Date    ALT 62 (H) 04/04/2022    AST 49 (H) 04/04/2022    ALKPHOS 140 (H) 04/04/2022    BILITOT 1.1 (H) 04/04/2022        No flowsheet data found. Lab Results   Component Value Date    LABA1C 9.3 06/14/2021       Imaging:  XR CHEST (2 VW)   Final Result   Subtle prominence of the pulmonary interstitium. This is nonspecific but can   be seen in interstitial pulmonary edema as well as atypical/viral infections.    Correlate clinically XR SHOULDER RIGHT (MIN 2 VIEWS)   Final Result   1. No acute osseous findings. 2.  Mild degenerative change of the acromioclavicular joint. 3.  Subtle interstitial opacities are seen in the visualized lungs. This is   nonspecific but can be seen in interstitial edema versus atypical/viral   infection. Correlate clinically         US ABDOMEN LIMITED Specify organ? LIVER, GALLBLADDER   Final Result   1. Hepatic steatosis. XR CHEST PORTABLE   Final Result   No acute process. CT ABDOMEN PELVIS W IV CONTRAST Additional Contrast? None   Final Result   1. Mild inflammation around the pancreatic head and body suspected to   represent acute pancreatitis. Correlate with clinical and laboratory workup. 2. Airspace changes in the lower chest are partially visualized. Correlate   with any pulmonary symptoms. Scheduled and prn Medications:    Scheduled Meds:   lisinopril  20 mg Oral Daily    pantoprazole  40 mg Oral QAM AC    lactobacillus  1 capsule Oral Daily with breakfast    [Held by provider] insulin glargine  20 Units SubCUTAneous Nightly    apixaban  5 mg Oral BID    sodium chloride flush  5-40 mL IntraVENous 2 times per day    insulin lispro  0-18 Units SubCUTAneous Q4H    scopolamine  1 patch TransDERmal Q72H     Continuous Infusions:   lactated ringers 75 mL/hr at 04/05/22 1035    sodium chloride      dextrose       PRN Meds:. HYDROmorphone, ketorolac, oxyCODONE-acetaminophen, sodium chloride flush, sodium chloride, potassium chloride, magnesium sulfate, ondansetron, glucose, dextrose, glucagon (rDNA), dextrose, prochlorperazine    Assessment & Plan:         Acute pancreatitis [K85.90]        Acute pancreatitis, unclear etiology.  Improving.   -Lipase >3,000 4/2--> 845 (4/3)   - triglycerides 164, 115  - abd u/s unremarkable  - decrease IVF-   - increase diet to clears  - adjusted pain meds  - surgery following  - was on thiazide diuretic, consider stopping

## 2022-04-06 NOTE — CARE COORDINATION
4/6 chart reviewed- continues on IV fluids - advancing diet  Plan remains to return home at OR. Will ask for PT/OT evals.   Electronically signed by Pavel Diaz on 4/6/2022 at 3:35 PM  #[phone]

## 2022-04-06 NOTE — PROGRESS NOTES
General Surgery  Daily Progress Note    Pt Name: Digna Cruz  Medical Record Number: [id number]  Date of Birth 1955   Today's Date: 4/6/2022    Chief Complaint   Patient presents with    Abdominal Pain     pt woke up with abd pain yesterday moring and has had emesis as well       ASSESSMENT/PLAN  1. Acute pancreatitis - unclear etiology. Pain improved epigastrium/RUQ. Nausea resolved. Doing well with liquids, wants to try food. Leukocytosis resolved. Lipase trending down 2 days ago, repeat labs ordered for today. Continue IVF. RUQ US repeated and remains negative for stones. No plans for cholecystectomy based on negative US. If labs improving will advance diet. 2. DM, HTN - per primary team  3. Paroxysmal afib - on eliquis per primary team       Ruth Ann Schmitt is improved from yesterday. Pain is controlled and improving, mid abdomen with right shoulder radiation sometimes. She denies any nausea or vomiting. She is tolerating clear liquids. Current activity is ad yordan. Diarrhea overnight, none for last 6-8 hours. OBJECTIVE  VITALS:  height is 4' 11\" (1.499 m) and weight is 168 lb 14 oz (76.6 kg). Her oral temperature is 97.9 °F (36.6 °C). Her blood pressure is 185/78 (abnormal) and her pulse is 73. Her respiration is 16 and oxygen saturation is 93%. GENERAL: alert, no distress  LUNGS: normal respiratory effort, no accessory muscle use  HEART: normal rate and regular rhythm  ABDOMEN: soft, minimally distended, mildly tender in epigastrium and RUQ with radiation to shoulder, no peritonitis  EXTREMITY: no cyanosis and no clubbing  I/O last 3 completed shifts: In: 605 [P. O.:605]  Out: -   No intake/output data recorded.     LABS  Recent Labs     04/03/22  0945 04/03/22  0945 04/03/22  2300 04/04/22  0838 04/05/22  0455 04/06/22  0433 04/06/22  0434   WBC 6.7   < >  --  8.3   < >  --  6.1   HGB 11.5*   < >  --  11.5*   < >  --  12.6   HCT 35.1*   < >  --  34.2*   < >  --  37.9    < >  --  240   < >  --  328      < >  --  132*   < > 136  --    K 4.2   < >  --  3.9   < > 3.6  --       < >  --  100   < > 98*  --    CO2 21   < >  --  21   < > 27  --    BUN 19   < >  --  13   < > 12  --    CREATININE 0.6   < >  --  0.6   < > 0.7  --    MG 2.00  --   --   --   --   --   --    PHOS  --   --   --   --   --  3.4  --    CALCIUM 8.3   < >  --  8.3   < > 9.5  --    AST 86*   < >  --  49*  --   --   --    ALT 83*   < >  --  62*  --   --   --    BILITOT 1.1*   < >  --  1.1*  --   --   --    BILIDIR 0.4*   < >  --  0.5*  --   --   --    NITRU  --   --  Negative  --   --   --   --    COLORU  --   --  Yellow  --   --   --   --     < > = values in this interval not displayed.      CBC with Differential:    Lab Results   Component Value Date    WBC 6.1 04/06/2022    RBC 4.45 04/06/2022    HGB 12.6 04/06/2022    HCT 37.9 04/06/2022     04/06/2022    MCV 85.2 04/06/2022    MCH 28.4 04/06/2022    MCHC 33.3 04/06/2022    RDW 14.7 04/06/2022    SEGSPCT 60.3 02/26/2013    LYMPHOPCT 29.5 04/06/2022    MONOPCT 8.2 04/06/2022    EOSPCT 3.9 03/06/2012    BASOPCT 1.0 04/06/2022    MONOSABS 0.5 04/06/2022    LYMPHSABS 1.8 04/06/2022    EOSABS 0.5 04/06/2022    BASOSABS 0.1 04/06/2022    DIFFTYPE Auto-K 02/26/2013     BMP:    Lab Results   Component Value Date     04/06/2022    K 3.6 04/06/2022    K 4.1 04/02/2022    CL 98 04/06/2022    CO2 27 04/06/2022    BUN 12 04/06/2022    LABALBU 3.8 04/06/2022    CREATININE 0.7 04/06/2022    CALCIUM 9.5 04/06/2022    GFRAA >60 04/06/2022    GFRAA >60 05/28/2013    LABGLOM >60 04/06/2022    GLUCOSE 139 04/06/2022     Hepatic Function Panel:    Lab Results   Component Value Date    ALKPHOS 140 04/04/2022    ALT 62 04/04/2022    AST 49 04/04/2022    PROT 6.0 04/04/2022    PROT 7.1 02/26/2013    BILITOT 1.1 04/04/2022    BILIDIR 0.5 04/04/2022    IBILI 0.6 04/04/2022    LABALBU 3.8 04/06/2022         SRIDHAR Bhatia - CNP  Electronically signed 4/6/2022 at 9:02 AM    Agree with above note. The patient was personally seen and examined. Terry Rollins is doing better. Abdominal pain continues to improved. Tolerating clears, just advanced to full liquids. Denies any nausea or worsening pain with eating. Abd soft, ND, NT    WBC 6.1  Cr 0.7  T bili 1  Lipase down to 107    A/P: 74 yo female with acute pancreatitis    Overall improving. Advance to low fat diet.   If tolerating, OK to discharge home tomorrow  No plans for cholecystectomy given negative US x 2    afib - continue xarelto    DM, HTN - per primary team    Sana Grullon MD

## 2022-04-07 VITALS
OXYGEN SATURATION: 93 % | HEIGHT: 59 IN | RESPIRATION RATE: 18 BRPM | SYSTOLIC BLOOD PRESSURE: 123 MMHG | TEMPERATURE: 98.6 F | BODY MASS INDEX: 34.27 KG/M2 | HEART RATE: 88 BPM | WEIGHT: 169.97 LBS | DIASTOLIC BLOOD PRESSURE: 80 MMHG

## 2022-04-07 LAB
BASOPHILS ABSOLUTE: 0 K/UL (ref 0–0.2)
BASOPHILS RELATIVE PERCENT: 0.7 %
EOSINOPHILS ABSOLUTE: 0.6 K/UL (ref 0–0.6)
EOSINOPHILS RELATIVE PERCENT: 8.6 %
GLUCOSE BLD-MCNC: 157 MG/DL (ref 70–99)
GLUCOSE BLD-MCNC: 167 MG/DL (ref 70–99)
GLUCOSE BLD-MCNC: 198 MG/DL (ref 70–99)
GLUCOSE BLD-MCNC: 235 MG/DL (ref 70–99)
HCT VFR BLD CALC: 35.8 % (ref 36–48)
HEMOGLOBIN: 11.9 G/DL (ref 12–16)
LYMPHOCYTES ABSOLUTE: 2 K/UL (ref 1–5.1)
LYMPHOCYTES RELATIVE PERCENT: 30.7 %
MCH RBC QN AUTO: 28.6 PG (ref 26–34)
MCHC RBC AUTO-ENTMCNC: 33.1 G/DL (ref 31–36)
MCV RBC AUTO: 86.3 FL (ref 80–100)
MONOCYTES ABSOLUTE: 0.5 K/UL (ref 0–1.3)
MONOCYTES RELATIVE PERCENT: 8.1 %
NEUTROPHILS ABSOLUTE: 3.3 K/UL (ref 1.7–7.7)
NEUTROPHILS RELATIVE PERCENT: 51.9 %
PDW BLD-RTO: 14.9 % (ref 12.4–15.4)
PERFORMED ON: ABNORMAL
PLATELET # BLD: 332 K/UL (ref 135–450)
PMV BLD AUTO: 7.2 FL (ref 5–10.5)
RBC # BLD: 4.15 M/UL (ref 4–5.2)
WBC # BLD: 6.4 K/UL (ref 4–11)

## 2022-04-07 PROCEDURE — 6370000000 HC RX 637 (ALT 250 FOR IP): Performed by: NURSE PRACTITIONER

## 2022-04-07 PROCEDURE — 6370000000 HC RX 637 (ALT 250 FOR IP): Performed by: INTERNAL MEDICINE

## 2022-04-07 PROCEDURE — 36415 COLL VENOUS BLD VENIPUNCTURE: CPT

## 2022-04-07 PROCEDURE — 2580000003 HC RX 258: Performed by: INTERNAL MEDICINE

## 2022-04-07 PROCEDURE — 99231 SBSQ HOSP IP/OBS SF/LOW 25: CPT | Performed by: SURGERY

## 2022-04-07 PROCEDURE — 94760 N-INVAS EAR/PLS OXIMETRY 1: CPT

## 2022-04-07 PROCEDURE — 6370000000 HC RX 637 (ALT 250 FOR IP): Performed by: FAMILY MEDICINE

## 2022-04-07 PROCEDURE — 97162 PT EVAL MOD COMPLEX 30 MIN: CPT

## 2022-04-07 PROCEDURE — 97530 THERAPEUTIC ACTIVITIES: CPT

## 2022-04-07 PROCEDURE — APPSS45 APP SPLIT SHARED TIME 31-45 MINUTES: Performed by: NURSE PRACTITIONER

## 2022-04-07 PROCEDURE — 97165 OT EVAL LOW COMPLEX 30 MIN: CPT

## 2022-04-07 PROCEDURE — 85025 COMPLETE CBC W/AUTO DIFF WBC: CPT

## 2022-04-07 PROCEDURE — 6360000002 HC RX W HCPCS: Performed by: FAMILY MEDICINE

## 2022-04-07 PROCEDURE — APPNB45 APP NON BILLABLE 31-45 MINUTES: Performed by: NURSE PRACTITIONER

## 2022-04-07 RX ORDER — HYDROCHLOROTHIAZIDE 25 MG/1
25 TABLET ORAL DAILY
Status: DISCONTINUED | OUTPATIENT
Start: 2022-04-07 | End: 2022-04-07 | Stop reason: HOSPADM

## 2022-04-07 RX ADMIN — PANTOPRAZOLE SODIUM 40 MG: 40 TABLET, DELAYED RELEASE ORAL at 04:14

## 2022-04-07 RX ADMIN — OXYCODONE HYDROCHLORIDE AND ACETAMINOPHEN 1 TABLET: 5; 325 TABLET ORAL at 08:58

## 2022-04-07 RX ADMIN — HYDRALAZINE HYDROCHLORIDE 5 MG: 20 INJECTION INTRAMUSCULAR; INTRAVENOUS at 09:12

## 2022-04-07 RX ADMIN — INSULIN LISPRO 3 UNITS: 100 INJECTION, SOLUTION INTRAVENOUS; SUBCUTANEOUS at 04:14

## 2022-04-07 RX ADMIN — APIXABAN 5 MG: 5 TABLET, FILM COATED ORAL at 08:58

## 2022-04-07 RX ADMIN — INSULIN LISPRO 6 UNITS: 100 INJECTION, SOLUTION INTRAVENOUS; SUBCUTANEOUS at 11:53

## 2022-04-07 RX ADMIN — INSULIN LISPRO 3 UNITS: 100 INJECTION, SOLUTION INTRAVENOUS; SUBCUTANEOUS at 00:45

## 2022-04-07 RX ADMIN — HYDROCHLOROTHIAZIDE 25 MG: 25 TABLET ORAL at 08:58

## 2022-04-07 RX ADMIN — INSULIN LISPRO 3 UNITS: 100 INJECTION, SOLUTION INTRAVENOUS; SUBCUTANEOUS at 09:00

## 2022-04-07 RX ADMIN — Medication 1 CAPSULE: at 08:58

## 2022-04-07 RX ADMIN — SODIUM CHLORIDE, PRESERVATIVE FREE 10 ML: 5 INJECTION INTRAVENOUS at 09:07

## 2022-04-07 RX ADMIN — LISINOPRIL 20 MG: 20 TABLET ORAL at 08:58

## 2022-04-07 ASSESSMENT — PAIN DESCRIPTION - PAIN TYPE
TYPE: ACUTE PAIN
TYPE: ACUTE PAIN

## 2022-04-07 ASSESSMENT — PAIN DESCRIPTION - LOCATION
LOCATION: SHOULDER
LOCATION: SHOULDER

## 2022-04-07 ASSESSMENT — PAIN DESCRIPTION - ORIENTATION
ORIENTATION: RIGHT
ORIENTATION: RIGHT

## 2022-04-07 ASSESSMENT — PAIN DESCRIPTION - DESCRIPTORS
DESCRIPTORS: ACHING;SORE
DESCRIPTORS: ACHING;SORE

## 2022-04-07 ASSESSMENT — PAIN DESCRIPTION - FREQUENCY
FREQUENCY: CONTINUOUS
FREQUENCY: CONTINUOUS

## 2022-04-07 ASSESSMENT — PAIN DESCRIPTION - PROGRESSION
CLINICAL_PROGRESSION: GRADUALLY IMPROVING
CLINICAL_PROGRESSION: GRADUALLY WORSENING

## 2022-04-07 ASSESSMENT — PAIN SCALES - WONG BAKER: WONGBAKER_NUMERICALRESPONSE: 0

## 2022-04-07 ASSESSMENT — PAIN SCALES - GENERAL
PAINLEVEL_OUTOF10: 0
PAINLEVEL_OUTOF10: 8
PAINLEVEL_OUTOF10: 7

## 2022-04-07 ASSESSMENT — PAIN DESCRIPTION - ONSET
ONSET: ON-GOING
ONSET: ON-GOING

## 2022-04-07 ASSESSMENT — PAIN - FUNCTIONAL ASSESSMENT
PAIN_FUNCTIONAL_ASSESSMENT: PREVENTS OR INTERFERES SOME ACTIVE ACTIVITIES AND ADLS
PAIN_FUNCTIONAL_ASSESSMENT: PREVENTS OR INTERFERES SOME ACTIVE ACTIVITIES AND ADLS

## 2022-04-07 NOTE — FLOWSHEET NOTE
04/07/22 1458   IMM Letter   IMM Letter given to Patient/Family/Significant other/Guardian/POA/by: Presented by Social Work Student Delbert Mackey   IMM Letter date given: 04/07/22   IMM Letter time given: 3362

## 2022-04-07 NOTE — PROGRESS NOTES
Occupational Therapy   Occupational Therapy Initial Assessment  Date: 2022   Patient Name: Liam Davidson  MRN: [id number]     : 1955    Date of Service: 2022    Discharge Recommendations:  Home with assist PRN       Liam Davidson scored a 21/24 on the AM-[facility] ADL Inpatient form. At this time, no further OT is recommended upon discharge. Recommend patient returns to prior setting with prior services. Assessment   Performance deficits / Impairments: Decreased functional mobility ; Decreased high-level IADLs;Decreased ADL status; Decreased endurance  Assessment: 74 yo female admitted  for abdominal pain with acute pancreatitis. PMH: CAD, DM, HTN, macular degeneration, Depression. PTA, pt lives with 15 yo grand dtr and fully IND + drivng + PT work with cane PRN. Today, pt functioning very near baseline with mild decreased endurance compared to baseline. Pt completes bed mobiltiy Mod I and fxl tx and mobility with no AD with SUP. Pt does required Mod A donning socks d/t L knee limited flexion (baseline). Pt grand dtr can assist. Anticipate pt SUP other LB ADL and Mod I UB ADL based on balance, endurance, cognition. Pt plans to continue to use shower chair for energy conservation and wait to return to work until improved endurance. No further acute OT warranted at this time, rec return home with PRN IADL assist  Treatment Diagnosis: impaired ADL/fxl mobility  Prognosis: Good  Decision Making: Low Complexity  OT Education: OT Role;Plan of Care  Patient Education: Slowly getting back to working, once tolerance buily up  REQUIRES OT FOLLOW UP: No  Activity Tolerance  Activity Tolerance: Patient Tolerated treatment well  Safety Devices  Safety Devices in place: Yes  Type of devices: Nurse notified;Gait belt;Call light within reach; Left in chair         Patient Diagnosis(es): The encounter diagnosis was Acute pancreatitis, unspecified complication status, unspecified pancreatitis type.      has a past medical history of CAD (coronary artery disease), Depression, Diabetes mellitus ([address] Hyperlipidemia, and Hypertension. has a past surgical history that includes Hysterectomy. Treatment Diagnosis: impaired ADL/fxl mobility      Restrictions  Restrictions/Precautions  Restrictions/Precautions: Fall Risk  Position Activity Restriction  Other position/activity restrictions: ADULT DIET; Regular; Low Fat/Low Chol/High Fiber/LIAM    Subjective   General  Chart Reviewed: Yes  Patient assessed for rehabilitation services?: Yes  Additional Pertinent Hx: 76 yo female admitted 4/2 for abdominal pain with acute pancreatitis.  PMH: CAD, DM, HTN, macular degeneration, Depression  Family / Caregiver Present: No  Referring Practitioner: SRIDHAR Calderón CNP  Diagnosis: Acute Pancreatitis  Subjective  Subjective: Pt resting in bed upon arrival and agreeable to OT/PT eval. Pt reports L shoulder pain prior to admission and seeing PCP for it  General Comment  Comments: RN ok to see  Patient Currently in Pain: Yes  Pain Assessment  Pain Assessment: 0-10  Pain Level: 8  Patient's Stated Pain Goal: No pain  Vital Signs  Resp: 16  Level of Consciousness: Alert (0)  Patient Currently in Pain: Yes  Oxygen Therapy  SpO2: 93 %  Pulse Oximeter Device Mode: Intermittent  Pulse Oximeter Device Location: Finger  O2 Device: None (Room air)    Social/Functional History  Social/Functional History  Lives With:  (grand dtr 15 yo)  Type of Home: Apartment  Home Layout: One level,Able to Live on Main level with bedroom/bathroom,Performs ADL's on one level  Home Access: Stairs to enter with rails  Entrance Stairs - Number of Steps: 7  Bathroom Shower/Tub: Tub/Shower unit  Bathroom Toilet: Standard  Bathroom Equipment: Shower chair  Home Equipment: Cane  ADL Assistance: Independent  Homemaking Assistance: Independent  Ambulation Assistance: Independent (cane PRN)  Transfer Assistance: Independent (cane PRN)  Occupation: Part time employment  Type of occupation: [employer] at [employer] (30+ hours)       Objective   Vision: Impaired  Vision Exceptions:  (macular degeneration- requires dim lights)  Hearing: Within functional limits    Orientation  Overall Orientation Status: Within Functional Limits     Balance  Sitting Balance: Supervision (at EOB for donning socks)  Standing Balance: Supervision (PRN tx)  Functional Mobility  Functional - Mobility Device: No device  Activity:  (EOB>around foot of bed>recliner)  Assist Level: Supervision  Functional Mobility Comments: little to no unsteadiness, no LOB. ADL  LE Dressing:  Moderate assistance (assist with LLE donning sock (d/t limited knee flexion- pt grand dtr assists))  Tone RUE  RUE Tone: Normotonic  Tone LUE  LUE Tone: Normotonic  Coordination  Movements Are Fluid And Coordinated: Yes     Bed mobility  Supine to Sit: Modified independent  Scooting: Modified independent  Transfers  Sit to stand: Supervision  Stand to sit: Supervision  Transfer Comments: no AD     Cognition  Overall Cognitive Status: WFL        Sensation  Overall Sensation Status:  (not assessed formally in distal LEs)        LUE AROM (degrees)  LUE AROM : WFL  RUE AROM (degrees)  RUE General AROM: R shoulder limited d/t pain  LUE Strength  Gross LUE Strength: WFL  RUE Strength  Gross RUE Strength: WFL                Plan   Plan  Times per week: 1 more  Specific instructions for Next Treatment: ensure Mod I  Current Treatment Recommendations: Endurance Training,Self-Care / ADL,Positioning,Safety Education & Training,Functional Mobility Training    AM-PAC Score        AM-[facility] Inpatient Daily Activity Raw Score: 21 (04/07/22 0953)  AM-PAC Inpatient ADL T-Scale Score : 44.27 (04/07/22 0953)  ADL Inpatient CMS 0-100% Score: 32.79 (04/07/22 0953)  ADL Inpatient CMS G-Code Modifier : CJ (04/07/22 8429)    Goals  Short term goals  Time Frame for Short term goals: prior to d/c  Short term goal 1: toileting Mod I  Short term goal 2: UB bathing/dressing Mod I  Short term goal 3: LB dressing Min A  Short term goal 4: fxl tx and mobility with AE as needed community distances Mod I  Short term goal 5:  Tolerate 5 min fxl standing task Mod I  Patient Goals   Patient goals : return home and back to work       Therapy Time   Individual Concurrent Group Co-treatment   Time In 0745         Time Out 0815         Minutes 30         Timed Code Treatment Minutes: 15 Minutes (15 eval. 15 TA)       Kaelyn Huang, GAIL, OTR/L

## 2022-04-07 NOTE — PROGRESS NOTES
Physical Therapy    Facility/Department: [address] ORTHOPEDICS  Initial Assessment    NAME: Obie Flores  : 1955  MRN: [id number]    Date of Service: 2022    Assessment / Discharge Recommendations:  -anticipate discharge to home with family assist   -do not anticipate need for Home PT   -she is able to ambulate with nursing supervision in room and hallway  -encouraged her to sit out of bed during daytime     Obie Flores scored a 21/24 on the AM-PAC short mobility form. Current research shows that an AM-PAC score of 18 or greater is typically associated with a discharge to the patient's home setting. Body structures, Functions, Activity limitations: Decreased endurance;Decreased vision/visual deficit  Prognosis: Good  Decision Making: Medium Complexity  REQUIRES PT FOLLOW UP: No  Activity Tolerance  Activity Tolerance: Patient Tolerated treatment well       Patient Diagnosis(es): The encounter diagnosis was Acute pancreatitis, unspecified complication status, unspecified pancreatitis type. has a past medical history of CAD (coronary artery disease), Depression, Diabetes mellitus ([address] Hyperlipidemia, and Hypertension. has a past surgical history that includes Hysterectomy. Restrictions  Restrictions/Precautions  Restrictions/Precautions: Fall Risk  Position Activity Restriction  Other position/activity restrictions: ADULT DIET;  Regular; Low Fat/Low Chol/High Fiber/LIAM  Vision/Hearing  Vision:  (macular degeneration - does not drive at night)  Hearing: Within functional limits     Subjective  General  Chart Reviewed: Yes  Patient assessed for rehabilitation services?: Yes  Additional Pertinent Hx: here due to N & V   - found to have pancreatitis   PMH includes CAD, DM  Response To Previous Treatment: Not applicable  Family / Caregiver Present: No  Follows Commands: Within Functional Limits  Subjective  Subjective: to room along with OT to patient resting in bed - she is alert oriented and agreeable to PT OT assessments and up to ambulate to demonstrate her mobility  - she expresses no concerns for discharge to home  Pain Screening  Patient Currently in Pain: Yes  Orientation  Orientation  Overall Orientation Status: Within Functional Limits  Social/Functional History  Social/Functional History  Lives With:  (grand dtdelfina 15 yo)  Type of Home: Apartment  Home Layout: One level,Able to Live on Main level with bedroom/bathroom,Performs ADL's on one level  Home Access: Stairs to enter with rails  Entrance Stairs - Number of Steps: 7  Bathroom Shower/Tub: Tub/Shower unit  Bathroom Toilet: Standard  Bathroom Equipment: Shower chair  Home Equipment: Cane  ADL Assistance: Independent  Homemaking Assistance: Independent  Ambulation Assistance: Independent (cane PRN)  Transfer Assistance: Independent (cane PRN)  Occupation: Part time employment  Type of occupation: [employer] at [employer] (30+ hours)  Objective  ROM and strength grossly wfl as observed mobilizing from bed and ambulating in room  Motor Control  Gross Motor?: WFL  Sensation  Overall Sensation Status:  (not assessed formally in distal LEs)  Bed mobility  Supine to Sit: Modified independent  Scooting: Modified independent  Transfers  Sit to Stand: Supervision;Modified independent  Stand to sit: Modified independent;Supervision  Ambulation  Ambulation?: Yes  Ambulation 1  Surface: level tile  Device: No Device  Assistance: Independent;Supervision  Quality of Gait: step through pattern with adequate base of support  Distance: in room for ~20-25 feet  Comments: overall stable with ambulation  Stairs/Curb  Stairs?: No  Balance  Comments: midline in sitting and static stance   -dynamic is fair/good with ambulation in room without AD  Plan   Plan  Times per week: no acute PT needs  Safety Devices  Type of devices:  All fall risk precautions in place,Call light within reach,Left in chair,Nurse notified Carmine Thrasher)    [address] Score  AM-PAC Inpatient Mobility

## 2022-04-07 NOTE — PLAN OF CARE
Problem: Infection:  Goal: Will remain free from infection  Description: Will remain free from infection  4/7/2022 0002 by Omega Montaño RN  Outcome: Ongoing  Note: Pt assessed for infection, No signs or symptoms of surgical site noted. VVS, WBC WNL. Reviewed information with pt and family, pt verbalized understanding     4/6/2022 1158 by Alfred Salguero RN  Outcome: Ongoing  Goal: Discharge to appropriate level of care  4/7/2022 0002 by Omega Montaño RN  Outcome: Ongoing  Note: Assessed patients knowledge of discharge. Will continue to work with patient on discharge planning and answer patient questions. Will consult case management and  as necessary. 4/6/2022 1158 by Alfred Salguero RN  Outcome: Ongoing     Problem: Safety:  Goal: Free from accidental physical injury  Description: Free from accidental physical injury  4/7/2022 0002 by Omega Montaño RN  Outcome: Ongoing  Note: No falls noted this shift. Patient ambulates with x1 staff assistance without difficulty. Family member at bedside, spent the night. Bed kept in low position. Safe environment maintained. Bedside table & call light in reach. Uses call light appropriately when needing assistance. 4/6/2022 1158 by Alfred Salguero RN  Outcome: Ongoing  Goal: Free from intentional harm  Description: Free from intentional harm  4/7/2022 0002 by Omega Montaño RN  Outcome: Ongoing  Note: No falls noted this shift. Patient ambulates with x1 staff assistance without difficulty. Family member at bedside, spent the night. Bed kept in low position. Safe environment maintained. Bedside table & call light in reach. Uses call light appropriately when needing assistance.      4/6/2022 1158 by Alfred Salguero RN  Outcome: Ongoing     Problem: Daily Care:  Goal: Daily care needs are met  Description: Daily care needs are met  4/7/2022 0002 by Omega Montaño RN  Outcome: Ongoing  Note: Checking on patient Q2H for nutrition needs, hygiene needs, comfort measures, mobility, fall risk interventions, and safe environment. All precautions and interventions in place. Educated patient on use of call light and telephone. Patient verbalizes understanding. Call light/telephone in reach. 4/6/2022 1158 by Pedrito Rhodes RN  Outcome: Ongoing     Problem: Pain:  Goal: Patient's pain/discomfort is manageable  Description: Patient's pain/discomfort is manageable  4/7/2022 0002 by Julia Pretty RN  Outcome: Ongoing  Note: Pain /discomfort being managed with PRN analgesics per MD orders. Patient able to express presence and absence of pain and rate pain appropriately using numerical scale. 4/6/2022 1158 by Pedrito Rhodes RN  Outcome: Ongoing  Goal: Pain level will decrease  Description: Pain level will decrease  4/7/2022 0002 by Julia Pretty RN  Outcome: Ongoing  Note: Pain /discomfort being managed with PRN analgesics per MD orders. Patient able to express presence and absence of pain and rate pain appropriately using numerical scale. 4/6/2022 1158 by Pedrito Rhodes RN  Outcome: Ongoing  Goal: Control of acute pain  Description: Control of acute pain  4/7/2022 0002 by Julia Pretty RN  Outcome: Ongoing  Note: Pain /discomfort being managed with PRN analgesics per MD orders. Patient able to express presence and absence of pain and rate pain appropriately using numerical scale. 4/6/2022 1158 by Pedrito Rhodes RN  Outcome: Ongoing  Goal: Control of chronic pain  Description: Control of chronic pain  4/7/2022 0002 by Julia Pretty RN  Outcome: Ongoing  Note: Pain /discomfort being managed with PRN analgesics per MD orders. Patient able to express presence and absence of pain and rate pain appropriately using numerical scale.      4/6/2022 1158 by Pedrito Rhodes RN  Outcome: Ongoing     Problem: Skin Integrity:  Goal: Skin integrity will stabilize  Description: Skin integrity will stabilize  4/7/2022 0002 by Jessenia Whitney Marialuisa Knight RN  Outcome: Ongoing  Note: Early and frequent ambulqtion encouraged. Educated patient on importance of early ambulation. Patient assisted with ambulation. Will continue to monitor. 4/6/2022 1158 by Jairo Ac RN  Outcome: Ongoing     Problem: Discharge Planning:  Goal: Patients continuum of care needs are met  Description: Patients continuum of care needs are met  4/7/2022 0002 by Roque Guerin RN  Outcome: Ongoing  Note: ADL's completed by patient to best of ability. Will continue to encourage patient to complete ADL's and offer and provide assistance when needed. 4/6/2022 1158 by Jairo Ac RN  Outcome: Ongoing     Problem: Falls - Risk of:  Goal: Will remain free from falls  Description: Will remain free from falls  4/7/2022 0002 by Roque Guerin RN  Outcome: Ongoing  Note: Fall risk assessment completed . Fall precautions in place, bed/ chair alarm on, side rails 2/4 up, call light in reach, educated pt on calling for assistance when needed, room clear of clutter. Pt verbalized understanding. 4/6/2022 1158 by Jairo Ac RN  Outcome: Ongoing  Goal: Absence of physical injury  Description: Absence of physical injury  4/7/2022 0002 by Roque Guerin RN  Outcome: Ongoing  Note: No falls noted this shift. Patient ambulates with x1 staff assistance without difficulty. Family member at bedside, spent the night. Bed kept in low position. Safe environment maintained. Bedside table & call light in reach. Uses call light appropriately when needing assistance.      4/6/2022 1158 by Jairo Ac RN  Outcome: Ongoing     Problem: Skin Integrity:  Goal: Will show no infection signs and symptoms  Description: Will show no infection signs and symptoms  4/7/2022 0002 by Roque Guerin RN  Outcome: Ongoing  4/6/2022 1158 by Jairo Ac RN  Outcome: Ongoing  Goal: Absence of new skin breakdown  Description: Absence of new skin breakdown  4/7/2022 0002 by Mayi Azar Luis Daniel Simmons RN  Outcome: Ongoing  4/6/2022 1158 by Dasia Moore RN  Outcome: Ongoing

## 2022-04-07 NOTE — PROGRESS NOTES
Pt up in chair, resting with legs elevated. A&Ox4. Tolerating intake and PO medications whole. C/o R shoulder pain, see MAR for intervention. SBA when ambulating. Able to make needs known. Call light within reach. Fall precautions in place. Will monitor per unit protocol.  Electronically signed by Chen Cutler RN on 4/7/2022 at 10:56 AM

## 2022-04-07 NOTE — CARE COORDINATION
CASE MANAGEMENT DISCHARGE SUMMARY:    DISCHARGE DATE: 4/7/2022    DISCHARGED TO HOME     TRANSPORTATION: family                        COMMENTS: Reviewed chart. Per therapy, home with PRN assist. Spoke with bedside RN. No other needs identified.      Electronically signed by DORETHA Smith, IVAN, Case Management on 4/7/2022 at 3:14 PM  Homer Murray [id number]

## 2022-04-07 NOTE — PROGRESS NOTES
General Surgery  Daily Progress Note    Pt Name: Amado Lundberg  Medical Record Number: [id number]  Date of Birth 1955   Today's Date: 4/7/2022    Chief Complaint   Patient presents with    Abdominal Pain     pt woke up with abd pain yesterday moring and has had emesis as well       ASSESSMENT/PLAN  1. Acute pancreatitis - unclear etiology. Pain improved epigastrium/RUQ. Nausea resolved. Doing well with liquids, wants to try food. Leukocytosis resolved. Lipase trending down. RUQ US repeated and remains negative for stones. No plans for cholecystectomy based on negative US. Tolerating diet. OK to DC from surgery standpoint when medically cleared. Surgery to sign off, please call with questions. 2. DM, HTN - per primary team  3. Paroxysmal afib - on eliquis per primary team       Phi Dubois is improved from yesterday. Sitting up to a chair. Pain is controlled and improving. She denies any nausea or vomiting. She is tolerating general diet. Current activity is ad yordan. OBJECTIVE  VITALS:  height is 4' 11\" (1.499 m) and weight is 169 lb 15.6 oz (77.1 kg). Her axillary temperature is 99.2 °F (37.3 °C). Her blood pressure is 185/78 (abnormal) and her pulse is 67. Her respiration is 16 and oxygen saturation is 93%. GENERAL: alert, no distress  LUNGS: normal respiratory effort, no accessory muscle use  HEART: normal rate and regular rhythm  ABDOMEN: soft, NT, ND.   EXTREMITY: no cyanosis and no clubbing  I/O last 3 completed shifts:   In: 2250 [P.O.:2250]  Out: -   I/O this shift:  In: 240 [P.O.:240]  Out: -     LABS  Recent Labs     04/06/22  0433 04/06/22  0434 04/06/22  0914 04/07/22  0537   WBC  --    < >  --  6.4   HGB  --    < >  --  11.9*   HCT  --    < >  --  35.8*   PLT  --    < >  --  332     --   --   --    K 3.6  --   --   --    CL 98*  --   --   --    CO2 27  --   --   --    BUN 12  --   --   --    CREATININE 0.7  --   --   --    PHOS 3.4  --   --   --    CALCIUM 9.5  -- --   --    AST  --   --  27  --    ALT  --   --  39  --    BILITOT  --   --  1.0  --    BILIDIR  --   --  0.3  --     < > = values in this interval not displayed. CBC with Differential:    Lab Results   Component Value Date    WBC 6.4 04/07/2022    RBC 4.15 04/07/2022    HGB 11.9 04/07/2022    HCT 35.8 04/07/2022     04/07/2022    MCV 86.3 04/07/2022    MCH 28.6 04/07/2022    MCHC 33.1 04/07/2022    RDW 14.9 04/07/2022    SEGSPCT 60.3 02/26/2013    LYMPHOPCT 30.7 04/07/2022    MONOPCT 8.1 04/07/2022    EOSPCT 3.9 03/06/2012    BASOPCT 0.7 04/07/2022    MONOSABS 0.5 04/07/2022    LYMPHSABS 2.0 04/07/2022    EOSABS 0.6 04/07/2022    BASOSABS 0.0 04/07/2022    DIFFTYPE Auto-K 02/26/2013     BMP:    Lab Results   Component Value Date     04/06/2022    K 3.6 04/06/2022    K 4.1 04/02/2022    CL 98 04/06/2022    CO2 27 04/06/2022    BUN 12 04/06/2022    LABALBU 3.5 04/06/2022    CREATININE 0.7 04/06/2022    CALCIUM 9.5 04/06/2022    GFRAA >60 04/06/2022    GFRAA >60 05/28/2013    LABGLOM >60 04/06/2022    GLUCOSE 139 04/06/2022     Hepatic Function Panel:    Lab Results   Component Value Date    ALKPHOS 160 04/06/2022    ALT 39 04/06/2022    AST 27 04/06/2022    PROT 6.6 04/06/2022    PROT 7.1 02/26/2013    BILITOT 1.0 04/06/2022    BILIDIR 0.3 04/06/2022    IBILI 0.7 04/06/2022    LABALBU 3.5 04/06/2022         SRIDHAR Verma - CNP  Electronically signed 4/7/2022 at 11:14 AM    Agree with above note. The patient was personally seen and examined. Harsha Bui is doing well. Tolerating low fat diet.  + loose BMs. Abdominal pain still present in RUQ but greatly improved. Abd soft, NT, ND    A/P: 74 yo female with acute pancreatitis    Doing well. Pain practically resolved. Tolerating low fat diet and having GI function. OK to discharge home from surgical standpoint. No follow up with me needed given negative US. No plans for cholecystectomy.     Florina Childers MD

## 2022-04-07 NOTE — PROGRESS NOTES
Hospitalist Progress Note      PCP: Enrique Velásqeuz    Date of Admission: 4/2/2022    Chief Complaint: [facility] Course: ***     Subjective: ***       Medications:  Reviewed    Infusion Medications    lactated ringers 75 mL/hr at 04/06/22 1936    sodium chloride      dextrose       Scheduled Medications    lisinopril  20 mg Oral Daily    pantoprazole  40 mg Oral QAM AC    lactobacillus  1 capsule Oral Daily with breakfast    [Held by provider] insulin glargine  20 Units SubCUTAneous Nightly    apixaban  5 mg Oral BID    sodium chloride flush  5-40 mL IntraVENous 2 times per day    insulin lispro  0-18 Units SubCUTAneous Q4H    scopolamine  1 patch TransDERmal Q72H     PRN Meds: hydrALAZINE, loperamide, HYDROmorphone, ketorolac, oxyCODONE-acetaminophen, sodium chloride flush, sodium chloride, potassium chloride, magnesium sulfate, ondansetron, glucose, dextrose, glucagon (rDNA), dextrose, prochlorperazine      Intake/Output Summary (Last 24 hours) at 4/7/2022 0809  Last data filed at 4/7/2022 0808  Gross per 24 hour   Intake 1890 ml   Output    Net 1890 ml       Exam:    BP (!) 185/78   Pulse 67   Temp 99.2 °F (37.3 °C) (Axillary)   Resp 16   Ht 4' 11\" (1.499 m)   Wt 169 lb 15.6 oz (77.1 kg)   SpO2 94%   BMI 34.33 kg/m²     General appearance: No apparent distress, appears stated age and cooperative. HEENT: Pupils equal, round, and reactive to light. Conjunctivae/corneas clear. Neck: Supple, with full range of motion. No jugular venous distention. Trachea midline. Respiratory:  Normal respiratory effort. Clear to auscultation, bilaterally without Rales/Wheezes/Rhonchi. Cardiovascular: Regular rate and rhythm with normal S1/S2 without murmurs, rubs or gallops. Abdomen: Soft, non-tender, non-distended with normal bowel sounds. Musculoskeletal: No clubbing, cyanosis or edema bilaterally. Full range of motion without deformity.   Skin: Skin color, texture, turgor normal.  No rashes or lesions. Neurologic:  Neurovascularly intact without any focal sensory/motor deficits. Cranial nerves: II-XII intact, grossly non-focal.  Psychiatric: Alert and oriented, thought content appropriate, normal insight  Capillary Refill: Brisk,< 3 seconds   Peripheral Pulses: +2 palpable, equal bilaterally       Labs:   Recent Labs     04/05/22  0455 04/06/22  0434 04/07/22  0537   WBC 5.5 6.1 6.4   HGB 11.5* 12.6 11.9*   HCT 34.4* 37.9 35.8*    328 332     Recent Labs     04/04/22  0838 04/05/22  0455 04/06/22  0433   * 139 136   K 3.9 3.6 3.6    102 98*   CO2 21 23 27   BUN 13 11 12   CREATININE 0.6 0.5* 0.7   CALCIUM 8.3 8.5 9.5   PHOS  --   --  3.4     Recent Labs     04/04/22  0838 04/06/22  0914   AST 49* 27   ALT 62* 39   BILIDIR 0.5* 0.3   BILITOT 1.1* 1.0   ALKPHOS 140* 160*     No results for input(s): INR in the last 72 hours. Recent Labs     04/05/22 0455   TROPONINI <0.01       Urinalysis:      Lab Results   Component Value Date    NITRU Negative 04/03/2022    WBCUA 2 04/03/2022    BACTERIA 2+ 04/27/2015    RBCUA 1 04/03/2022    BLOODU Negative 04/03/2022    SPECGRAV 1.020 04/03/2022    GLUCOSEU 500 04/03/2022       Radiology:  XR CHEST (2 VW)   Final Result   Subtle prominence of the pulmonary interstitium. This is nonspecific but can   be seen in interstitial pulmonary edema as well as atypical/viral infections. Correlate clinically         XR SHOULDER RIGHT (MIN 2 VIEWS)   Final Result   1. No acute osseous findings. 2.  Mild degenerative change of the acromioclavicular joint. 3.  Subtle interstitial opacities are seen in the visualized lungs. This is   nonspecific but can be seen in interstitial edema versus atypical/viral   infection. Correlate clinically         US ABDOMEN LIMITED Specify organ? LIVER, GALLBLADDER   Final Result   1. Hepatic steatosis. XR CHEST PORTABLE   Final Result   No acute process.          CT ABDOMEN PELVIS W IV CONTRAST Additional Contrast? None   Final Result   1. Mild inflammation around the pancreatic head and body suspected to   represent acute pancreatitis. Correlate with clinical and laboratory workup. 2. Airspace changes in the lower chest are partially visualized. Correlate   with any pulmonary symptoms. Assessment/Plan:    Active Hospital Problems    Diagnosis Date Noted    Acute pancreatitis [K85.90] 04/02/2022     Acute pancreatitis, unclear etiology. Improving.   -Lipase >3,000 4/2--> 845 (4/3)   - triglycerides 164, 115  - abd u/s unremarkable  - decrease IVF-   - increase diet to clears  - adjusted pain meds  - surgery following  - was on thiazide diuretic, consider stopping at discharge  -h/o Chronic NSAID use, start on ppi     Accelerated hypertension  - likely related to pain in addition to inability to restart HCTZ part of her home med due to getting fluids currently. Will discuss possibly holding this on discharge if thought to be the causative factor in pancreatitis this time  - monitor BP q4h  -resume home dose of LIsinopril        Transaminitis  - related to pancreatitis?  - improvoing slowly and ALT/AST normal now  - u/s as above without acute pathology     Paroxysmal atrial fibrillation s/p ablation   -on chronic ac w eliquis  -given ongoing hypoxia and use of thiazide diuretic as outpatient, lasix given today with KCL  - monitor on tele     Right shoulder pain  - xray with osteo changes only  - improved mobility   - was on medrol dose pack and muscle relaxers for this recently      Other comorbidities:  Diabetes mellitus type 2, Aic 9.3 ~1 yr ago cont ssi  Essential hypertension- accelerated as above  CAD ? Normal stress test 2020  Hyperlipidemia, unspecified- previously on fenofibrate, not currently taking           Continue current regimen/therapies. Monitor.  Adjust medical regimen as appropriate.     Body mass index is 34.11 kg/m².     The patient and / or the family were informed of the results of any tests, a time was given to answer questions, a plan was proposed and they agreed with plan.       DVT Prophylaxis: ***  Diet: ADULT DIET;  Regular; Low Fat/Low Chol/High Fiber/LIAM  Code Status: Full Code    PT/OT Eval Status: ***    Dispo - ***    Citlaly Jolley MD

## 2022-04-07 NOTE — PROGRESS NOTES
Data- discharge order received, pt verbalized agreement to discharge, disposition to previous residence, no needs for HHC/DME. Action- discharge instructions prepared and given to pt, pt verbalized understanding. Medication information packet given r/t NEW and/or CHANGED prescriptions emphasizing name/purpose/side effects, pt verbalized understanding. Discharge instruction summary: Diet- Regular (low fat/low chol/LIAM), Activity- SBA, Primary Care Physician as follows: Ute Westfall [phone] f/u appointment, immunizations reviewed. Response- Pt belongings gathered, IV removed. Disposition is home (no HHC/DME needs), transported with daughter in private car, taken to lobby via w/c w/ belongings and D/C paperwork, no complications. Pt received work note. No further questions at this time.      Electronically signed by Malathi Rice RN on 4/7/2022 at 3:57 PM

## 2022-04-07 NOTE — PLAN OF CARE
Dr. Daniels notified of consult. Discussed with MD assessment of LLE and that pt was on heparin gtt. Dr. Daniels stated that pt will need angiogram. Will cont to monitor. No new orders received.   Problem: Infection:  Goal: Will remain free from infection  Description: Will remain free from infection  4/7/2022 1053 by Sharyle Meyers, RN  Outcome: Ongoing  Pt shows no signs of infection. Will monitor VS.   Problem: Infection:  Goal: Discharge to appropriate level of care  4/7/2022 1053 by Sharyle Meyers, RN  Outcome: Ongoing  Problem: Safety:  Goal: Free from accidental physical injury  Description: Free from accidental physical injury  4/7/2022 1053 by Sharyle Meyers, RN  Outcome: Ongoing  Problem: Pain:  Goal: Control of acute pain  Description: Control of acute pain  4/7/2022 1053 by Sharyle Meyers, RN  Outcome: Ongoing   Pt assessed for pain. Pt in pain and assessed with 0-10 pain rating scale. Pt given prescribed analgesic for pain. (See eMar) Pt satisfied with pain relief thus far. Will reassess and continue to monitor. Problem: Falls - Risk of:  Goal: Will remain free from falls  Description: Will remain free from falls  4/7/2022 1053 by Sharyle Meyers, RN  Outcome: Ongoing   Fall risk assessment completed. Fall precautions in place. Call light within reach. Pt educated on calling for assistance before getting up. Walkway free of clutter. encouraged patient to call for assistance especially if feeling dizzy, SOB, or weakness. Will continue to monitor.

## 2022-04-12 NOTE — DISCHARGE SUMMARY
Hospital Medicine Discharge Summary    Patient ID: Natalie Moore      Patient's PCP: Angelo Weathers    Admit Date: 4/2/2022     Discharge Date: 4/7/2022      Admitting Physician: Radha Shepherd MD     Discharge Physician: Subhash Jackson MD     Discharge Diagnoses: Active Hospital Problems    Diagnosis Date Noted    Hx of type 2 diabetes mellitus [Z86.39]     Acute pancreatitis [K85.90] 04/02/2022    Paroxysmal atrial fibrillation ([address] [I48.0] 12/28/2021       The patient was seen and examined on day of discharge and this discharge summary is in conjunction with any daily progress note from day of discharge. Hospital Course:     Acute pancreatitis, unclear etiology. Improving.   -Lipase >3,000 4/2--> 845 (4/3)   - triglycerides 164, 115  - abd u/s unremarkable  - decrease IVF-   - increase diet to clears  - adjusted pain meds  - surgery following  - was on thiazide diuretic, consider stopping at discharge  -h/o Chronic NSAID use, start on ppi     Accelerated hypertension  - likely related to pain in addition to inability to restart HCTZ part of her home med due to getting fluids currently.  Will discuss possibly holding this on discharge if thought to be the causative factor in pancreatitis this time  - monitor BP q4h  -resume home dose of LIsinopril        Transaminitis  - related to pancreatitis?  - improvoing slowly and ALT/AST normal now  - u/s as above without acute pathology     Paroxysmal atrial fibrillation s/p ablation   -on chronic ac w eliquis  -given ongoing hypoxia and use of thiazide diuretic as outpatient, lasix given today with KCL  - monitor on tele     Right shoulder pain  - xray with osteo changes only  - improved mobility   - was on medrol dose pack and muscle relaxers for this recently      Other comorbidities:  Diabetes mellitus type 2, Aic 9.3 ~1 yr ago cont ssi  Essential hypertension- accelerated as above  CAD ? Normal stress test 2020  Hyperlipidemia, unspecified- previously on fenofibrate, not currently taking         Exam:     /80   Pulse 88   Temp 98.6 °F (37 °C) (Oral)   Resp 18   Ht 4' 11\" (1.499 m)   Wt 169 lb 15.6 oz (77.1 kg)   SpO2 93%   BMI 34.33 kg/m²     General appearance: No apparent distress, appears stated age and cooperative. HEENT: Pupils equal, round, and reactive to light. Conjunctivae/corneas clear. Neck: Supple, with full range of motion. No jugular venous distention. Trachea midline. Respiratory:  Normal respiratory effort. Clear to auscultation, bilaterally without Rales/Wheezes/Rhonchi. Cardiovascular: Regular rate and rhythm with normal S1/S2 without murmurs, rubs or gallops. Abdomen: Soft, non-tender, non-distended with normal bowel sounds. Musculoskelatal: No clubbing, cyanosis or edema bilaterally. Full range of motion without deformity. Skin: Skin color, texture, turgor normal.  No rashes or lesions. Neurologic:  Neurovascularly intact without any focal sensory/motor deficits. Cranial nerves: II-XII intact, grossly non-focal.  Psychiatric: Alert and oriented, thought content appropriate, normal insight      Consults:     IP CONSULT TO GENERAL SURGERY    Significant Diagnostic Studies:         Radiology:  XR CHEST (2 VW)   Final Result   Subtle prominence of the pulmonary interstitium. This is nonspecific but can   be seen in interstitial pulmonary edema as well as atypical/viral infections. Correlate clinically           XR SHOULDER RIGHT (MIN 2 VIEWS)   Final Result   1. No acute osseous findings.       2.  Mild degenerative change of the acromioclavicular joint.       3.  Subtle interstitial opacities are seen in the visualized lungs. This is   nonspecific but can be seen in interstitial edema versus atypical/viral   infection. Correlate clinically           US ABDOMEN LIMITED Specify organ? LIVER, GALLBLADDER   Final Result   1. Hepatic steatosis.            XR CHEST PORTABLE   Final Result   No acute process.           CT ABDOMEN PELVIS W IV CONTRAST Additional Contrast? None   Final Result   1. Mild inflammation around the pancreatic head and body suspected to   represent acute pancreatitis. Correlate with clinical and laboratory workup. 2. Airspace changes in the lower chest are partially visualized. Correlate   with any pulmonary symptoms.             PCP/SNF to follow up: Chronic conditions    Disposition:  Home    Condition on D/C:  Stable    Discharge Instructions/Follow-up:  F/u with PCP within 1 week    Code Status:  Prior     Activity: activity as tolerated    Diet: diabetic diet    Labs:  For convenience and continuity at follow-up the following most recent labs are provided:      CBC:    Lab Results   Component Value Date    WBC 6.4 04/07/2022    HGB 11.9 04/07/2022    HCT 35.8 04/07/2022     04/07/2022       Renal:    Lab Results   Component Value Date     04/06/2022    K 3.6 04/06/2022    K 4.1 04/02/2022    CL 98 04/06/2022    CO2 27 04/06/2022    BUN 12 04/06/2022    CREATININE 0.7 04/06/2022    CALCIUM 9.5 04/06/2022    PHOS 3.4 04/06/2022       Discharge Medications:     Discharge Medication List as of 4/7/2022  3:25 PM           Details   atorvastatin (LIPITOR) 10 MG tablet Take 10 mg by mouth dailyHistorical Med      pantoprazole (PROTONIX) 40 MG tablet Take 40 mg by mouth dailyHistorical Med      meloxicam (MOBIC) 15 MG tablet Take 15 mg by mouth dailyHistorical Med      fenofibrate (TRIGLIDE) 160 MG tablet Take 160 mg by mouth dailyHistorical Med      azelastine (ASTELIN) 0.1 % nasal spray 1 spray by Nasal route 2 times daily as needed for Rhinitis Use in each nostril as directedHistorical Med      chlorhexidine (HIBICLENS) 4 % external liquid Apply 1 each topically three times a week, Topical, THREE TIMES WEEKLY Starting Mon 3/28/2022, Historical Med      mupirocin (BACTROBAN) 2 % ointment Apply 1 each topically 2 times daily as needed for Other With cleaning of wounds, Topical, 2 TIMES DAILY PRN Starting Mon 3/28/2022, Historical Med      Multiple Vitamins-Minerals (EYE VITAMINS PO) Take 1 tablet by mouth in the morning and at bedtime Historical Med      benzonatate (TESSALON) 200 MG capsule Take 200 mg by mouth 3 times daily as needed for Cough Historical Med      apixaban (ELIQUIS) 5 MG TABS tablet Take 1 tablet by mouth 2 times daily, Disp-60 tablet, R-11Normal      acetaminophen (TYLENOL) 500 MG tablet Take 2 tablets by mouth every 6 hours as needed for Pain, Disp-180 tablet, R-0Print      vitamin D (CHOLECALCIFEROL) 25 MCG (1000 UT) TABS tablet Take 1,000 Units by mouth dailyHistorical Med      diclofenac sodium (VOLTAREN) 1 % GEL Apply 2 g topically 4 times daily as needed for Pain, Topical, 4 TIMES DAILY PRN, Historical Med      albuterol sulfate HFA (VENTOLIN HFA) 108 (90 Base) MCG/ACT inhaler Inhale 2 puffs into the lungs every 4 hours as needed for Wheezing or Shortness of BreathHistorical Med      melatonin 3 MG TABS tablet Take 3-6 mg by mouth nightly as needed (insomnia) Historical Med      citalopram (CELEXA) 40 MG tablet Take 40 mg by mouth dailyHistorical Med      pioglitazone (ACTOS) 30 MG tablet Take 30 mg by mouth dailyHistorical Med      insulin aspart (NOVOLOG) 100 UNIT/ML injection vial Inject 25 Units into the skin 3 times daily (before meals) Plus 5 units with a snackHistorical Med      empagliflozin (JARDIANCE) 25 MG tablet Take 25 mg by mouth dailyHistorical Med      methocarbamol (ROBAXIN) 500 MG tablet Take 500 mg by mouth 3 times daily as needed (muscle spasms) Historical Med      loratadine (CLARITIN) 10 MG tablet Take 10 mg by mouth daily PRNHistorical Med      lisinopril-hydroCHLOROthiazide (ZESTORETIC) 20-12.5 MG per tablet Take 1 tablet by mouth 2 times dailyHistorical Med      mirabegron (MYRBETRIQ) 25 MG TB24 Take 25 mg by mouth dailyHistorical Med      insulin glargine (LANTUS) 100 UNIT/ML injection vial Inject 72 Units into the skin nightlyHistorical Med             Time Spent on discharge is more than 45 minutes in the examination, evaluation, counseling and review of medications and discharge plan. Signed: Dennys Ortega MD   4/11/2022      Thank you Enrique Velásquez for the opportunity to be involved in this patient's care. If you have any questions or concerns please feel free to contact me at [phone].

## 2022-04-27 NOTE — PROGRESS NOTES
[address]   Electrophysiology      Date: 4/28/2022    Primary Cardiologist: Davion Egan MD  PCP: Tyrese Cardenas     Chief Complaint:   Chief Complaint   Patient presents with    Follow-up     no cc     History of Present Illness:    I saw Mario Rodriguez in the office for electrophysiology follow up today. She is a 75 y.o. female with a past medical history of CAD (seen on coronary CTA 10/21), DM and atrial fibrillation. Atrial fibrillation was first diagnosed in 6/14/2021 while hospitalized at [facility] for pancreatitis. She underwent atrial fibrillation (PVI, CAFE) and left atrial flutter (roof line, mitral isthmus, anterior wall) ablation on 12/28/21 with Dr. Ronni Lantigua. She was started on flecainide 50mg BID. She presents for procedure follow up. She has been feeling well for the most part since her ablation. She was recently hospitalized with pancreatitis. She is also dealing with some shoulder pain. Denies any dyspnea or palpitations. No chest pain. No edema. No syncope. No bleeding issues. Allergies: Allergies   Allergen Reactions    Dulaglutide      Got pancreatitis from this    Hydrocodone Itching    Metformin Diarrhea     Home Medications:  Prior to Visit Medications    Medication Sig Taking?  Authorizing Provider   atorvastatin (LIPITOR) 10 MG tablet Take 10 mg by mouth daily Yes Historical Provider, MD   pantoprazole (PROTONIX) 40 MG tablet Take 40 mg by mouth daily Yes Historical Provider, MD   meloxicam (MOBIC) 15 MG tablet Take 15 mg by mouth daily Yes Historical Provider, MD   fenofibrate (TRIGLIDE) 160 MG tablet Take 160 mg by mouth daily Yes Historical Provider, MD   azelastine (ASTELIN) 0.1 % nasal spray 1 spray by Nasal route 2 times daily as needed for Rhinitis Use in each nostril as directed Yes Historical Provider, MD   mupirocin (BACTROBAN) 2 % ointment Apply 1 each topically 2 times daily as needed for Other With cleaning of wounds Yes Historical Provider, MD   Multiple Vitamins-Minerals (EYE VITAMINS PO) Take 1 tablet by mouth in the morning and at bedtime  Yes Historical Provider, MD   benzonatate (TESSALON) 200 MG capsule Take 200 mg by mouth 3 times daily as needed for Cough  Yes Historical Provider, MD   apixaban (ELIQUIS) 5 MG TABS tablet Take 1 tablet by mouth 2 times daily Yes Janelle Oreilly MD   acetaminophen (TYLENOL) 500 MG tablet Take 2 tablets by mouth every 6 hours as needed for Pain Yes Ivelisse Jarquin MD   vitamin D (CHOLECALCIFEROL) 25 MCG (1000 UT) TABS tablet Take 1,000 Units by mouth daily Yes Historical Provider, MD   diclofenac sodium (VOLTAREN) 1 % GEL Apply 2 g topically 4 times daily as needed for Pain Yes Historical Provider, MD   albuterol sulfate HFA (VENTOLIN HFA) 108 (90 Base) MCG/ACT inhaler Inhale 2 puffs into the lungs every 4 hours as needed for Wheezing or Shortness of Breath Yes Historical Provider, MD   melatonin 3 MG TABS tablet Take 3-6 mg by mouth nightly as needed (insomnia)  Yes Historical Provider, MD   citalopram (CELEXA) 40 MG tablet Take 40 mg by mouth daily Yes Historical Provider, MD   pioglitazone (ACTOS) 30 MG tablet Take 30 mg by mouth daily Yes Historical Provider, MD   insulin aspart (NOVOLOG) 100 UNIT/ML injection vial Inject 25 Units into the skin 3 times daily (before meals) Plus 5 units with a snack Yes Historical Provider, MD   empagliflozin (JARDIANCE) 25 MG tablet Take 25 mg by mouth daily Yes Historical Provider, MD   methocarbamol (ROBAXIN) 500 MG tablet Take 500 mg by mouth 3 times daily as needed (muscle spasms)  Yes Historical Provider, MD   loratadine (CLARITIN) 10 MG tablet Take 10 mg by mouth daily PRN Yes Historical Provider, MD   lisinopril-hydroCHLOROthiazide (ZESTORETIC) 20-12.5 MG per tablet Take 1 tablet by mouth 2 times daily Yes Historical Provider, MD   mirabegron (MYRBETRIQ) 25 MG TB24 Take 25 mg by mouth daily Yes Historical Provider, MD   insulin glargine (LANTUS) 100 UNIT/ML injection vial Inject 72 Units into the skin nightly Yes Historical Provider, MD        Past Medical History:  Past Medical History:   Diagnosis Date    CAD (coronary artery disease)     Depression     Diabetes mellitus ([address]     Hyperlipidemia     Hypertension        Past Surgical History:   Past Surgical History:   Procedure Laterality Date    HYSTERECTOMY         Social History:   reports that she has never smoked. She has never used smokeless tobacco. She reports that she does not drink alcohol and does not use drugs. Family History:      Problem Relation Age of Onset    Heart Attack Father        Review of Systems   Constitutional: Negative for chills, fatigue, fever and unexpected weight change. HENT: Negative for congestion, hearing loss, sinus pressure, sore throat and trouble swallowing. Respiratory: Negative for cough, shortness of breath and wheezing. Cardiovascular: Negative for chest pain, palpitations and leg swelling. Gastrointestinal: Negative for abdominal pain, blood in stool, constipation, diarrhea, nausea and vomiting. Genitourinary: Negative for hematuria. Musculoskeletal: Negative for arthralgias, back pain, gait problem and myalgias. R shoulder pain   Skin: Negative for color change, rash and wound. Neurological: Negative for dizziness, seizures, syncope, speech difficulty, weakness and light-headedness. Hematological: Does not bruise/bleed easily. Physical Examination:  Vitals:    04/28/22 1602   BP: 124/68   Pulse: 89   SpO2: 97%      Wt Readings from Last 3 Encounters:   04/28/22 174 lb (78.9 kg)   04/07/22 169 lb 15.6 oz (77.1 kg)   01/14/22 177 lb (80.3 kg)       Physical Exam  Vitals reviewed. Constitutional:       General: She is not in acute distress. Appearance: Normal appearance. HENT:      Head: Normocephalic and atraumatic.       Nose: Nose normal.      Mouth/Throat:      Mouth: Mucous membranes are moist.   Eyes:      Conjunctiva/sclera: Conjunctivae normal.      Pupils: Pupils are equal, round, and reactive to light. Cardiovascular:      Rate and Rhythm: Normal rate and regular rhythm. Heart sounds: No murmur heard. No friction rub. No gallop. Pulmonary:      Effort: No respiratory distress. Breath sounds: No wheezing, rhonchi or rales. Abdominal:      General: Abdomen is flat. Bowel sounds are normal.      Palpations: Abdomen is soft. Musculoskeletal:         General: Normal range of motion. Right lower leg: No edema. Left lower leg: No edema. Skin:     General: Skin is warm and dry. Findings: No bruising. Neurological:      General: No focal deficit present. Mental Status: She is alert and oriented to person, place, and time. Motor: No weakness. Psychiatric:         Mood and Affect: Mood normal.         Behavior: Behavior normal.          Pertinent labs, diagnostic, device, and imaging results reviewed as a part of this visit    LABS    CBC:   Lab Results   Component Value Date    WBC 6.4 2022    HGB 11.9 (L) 2022    HCT 35.8 (L) 2022    MCV 86.3 2022     2022     BMP:   Lab Results   Component Value Date    CREATININE 0.7 2022    BUN 12 2022     2022    K 3.6 2022    CL 98 (L) 2022    CO2 27 2022     Estimated Creatinine Clearance: 74 mL/min (based on SCr of 0.7 mg/dL). No results found for: BNP    Thyroid:   Lab Results   Component Value Date    TSH 1.31 2013     Lipid Panel:   Lab Results   Component Value Date    CHOL 109 2021    HDL 31 2021    HDL 33 2012    TRIG 115 2022     LFTs:  Lab Results   Component Value Date    ALT 39 2022    AST 27 2022    ALKPHOS 160 (H) 2022    BILITOT 1.0 2022     Coags: No results found for: PROTIME, INR, APTT    EC2022   SR at 87 BPM. Poor R wave progression. Non-specific ST-T wave changes.      Echo: 6/15/21   The left atrium is mildly dilated. Normal LV size and wall motion. EF is    60%. Indeterminate diastolic   function. The right ventricle is normal in size and function. Trivial Mitral and Tricuspid regurgitation. Stress test: 9/5/20  1. No ECG changes to suggest ischemia following administration of      Lexiscan. 2. Myocardial perfusion imaging was performed in conjunction and      will be reported separately. EP Procedures:  1. Atrial fibrillation (PVI, CAFE) and left atrial flutter (roof line, mitral isthmus, anterior wall) ablation on 12/28/21, Dr. Maggy Chisholm:    Paroxysmal atrial fibrillation   - first seen on telemetry 6/14/21   - s/p PVI, CAFE RFA on 12/28/21 with Dr. Jessee Bates 4 (age, gender, DM, CAD) on Eliquis 5mg BID   - EKG today with SR   - flecainide and Cardizem stopped 1/22 by Dr. Ruby Sharif due to junctional rhythm   - 7 day monitor 1/22 showed a 32 beat run of SVT, no other arrhythmias and Dr. Dorothy Lynn had recommended ILR at that time   - 30 day monitor offered but pt declined, will repeat EKG at 3 month f/u    Left atrial flutter   - seen on EP study s/p roof line, mitral isthmus, anterior wall RFA on 12/28/21   - see above    CAD   - seen on coronary CTA    - not on statin, will defer to Dr. Ruby Sharif    Thank you for allowing to us to participate in the care of [place] [id number]. Return in about 3 months (around 7/28/2022) for an appointment with Michaelle Moreno NP.      SRIDHAR Lopez  [facility] [address], [address] [place] [address]  Phone: [phone]  Fax: [phone]    Electronically signed by SRIDHAR Suarez - CNP on 4/28/2022 at 4:24 PM

## 2022-04-28 ENCOUNTER — OFFICE VISIT (OUTPATIENT)
Dept: CARDIOLOGY CLINIC | Age: 67
End: 2022-04-28
Payer: MEDICARE

## 2022-04-28 VITALS
WEIGHT: 174 LBS | DIASTOLIC BLOOD PRESSURE: 68 MMHG | SYSTOLIC BLOOD PRESSURE: 124 MMHG | HEIGHT: 59 IN | OXYGEN SATURATION: 97 % | HEART RATE: 89 BPM | BODY MASS INDEX: 35.08 KG/M2

## 2022-04-28 DIAGNOSIS — I48.0 PAROXYSMAL ATRIAL FIBRILLATION (HCC): Primary | ICD-10-CM

## 2022-04-28 DIAGNOSIS — I48.92 LEFT ATRIAL FLUTTER BY ELECTROCARDIOGRAM (HCC): ICD-10-CM

## 2022-04-28 DIAGNOSIS — I25.10 CAD IN NATIVE ARTERY: ICD-10-CM

## 2022-04-28 PROCEDURE — 1090F PRES/ABSN URINE INCON ASSESS: CPT | Performed by: NURSE PRACTITIONER

## 2022-04-28 PROCEDURE — 93000 ELECTROCARDIOGRAM COMPLETE: CPT | Performed by: NURSE PRACTITIONER

## 2022-04-28 PROCEDURE — G8400 PT W/DXA NO RESULTS DOC: HCPCS | Performed by: NURSE PRACTITIONER

## 2022-04-28 PROCEDURE — 1123F ACP DISCUSS/DSCN MKR DOCD: CPT | Performed by: NURSE PRACTITIONER

## 2022-04-28 PROCEDURE — 4040F PNEUMOC VAC/ADMIN/RCVD: CPT | Performed by: NURSE PRACTITIONER

## 2022-04-28 PROCEDURE — G8417 CALC BMI ABV UP PARAM F/U: HCPCS | Performed by: NURSE PRACTITIONER

## 2022-04-28 PROCEDURE — 3017F COLORECTAL CA SCREEN DOC REV: CPT | Performed by: NURSE PRACTITIONER

## 2022-04-28 PROCEDURE — 99214 OFFICE O/P EST MOD 30 MIN: CPT | Performed by: NURSE PRACTITIONER

## 2022-04-28 PROCEDURE — 1111F DSCHRG MED/CURRENT MED MERGE: CPT | Performed by: NURSE PRACTITIONER

## 2022-04-28 PROCEDURE — G8427 DOCREV CUR MEDS BY ELIG CLIN: HCPCS | Performed by: NURSE PRACTITIONER

## 2022-04-28 PROCEDURE — 1036F TOBACCO NON-USER: CPT | Performed by: NURSE PRACTITIONER

## 2022-04-28 ASSESSMENT — ENCOUNTER SYMPTOMS
COUGH: 0
ABDOMINAL PAIN: 0
CONSTIPATION: 0
BACK PAIN: 0
COLOR CHANGE: 0
VOMITING: 0
SHORTNESS OF BREATH: 0
TROUBLE SWALLOWING: 0
NAUSEA: 0
SORE THROAT: 0
SINUS PRESSURE: 0
BLOOD IN STOOL: 0
WHEEZING: 0
DIARRHEA: 0

## 2022-05-16 ENCOUNTER — HOSPITAL ENCOUNTER (OUTPATIENT)
Dept: PHYSICAL THERAPY | Age: 67
Setting detail: THERAPIES SERIES
Discharge: HOME | End: 2022-05-16
Payer: MEDICARE

## 2022-05-16 PROCEDURE — 97161 PT EVAL LOW COMPLEX 20 MIN: CPT

## 2022-05-16 PROCEDURE — 97530 THERAPEUTIC ACTIVITIES: CPT

## 2022-05-16 PROCEDURE — 97112 NEUROMUSCULAR REEDUCATION: CPT

## 2022-05-16 NOTE — PLAN OF CARE
[facility], [facility] [address] [place] [address]  Phone: [phone]   Fax:     [phone]                                                       Physical Therapy Certification    Dear Ms. Zhang Nash,    We had the pleasure of evaluating the following patient for physical therapy services at [facility]. A summary of our findings can be found in the initial assessment below. This includes our plan of care. If you have any questions or concerns regarding these findings, please do not hesitate to contact me at the office phone number checked above. Thank you for the referral.       Physician Signature:_______________________________Date:__________________  By signing above (or electronic signature), therapists plan is approved by physician        Patient: Clari Pearson   : 1955   MRN: [id number]  Referring Physician:        Evaluation Date: 2022      Medical Diagnosis Information:  Diagnosis: Chronic Pain of Left Knee M25.562, G89.29;  Primary Osteoarthritis of Left Knee M17.12; s/p Arthroscopy of L Knee Z98.890   Treatment Diagnosis: L knee Pain M25.562                                         Insurance information: PT Insurance Information: [employer], $40 copay, visits based on medical necessity, [id number], [id number] not billable, prior authorization required via [email or web address] after initial evaluation   Referring Provider: HEMANT Wong    Precautions/ Contra-indications:   Latex Allergy:  [x]NO      []YES  Preferred Language for Healthcare:   [x]English       []other:    C-SSRS Triggered by Intake questionnaire (Past 2 wk assessment ):   [x] No, Questionnaire did not trigger screening.   [] Yes, Patient intake triggered C-SSRS Screening      [] C-SSRS Screening completed  [] PCP notified via Epic     SUBJECTIVE: Patient reports a history of L knee after having arthroscopy Oct 2021 due to post knee pain. Patient states knee was \"pretty well ripped\", but wasn't ready for TKA. Now, patient states she was told if PT doesn't help, she may need a TKA. Patient has difficulty bending L knee, transfering into and out of tub, putting on/off pants, socks, shoes, sometimes has difficulty getting out of bed due to pain and stiffness, difficulty standing and walking >/= 10 minutes, sitting >/= 10-15 minutes, has to lift L LE into car with UEs, negotiates stairs on step going up/down steps (has 7 steps up to aoartment).   Patient had cortisone injections to L knee, with min relief    Relevant Medical History: Coronary Artery Disease - Ablasion, Diabetes, Hyperlipidemia, Hypertension, Depression, Osteoarthritis  Functional Scale/Score: FOTO = 55/100 (5/16/22)    Pain Scale: 8/10  Easing factors: ice on it, heating pad  Provocative factors: weightbearing    Type: [x]Constant   []Intermittent  []Radiating []Localized []other:     Numbness/Tingling: none    Occupation/School: [email or web address] part time, is retired    Living Status/Prior Level of Function: Independent with ADLs and IADLs,     OBJECTIVE:   Posture: tends to lean to R    Functional Mobility/Transfers: independent    Palpation: tenderness L pes anserine, patellar tendon infrapatellar region, popliteal space, distal ITB  Bandages/Dressings/Incisions: N/A    Gait: patient demonstrates wide base of support, decreased stride length, lat sway noted    Single Limb Stance:  (Left):   Fair-              Right: Fair+          ROM (LEFT) RIGHT   HIP Flex WFL/difficult WFL   Knee ext 0 0   Knee Flex 95 125   Ankle PF [facility] WFL   Ankle DF WFL WFL   Ankle In [facility]   Ankle Ev [facility] WFL   Strength  (LEFT) RIGHT   HIP Flexors 3/5 5/5   Knee EXT (quad) 3/5 5/5   Knee Flex (HS) 3/5 5/5   Ankle DF 4/5 5/5   Ankle PF 4/5 5/5   Ankle Inv 4/5 5/5   Ankle EV 4/5 5/5        Muscle Control     Transverse Abdominus Fair [facility] Hip Adductors Fair- Fair   Quadriceps Fair- Fair+          Sensation:    [x]Dermatomes/Myotomes intact to Light Touch       Joint mobility:    []Normal    [x]Hypo L patellar decreased sup/inf glides, and med/lat glides   []Hyper    Orthopedic Special Tests:  DATE:          (Left) Right Comments   Anterior Drawer N N    Posterior Drawer N N    Varus Stress N N    Valgus Stress N N    *N = Normal, A = Abnormal                       [x] Patient history, allergies, meds reviewed. Medical chart reviewed. See intake form. Review Of Systems (ROS):  [x]Performed Review of systems (Integumentary, CardioPulmonary, Neurological) by intake and observation. Intake form has been scanned into medical record. Patient has been instructed to contact their primary care physician regarding ROS issues if not already being addressed at this time.       Co-morbidities/Complexities (which will affect course of rehabilitation):   []None           Arthritic conditions   []Rheumatoid arthritis (M05.9)  []Osteoarthritis (M19.91)   Cardiovascular conditions   [x]Hypertension (I10)  [x]Hyperlipidemia (E78.5)  []Angina pectoris (I20)  []Atherosclerosis (I70)  []CVA Musculoskeletal conditions   []Disc pathology   []Congenital spine pathologies   []Prior surgical intervention  []Osteoporosis (M81.8)  []Osteopenia (M85.8)   Endocrine conditions   []Hypothyroid (E03.9)  []Hyperthyroid Gastrointestinal conditions   []Constipation (J77.77)   Metabolic conditions   [x]Morbid obesity (E66.01)  [x]Diabetes type 1(E10.65) or 2 (E11.65)   []Neuropathy (G60.9)     Pulmonary conditions   []Asthma (J45)  []Coughing   []COPD (J44.9)   Psychological Disorders  []Anxiety (F41.9)  [x]Depression (F32.9)   []Other:   []Other:          Barriers to/and or personal factors that will affect rehab potential:              []Age  []Sex    []Smoker              [x]Motivation/                      [x]Co-Morbidities              []Cognitive Function, education/learning barriers              []Environmental, home barriers              []profession/work barriers  []past PT/medical experience  []other:  Justification:     Falls Risk Assessment (30 days):   [x] Falls Risk assessed and no intervention required. [] Falls Risk assessed and Patient requires intervention due to being higher risk   TUG score (>12s at risk):     [] Falls education provided, including         ASSESSMENT: Patient presents with decreased functional mobility consistent with L knee OA . Patient would benefit from PT to increase functional mobility  Functional Impairments:     [] Ankle/Foot sprain/strain   [x]Decreased LE functional ROM   [x]Decreased core/proximal hip strength/Muscle Imbalance of core and B LEs, and neuromuscular control   []Decreased Ankle/Foot joint mobility  [x]Reduced balance/proprioceptive control   []other:      Functional Activity Limitations (from functional questionnaire and intake)   [x]Reduced ability to tolerate prolonged functional positions   [x]Reduced ability or difficulty with changes of positions or transfers between positions   [x]Reduced ability to sleep   [x] Reduced ability or tolerance with driving and/or computer work   [x]Reduced ability to perform lifting, carrying tasks   [x]Reduced ability to squat   [x]Reduced ability to ambulate prolonged functional periods/distances/surfaces   [x]Reduced ability to ascend/descend stairs   [x]Reduced ability to run, hop or jump   []other:     Participation Restrictions   [x]Reduced participation in self care activities   [x]Reduced participation in home management activities   [x]Reduced participation in work activities   [x]Reduced participation in social activities. [x]Reduced participation in sport activities. Classification :    []Signs/symptoms consistent with post-surgical status including decreased ROM, strength and function.    []Signs/symptoms consistent with joint sprain/strain   []Signs/symptoms consistent with patella-femoral syndrome   [x]Signs/symptoms consistent with knee OA/hip OA   []Signs/symptoms consistent with internal derangement of knee/Hip   []Signs/symptoms consistent with functional hip weakness/NMR control/Muscle Imbalance of Core and B LEs     []Signs/symptoms consistent with tendinitis/tendinosis    []signs/symptoms consistent with pathology which may benefit from Dry needling      []other:      Prognosis/Rehab Potential:      []Excellent   []Good    [x]Fair   []Poor    Tolerance of evaluation/treatment:    []Excellent   [x]Good    []Fair   []Poor    Physical Therapy Evaluation Complexity Justification  [x] A history of present problem with:  [] no personal factors and/or comorbidities that impact the plan of care;  [x]1-2 personal factors and/or comorbidities that impact the plan of care  []3 personal factors and/or comorbidities that impact the plan of care  [x] An examination of body systems using standardized tests and measures addressing any of the following: body structures and functions (impairments), activity limitations, and/or participation restrictions;:  [] a total of 1-2 or more elements   [x] a total of 3 or more elements   [] a total of 4 or more elements   [x] A clinical presentation with:  [x] stable and/or uncomplicated characteristics   [] evolving clinical presentation with changing characteristics  [] unstable and unpredictable characteristics;   [x] Clinical decision making of [x] low, [] moderate, [] high complexity using standardized patient assessment instrument and/or measurable assessment of functional outcome. [x] EVAL (LOW) 65610 (typically 20 minutes face-to-face)  [] EVAL (MOD) 63481 (typically 30 minutes face-to-face)  [] EVAL (HIGH) 67692 (typically 45 minutes face-to-face)  [] RE-EVAL     GOALS:  Patient stated goal: \"Everything - walking\"  [] Progressing: [] Met: [] Not Met: [] Adjusted    Therapist goals for Patient:   Short Term Goals:  To be achieved in: 2 - 4 weeks  1. Patient will report pain </= 0-2/10 at rest,2- 4/10 with activity  [] Progressing: [] Met: [] Not Met: [] Adjusted  2. Patient will demonstrate AROM >/= 3-105 to allow  ease of transfers, putting on shoes, socks, pants independently. [] Progressing: [] Met: [] Not Met: [] Adjusted    Long Term Goals: To be achieved in: 4-8 weeks  1. Disability index score of 59 or more for the FOTO to assist with reaching prior level of function. [] Progressing: [] Met: [] Not Met: [] Adjusted  2. Patient will demonstrate increased AROM to [facility] to enable amb without AD, gait pattern WNL, >/= 15 minutes  [] Progressing: [] Met: [] Not Met: [] Adjusted  3. Patient will be independent with written home exercise program  [] Progressing: [] Met: [] Not Met: [] Adjusted  4. Patient will return to light chores/ functional activities (I.e, light cooking, dusting, etc) without increased symptoms or restriction. [] Progressing: [] Met: [] Not Met: [] Adjusted  5. Patient is able to negotiate stairs with reciprocal pattern without increased symptoms  [] Progressing: [] Met: [] Not Met: [] Adjusted     HEP instruction:  Patient instructed in calf stretch with towel, ankle dorsiflexion/plantarflexion, inversion/eversion, gluteal sets, quad sets, isometric hip add, SLR; written instructions with pictures issued, patient able to demonstrate exercises. PLAN: Will initiate NuStep, Leg Ext/Leg curl (JOSEPH), calf stretch incline board, knee flex stretch/stairs, soft tissue mob, patellar mob, manual stretch/PROM. Frequency/Duration:  1-3 days per week for 4-6 Weeks:  Interventions:  [x]  Therapeutic exercise including: strength training, ROM, for Lower extremity and core   [x]  NMR activation and proprioception for LE, Glutes and Core   [x]  Manual therapy as indicated for LE, Hip and spine to include: Dry Needling/IASTM, STM, PROM, Gr I-IV mobilizations, manipulation.    [x] Modalities as needed that may include: thermal agents, E-stim, Biofeedback, US, iontophoresis as indicated  [x] Patient education on joint protection, postural re-education, activity modification, progression of HEP. Electronically signed by:  Yokasta Millan, PT  [id number]    Note: If patient does not return for scheduled/recommended follow up visits, this note will serve as a discharge from care along with the most recent update on progress.

## 2022-05-16 NOTE — FLOWSHEET NOTE
[facility] - Outpatient Rehabilitation and Therapy, [address] [place] [address]  Phone: [phone]   Fax:     [phone]      Physical Therapy Treatment Note/ Progress Report:     Date:  2022    Patient Name:  Rhett Palacio    :  1955  MRN: [id number]    Pertinent Medical History:     Medical/Treatment Diagnosis Information:  · Diagnosis: Chronic Pain of Left Knee M25.562, G89.29; Primary Osteoarthritis of Left Knee M17.12; s/p Arthroscopy of L Knee Z98.890  · Treatment Diagnosis: L knee Pain X58.673    Insurance/Certification information:  PT Insurance Information: [employer] Medicare, $40 copay, visits based on medical necessity, [id number], [id number] not billable, prior authorization required via [employer] after initial evaluation. Approval from [employer] 22 thru  16 visits  Physician Information:  HEMANT Rizvi  Plan of care signed (Y/N): raji requested     Date of Patient follow up with Physician:      Progress Report: []  Yes  [x]  No     Date Range for reporting period:  Beginnin22  Ending:      Progress report due (10 Rx/or 30 days whichever is less):     Recertification due (POC duration/ or 90 days whichever is less):      Visit # POC/Insurance Allowable Auth Needed   1  [x]Yes via [employer]   []No     Latex Allergy:  [x]NO      []YES  Preferred Language for Healthcare:   [x]English       []other:    History of Injury: Patient reports a history of L knee after having arthroscopy Oct 2021 due to post knee pain. Patient states knee was \"pretty well ripped\", but wasn't ready for TKA. Now, patient states she was told if PT doesn't help, she may need a TKA.   Patient has difficulty bending L knee, transfering into and out of tub, putting on/off pants, socks, shoes, sometimes has difficulty getting out of bed due to pain and stiffness, difficulty standing and walking >/= 10 minutes, sitting >/= 10-15 minutes, has to lift L LE into car with UEs, negotiates stairs on step going up/down steps (has 7 steps up to aoartment). Patient had cortisone injections to L knee, with min relief     Relevant Medical History: Coronary Artery Disease - Ablasion, Diabetes, Hyperlipidemia, Hypertension, Depression, Osteoarthritis  Functional Scale/Score: FOTO = 55/100 (5/16/22)     Pain Scale: 8/10    SUBJECTIVE:  See eval    OBJECTIVE:   Observation:    Test measurements:   DATE:                    5/16  L Knee Extension 0    L Knee Flexion 95          RESTRICTIONS/PRECAUTIONS:     Exercises/Interventions:     Therapeutic Ex (44262)   Min: Reps/Resistance Notes/CUES   NuStep Flexion    Calf Stretch/Incline               NMR re-education (69872)  Min: [address] [place] Ex Program  See below   Leg Extension     Leg Curl               Therapeutic Activity (04762)  Min:10     Patient education  See below                  Manual Intervention (18736)  Min:     Soft tissue mob     IASTM     Patellar Mob     PROM     Modalities  Min:     IFC with      CP after exercises     MH after exercises            Other Therapeutic Activities: Pt was educated on PT POC, Diagnosis, Prognosis, pathomechanics as well as frequency and duration of scheduling future physical therapy appointments. Time was also taken on this day to answer all patient questions and participation in PT. Reviewed appointment policy in detail with patient and patient verbalized understanding. Discussed at length anatomy and biomechanics of the knee, muscle reeducation, motor recruitment. Home Exercise Program: Patient instructed in glut sets, quad sets, isometric hip add, SLR, heel slides, knee extension prop, calf stretch with towel, ankle pumps ; written instructions with pictures issued, patient able to demonstrate exercises. ASSESSMENT:  Patient reports a history of L knee after having arthroscopy Oct 2021 due to post knee pain.   Patient states knee was \"pretty well ripped\", but wasn't ready for TKA. Now, patient states she was told if PT doesn't help, she may need a TKA. Patient has difficulty bending L knee, transfering into and out of tub, putting on/off pants, socks, shoes, sometimes has difficulty getting out of bed due to pain and stiffness, difficulty standing and walking >/= 10 minutes, sitting >/= 10-15 minutes, has to lift L LE into car with UEs, negotiates stairs on step going up/down steps (has 7 steps up to aoartment). Patient had cortisone injections to L knee, with min relief     Relevant Medical History: Coronary Artery Disease - Ablasion, Diabetes, Hyperlipidemia, Hypertension, Depression, Osteoarthritis  Functional Scale/Score: FOTO = 55/100 (5/16/22)     Pain Scale: 8/10    GOALS:  Patient stated goal: \"Everything - walking\"  []? Progressing: []? Met: []? Not Met: []? Adjusted     Therapist goals for Patient:   Short Term Goals: To be achieved in: 2 - 4 weeks  1. Patient will report pain </= 0-2/10 at rest,2- 4/10 with activity  []? Progressing: []? Met: []? Not Met: []? Adjusted  2. Patient will demonstrate AROM >/= 3-105 to allow  ease of transfers, putting on shoes, socks, pants independently. []? Progressing: []? Met: []? Not Met: []? Adjusted     Long Term Goals: To be achieved in: 4-8 weeks  1. Disability index score of 59 or more for the FOTO to assist with reaching prior level of function. []? Progressing: []? Met: []? Not Met: []? Adjusted  2. Patient will demonstrate increased AROM to [facility] to enable amb without AD, gait pattern WNL, >/= 15 minutes  []? Progressing: []? Met: []? Not Met: []? Adjusted  3. Patient will be independent with written home exercise program  []? Progressing: []? Met: []? Not Met: []? Adjusted  4. Patient will return to light chores/ functional activities (I.e, light cooking, dusting, etc) without increased symptoms or restriction. []? Progressing: []? Met: []? Not Met: []? Adjusted  5.  Patient is able to negotiate stairs with reciprocal pattern without increased symptoms  []? Progressing: []? Met: []? Not Met: []? Adjusted          Treatment/Activity Tolerance:  [x] Patient tolerated treatment well [] Patient limited by fatique  [] Patient limited by pain  [] Patient limited by other medical complications  [] Other:     Overall Progression Towards Functional goals/ Treatment Progress Update:  [] Patient is progressing as expected towards functional goals listed. [] Progression is slowed due to complexities/Impairments listed. [] Progression has been slowed due to co-morbidities. [x] Plan just implemented, too soon to assess goals progression <30days   [] Goals require adjustment due to lack of progress  [] Patient is not progressing as expected and requires additional follow up with physician  [] Other    Prognosis for POC: [x] Good [] Fair  [] Poor    Patient requires continued skilled intervention: [x] Yes  [] No        PLAN: Initiate NuStep, calf stretch/incline, Leg Ext/Leg Curl/JOSEPH, soft tissue mob, patellar mob, modalities prn, PROM. [] Continue per plan of care [] Alter current plan (see comments)  [x] Plan of care initiated [] Hold pending MD visit [] Discharge    Therapeutic Exercise and NMR EXR  [] (94055) Provided verbal/tactile cueing for activities related to strengthening, flexibility, endurance, ROM for improvements in LE, proximal hip, and core control with self care, mobility, lifting, ambulation. [x] (75939) Provided verbal/tactile cueing for activities related to improving balance, coordination, kinesthetic sense, posture, motor skill, proprioception  to assist with LE, proximal hip, and core control in self care, mobility, lifting, ambulation and eccentric single leg control.      NMR and Therapeutic Activities:    [x] (65623 or 31571) Provided verbal/tactile cueing for activities related to improving balance, coordination, kinesthetic sense, posture, motor skill, proprioception and (80635) [] Krysta (16182)   [] Other:      Electronically signed by: Ezio Mo, PT [id number]    Note: If patient does not return for scheduled/recommended follow up visits, this note will serve as a discharge from care along with the most recent update on progress.

## 2022-05-18 ENCOUNTER — HOSPITAL ENCOUNTER (OUTPATIENT)
Dept: PHYSICAL THERAPY | Age: 67
Setting detail: THERAPIES SERIES
Discharge: HOME | End: 2022-05-18
Payer: MEDICARE

## 2022-05-18 PROCEDURE — 97140 MANUAL THERAPY 1/> REGIONS: CPT

## 2022-05-18 PROCEDURE — 97110 THERAPEUTIC EXERCISES: CPT

## 2022-05-18 PROCEDURE — 97112 NEUROMUSCULAR REEDUCATION: CPT

## 2022-05-18 NOTE — FLOWSHEET NOTE
[facility] - Outpatient Rehabilitation and Therapy, [place] [address]  Phone: [phone]   Fax:     [phone]      Physical Therapy Treatment Note/ Progress Report:     Date:  2022    Patient Name:  Rhett Palacio    :  1955  MRN: [id number]    Pertinent Medical History:     Medical/Treatment Diagnosis Information:  · Diagnosis: Chronic Pain of Left Knee M25.562, G89.29; Primary Osteoarthritis of Left Knee M17.12; s/p Arthroscopy of L Knee Z98.890  · Treatment Diagnosis: L knee Pain S91.486    Insurance/Certification information:  PT Insurance Information: [employer] Medicare, $40 copay, visits based on medical necessity, [id number], 70840 not billable, prior authorization required via [employer] after initial evaluation. Approval from [employer] 22 thru  16 visits  Physician Information:  HEMANT Rizvi  Plan of care signed (Y/N): raji requested     Date of Patient follow up with Physician:      Progress Report: []  Yes  [x]  No     Date Range for reporting period:  Beginnin22  Ending:      Progress report due (10 Rx/or 30 days whichever is less):     Recertification due (POC duration/ or 90 days whichever is less):      Visit # POC/Insurance Allowable Auth Needed   2  [x]Yes via [employer]   []No     Latex Allergy:  [x]NO      []YES  Preferred Language for Healthcare:   [x]English       []other:    History of Injury: Patient reports a history of L knee after having arthroscopy Oct 2021 due to post knee pain. Patient states knee was \"pretty well ripped\", but wasn't ready for TKA. Now, patient states she was told if PT doesn't help, she may need a TKA.   Patient has difficulty bending L knee, transfering into and out of tub, putting on/off pants, socks, shoes, sometimes has difficulty getting out of bed due to pain and stiffness, difficulty standing and walking >/= 10 minutes, sitting >/= 10-15 minutes, has to lift L LE into car with UEs, negotiates stairs on step going up/down steps (has 7 steps up to aoartment). Patient had cortisone injections to L knee, with min relief     Relevant Medical History: Coronary Artery Disease - Ablasion, Diabetes, Hyperlipidemia, Hypertension, Depression, Osteoarthritis  Functional Scale/Score: FOTO = 55/100 (5/16/22)     Pain Scale: 8/10    SUBJECTIVE:  See eval  5/18/22:  Patient reports knee is doing okay after initial eval.    OBJECTIVE:   Observation:    Test measurements:   DATE:                    5/16 5/18  L Knee Extension 0 0   L Knee Flexion 95 103         RESTRICTIONS/PRECAUTIONS:     Exercises/Interventions:     Therapeutic Ex (16761)   Min:10 Reps/Resistance Notes/CUES   NuStep Level 1 x 6 minutes Seat, UEs #6   Calf Stretch/Incline 3 x 30 sec Incline board   Knee Flex Stretch 3 x 30 sec stairs        NMR re-education (99223)  Min: [address] [place] Ex Program  See below   Leg Extension Multiple angle Isometrics @ 80, 50, 20 x 5sec x 5 reps Seat #3, Tibia #2   Leg Curl Multiple Angle Isometrics @ 20, 50, 80 x 5 sec x 5 reps Seat #3, Tibia #2             Therapeutic Activity (11237)  Min:     Patient education  See below                  20     Soft tissue mob L knee with focus on IT band, suprapatellar region    IASTM     Patellar Mob Sup, inf, med, lat x 5 each    PROM x5    Modalities  Min:     IFC with      CP after exercises     MH after exercises            Other Therapeutic Activities: Pt was educated on PT POC, Diagnosis, Prognosis, pathomechanics as well as frequency and duration of scheduling future physical therapy appointments. Time was also taken on this day to answer all patient questions and participation in PT. Reviewed appointment policy in detail with patient and patient verbalized understanding. Discussed at length anatomy and biomechanics of the knee, muscle reeducation, motor recruitment.      Home Exercise Program: Patient instructed in glut sets, quad sets, isometric hip add, SLR, heel slides, knee extension prop, calf stretch with towel, ankle pumps ; written instructions with pictures issued, patient able to demonstrate exercises. ASSESSMENT:  Patient reports a history of L knee after having arthroscopy Oct 2021 due to post knee pain. Patient states knee was \"pretty well ripped\", but wasn't ready for TKA. Now, patient states she was told if PT doesn't help, she may need a TKA. Patient has difficulty bending L knee, transfering into and out of tub, putting on/off pants, socks, shoes, sometimes has difficulty getting out of bed due to pain and stiffness, difficulty standing and walking >/= 10 minutes, sitting >/= 10-15 minutes, has to lift L LE into car with UEs, negotiates stairs on step going up/down steps (has 7 steps up to aoartment). Patient had cortisone injections to L knee, with min relief     Relevant Medical History: Coronary Artery Disease - Ablasion, Diabetes, Hyperlipidemia, Hypertension, Depression, Osteoarthritis  Functional Scale/Score: FOTO = 55/100 (5/16/22)     Pain Scale: 8/10    GOALS:  Patient stated goal: \"Everything - walking\"  []? Progressing: []? Met: []? Not Met: []? Adjusted     Therapist goals for Patient:   Short Term Goals: To be achieved in: 2 - 4 weeks  1. Patient will report pain </= 0-2/10 at rest,2- 4/10 with activity  []? Progressing: []? Met: []? Not Met: []? Adjusted  2. Patient will demonstrate AROM >/= 3-105 to allow  ease of transfers, putting on shoes, socks, pants independently. []? Progressing: []? Met: []? Not Met: []? Adjusted     Long Term Goals: To be achieved in: 4-8 weeks  1. Disability index score of 59 or more for the FOTO to assist with reaching prior level of function. []? Progressing: []? Met: []? Not Met: []? Adjusted  2. Patient will demonstrate increased AROM to [facility] to enable amb without AD, gait pattern WNL, >/= 15 minutes  []? Progressing: []? Met: []?  Not Met: []? Adjusted  3. Patient will be independent with written home exercise program  []? Progressing: []? Met: []? Not Met: []? Adjusted  4. Patient will return to light chores/ functional activities (I.e, light cooking, dusting, etc) without increased symptoms or restriction. []? Progressing: []? Met: []? Not Met: []? Adjusted  5. Patient is able to negotiate stairs with reciprocal pattern without increased symptoms  []? Progressing: []? Met: []? Not Met: []? Adjusted          Treatment/Activity Tolerance:  [x] Patient tolerated treatment well [] Patient limited by fatique  [] Patient limited by pain  [] Patient limited by other medical complications  [] Other:     Overall Progression Towards Functional goals/ Treatment Progress Update:  [] Patient is progressing as expected towards functional goals listed. [] Progression is slowed due to complexities/Impairments listed. [] Progression has been slowed due to co-morbidities. [x] Plan just implemented, too soon to assess goals progression <30days   [] Goals require adjustment due to lack of progress  [] Patient is not progressing as expected and requires additional follow up with physician  [] Other    Prognosis for POC: [x] Good [] Fair  [] Poor    Patient requires continued skilled intervention: [x] Yes  [] No        PLAN: Monitor response. Instruct patient in sitting in chair hamstring stretch and longsitting hip IR/ER  [] Continue per plan of care [] Alter current plan (see comments)  [x] Plan of care initiated [] Hold pending MD visit [] Discharge    Therapeutic Exercise and NMR EXR  [] (83526) Provided verbal/tactile cueing for activities related to strengthening, flexibility, endurance, ROM for improvements in LE, proximal hip, and core control with self care, mobility, lifting, ambulation.   [x] (31874) Provided verbal/tactile cueing for activities related to improving balance, coordination, kinesthetic sense, posture, motor skill, proprioception  to assist with LE, proximal hip, and core control in self care, mobility, lifting, ambulation and eccentric single leg control. NMR and Therapeutic Activities:    [x] (79432 or 78345) Provided verbal/tactile cueing for activities related to improving balance, coordination, kinesthetic sense, posture, motor skill, proprioception and motor activation to allow for proper function of core, proximal hip and LE with self care and ADLs and functional mobility.   [] (77547) Gait Re-education- Provided training and instruction to the patient for proper LE, core and proximal hip recruitment and positioning and eccentric body weight control with ambulation re-education including up and down stairs     Home Exercise Program:    [] (64721) Reviewed/Progressed HEP activities related to strengthening, flexibility, endurance, ROM of core, proximal hip and LE for functional self-care, mobility, lifting and ambulation/stair navigation   [x] (59248)Reviewed/Progressed HEP activities related to improving balance, coordination, kinesthetic sense, posture, motor skill, proprioception of core, proximal hip and LE for self care, mobility, lifting, and ambulation/stair navigation      Manual Treatments:  PROM / STM / Oscillations-Mobs:  G-I, II, III, IV (PA's, Inf., Post.)  [] (36387) Provided manual therapy to mobilize LE, proximal hip and/or LS spine soft tissue/joints for the purpose of modulating pain, promoting relaxation,  increasing ROM, reducing/eliminating soft tissue swelling/inflammation/restriction, improving soft tissue extensibility and allowing for proper ROM for normal function with self care, mobility, lifting and ambulation.        Approval Dates:  CPT Code Units Approved Units Used  Date Updated:                     Charges:  Timed Code Treatment Minutes: 40   Total Treatment Minutes: 45      [] EVAL (LOW) 05269 (typically 20 minutes face-to-face)  [] EVAL (MOD) 97780 (typically 30 minutes face-to-face)  [] EVAL (HIGH) 16537 (typically 45 minutes face-to-face)  [] RE-EVAL     [x] WQ(93009) x     [] Dry needle 1 or 2 Muscles (85254)  [x] NMR (78513) x     [] Dry needle 3+ Muscles (34815)  [x] Manual (77005) x     [] Ultrasound (42201) x  [] TA (59115) x     [] Mech Traction (89014)  [] ES(attended) (60663)     [] ES (un) (71077):   [] Vasopump (51639) [] Ionto (17846)   [] Other:      Electronically signed by: Moss Crigler, PT [id number]    Note: If patient does not return for scheduled/recommended follow up visits, this note will serve as a discharge from care along with the most recent update on progress.

## 2022-05-24 ENCOUNTER — HOSPITAL ENCOUNTER (OUTPATIENT)
Dept: PHYSICAL THERAPY | Age: 67
Setting detail: THERAPIES SERIES
Discharge: HOME | End: 2022-05-24
Payer: MEDICARE

## 2022-05-24 PROCEDURE — 97112 NEUROMUSCULAR REEDUCATION: CPT

## 2022-05-24 PROCEDURE — 97140 MANUAL THERAPY 1/> REGIONS: CPT

## 2022-05-24 PROCEDURE — 97110 THERAPEUTIC EXERCISES: CPT

## 2022-05-24 NOTE — FLOWSHEET NOTE
[facility] - Outpatient Rehabilitation and Therapy, [place] [address]  Phone: [phone]   Fax:     [phone]      Physical Therapy Treatment Note/ Progress Report:     Date:  2022    Patient Name:  Kota Huber    :  1955  MRN: [id number]    Pertinent Medical History:     Medical/Treatment Diagnosis Information:  · Diagnosis: Chronic Pain of Left Knee M25.562, G89.29; Primary Osteoarthritis of Left Knee M17.12; s/p Arthroscopy of L Knee Z98.890  · Treatment Diagnosis: L knee Pain T41.520    Insurance/Certification information:  PT Insurance Information: [employer] Medicare, $40 copay, visits based on medical necessity, [id number], [id number] not billable, prior authorization required via [employer] after initial evaluation. Approval from [employer] 22 thru  16 visits  Physician Information:  HEMANT Wilson  Plan of care signed (Y/N): raji requested     Date of Patient follow up with Physician:      Progress Report: []  Yes  [x]  No     Date Range for reporting period:  Beginnin22  Ending:      Progress report due (10 Rx/or 30 days whichever is less):     Recertification due (POC duration/ or 90 days whichever is less):      Visit # POC/Insurance Allowable Auth Needed   3  [x]Yes via [employer]   []No     Latex Allergy:  [x]NO      []YES  Preferred Language for Healthcare:   [x]English       []other:    History of Injury: Patient reports a history of L knee after having arthroscopy Oct 2021 due to post knee pain. Patient states knee was \"pretty well ripped\", but wasn't ready for TKA. Now, patient states she was told if PT doesn't help, she may need a TKA.   Patient has difficulty bending L knee, transfering into and out of tub, putting on/off pants, socks, shoes, sometimes has difficulty getting out of bed due to pain and stiffness, difficulty standing and walking >/= 10 minutes, sitting >/= 10-15 minutes, has to lift L LE into car with UEs, negotiates stairs on step going up/down steps (has 7 steps up to aoartment). Patient had cortisone injections to L knee, with min relief     Relevant Medical History: Coronary Artery Disease - Ablasion, Diabetes, Hyperlipidemia, Hypertension, Depression, Osteoarthritis  Functional Scale/Score: FOTO = 55/100 (5/16/22)     Pain Scale: 8/10    SUBJECTIVE:  See eval  5/18/22:  Patient reports knee is doing okay after initial eval.  5/24/22:  Patient reports knee has been sore since the weekend. Patient did ok after last visit. OBJECTIVE:   Observation:    Test measurements:   DATE:                    5/16 5/18 5/24  L Knee Extension 0 0    L Knee Flexion 95 103          RESTRICTIONS/PRECAUTIONS:     Exercises/Interventions:     Therapeutic Ex (68064)   Min:10 Reps/Resistance Notes/CUES   NuStep Level 1 x 6 minutes Seat, UEs #6   Calf Stretch/Incline 3 x 30 sec Incline board   Knee Flex Stretch 3 x 30 sec stairs        NMR re-education (08292)  Min: [place] Ex Program  See below   Leg Extension Multiple angle Isometrics @ 80, 50, 20 x 5sec x 5 reps Seat #3, Tibia #2   Leg Curl Multiple Angle Isometrics @ 20, 50, 80 x 5 sec x 5 reps Seat #3, Tibia #2             Therapeutic Activity (69843)  Min:     Patient education  See below                  Manual: 20     Soft tissue mob L knee with focus on IT band, suprapatellar region    IASTM     Patellar Mob Sup, inf, med, lat x 5 each    PROM x5    Modalities  Min:     IFC with      CP after exercises     MH after exercises            Other Therapeutic Activities: Pt was educated on PT POC, Diagnosis, Prognosis, pathomechanics as well as frequency and duration of scheduling future physical therapy appointments. Time was also taken on this day to answer all patient questions and participation in PT. Reviewed appointment policy in detail with patient and patient verbalized understanding.  Discussed at length anatomy and biomechanics of the knee, muscle reeducation, motor recruitment. Home Exercise Program: Patient instructed in glut sets, quad sets, isometric hip add, SLR, heel slides, knee extension prop, calf stretch with towel, ankle pumps ; written instructions with pictures issued, patient able to demonstrate exercises. 5/24/22:   Patient instructed in sitting in chair hamstring stretch, longsitting hi IR/ER ; written instructions with pictures issued, patient able to demonstrate exercises. ASSESSMENT:  Patient presents with decreased functional mobility consistent with L knee OA . Patient would benefit from PT to increase functional mobility     Relevant Medical History: Coronary Artery Disease - Ablasion, Diabetes, Hyperlipidemia, Hypertension, Depression, Osteoarthritis  Functional Scale/Score: FOTO = 55/100 (5/16/22)     Pain Scale: 8/10    GOALS:  Patient stated goal: \"Everything - walking\"  []? Progressing: []? Met: []? Not Met: []? Adjusted     Therapist goals for Patient:   Short Term Goals: To be achieved in: 2 - 4 weeks  1. Patient will report pain </= 0-2/10 at rest,2- 4/10 with activity  []? Progressing: []? Met: []? Not Met: []? Adjusted  2. Patient will demonstrate AROM >/= 3-105 to allow  ease of transfers, putting on shoes, socks, pants independently. []? Progressing: []? Met: []? Not Met: []? Adjusted     Long Term Goals: To be achieved in: 4-8 weeks  1. Disability index score of 59 or more for the FOTO to assist with reaching prior level of function. []? Progressing: []? Met: []? Not Met: []? Adjusted  2. Patient will demonstrate increased AROM to [facility] to enable amb without AD, gait pattern WNL, >/= 15 minutes  []? Progressing: []? Met: []? Not Met: []? Adjusted  3. Patient will be independent with written home exercise program  []? Progressing: []? Met: []? Not Met: []? Adjusted  4.  Patient will return to light chores/ functional activities (I.e, light cooking, dusting, etc) without increased symptoms or restriction. []? Progressing: []? Met: []? Not Met: []? Adjusted  5. Patient is able to negotiate stairs with reciprocal pattern without increased symptoms  []? Progressing: []? Met: []? Not Met: []? Adjusted          Treatment/Activity Tolerance:  [x] Patient tolerated treatment well [] Patient limited by fatique  [] Patient limited by pain  [] Patient limited by other medical complications  [] Other:     Overall Progression Towards Functional goals/ Treatment Progress Update:  [] Patient is progressing as expected towards functional goals listed. [] Progression is slowed due to complexities/Impairments listed. [] Progression has been slowed due to co-morbidities. [x] Plan just implemented, too soon to assess goals progression <30days   [] Goals require adjustment due to lack of progress  [] Patient is not progressing as expected and requires additional follow up with physician  [] Other    Prognosis for POC: [x] Good [] Fair  [] Poor    Patient requires continued skilled intervention: [x] Yes  [] No        PLAN: Monitor response. Consider kinesiotape L knee  [] Continue per plan of care [] Alter current plan (see comments)  [x] Plan of care initiated [] Hold pending MD visit [] Discharge    Therapeutic Exercise and NMR EXR  [] (73548) Provided verbal/tactile cueing for activities related to strengthening, flexibility, endurance, ROM for improvements in LE, proximal hip, and core control with self care, mobility, lifting, ambulation. [x] (20216) Provided verbal/tactile cueing for activities related to improving balance, coordination, kinesthetic sense, posture, motor skill, proprioception  to assist with LE, proximal hip, and core control in self care, mobility, lifting, ambulation and eccentric single leg control.      NMR and Therapeutic Activities:    [x] (60241 or 43822) Provided verbal/tactile cueing for activities related to improving balance, coordination, kinesthetic sense, posture, motor skill, proprioception and motor activation to allow for proper function of core, proximal hip and LE with self care and ADLs and functional mobility.   [] (18604) Gait Re-education- Provided training and instruction to the patient for proper LE, core and proximal hip recruitment and positioning and eccentric body weight control with ambulation re-education including up and down stairs     Home Exercise Program:    [] (56969) Reviewed/Progressed HEP activities related to strengthening, flexibility, endurance, ROM of core, proximal hip and LE for functional self-care, mobility, lifting and ambulation/stair navigation   [x] (35071)Reviewed/Progressed HEP activities related to improving balance, coordination, kinesthetic sense, posture, motor skill, proprioception of core, proximal hip and LE for self care, mobility, lifting, and ambulation/stair navigation      Manual Treatments:  PROM / STM / Oscillations-Mobs:  G-I, II, III, IV (PA's, Inf., Post.)  [] (12211) Provided manual therapy to mobilize LE, proximal hip and/or LS spine soft tissue/joints for the purpose of modulating pain, promoting relaxation,  increasing ROM, reducing/eliminating soft tissue swelling/inflammation/restriction, improving soft tissue extensibility and allowing for proper ROM for normal function with self care, mobility, lifting and ambulation.        Approval Dates:  CPT Code Units Approved Units Used  Date Updated:                     Charges:  Timed Code Treatment Minutes: 45   Total Treatment Minutes: 45      [] EVAL (LOW) 49405 (typically 20 minutes face-to-face)  [] EVAL (MOD) 53322 (typically 30 minutes face-to-face)  [] EVAL (HIGH) 99379 (typically 45 minutes face-to-face)  [] RE-EVAL     [x] JU(93770) x     [] Dry needle 1 or 2 Muscles (67604)  [x] NMR (66475) x     [] Dry needle 3+ Muscles (79882)  [x] Manual (50488) x     [] Ultrasound (06854) x  [] TA (78510) x     [] Mech Traction (53629)  [] ES(attended) (66066)     [] ES (un) (90491):   [] Vasopump (41930) [] Ionto (39007)   [] Other:      Electronically signed by: Ezio Mo, PT [id number]    Note: If patient does not return for scheduled/recommended follow up visits, this note will serve as a discharge from care along with the most recent update on progress.

## 2022-06-01 ENCOUNTER — APPOINTMENT (OUTPATIENT)
Dept: PHYSICAL THERAPY | Age: 67
End: 2022-06-01
Payer: MEDICARE

## 2022-06-03 ENCOUNTER — APPOINTMENT (OUTPATIENT)
Dept: PHYSICAL THERAPY | Age: 67
End: 2022-06-03
Payer: MEDICARE

## 2022-06-08 ENCOUNTER — HOSPITAL ENCOUNTER (OUTPATIENT)
Dept: PHYSICAL THERAPY | Age: 67
Setting detail: THERAPIES SERIES
Discharge: HOME | End: 2022-06-08
Payer: MEDICARE

## 2022-06-08 NOTE — FLOWSHEET NOTE
[place] [facility] [place] [address] [address]  Phone: [phone]   Fax:     [phone]     Physical Therapy  Cancellation/No-show Note  Patient Name:  Alex Rudolph  :  1955   Date:  2022  Cancelled visits to date: 1  No-shows to date: 0    Patient status for today's appointment patient:  [x]  Cancelled  []  Rescheduled appointment  []  No-show     Reason given by patient:  []  Patient ill  []  Conflicting appointment  []  No transportation    []  Conflict with work  []  No reason given  [x]  Other:     Comments:  Patient called, cancelled appointments this week after having a fall at home.  Patient is going to follow up with MD, will call re: resuming PT next week if released by MD.    Phone call information:   []  Phone call made today to patient at _ time at number provided:      []  Patient answered, conversation as follows:    []  Patient did not answer, message left as follows:  []  Phone call not made today    Electronically signed by:  Moss Crigler, [address] [facility]

## 2022-06-10 ENCOUNTER — APPOINTMENT (OUTPATIENT)
Dept: PHYSICAL THERAPY | Age: 67
End: 2022-06-10
Payer: MEDICARE

## 2022-06-15 ENCOUNTER — HOSPITAL ENCOUNTER (OUTPATIENT)
Dept: PHYSICAL THERAPY | Age: 67
Setting detail: THERAPIES SERIES
Discharge: HOME | End: 2022-06-15
Payer: MEDICARE

## 2022-06-15 PROCEDURE — 97112 NEUROMUSCULAR REEDUCATION: CPT

## 2022-06-15 PROCEDURE — 97140 MANUAL THERAPY 1/> REGIONS: CPT

## 2022-06-15 PROCEDURE — 97110 THERAPEUTIC EXERCISES: CPT

## 2022-06-15 NOTE — FLOWSHEET NOTE
[facility] - Outpatient Rehabilitation and Therapy, [place] [address] [place] [address]  Phone: [phone]   Fax:     [phone]      Physical Therapy Treatment Note/ Progress Report:     Date:  6/15/2022    Patient Name:  Cal Vogel    :  1955  MRN: [id number]    Pertinent Medical History:     Medical/Treatment Diagnosis Information:  · Diagnosis: Chronic Pain of Left Knee M25.562, G89.29; Primary Osteoarthritis of Left Knee M17.12; s/p Arthroscopy of L Knee Z98.890  · Treatment Diagnosis: L knee Pain W66.089    Insurance/Certification information:  PT Insurance Information: [employer] Medicare, $40 copay, visits based on medical necessity, [id number], [id number] not billable, prior authorization required via [employer] after initial evaluation. Approval from [employer] 22 thru  16 visits  Physician Information:  HEMANT Atkinson  Plan of care signed (Y/N): raji requested     Date of Patient follow up with Physician:      Progress Report: []  Yes  [x]  No     Date Range for reporting period:  Beginnin22  Ending:      Progress report due (10 Rx/or 30 days whichever is less):     Recertification due (POC duration/ or 90 days whichever is less):      Visit # POC/Insurance Allowable Auth Needed   4  [x]Yes via [employer]   []No     Latex Allergy:  [x]NO      []YES  Preferred Language for Healthcare:   [x]English       []other:    History of Injury: Patient reports a history of L knee after having arthroscopy Oct 2021 due to post knee pain. Patient states knee was \"pretty well ripped\", but wasn't ready for TKA. Now, patient states she was told if PT doesn't help, she may need a TKA.   Patient has difficulty bending L knee, transfering into and out of tub, putting on/off pants, socks, shoes, sometimes has difficulty getting out of bed due to pain and stiffness, difficulty standing and walking >/= 10 minutes, sitting >/= 10-15 minutes, has to lift L LE into car with UEs, negotiates stairs on step going up/down steps (has 7 steps up to aoartment). Patient had cortisone injections to L knee, with min relief     Relevant Medical History: Coronary Artery Disease - Ablasion, Diabetes, Hyperlipidemia, Hypertension, Depression, Osteoarthritis  Functional Scale/Score: FOTO = 55/100 (5/16/22)     Pain Scale: 8/10    SUBJECTIVE:  See eval  5/18/22:  Patient reports knee is doing okay after initial eval.  5/24/22:  Patient reports knee has been sore since the weekend. Patient did ok after last visit. 6/15: Knee has been stiff and sore. Still waiting on insurance authorization for MRI    OBJECTIVE:   Observation:    Test measurements:   DATE:                    5/16 5/18 5/24  L Knee Extension 0 0    L Knee Flexion 95 103          RESTRICTIONS/PRECAUTIONS:     Exercises/Interventions:     Therapeutic Ex (18391)   Min:10 Reps/Resistance Notes/CUES   NuStep Level 1 x 6 minutes Seat, UEs #6   Calf Stretch/Incline 3 x 30 sec Incline board   Knee Flex Stretch 3 x 30 sec stairs        NMR re-education (11572)  [facility]  See below   Leg Extension Multiple angle Isometrics @ 80, 50, 20 x 5sec x 5 reps Seat #3, Tibia #2   Leg Curl Multiple Angle Isometrics @ 20, 50, 80 x 5 sec x 5 reps Seat #3, Tibia #2             Therapeutic Activity (80791)  Min:     Patient education  See below                  Manual: 20     Soft tissue mob L knee with focus on IT band, suprapatellar region    IASTM     Patellar Mob Sup, inf, med, lat x 5 each    PROM x5    Modalities  Min:     IFC with      CP after exercises     MH after exercises     Kinesiotape L knee (arcadio) Assess     Other Therapeutic Activities: Pt was educated on PT POC, Diagnosis, Prognosis, pathomechanics as well as frequency and duration of scheduling future physical therapy appointments.  Time was also taken on this day to answer all patient questions and participation in PT. Reviewed appointment policy in detail with patient and patient verbalized understanding. Discussed at length anatomy and biomechanics of the knee, muscle reeducation, motor recruitment. Home Exercise Program: Patient instructed in glut sets, quad sets, isometric hip add, SLR, heel slides, knee extension prop, calf stretch with towel, ankle pumps ; written instructions with pictures issued, patient able to demonstrate exercises. 5/24/22:   Patient instructed in sitting in chair hamstring stretch, longsitting hi IR/ER ; written instructions with pictures issued, patient able to demonstrate exercises. ASSESSMENT:  Patient presents with decreased functional mobility consistent with L knee OA . Patient would benefit from PT to increase functional mobility     Relevant Medical History: Coronary Artery Disease - Ablasion, Diabetes, Hyperlipidemia, Hypertension, Depression, Osteoarthritis  Functional Scale/Score: FOTO = 55/100 (5/16/22)     Pain Scale: 8/10    GOALS:  Patient stated goal: \"Everything - walking\"  []? Progressing: []? Met: []? Not Met: []? Adjusted     Therapist goals for Patient:   Short Term Goals: To be achieved in: 2 - 4 weeks  1. Patient will report pain </= 0-2/10 at rest,2- 4/10 with activity  []? Progressing: []? Met: []? Not Met: []? Adjusted  2. Patient will demonstrate AROM >/= 3-105 to allow  ease of transfers, putting on shoes, socks, pants independently. []? Progressing: []? Met: []? Not Met: []? Adjusted     Long Term Goals: To be achieved in: 4-8 weeks  1. Disability index score of 59 or more for the FOTO to assist with reaching prior level of function. []? Progressing: []? Met: []? Not Met: []? Adjusted  2. Patient will demonstrate increased AROM to [facility] to enable amb without AD, gait pattern WNL, >/= 15 minutes  []? Progressing: []? Met: []? Not Met: []? Adjusted  3. Patient will be independent with written home exercise program  []? Progressing: []?  Met: []? Not Met: []? Adjusted  4. Patient will return to light chores/ functional activities (I.e, light cooking, dusting, etc) without increased symptoms or restriction. []? Progressing: []? Met: []? Not Met: []? Adjusted  5. Patient is able to negotiate stairs with reciprocal pattern without increased symptoms  []? Progressing: []? Met: []? Not Met: []? Adjusted          Treatment/Activity Tolerance:  [x] Patient tolerated treatment well [] Patient limited by fatique  [] Patient limited by pain  [] Patient limited by other medical complications  [] Other:     Overall Progression Towards Functional goals/ Treatment Progress Update:  [] Patient is progressing as expected towards functional goals listed. [] Progression is slowed due to complexities/Impairments listed. [] Progression has been slowed due to co-morbidities. [x] Plan just implemented, too soon to assess goals progression <30days   [] Goals require adjustment due to lack of progress  [] Patient is not progressing as expected and requires additional follow up with physician  [] Other    Prognosis for POC: [x] Good [] Fair  [] Poor    Patient requires continued skilled intervention: [x] Yes  [] No        PLAN: Monitor response. Consider kinesiotape L knee  [] Continue per plan of care [] Alter current plan (see comments)  [x] Plan of care initiated [] Hold pending MD visit [] Discharge    Therapeutic Exercise and NMR EXR  [] (99088) Provided verbal/tactile cueing for activities related to strengthening, flexibility, endurance, ROM for improvements in LE, proximal hip, and core control with self care, mobility, lifting, ambulation. [x] (45029) Provided verbal/tactile cueing for activities related to improving balance, coordination, kinesthetic sense, posture, motor skill, proprioception  to assist with LE, proximal hip, and core control in self care, mobility, lifting, ambulation and eccentric single leg control.      NMR and Therapeutic Activities:    [x] (36587 or ) Provided verbal/tactile cueing for activities related to improving balance, coordination, kinesthetic sense, posture, motor skill, proprioception and motor activation to allow for proper function of core, proximal hip and LE with self care and ADLs and functional mobility.   [] (25179) Gait Re-education- Provided training and instruction to the patient for proper LE, core and proximal hip recruitment and positioning and eccentric body weight control with ambulation re-education including up and down stairs     Home Exercise Program:    [] (40958) Reviewed/Progressed HEP activities related to strengthening, flexibility, endurance, ROM of core, proximal hip and LE for functional self-care, mobility, lifting and ambulation/stair navigation   [x] (11689)Reviewed/Progressed HEP activities related to improving balance, coordination, kinesthetic sense, posture, motor skill, proprioception of core, proximal hip and LE for self care, mobility, lifting, and ambulation/stair navigation      Manual Treatments:  PROM / STM / Oscillations-Mobs:  G-I, II, III, IV (PA's, Inf., Post.)  [] (18847) Provided manual therapy to mobilize LE, proximal hip and/or LS spine soft tissue/joints for the purpose of modulating pain, promoting relaxation,  increasing ROM, reducing/eliminating soft tissue swelling/inflammation/restriction, improving soft tissue extensibility and allowing for proper ROM for normal function with self care, mobility, lifting and ambulation.        Approval Dates:  CPT Code Units Approved Units Used  Date Updated:                     Charges:  Timed Code Treatment Minutes: 45   Total Treatment Minutes: 45      [] EVAL (LOW) 76568 (typically 20 minutes face-to-face)  [] EVAL (MOD) 16843 (typically 30 minutes face-to-face)  [] EVAL (HIGH) 17921 (typically 45 minutes face-to-face)  [] RE-EVAL     [x] WT(02087) x     [] Dry needle 1 or 2 Muscles (80972)  [x] NMR (92579) x     [] Dry needle 3+ Muscles (48992)  [x] Manual (44819) x     [] Ultrasound (76740) x  [] TA (89918) x     [] Mech Traction (34165)  [] ES(attended) (83898)     [] ES (un) (32711):   [] Vasopump (25132) [] Ionto (88410)   [] Other:      Electronically signed by: Maribel Iglesias, PTA [id number]    Note: If patient does not return for scheduled/recommended follow up visits, this note will serve as a discharge from care along with the most recent update on progress.

## 2022-06-17 ENCOUNTER — HOSPITAL ENCOUNTER (OUTPATIENT)
Dept: PHYSICAL THERAPY | Age: 67
Setting detail: THERAPIES SERIES
Discharge: HOME | End: 2022-06-17
Payer: MEDICARE

## 2022-06-17 PROCEDURE — 97140 MANUAL THERAPY 1/> REGIONS: CPT

## 2022-06-17 PROCEDURE — 97112 NEUROMUSCULAR REEDUCATION: CPT

## 2022-06-17 PROCEDURE — 97110 THERAPEUTIC EXERCISES: CPT

## 2022-06-17 NOTE — FLOWSHEET NOTE
[facility] - Outpatient Rehabilitation and Therapy, [address] [place] [address]  Phone: [phone]   Fax:     [phone]      Physical Therapy Treatment Note/ Progress Report:     Date:  2022    Patient Name:  Rhett Palacio    :  1955  MRN: [id number]    Pertinent Medical History:     Medical/Treatment Diagnosis Information:  · Diagnosis: Chronic Pain of Left Knee M25.562, G89.29; Primary Osteoarthritis of Left Knee M17.12; s/p Arthroscopy of L Knee Z98.890  · Treatment Diagnosis: L knee Pain J27.601    Insurance/Certification information:  PT Insurance Information: [employer] Medicare, $40 copay, visits based on medical necessity, [id number], [id number] not billable, prior authorization required via [employer] after initial evaluation. Approval from [facility] 22 thru  16 visits  Physician Information:  HEMANT Rizvi  Plan of care signed (Y/N): raji requested     Date of Patient follow up with Physician:      Progress Report: []  Yes  [x]  No     Date Range for reporting period:  Beginnin22  Ending:      Progress report due (10 Rx/or 30 days whichever is less):     Recertification due (POC duration/ or 90 days whichever is less):      Visit # POC/Insurance Allowable Auth Needed    [x]Yes via [facility]   []No     Latex Allergy:  [x]NO      []YES  Preferred Language for Healthcare:   [x]English       []other:    History of Injury: Patient reports a history of L knee after having arthroscopy Oct 2021 due to post knee pain. Patient states knee was \"pretty well ripped\", but wasn't ready for TKA. Now, patient states she was told if PT doesn't help, she may need a TKA.   Patient has difficulty bending L knee, transfering into and out of tub, putting on/off pants, socks, shoes, sometimes has difficulty getting out of bed due to pain and stiffness, difficulty standing and walking >/= 10 minutes, sitting >/= 10-15 minutes, has to lift L LE into car with UEs, negotiates stairs on step going up/down steps (has 7 steps up to aoartment). Patient had cortisone injections to L knee, with min relief     Relevant Medical History: Coronary Artery Disease - Ablasion, Diabetes, Hyperlipidemia, Hypertension, Depression, Osteoarthritis  Functional Scale/Score: FOTO = 55/100 (5/16/22)     Pain Scale: 8/10    SUBJECTIVE:  See eval  5/18/22:  Patient reports knee is doing okay after initial eval.  5/24/22:  Patient reports knee has been sore since the weekend. Patient did ok after last visit. 6/15: Knee has been stiff and sore. Still waiting on insurance authorization for MRI  6/17: Knee is feeling a bit better. The taping has seemed to help.  Still has tape on    OBJECTIVE:   Observation:    Test measurements:   DATE:                    5/16 5/18 5/24  L Knee Extension 0 0    L Knee Flexion 95 103          RESTRICTIONS/PRECAUTIONS:     Exercises/Interventions:     Therapeutic Ex (25668)   Min:10 Reps/Resistance Notes/CUES   NuStep Level 1 x 6 minutes Seat, UEs #6   Calf Stretch/Incline 3 x 30 sec Incline board   Knee Flex Stretch 3 x 30 sec stairs        NMR re-education (09106)  [facility]  See below   Leg Extension Multiple angle Isometrics @ 80, 50, 20 x 5sec x 5 reps Seat #3, Tibia #2   Leg Curl Multiple Angle Isometrics @ 20, 50, 80 x 5 sec x 5 reps Seat #3, Tibia #2             Therapeutic Activity (96406)  Min:     Patient education  See below                  Manual: 20     Soft tissue mob L knee with focus on IT band, suprapatellar region    IASTM     Patellar Mob Sup, inf, med, lat x 5 each    PROM x5    Modalities  Min:     IFC with      CP after exercises     MH after exercises     Kinesiotape L knee (arcadio)      Other Therapeutic Activities: Pt was educated on PT POC, Diagnosis, Prognosis, pathomechanics as well as frequency and duration of scheduling future physical therapy appointments. Time was also taken on this day to answer all patient questions and participation in PT. Reviewed appointment policy in detail with patient and patient verbalized understanding. Discussed at length anatomy and biomechanics of the knee, muscle reeducation, motor recruitment. Home Exercise Program: Patient instructed in glut sets, quad sets, isometric hip add, SLR, heel slides, knee extension prop, calf stretch with towel, ankle pumps ; written instructions with pictures issued, patient able to demonstrate exercises. 5/24/22:   Patient instructed in sitting in chair hamstring stretch, longsitting hi IR/ER ; written instructions with pictures issued, patient able to demonstrate exercises. ASSESSMENT:  Patient presents with decreased functional mobility consistent with L knee OA . Patient would benefit from PT to increase functional mobility     Relevant Medical History: Coronary Artery Disease - Ablasion, Diabetes, Hyperlipidemia, Hypertension, Depression, Osteoarthritis  Functional Scale/Score: FOTO = 55/100 (5/16/22)     Pain Scale: 8/10    GOALS:  Patient stated goal: \"Everything - walking\"  []? Progressing: []? Met: []? Not Met: []? Adjusted     Therapist goals for Patient:   Short Term Goals: To be achieved in: 2 - 4 weeks  1. Patient will report pain </= 0-2/10 at rest,2- 4/10 with activity  []? Progressing: []? Met: []? Not Met: []? Adjusted  2. Patient will demonstrate AROM >/= 3-105 to allow  ease of transfers, putting on shoes, socks, pants independently. []? Progressing: []? Met: []? Not Met: []? Adjusted     Long Term Goals: To be achieved in: 4-8 weeks  1. Disability index score of 59 or more for the FOTO to assist with reaching prior level of function. []? Progressing: []? Met: []? Not Met: []? Adjusted  2. Patient will demonstrate increased AROM to [facility] to enable amb without AD, gait pattern WNL, >/= 15 minutes  []? Progressing: []? Met: []? Not Met: []? Adjusted  3.  Patient will be independent with written home exercise program  []? Progressing: []? Met: []? Not Met: []? Adjusted  4. Patient will return to light chores/ functional activities (I.e, light cooking, dusting, etc) without increased symptoms or restriction. []? Progressing: []? Met: []? Not Met: []? Adjusted  5. Patient is able to negotiate stairs with reciprocal pattern without increased symptoms  []? Progressing: []? Met: []? Not Met: []? Adjusted          Treatment/Activity Tolerance:  [x] Patient tolerated treatment well [] Patient limited by fatique  [] Patient limited by pain  [] Patient limited by other medical complications  [] Other:     Overall Progression Towards Functional goals/ Treatment Progress Update:  [] Patient is progressing as expected towards functional goals listed. [] Progression is slowed due to complexities/Impairments listed. [] Progression has been slowed due to co-morbidities. [x] Plan just implemented, too soon to assess goals progression <30days   [] Goals require adjustment due to lack of progress  [] Patient is not progressing as expected and requires additional follow up with physician  [] Other    Prognosis for POC: [x] Good [] Fair  [] Poor    Patient requires continued skilled intervention: [x] Yes  [] No        PLAN: Monitor response.   [] Continue per plan of care [] Alter current plan (see comments)  [x] Plan of care initiated [] Hold pending MD visit [] Discharge    Therapeutic Exercise and NMR EXR  [] (81628) Provided verbal/tactile cueing for activities related to strengthening, flexibility, endurance, ROM for improvements in LE, proximal hip, and core control with self care, mobility, lifting, ambulation.   [x] (23405) Provided verbal/tactile cueing for activities related to improving balance, coordination, kinesthetic sense, posture, motor skill, proprioception  to assist with LE, proximal hip, and core control in self care, mobility, lifting, ambulation and eccentric single leg control. NMR and Therapeutic Activities:    [x] (28588 or 70759) Provided verbal/tactile cueing for activities related to improving balance, coordination, kinesthetic sense, posture, motor skill, proprioception and motor activation to allow for proper function of core, proximal hip and LE with self care and ADLs and functional mobility.   [] (90722) Gait Re-education- Provided training and instruction to the patient for proper LE, core and proximal hip recruitment and positioning and eccentric body weight control with ambulation re-education including up and down stairs     Home Exercise Program:    [] (81362) Reviewed/Progressed HEP activities related to strengthening, flexibility, endurance, ROM of core, proximal hip and LE for functional self-care, mobility, lifting and ambulation/stair navigation   [x] (98537)Reviewed/Progressed HEP activities related to improving balance, coordination, kinesthetic sense, posture, motor skill, proprioception of core, proximal hip and LE for self care, mobility, lifting, and ambulation/stair navigation      Manual Treatments:  PROM / STM / Oscillations-Mobs:  G-I, II, III, IV (PA's, Inf., Post.)  [] (83281) Provided manual therapy to mobilize LE, proximal hip and/or LS spine soft tissue/joints for the purpose of modulating pain, promoting relaxation,  increasing ROM, reducing/eliminating soft tissue swelling/inflammation/restriction, improving soft tissue extensibility and allowing for proper ROM for normal function with self care, mobility, lifting and ambulation.        Approval Dates:  CPT Code Units Approved Units Used  Date Updated:                     Charges:  Timed Code Treatment Minutes: 45   Total Treatment Minutes: 45      [] EVAL (LOW) 49535 (typically 20 minutes face-to-face)  [] EVAL (MOD) 27735 (typically 30 minutes face-to-face)  [] EVAL (HIGH) 77626 (typically 45 minutes face-to-face)  [] RE-EVAL     [x] LY(68370) x     [] Dry needle 1 or 2 Muscles (43037)  [x] NMR (86143) x     [] Dry needle 3+ Muscles (20306)  [x] Manual (33644) x     [] Ultrasound (12858) x  [] TA (07208) x     [] Mech Traction (77936)  [] ES(attended) (29521)     [] ES (un) (43973):   [] Vasopump (94467) [] Ionto (85866)   [] Other:      Electronically signed by: Cristal Callaway, PTA [id number]    Note: If patient does not return for scheduled/recommended follow up visits, this note will serve as a discharge from care along with the most recent update on progress.

## 2022-06-22 ENCOUNTER — HOSPITAL ENCOUNTER (OUTPATIENT)
Dept: PHYSICAL THERAPY | Age: 67
Setting detail: THERAPIES SERIES
Discharge: HOME | End: 2022-06-22
Payer: MEDICARE

## 2022-06-22 PROCEDURE — 97110 THERAPEUTIC EXERCISES: CPT

## 2022-06-22 PROCEDURE — 97112 NEUROMUSCULAR REEDUCATION: CPT

## 2022-06-22 PROCEDURE — 97140 MANUAL THERAPY 1/> REGIONS: CPT

## 2022-06-22 NOTE — FLOWSHEET NOTE
[facility] - Outpatient Rehabilitation and Therapy, [address] [place] [address]  Phone: [phone]   Fax:     [phone]      Physical Therapy Treatment Note/ Progress Report:     Date:  2022    Patient Name:  Abner Kim    :  1955  MRN: [id number]    Pertinent Medical History:     Medical/Treatment Diagnosis Information:  · Diagnosis: Chronic Pain of Left Knee M25.562, G89.29; Primary Osteoarthritis of Left Knee M17.12; s/p Arthroscopy of L Knee Z98.890  · Treatment Diagnosis: L knee Pain F62.214    Insurance/Certification information:  PT Insurance Information: [employer] Medicare, $40 copay, visits based on medical necessity, [id number], [id number] not billable, prior authorization required via [employer] after initial evaluation. Approval from [employer] 22 thru  16 visits  Physician Information:  HEMANT Tobar  Plan of care signed (Y/N): raji requested     Date of Patient follow up with Physician:      Progress Report: []  Yes  [x]  No     Date Range for reporting period:  Beginnin22  Ending:      Progress report due (10 Rx/or 30 days whichever is less):     Recertification due (POC duration/ or 90 days whichever is less):      Visit # POC/Insurance Allowable Auth Needed    [x]Yes via [employer]   []No     Latex Allergy:  [x]NO      []YES  Preferred Language for Healthcare:   [x]English       []other:    History of Injury: Patient reports a history of L knee after having arthroscopy Oct 2021 due to post knee pain. Patient states knee was \"pretty well ripped\", but wasn't ready for TKA. Now, patient states she was told if PT doesn't help, she may need a TKA.   Patient has difficulty bending L knee, transfering into and out of tub, putting on/off pants, socks, shoes, sometimes has difficulty getting out of bed due to pain and stiffness, difficulty standing and walking >/= 10 minutes, sitting >/= 10-15 minutes, has to lift L LE into car with UEs, negotiates stairs on step going up/down steps (has 7 steps up to aoartment). Patient had cortisone injections to L knee, with min relief     Relevant Medical History: Coronary Artery Disease - Ablasion, Diabetes, Hyperlipidemia, Hypertension, Depression, Osteoarthritis  Functional Scale/Score: FOTO = 55/100 (5/16/22)     Pain Scale: 8/10    SUBJECTIVE:  See eval  5/18/22:  Patient reports knee is doing okay after initial eval.  5/24/22:  Patient reports knee has been sore since the weekend. Patient did ok after last visit. 6/15: Knee has been stiff and sore. Still waiting on insurance authorization for MRI  6/17: Knee is feeling a bit better. The taping has seemed to help. Still has tape on  6/22/22:  Patient reports knee continues to be sore and tight. Patient is scheduled for 6/30/22. \"I didn't sleep very well last night because of the pain. \"    OBJECTIVE:   Observation:    Test measurements:   DATE:                    5/16 5/18 5/24  L Knee Extension 0 0 0   L Knee Flexion 95 103 98   98      RESTRICTIONS/PRECAUTIONS:     Exercises/Interventions:     Therapeutic Ex (11553)   Min:10 Reps/Resistance Notes/CUES   NuStep Level 1 x 6 minutes Seat, UEs #6   Calf Stretch/Incline 3 x 30 sec Incline board   Knee Flex Stretch 3 x 30 sec stairs        NMR re-education (44760)  [facility]: [facility]  See below   Leg Extension Multiple angle Isometrics @ 80, 50, 20 x 5sec x 5 reps Seat #3, Tibia #2   Leg Curl Multiple Angle Isometrics @ 20, 50, 80 x 5 sec x 5 reps Seat #3, Tibia #2             Therapeutic Activity (74147)  Min:     Patient education  See below                  Manual: 20     Soft tissue mob L knee with focus on IT band, suprapatellar region    IASTM     Patellar Mob Sup, inf, med, lat x 5 each    PROM x5    Modalities  Min:     IFC with      CP after exercises     MH after exercises     Kinesiotape L knee (arcadio)      Other Therapeutic Activities: Pt was educated on PT POC, Diagnosis, Prognosis, pathomechanics as well as frequency and duration of scheduling future physical therapy appointments. Time was also taken on this day to answer all patient questions and participation in PT. Reviewed appointment policy in detail with patient and patient verbalized understanding. Discussed at length anatomy and biomechanics of the knee, muscle reeducation, motor recruitment. Home Exercise Program: Patient instructed in glut sets, quad sets, isometric hip add, SLR, heel slides, knee extension prop, calf stretch with towel, ankle pumps ; written instructions with pictures issued, patient able to demonstrate exercises. 5/24/22:   Patient instructed in sitting in chair hamstring stretch, longsitting hi IR/ER ; written instructions with pictures issued, patient able to demonstrate exercises. ASSESSMENT:  Patient presents with decreased functional mobility consistent with L knee OA . Patient would benefit from PT to increase functional mobility     Relevant Medical History: Coronary Artery Disease - Ablasion, Diabetes, Hyperlipidemia, Hypertension, Depression, Osteoarthritis  Functional Scale/Score: FOTO = 55/100 (5/16/22)     Pain Scale: 8/10    GOALS:  Patient stated goal: \"Everything - walking\"  []? Progressing: []? Met: []? Not Met: []? Adjusted     Therapist goals for Patient:   Short Term Goals: To be achieved in: 2 - 4 weeks  1. Patient will report pain </= 0-2/10 at rest,2- 4/10 with activity  []? Progressing: []? Met: []? Not Met: []? Adjusted  2. Patient will demonstrate AROM >/= 3-105 to allow  ease of transfers, putting on shoes, socks, pants independently. []? Progressing: []? Met: []? Not Met: []? Adjusted     Long Term Goals: To be achieved in: 4-8 weeks  1. Disability index score of 59 or more for the FOTO to assist with reaching prior level of function. []? Progressing: []? Met: []? Not Met: []? Adjusted  2.  Patient will demonstrate increased AROM to [facility] to enable amb without AD, gait pattern WNL, >/= 15 minutes  []? Progressing: []? Met: []? Not Met: []? Adjusted  3. Patient will be independent with written home exercise program  []? Progressing: []? Met: []? Not Met: []? Adjusted  4. Patient will return to light chores/ functional activities (I.e, light cooking, dusting, etc) without increased symptoms or restriction. []? Progressing: []? Met: []? Not Met: []? Adjusted  5. Patient is able to negotiate stairs with reciprocal pattern without increased symptoms  []? Progressing: []? Met: []? Not Met: []? Adjusted          Treatment/Activity Tolerance:  [x] Patient tolerated treatment well [] Patient limited by fatique  [] Patient limited by pain  [] Patient limited by other medical complications  [] Other:     Overall Progression Towards Functional goals/ Treatment Progress Update:  [] Patient is progressing as expected towards functional goals listed. [] Progression is slowed due to complexities/Impairments listed. [] Progression has been slowed due to co-morbidities. [x] Plan just implemented, too soon to assess goals progression <30days   [] Goals require adjustment due to lack of progress  [] Patient is not progressing as expected and requires additional follow up with physician  [] Other    Prognosis for POC: [x] Good [] Fair  [] Poor    Patient requires continued skilled intervention: [x] Yes  [] No        PLAN: Monitor response. Patient to have MRI 6/30/22; PT on hold until after MRI.  [] Continue per plan of care [] Alter current plan (see comments)  [x] Plan of care initiated [] Hold pending MD visit [] Discharge    Therapeutic Exercise and NMR EXR  [] (35672) Provided verbal/tactile cueing for activities related to strengthening, flexibility, endurance, ROM for improvements in LE, proximal hip, and core control with self care, mobility, lifting, ambulation.   [x] (76942) Provided verbal/tactile cueing for activities related to improving balance, coordination, kinesthetic sense, posture, motor skill, proprioception  to assist with LE, proximal hip, and core control in self care, mobility, lifting, ambulation and eccentric single leg control. NMR and Therapeutic Activities:    [x] (67777 or 86979) Provided verbal/tactile cueing for activities related to improving balance, coordination, kinesthetic sense, posture, motor skill, proprioception and motor activation to allow for proper function of core, proximal hip and LE with self care and ADLs and functional mobility.   [] (47144) Gait Re-education- Provided training and instruction to the patient for proper LE, core and proximal hip recruitment and positioning and eccentric body weight control with ambulation re-education including up and down stairs     Home Exercise Program:    [] (34716) Reviewed/Progressed HEP activities related to strengthening, flexibility, endurance, ROM of core, proximal hip and LE for functional self-care, mobility, lifting and ambulation/stair navigation   [x] (21009)Reviewed/Progressed HEP activities related to improving balance, coordination, kinesthetic sense, posture, motor skill, proprioception of core, proximal hip and LE for self care, mobility, lifting, and ambulation/stair navigation      Manual Treatments:  PROM / STM / Oscillations-Mobs:  G-I, II, III, IV (PA's, Inf., Post.)  [] (20410) Provided manual therapy to mobilize LE, proximal hip and/or LS spine soft tissue/joints for the purpose of modulating pain, promoting relaxation,  increasing ROM, reducing/eliminating soft tissue swelling/inflammation/restriction, improving soft tissue extensibility and allowing for proper ROM for normal function with self care, mobility, lifting and ambulation.        Approval Dates:  CPT Code Units Approved Units Used  Date Updated:                     Charges:  Timed Code Treatment Minutes: 45   Total Treatment Minutes: 45      [] EVAL (LOW) 86790 (typically 20 minutes face-to-face)  [] EVAL (MOD) 58160 (typically 30 minutes face-to-face)  [] EVAL (HIGH) 13536 (typically 45 minutes face-to-face)  [] RE-EVAL     [x] CM(44600) x     [] Dry needle 1 or 2 Muscles (12502)  [x] NMR (52127) x     [] Dry needle 3+ Muscles (31794)  [x] Manual (89797) x     [] Ultrasound (01123) x  [] TA (23949) x     [] Mech Traction (54586)  [] ES(attended) (22077)     [] ES (un) (82365):   [] Vasopump (63596) [] Ionto (91907)   [] Other:      Electronically signed by: Ismael Caballero, PT [id number]    Note: If patient does not return for scheduled/recommended follow up visits, this note will serve as a discharge from care along with the most recent update on progress.

## 2022-06-28 ENCOUNTER — HOSPITAL ENCOUNTER (OUTPATIENT)
Dept: PHYSICAL THERAPY | Age: 67
Setting detail: THERAPIES SERIES
Discharge: HOME | End: 2022-06-28
Payer: MEDICARE

## 2022-06-28 PROCEDURE — 97112 NEUROMUSCULAR REEDUCATION: CPT

## 2022-06-28 PROCEDURE — 97530 THERAPEUTIC ACTIVITIES: CPT

## 2022-06-28 PROCEDURE — 97161 PT EVAL LOW COMPLEX 20 MIN: CPT

## 2022-06-28 NOTE — FLOWSHEET NOTE
[facility], [address] [place] [address]  Phone: [phone]   Fax:     [phone]      Physical Therapy Treatment Note/ Progress Report:     Date:  2022    Patient Name:  Liam Davidson    :  1955  MRN: [id number]    Pertinent Medical History:     Medical/Treatment Diagnosis Information:  · Diagnosis: K92.970 Spontaneous rupture of flexor tendons, right upper arm  · Treatment Diagnosis: R shoulder Pain E28,860    Insurance/Certification information:  PT Insurance Information: [employer] [place] [employer], $40 copay, visits based on medical necessity, [id number], [id number] not billable, prior authorization required via [employer] after initial evaluation  Physician Information:   7 [facility] signed (Y/N): faxed 22    Date of Patient follow up with Physician:      Progress Report: []  Yes  [x]  No     Date Range for reporting period:  Beginnin22  Ending:      Progress report due (10 Rx/or 30 days whichever is less):     Recertification due (POC duration/ or 90 days whichever is less):      Visit # POC/Insurance Allowable Auth Needed   1  [x]Yes  Via [employer] []No     Latex Allergy:  [x]NO      []YES  Preferred Language for Healthcare:   [x]English       []other:  History of Injury: Patient reports a history of R shoulder pain, had an MRI, showed tears in flexor tendons of R shoulder. Patient reports shoulder continues to be painful, has pain down arm, occasionally has weakness in R hand. Patient has been using a 2 lb weight (bicep curls). Patient reports pain with washing hair, putting on shirt, pulling on pants, sleeping is limited due to knee and shoulder pain. Patient uses ice  3x/day does help. Patient is taking Tylenol for pain.   Patient has been getting in the pool at complex and seems to help.       Relevant Medical History:Coronary Artery Disease - Ablasion, Diabetes, Hyperlipidemia, Hypertension, Depression, Osteoarthritis  Functional Scale/Score: FOTO: 49 (6/28/22)     Pain Scale: 8-10/10    SUBJECTIVE:  See eval    OBJECTIVE:   Observation:    Test measurements:       RESTRICTIONS/PRECAUTIONS:     Exercises/Interventions:     Therapeutic Ex (99835)   Min: Reps/Resistance Notes/CUES   Pulleys Flexion    Gym Ball on Table     UE Keller          NMR re-education (12263)  Min:  [address]  See below   Theraslide                    Therapeutic Activity (48153)  Min:10     Patient education  See below                  Manual Intervention (29690)  Min:     Soft tissue mob     IASTM     Joint Mob     PROM     Modalities  Min:     IFC with      CP after exercises     MH after exercises            Other Therapeutic Activities: Pt was educated on PT POC, Diagnosis, Prognosis, pathomechanics as well as frequency and duration of scheduling future physical therapy appointments. Time was also taken on this day to answer all patient questions and participation in PT. Reviewed appointment policy in detail with patient and patient verbalized understanding. Discussed at length anatomy and biomechanics of the shoulder, muscle reeducation, motor recruitment. Home Exercise Program: Patient instructed in Codman's Pendulum, lower trap sets, table slides flex, ; written instructions with pictures issued, patient able to demonstrate exercises. ASSESSMENT:  Patient reports decreased functional mobility consistent with R shoulder tear. Patient would benefit from PT to increase functional mobility. GOALS:  Patient stated goal: \"Use arm better, no pain\"  []? Progressing: []? Met: []? Not Met: []? Adjusted     Therapist goals for Patient:   Short Term Goals: To be achieved in: 2-4 weeks  1. Patient reports pain </= 2/10 at rest, 4/10 with activity  []? Progressing: []? Met: []? Not Met: []? Adjusted  2.  Patient will demonstrate PROM WFL within protocol limits  []? Progressing: []? Met: []? Not Met: []? Adjusted     Long Term Goals: To be achieved in at discharge:  1. Disability index score of  56 or more for the FOTO to assist with reaching prior level of function. []? Progressing: []? Met: []? Not Met: []? Adjusted  2. Patient will demonstrate increased AROM to WNL to performs ADLs independently (dressing, grooming, reaching for back pocket)  []? Progressing: []? Met: []? Not Met: []? Adjusted  3. Patient will demonstrate an increase in NM recruitment/activation and overall GH and scapular strength to within WNL for proper functional mobility  without compensatory movement patterns (reaching into wall cabinet, pouring milk/coffee, driving)  []? Progressing: []? Met: []? Not Met: []? Adjusted  4. Patient independent with written home exercise program  []? Progressing: []? Met: []? Not Met: []? Adjusted  5. Patient able to sleep thru night without waking due to shoulder pain. []? Progressing: []? Met: []? Not Met: []? Adjusted     Treatment/Activity Tolerance:  [x] Patient tolerated treatment well [] Patient limited by fatique  [] Patient limited by pain  [] Patient limited by other medical complications  [] Other:     Overall Progression Towards Functional goals/ Treatment Progress Update:  [] Patient is progressing as expected towards functional goals listed. [] Progression is slowed due to complexities/Impairments listed. [] Progression has been slowed due to co-morbidities. [x] Plan just implemented, too soon to assess goals progression <30days   [] Goals require adjustment due to lack of progress  [] Patient is not progressing as expected and requires additional follow up with physician  [] Other    Prognosis for POC: [x] Good [] Fair  [] Poor    Patient requires continued skilled intervention: [x] Yes  [] No        PLAN: Initiate pulleys, gym ball on table, soft tissue mob, joint mob prn, modalities prn, PROM.   [] Continue per plan of care [] Jamal Holly current plan (see comments)  [x] Plan of care initiated [] Hold pending MD visit [] Discharge    Therapeutic Exercise and NMR EXR  [] (91328) Provided verbal/tactile cueing for activities related to strengthening, flexibility, endurance, ROM for improvements in LE, proximal hip, and core control with self care, mobility, lifting, ambulation. [x] (56143) Provided verbal/tactile cueing for activities related to improving balance, coordination, kinesthetic sense, posture, motor skill, proprioception  to assist with LE, proximal hip, and core control in self care, mobility, lifting, ambulation and eccentric single leg control.      NMR and Therapeutic Activities:    [x] (90155 or 65457) Provided verbal/tactile cueing for activities related to improving balance, coordination, kinesthetic sense, posture, motor skill, proprioception and motor activation to allow for proper function of core, proximal hip and LE with self care and ADLs and functional mobility.   [] (96733) Gait Re-education- Provided training and instruction to the patient for proper LE, core and proximal hip recruitment and positioning and eccentric body weight control with ambulation re-education including up and down stairs     Home Exercise Program:    [] (77220) Reviewed/Progressed HEP activities related to strengthening, flexibility, endurance, ROM of core, proximal hip and LE for functional self-care, mobility, lifting and ambulation/stair navigation   [x] (44584)Reviewed/Progressed HEP activities related to improving balance, coordination, kinesthetic sense, posture, motor skill, proprioception of core, proximal hip and LE for self care, mobility, lifting, and ambulation/stair navigation      Manual Treatments:  PROM / STM / Oscillations-Mobs:  G-I, II, III, IV (PA's, Inf., Post.)  [] (64403) Provided manual therapy to mobilize LE, proximal hip and/or LS spine soft tissue/joints for the purpose of modulating pain, promoting relaxation,  increasing ROM, reducing/eliminating soft tissue swelling/inflammation/restriction, improving soft tissue extensibility and allowing for proper ROM for normal function with self care, mobility, lifting and ambulation. Approval Dates:  CPT Code Units Approved Units Used  Date Updated:                     Charges:  Timed Code Treatment Minutes: 20   Total Treatment Minutes: 45      [] EVAL (LOW) 88689 (typically 20 minutes face-to-face)  [x] EVAL (MOD) 65616 (typically 30 minutes face-to-face)  [] EVAL (HIGH) 40357 (typically 45 minutes face-to-face)  [] RE-EVAL     [] XH(48487) x     [] Dry needle 1 or 2 Muscles (07652)  [x] NMR (07919) x     [] Dry needle 3+ Muscles (89369)  [] Manual (14701) x     [] Ultrasound (08775) x  [x] TA (72045) x     [] Mech Traction (84696)  [] ES(attended) (48149)     [] ES (un) (19666):   [] Vasopump (68406) [] Ionto (24079)   [] Other:      Electronically signed by: Elisabeth Vaughan, PT [id number]    Note: If patient does not return for scheduled/recommended follow up visits, this note will serve as a discharge from care along with the most recent update on progress.

## 2022-06-28 NOTE — PLAN OF CARE
[facility], [facility] [address] [place] [address]  Phone: [phone]   Fax:     [phone]                                                       Physical Therapy Certification    Dear Herber Tang,    We had the pleasure of evaluating the following patient for physical therapy services at [facility]. A summary of our findings can be found in the initial assessment below. This includes our plan of care. If you have any questions or concerns regarding these findings, please do not hesitate to contact me at the office phone number checked above. Thank you for the referral.       Physician Signature:_______________________________Date:__________________  By signing above (or electronic signature), therapists plan is approved by physician        Patient: Clari Pearson   : 1955   MRN: [id number]  Referring Physician:  Herber Tang APRN      Evaluation Date: 2022      Medical Diagnosis Information:  Diagnosis: E20.454 Spontaneous rupture of flexor tendons, right upper arm   Treatment Diagnosis: R shoulder Pain N25,511                                         Insurance information: PT Insurance Information: [employer] [place] [employer], $40 copay, visits based on medical necessity, [id number], [id number] not billable, prior authorization required via [employer] after initial evaluation    Precautions/ Contra-indications:   Latex Allergy:   [x]  NO      []YES  Preferred Language for Healthcare:   [x]English       []other:    C-SSRS Triggered by Intake questionnaire (Past 2 wk assessment ):   [x] No, Questionnaire did not trigger screening.   [] Yes, Patient intake triggered C-SSRS Screening      [] C-SSRS Screening completed  [] PCP notified via Epic     SUBJECTIVE: Patient reports a history of R shoulder pain, had an MRI, showed tears in flexor tendons of R shoulder. Patient reports shoulder continues to be painful, has pain down arm, occasionally has weakness in R hand. Patient has been using a 2 lb weight (bicep curls). Patient reports pain with washing hair, putting on shirt, pulling on pants, sleeping is limited due to knee and shoulder pain. Patient uses ice  3x/day does help. Patient is taking Tylenol for pain. Patient has been getting in the pool at complex and seems to help. Relevant Medical History:Coronary Artery Disease - Ablasion, Diabetes, Hyperlipidemia, Hypertension, Depression, Osteoarthritis  Functional Scale/Score: FOTO: 52 (6/28/22)    Pain Scale: 8-10/10  Easing factors: ice, pool  Provocative factors: moving it/using it    Type: [x]Constant   []Intermittent  []Radiating []Localized []other:     Numbness/Tingling: occasionally, 2-3x/day, briefly    Occupation/School: retired    Living Status/Prior Level of Function: Independent with ADLs and IADLs,     OBJECTIVE:   Posture:     Functional Mobility/Transfers:    Palpation: tenderness R lev scap, rhomboids, supra, infrap, teres minor, teres rosalba, subscap, upper trap, lower trap, biceps, muscle bellies and insertions    Bandages/Dressings/Incisions: NA    Gait: (include devices/WB status): WNL     CERV ROM Left (Right)   Cervical Flexion WFL    Cervical Extension WFL    Cervical SB [facility] WFL   Cervical rotation [facility] WFL        ROM Left (Right)   Shoulder Flex 165 115   Shoulder Ext 55 30   Shoulder Abd 160 80   Shoulder ER 45 40   Shoulder IR 75 40             Strength  Left (Right)   Shoulder Flex 5/5 3/5   Shoulder Ex 5/5 3/5   Shoulder Abd 5/5 3/5   Shoulder ER 5/5 3/5   Shoulder IR 5/5 3/5             Sensation:    [x]Dermatomes/Myotomes intact        Joint mobility:   []Normal    [x]Hypo decreased post and inf g-H glides   []Hyper    Orthopedic Special Tests:                        [x] Patient history, allergies, meds reviewed. Medical chart reviewed. See intake form.      Review Of Systems (ROS):  [x]Performed Review of systems (Integumentary, CardioPulmonary, Neurological) by intake and observation. Intake form has been scanned into medical record. Patient has been instructed to contact their primary care physician regarding ROS issues if not already being addressed at this time. Co-morbidities/Complexities (which will affect course of rehabilitation):   []None           Arthritic conditions   []Rheumatoid arthritis (M05.9)  [x]Osteoarthritis (M19.91)   Cardiovascular conditions   [x]Hypertension (I10)  [x]Hyperlipidemia (E78.5)  []Angina pectoris (I20)  []Atherosclerosis (I70)  []CVA Musculoskeletal conditions   []Disc pathology   []Congenital spine pathologies   []Prior surgical intervention  []Osteoporosis (M81.8)  []Osteopenia (M85.8)   Endocrine conditions   []Hypothyroid (E03.9)  []Hyperthyroid Gastrointestinal conditions   []Constipation (D18.56)   Metabolic conditions   []Morbid obesity (E66.01)  [x]Diabetes type 1(E10.65) or 2 (E11.65)   []Neuropathy (G60.9)     Pulmonary conditions   []Asthma (J45)  []Coughing   []COPD (J44.9)   Psychological Disorders  []Anxiety (F41.9)  [x]Depression (F32.9)   []Other:   []Other:     Coronary Artery Disease - Ablasion,     Barriers to/and or personal factors that will affect rehab potential:              []Age  []Sex   []Smoker              []Motivation/Lack of Motivation                        [x]Co-Morbidities              []Cognitive Function, education/learning barriers              []Environmental, home barriers              []profession/work barriers  []past PT/medical experience  []other:  Justification:     Falls Risk Assessment (30 days):   [x] Falls Risk assessed and no intervention required. [] Falls Risk assessed and Patient requires intervention due to being higher risk   TUG score (>12s at risk):     [] Falls education provided, including         ASSESSMENT: Patient reports decreased functional mobility consistent with R shoulder tear. Patient would benefit from PT to increase functional mobility. Functional Impairments:     []Noted spinal or UE joint hypomobility   []Noted spinal or UE joint hypermobility   [x]Decreased spinal/UE functional ROM   []Abnormal reflexes/sensation/myotomal/dermatomal deficits   [x]Decreased RC/scapular/core strength and neuromuscular control    [x]Decreased UE functional strength   []other:      Functional Activity Limitations (from functional questionnaire and intake)   [x]Reduced ability to tolerate prolonged functional positions   [x]Reduced ability or difficulty with changes of positions or transfers between positions   []Reduced ability to maintain good posture and demonstrate good body mechanics with sitting, bending, and lifting   [x] Reduced ability or tolerance with driving and/or computer work   [x]Reduced ability to perform lifting, reaching, carrying tasks   [x]Reduced ability to reach behind back   [x]Reduced ability to sleep    [x]Reduced ability to tolerate any impact through UE or spine   []Reduced ability to  or hold objects   [x]Reduced ability to throw or toss an object   []other:    Participation Restrictions   [x]Reduced participation in self care activities   [x]Reduced participation in home management activities   []Reduced participation in work activities   [x]Reduced participation in social activities. []Reduced participation in sport/recreational activities. Classification/Subgrouping:   []signs/symptoms consistent with post-surgical status including decreased ROM, strength and function.     [x]signs/symptoms consistent with joint sprain/strain    [x]signs/symptoms consistent with shoulder impingement (internal, external, primary or secondary)   []signs/symptoms consistent with shoulder/elbow/wrist tendinopathy   [x]Signs/symptoms consistent with Rotator cuff tear   []sign/symptoms consistent with labral tear   []signs/symptoms consistent with rib dysfunction   []signs/symptoms consistent with postural dysfunction   []signs/symptoms consistent with Glenohumeral IR Deficit - <45 degrees   []signs/symptoms consistent with facet dysfunction of cervical/thoracic spine   []signs/symptoms consistent with pathology which may benefit from Dry Needling   []signs/symptoms which may limit the use of advanced manual therapy techniques: (Elevated CV risk profile, recent trauma, intolerance to end range positions, prior TIA, visual issues, UE neurological compromise )     Prognosis/Rehab Potential:      []Excellent   [x]Good    [x]Fair   []Poor    Tolerance of evaluation/treatment:    []Excellent   [x]Good    [x]Fair   []Poor    Physical Therapy Evaluation Complexity Justification  [x] A history of present problem with:  [] no personal factors and/or comorbidities that impact the plan of care;  [x]1-2 personal factors and/or comorbidities that impact the plan of care  []3 personal factors and/or comorbidities that impact the plan of care  [x] An examination of body systems using standardized tests and measures addressing any of the following: body structures and functions (impairments), activity limitations, and/or participation restrictions;:  [] a total of 1-2 or more elements   [x] a total of 3 or more elements   [] a total of 4 or more elements   [x] A clinical presentation with:  [x] stable and/or uncomplicated characteristics   [] evolving clinical presentation with changing characteristics  [] unstable and unpredictable characteristics;   [x] Clinical decision making of [] low, [] moderate, [] high complexity using standardized patient assessment instrument and/or measurable assessment of functional outcome.     [x] EVAL (LOW) 91495 (typically 20 minutes face-to-face)  [] EVAL (MOD) 33442 (typically 30 minutes face-to-face)  [] EVAL (HIGH) 80063 (typically 45 minutes face-to-face)  [] RE-EVAL     PLAN: Initiate Pulleys, gym ball on table, soft tissue mob, PROM, joint mob prn, modalities prn.  Frequency/Duration:  1-3 days per week for 4-6Weeks:  Interventions:  [x]  Therapeutic exercise including: strength training, ROM, for scapula, core and Upper extremity, including postural re-education. [x]  NMR activation and proprioception for UE, periscapular and RC muscles and Core, including postural re-education. [x]  Manual therapy as indicated for shoulder, scapula, spine and associated soft tissue including: Dry Needling/IASTM, STM, PROM, Gr I-IV mobilizations, manipulation. [x] Modalities as needed that may include: thermal agents, E-stim, Biofeedback, US, iontophoresis as indicated  [x] Patient education on joint protection, postural re-education, activity modification, progression of HEP. HEP instruction:  Patient instructed in Codman's Pendulum, lower trap sets, table slides flex, scap; written instructions with pictures issued, patient able to demonstrate exercises. GOALS:  Patient stated goal: \"Use arm better, no pain\"  [] Progressing: [] Met: [] Not Met: [] Adjusted    Therapist goals for Patient:   Short Term Goals: To be achieved in: 2-4 weeks  1. Patient reports pain </= 2/10 at rest, 4/10 with activity  [] Progressing: [] Met: [] Not Met: [] Adjusted  2. Patient will demonstrate PROM WFL within protocol limits  [] Progressing: [] Met: [] Not Met: [] Adjusted    Long Term Goals: To be achieved in at discharge:  1. Disability index score of  56 or more for the FOTO to assist with reaching prior level of function. [] Progressing: [] Met: [] Not Met: [] Adjusted  2. Patient will demonstrate increased AROM to WNL to performs ADLs independently (dressing, grooming, reaching for back pocket)  [] Progressing: [] Met: [] Not Met: [] Adjusted  3.  Patient will demonstrate an increase in NM recruitment/activation and overall GH and scapular strength to within WNL for proper functional mobility  without compensatory movement patterns (reaching into wall cabinet, pouring milk/coffee, driving)  [] Progressing: [] Met: [] Not Met: [] Adjusted  4. Patient independent with written home exercise program  [] Progressing: [] Met: [] Not Met: [] Adjusted  5. Patient able to sleep thru night without waking due to shoulder pain. [] Progressing: [] Met: [] Not Met: [] Adjusted    Electronically signed by:  Bry Gregg, PT [id number]      Note: If patient does not return for scheduled/recommended follow up visits, this note will serve as a discharge from care along with the most recent update on progress.

## 2022-07-13 ENCOUNTER — HOSPITAL ENCOUNTER (OUTPATIENT)
Dept: PHYSICAL THERAPY | Age: 67
Setting detail: THERAPIES SERIES
Discharge: HOME | End: 2022-07-13
Payer: MEDICARE

## 2022-07-13 NOTE — FLOWSHEET NOTE
[facility] [address] [place] [address]  Phone: [phone]   Fax:     [phone]     Physical Therapy  Cancellation/No-show Note  Patient Name:  Matthew Montalvo  :  1955   Date:  2022  Cancelled visits to date: 0  No-shows to date: 1    Patient status for today's appointment patient:  []  Cancelled  []  Rescheduled appointment  [x]  No-show     Reason given by patient:  []  Patient ill  []  Conflicting appointment  []  No transportation    []  Conflict with work  []  No reason given  []  Other:     Comments:      Phone call information:   [x]  Phone call made today to patient at 11:40 am_ time at number provided      [x]  Patient answered, conversation as follows: \"Hi this is Martha at [facility]. We had you scheduled today at 11. Were you aware of the appointment? \"  Patient repsponded \"No.\"  Pt informed patient of next appointment.    []  Patient did not answer, message left as follows:  []  Phone call not made today    Electronically signed by:  Sandra Holland, [id number]

## 2022-07-18 ENCOUNTER — HOSPITAL ENCOUNTER (OUTPATIENT)
Dept: PHYSICAL THERAPY | Age: 67
Setting detail: THERAPIES SERIES
Discharge: HOME | End: 2022-07-18
Payer: MEDICARE

## 2022-07-18 NOTE — FLOWSHEET NOTE
[facility] [address] [place] [address]  Phone: [phone]   Fax:     [phone]     Physical Therapy  Cancellation/No-show Note  Patient Name:  Octavio Cannon  :  1955   Date:  2022  Cancelled visits to date: 0  No-shows to date: 2    Patient status for today's appointment patient:  []  Cancelled  []  Rescheduled appointment  [x]  No-show     Reason given by patient:  []  Patient ill  []  Conflicting appointment  []  No transportation    []  Conflict with work  []  No reason given  [x]  Other:     Comments:  Patient forgot. Phone call information:   [x]  Phone call made today to patient at _2:00 pm time at number provided      [x]  Patient answered, conversation as follows: \"Marshall Conrad, this is Martha at [facility] in [place]. We had you scheduled today at 11:00 am and didn't hear from you. \"  Patient repsponded she forgot with all of the doctor appointments. PT reminded patient of appointment policy. Patient stated she wanted to do PT, and will be here for next appointment.  []  Patient did not answer, message left as follows:  []  Phone call not made today    Electronically signed by:  Alyssa Coombs, [id number]

## 2022-07-20 ENCOUNTER — APPOINTMENT (OUTPATIENT)
Dept: PHYSICAL THERAPY | Age: 67
End: 2022-07-20
Payer: MEDICARE

## 2022-07-25 ENCOUNTER — HOSPITAL ENCOUNTER (OUTPATIENT)
Dept: PHYSICAL THERAPY | Age: 67
Setting detail: THERAPIES SERIES
Discharge: HOME | End: 2022-07-25
Payer: MEDICARE

## 2022-07-25 PROCEDURE — 97140 MANUAL THERAPY 1/> REGIONS: CPT

## 2022-07-25 PROCEDURE — 97112 NEUROMUSCULAR REEDUCATION: CPT

## 2022-07-25 PROCEDURE — 97110 THERAPEUTIC EXERCISES: CPT

## 2022-07-25 NOTE — FLOWSHEET NOTE
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, right upper arm  Treatment Diagnosis: R shoulder Pain P97,583    Insurance/Certification information:    PT Insurance Information: [facility], $40 copay, visits based on medical necessity, [id number], [id number] not billable, prior authorization required via [employer] after initial evaluation; 18 visits thru 10/31/22 approved  Physician Information:   [facility] signed (Y/N): faxed 22    Date of Patient follow up with Physician:      Progress Report: []  Yes  [x]  No     Date Range for reporting period:  Beginnin22  Ending:      Progress report due (10 Rx/or 30 days whichever is less):     Recertification due (POC duration/ or 90 days whichever is less):      Visit # POC/Insurance Allowable Auth Needed   2  [x]Yes  Via [employer] []No     Latex Allergy:  [x]NO      []YES  Preferred Language for Healthcare:   [x]English       []other:  History of Injury: Patient reports a history of R shoulder pain, had an MRI, showed tears in flexor tendons of R shoulder. Patient reports shoulder continues to be painful, has pain down arm, occasionally has weakness in R hand. Patient has been using a 2 lb weight (bicep curls). Patient reports pain with washing hair, putting on shirt, pulling on pants, sleeping is limited due to knee and shoulder pain. Patient uses ice  3x/day does help. Patient is taking Tylenol for pain. Patient has been getting in the pool at complex and seems to help.        Relevant Medical History:Coronary Artery Disease - Ablasion, Diabetes, Hyperlipidemia, Hypertension, Depression, Osteoarthritis  Functional Scale/Score: FOTO: 49 (6/28/22)     Pain Scale: 8-10/10    SUBJECTIVE:  See eval  7/25/22:  Patient reports shoulder is doing a little better, has been able to move it a little easier. OBJECTIVE:  Observation:   Test measurements:       RESTRICTIONS/PRECAUTIONS:     Exercises/Interventions:     Therapeutic Ex (66926)   Min: 10 Reps/Resistance Notes/CUES   Pulleys:     Flexion 1 x 20 reps sitting        UE [place]:    Flexion 1 x 10 reps standing        NMR re-education (96486)  Min: [address] Ex Program  See below   Theraslide:  Lat Pull down                      Row                       Extension X 10 reps, yellow  X 10 reps, yellow  X 10 reps, yellow standing                  Therapeutic Activity (00656)  Min:     Patient education  See below                  Manual Intervention ([phone])  Min: 20     Soft tissue mob R upper quarter with focus on lev scap, rhomboids, all portions of RTC L sidelying   IASTM     Joint Mob     PROM X 5 reps all motions supine   Modalities  Min:     IFC with      CP after exercises     MH after exercises            Other Therapeutic Activities: Pt was educated on PT POC, Diagnosis, Prognosis, pathomechanics as well as frequency and duration of scheduling future physical therapy appointments. Time was also taken on this day to answer all patient questions and participation in PT. Reviewed appointment policy in detail with patient and patient verbalized understanding. Discussed at length anatomy and biomechanics of the shoulder, muscle reeducation, motor recruitment. Home Exercise Program: Patient instructed in Codman's Pendulum, lower trap sets, table slides flex, ; written instructions with pictures issued, patient able to demonstrate exercises. ASSESSMENT:  Patient reports decreased functional mobility consistent with R shoulder tear.   Patient would benefit from PT to increase functional mobility. GOALS:  Patient stated goal: \"Use arm better, no pain\"  [] Progressing: [] Met: [] Not Met: [] Adjusted     Therapist goals for Patient:   Short Term Goals: To be achieved in: 2-4 weeks  1. Patient reports pain </= 2/10 at rest, 4/10 with activity  [] Progressing: [] Met: [] Not Met: [] Adjusted  2. Patient will demonstrate PROM WFL within protocol limits  [] Progressing: [] Met: [] Not Met: [] Adjusted     Long Term Goals: To be achieved in at discharge:  1. Disability index score of  56 or more for the FOTO to assist with reaching prior level of function. [] Progressing: [] Met: [] Not Met: [] Adjusted  2. Patient will demonstrate increased AROM to WNL to performs ADLs independently (dressing, grooming, reaching for back pocket)  [] Progressing: [] Met: [] Not Met: [] Adjusted  3. Patient will demonstrate an increase in NM recruitment/activation and overall GH and scapular strength to within WNL for proper functional mobility  without compensatory movement patterns (reaching into wall cabinet, pouring milk/coffee, driving)  [] Progressing: [] Met: [] Not Met: [] Adjusted  4. Patient independent with written home exercise program  [] Progressing: [] Met: [] Not Met: [] Adjusted  5. Patient able to sleep thru night without waking due to shoulder pain. [] Progressing: [] Met: [] Not Met: [] Adjusted     Treatment/Activity Tolerance:  [x] Patient tolerated treatment well [] Patient limited by fatique  [] Patient limited by pain  [] Patient limited by other medical complications  [] Other:     Overall Progression Towards Functional goals/ Treatment Progress Update:  [] Patient is progressing as expected towards functional goals listed. [] Progression is slowed due to complexities/Impairments listed. [] Progression has been slowed due to co-morbidities.   [x] Plan just implemented, too soon to assess goals progression <30days   [] Goals require adjustment due to lack of progress  [] Patient is not progressing as expected and requires additional follow up with physician  [] Other    Prognosis for POC: [x] Good [] Fair  [] Poor    Patient requires continued skilled intervention: [x] Yes  [] No        PLAN: Initiate pulleys, gym ball on table, soft tissue mob, joint mob prn, modalities prn, PROM. [] Continue per plan of care [] Alter current plan (see comments)  [x] Plan of care initiated [] Hold pending MD visit [] Discharge    Therapeutic Exercise and NMR EXR  [] (73202) Provided verbal/tactile cueing for activities related to strengthening, flexibility, endurance, ROM for improvements in LE, proximal hip, and core control with self care, mobility, lifting, ambulation. [x] (85679) Provided verbal/tactile cueing for activities related to improving balance, coordination, kinesthetic sense, posture, motor skill, proprioception  to assist with LE, proximal hip, and core control in self care, mobility, lifting, ambulation and eccentric single leg control.      NMR and Therapeutic Activities:    [x] (65292 or 09639) Provided verbal/tactile cueing for activities related to improving balance, coordination, kinesthetic sense, posture, motor skill, proprioception and motor activation to allow for proper function of core, proximal hip and LE with self care and ADLs and functional mobility.   [] (41879) Gait Re-education- Provided training and instruction to the patient for proper LE, core and proximal hip recruitment and positioning and eccentric body weight control with ambulation re-education including up and down stairs     Home Exercise Program:    [] (76673) Reviewed/Progressed HEP activities related to strengthening, flexibility, endurance, ROM of core, proximal hip and LE for functional self-care, mobility, lifting and ambulation/stair navigation   [x] (87926)Reviewed/Progressed HEP activities related to improving balance, coordination, kinesthetic sense, posture, motor

## 2022-07-26 NOTE — PROGRESS NOTES
[address]   Electrophysiology      Date: 7/28/2022    Primary Cardiologist: Susy Bell MD  PCP: Cuong Rawls     Chief Complaint:   Chief Complaint   Patient presents with    Follow-up     3mnts/ no cc     History of Present Illness:    I saw Slime Beck in the office for electrophysiology follow up today. She is a 75 y.o. female with a past medical history of CAD (seen on coronary CTA 10/21), DM and atrial fibrillation. Atrial fibrillation was first diagnosed in 6/14/2021 while hospitalized at [facility] [place] for pancreatitis. She underwent atrial fibrillation (PVI, CAFE) and left atrial flutter (roof line, mitral isthmus, anterior wall) ablation on 12/28/21 with Dr. Tamara Adames. She was started on flecainide 50mg BID later stopped in January. She declined 30 day monitor at her 3 month follow up. She presents today for follow up. She has been feeling well since her last visit. She denies any palpitations or dyspnea. No chest pain. No edema. No syncope. No bleeding issues. Allergies: Allergies   Allergen Reactions    Dulaglutide      Got pancreatitis from this    Hydrocodone Itching    Metformin Diarrhea     Home Medications:  Prior to Visit Medications    Medication Sig Taking?  Authorizing Provider   atorvastatin (LIPITOR) 10 MG tablet Take 10 mg by mouth daily Yes Historical Provider, MD   pantoprazole (PROTONIX) 40 MG tablet Take 40 mg by mouth daily Yes Historical Provider, MD   meloxicam (MOBIC) 15 MG tablet Take 15 mg by mouth daily Yes Historical Provider, MD   fenofibrate (TRIGLIDE) 160 MG tablet Take 160 mg by mouth daily Yes Historical Provider, MD   azelastine (ASTELIN) 0.1 % nasal spray 1 spray by Nasal route 2 times daily as needed for Rhinitis Use in each nostril as directed Yes Historical Provider, MD   mupirocin (BACTROBAN) 2 % ointment Apply 1 each topically 2 times daily as needed for Other With cleaning of wounds Yes Historical Provider, MD   Multiple Vitamins-Minerals (EYE VITAMINS PO) Take 1 tablet by mouth in the morning and at bedtime  Yes Historical Provider, MD   benzonatate (TESSALON) 200 MG capsule Take 200 mg by mouth 3 times daily as needed for Cough  Yes Historical Provider, MD   apixaban (ELIQUIS) 5 MG TABS tablet Take 1 tablet by mouth 2 times daily Yes Edwige Kohli MD   acetaminophen (TYLENOL) 500 MG tablet Take 2 tablets by mouth every 6 hours as needed for Pain Yes Sujit Ritter MD   vitamin D (CHOLECALCIFEROL) 25 MCG (1000 UT) TABS tablet Take 1,000 Units by mouth daily Yes Historical Provider, MD   diclofenac sodium (VOLTAREN) 1 % GEL Apply 2 g topically 4 times daily as needed for Pain Yes Historical Provider, MD   albuterol sulfate HFA (PROVENTIL;VENTOLIN;PROAIR) 108 (90 Base) MCG/ACT inhaler Inhale 2 puffs into the lungs every 4 hours as needed for Wheezing or Shortness of Breath Yes Historical Provider, MD   melatonin 3 MG TABS tablet Take 3-6 mg by mouth nightly as needed (insomnia)  Yes Historical Provider, MD   citalopram (CELEXA) 40 MG tablet Take 40 mg by mouth daily Yes Historical Provider, MD   pioglitazone (ACTOS) 30 MG tablet Take 30 mg by mouth daily Yes Historical Provider, MD   insulin aspart (NOVOLOG) 100 UNIT/ML injection vial Inject 25 Units into the skin 3 times daily (before meals) Plus 5 units with a snack Yes Historical Provider, MD   empagliflozin (JARDIANCE) 25 MG tablet Take 25 mg by mouth daily Yes Historical Provider, MD   methocarbamol (ROBAXIN) 500 MG tablet Take 500 mg by mouth 3 times daily as needed (muscle spasms)  Yes Historical Provider, MD   loratadine (CLARITIN) 10 MG tablet Take 10 mg by mouth daily PRN Yes Historical Provider, MD   lisinopril-hydroCHLOROthiazide (PRINZIDE;ZESTORETIC) 20-12.5 MG per tablet Take 1 tablet by mouth 2 times daily Yes Historical Provider, MD   mirabegron (MYRBETRIQ) 25 MG TB24 Take 25 mg by mouth daily Yes Historical Provider, MD   insulin glargine (LANTUS) 100 UNIT/ML injection vial Inject 72 Units into the skin nightly Yes Historical Provider, MD        Past Medical History:  Past Medical History:   Diagnosis Date    CAD (coronary artery disease)     Depression     Diabetes mellitus ([address]     Hyperlipidemia     Hypertension        Past Surgical History:   Past Surgical History:   Procedure Laterality Date    HYSTERECTOMY (CERVIX STATUS UNKNOWN)         Social History:   reports that she has never smoked. She has never used smokeless tobacco. She reports that she does not drink alcohol and does not use drugs. Family History:      Problem Relation Age of Onset    Heart Attack Father        Review of Systems   Constitutional:  Negative for chills, fatigue, fever and unexpected weight change. HENT:  Negative for congestion, hearing loss, sinus pressure, sore throat and trouble swallowing. Respiratory:  Negative for cough, shortness of breath and wheezing. Cardiovascular:  Negative for chest pain, palpitations and leg swelling. Gastrointestinal:  Negative for abdominal pain, blood in stool, constipation, diarrhea, nausea and vomiting. Genitourinary:  Negative for hematuria. Musculoskeletal:  Positive for arthralgias. Negative for back pain, gait problem and myalgias. Skin:  Negative for color change, rash and wound. Neurological:  Negative for dizziness, seizures, syncope, speech difficulty, weakness and light-headedness. Hematological:  Does not bruise/bleed easily. Physical Examination:  Vitals:    07/28/22 1330   BP: 130/70   Pulse: 86   SpO2: 95%      Wt Readings from Last 3 Encounters:   07/28/22 176 lb (79.8 kg)   04/28/22 174 lb (78.9 kg)   04/07/22 169 lb 15.6 oz (77.1 kg)       Physical Exam  Vitals reviewed. Constitutional:       General: She is not in acute distress. Appearance: Normal appearance. HENT:      Head: Normocephalic and atraumatic.       Nose: Nose normal.      Mouth/Throat:      Mouth: Mucous membranes are moist.   Eyes: Conjunctiva/sclera: Conjunctivae normal.      Pupils: Pupils are equal, round, and reactive to light. Cardiovascular:      Rate and Rhythm: Normal rate and regular rhythm. Heart sounds: No murmur heard. No friction rub. No gallop. Pulmonary:      Effort: No respiratory distress. Breath sounds: No wheezing, rhonchi or rales. Abdominal:      General: Abdomen is flat. Bowel sounds are normal.      Palpations: Abdomen is soft. Musculoskeletal:         General: Normal range of motion. Right lower leg: No edema. Left lower leg: No edema. Skin:     General: Skin is warm and dry. Findings: No bruising. Neurological:      General: No focal deficit present. Mental Status: She is alert and oriented to person, place, and time. Motor: No weakness. Psychiatric:         Mood and Affect: Mood normal.         Behavior: Behavior normal.        Pertinent labs, diagnostic, device, and imaging results reviewed as a part of this visit    LABS    CBC:   Lab Results   Component Value Date    WBC 6.4 2022    HGB 11.9 (L) 2022    HCT 35.8 (L) 2022    MCV 86.3 2022     2022     BMP:   Lab Results   Component Value Date    CREATININE 0.7 2022    BUN 12 2022     2022    K 3.6 2022    CL 98 (L) 2022    CO2 27 2022     Estimated Creatinine Clearance: 74 mL/min (based on SCr of 0.7 mg/dL). No results found for: BNP    Thyroid:   Lab Results   Component Value Date    TSH 1.31 2013     Lipid Panel:   Lab Results   Component Value Date/Time    CHOL 109 2021 06:46 PM    HDL 31 2021 06:46 PM    HDL 33 2012 08:12 AM    TRIG 115 2022 04:55 AM     LFTs:  Lab Results   Component Value Date    ALT 39 2022    AST 27 2022    ALKPHOS 160 (H) 2022    BILITOT 1.0 2022     Coags: No results found for: PROTIME, INR, APTT    EC2022   SR at 89 BPM. Low voltage.  Poor R wave progression. Echo: 6/15/21   The left atrium is mildly dilated. Normal LV size and wall motion. EF is    60%. Indeterminate diastolic   function. The right ventricle is normal in size and function. Trivial Mitral and Tricuspid regurgitation. Stress test: 9/5/20  1. No ECG changes to suggest ischemia following administration of      Lexiscan. 2. Myocardial perfusion imaging was performed in conjunction and      will be reported separately. EP Procedures:  1. Atrial fibrillation (PVI, CAFE) and left atrial flutter (roof line, mitral isthmus, anterior wall) ablation on 12/28/21, Dr. Sue Berry:    Paroxysmal atrial fibrillation   - first seen on telemetry 6/14/21   - s/p PVI, CAFE RFA on 12/28/21 with Dr. Abby Acosta 4 (age, gender, DM, CAD) on Eliquis 5mg BID   - EKG today with SR   - 7 day monitor 1/22 showed a 32 beat run of SVT, no other arrhythmias and Dr. Patricia Wahl had recommended ILR at that time   - continue to monitor for symptoms of atrial fibrillation/flutter and if symptomatic,should get EKG done    Left atrial flutter   - seen on EP study s/p roof line, mitral isthmus, anterior wall RFA on 12/28/21   - see above    CAD   - seen on coronary CTA    - not on statin, will defer to Dr. Roxann Sanchez    Thank you for allowing to us to participate in the care of [facility]. Return in about 1 year (around 7/28/2023) for an appointment with Alisson Canchola NP.      SRIDHAR Galaviz  [address]  Phone: [phone]  Fax: [phone]    Electronically signed by SRIDHAR Anand CNP on 7/28/2022 at 1:55 PM

## 2022-07-27 ENCOUNTER — HOSPITAL ENCOUNTER (OUTPATIENT)
Dept: PHYSICAL THERAPY | Age: 67
Setting detail: THERAPIES SERIES
Discharge: HOME | End: 2022-07-27
Payer: MEDICARE

## 2022-07-27 PROCEDURE — 97110 THERAPEUTIC EXERCISES: CPT

## 2022-07-27 PROCEDURE — 97112 NEUROMUSCULAR REEDUCATION: CPT

## 2022-07-27 PROCEDURE — 97140 MANUAL THERAPY 1/> REGIONS: CPT

## 2022-07-27 NOTE — FLOWSHEET NOTE
[facility] - Outpatient Rehabilitation and Therapy, [address] 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, right upper arm  Treatment Diagnosis: R shoulder Pain R48,144    Insurance/Certification information:    PT Insurance Information: [employer], $40 copay, visits based on medical necessity, [id number], [id number] not billable, prior authorization required via [employer] after initial evaluation; 18 visits thru 10/31/22 approved  Physician Information:   7 [facility] signed (Y/N): faxed 22    Date of Patient follow up with Physician:      Progress Report: []  Yes  [x]  No     Date Range for reporting period:  Beginnin22  Ending:      Progress report due (10 Rx/or 30 days whichever is less):     Recertification due (POC duration/ or 90 days whichever is less):      Visit # POC/Insurance Allowable Auth Needed   3  [x]Yes  [employer]ia [employer]) []No     Latex Allergy:  [x]NO      []YES  Preferred Language for Healthcare:   [x]English       []other:  History of Injury: Patient reports a history of R shoulder pain, had an MRI, showed tears in flexor tendons of R shoulder. Patient reports shoulder continues to be painful, has pain down arm, occasionally has weakness in R hand. Patient has been using a 2 lb weight (bicep curls). Patient reports pain with washing hair, putting on shirt, pulling on pants, sleeping is limited due to knee and shoulder pain. Patient uses ice  3x/day does help. Patient is taking Tylenol for pain. Patient has been getting in the pool at complex and seems to help.        Relevant Medical History:Coronary pictures issued, patient able to demonstrate exercises. ASSESSMENT:  Patient reports decreased functional mobility consistent with R shoulder tear. Patient would benefit from PT to increase functional mobility. GOALS:  Patient stated goal: \"Use arm better, no pain\"  [] Progressing: [] Met: [] Not Met: [] Adjusted     Therapist goals for Patient:   Short Term Goals: To be achieved in: 2-4 weeks  1. Patient reports pain </= 2/10 at rest, 4/10 with activity  [] Progressing: [] Met: [] Not Met: [] Adjusted  2. Patient will demonstrate PROM WFL within protocol limits  [] Progressing: [] Met: [] Not Met: [] Adjusted     Long Term Goals: To be achieved in at discharge:  1. Disability index score of  56 or more for the FOTO to assist with reaching prior level of function. [] Progressing: [] Met: [] Not Met: [] Adjusted  2. Patient will demonstrate increased AROM to WNL to performs ADLs independently (dressing, grooming, reaching for back pocket)  [] Progressing: [] Met: [] Not Met: [] Adjusted  3. Patient will demonstrate an increase in NM recruitment/activation and overall GH and scapular strength to within WNL for proper functional mobility  without compensatory movement patterns (reaching into wall cabinet, pouring milk/coffee, driving)  [] Progressing: [] Met: [] Not Met: [] Adjusted  4. Patient independent with written home exercise program  [] Progressing: [] Met: [] Not Met: [] Adjusted  5. Patient able to sleep thru night without waking due to shoulder pain. [] Progressing: [] Met: [] Not Met: [] Adjusted     Treatment/Activity Tolerance:  [x] Patient tolerated treatment well [] Patient limited by fatique  [] Patient limited by pain  [] Patient limited by other medical complications  [] Other:     Overall Progression Towards Functional goals/ Treatment Progress Update:  [] Patient is progressing as expected towards functional goals listed.     [] Progression is slowed due to complexities/Impairments listed. [] Progression has been slowed due to co-morbidities. [x] Plan just implemented, too soon to assess goals progression <30days   [] Goals require adjustment due to lack of progress  [] Patient is not progressing as expected and requires additional follow up with physician  [] Other    Prognosis for POC: [x] Good [] Fair  [] Poor    Patient requires continued skilled intervention: [x] Yes  [] No        PLAN: Monitor response. Instruct patient in theraband ex for HEP  [] Continue per plan of care [] Alter current plan (see comments)  [x] Plan of care initiated [] Hold pending MD visit [] Discharge    Therapeutic Exercise and NMR EXR  [] (39650) Provided verbal/tactile cueing for activities related to strengthening, flexibility, endurance, ROM for improvements in LE, proximal hip, and core control with self care, mobility, lifting, ambulation. [x] (59117) Provided verbal/tactile cueing for activities related to improving balance, coordination, kinesthetic sense, posture, motor skill, proprioception  to assist with LE, proximal hip, and core control in self care, mobility, lifting, ambulation and eccentric single leg control.      NMR and Therapeutic Activities:    [x] (21640 or 22119) Provided verbal/tactile cueing for activities related to improving balance, coordination, kinesthetic sense, posture, motor skill, proprioception and motor activation to allow for proper function of core, proximal hip and LE with self care and ADLs and functional mobility.   [] (99218) Gait Re-education- Provided training and instruction to the patient for proper LE, core and proximal hip recruitment and positioning and eccentric body weight control with ambulation re-education including up and down stairs     Home Exercise Program:    [] (36169) Reviewed/Progressed HEP activities related to strengthening, flexibility, endurance, ROM of core, proximal hip and LE for functional self-care, mobility, lifting and ambulation/stair navigation   [x] (89176)Reviewed/Progressed HEP activities related to improving balance, coordination, kinesthetic sense, posture, motor skill, proprioception of core, proximal hip and LE for self care, mobility, lifting, and ambulation/stair navigation      Manual Treatments:  PROM / STM / Oscillations-Mobs:  G-I, II, III, IV (PA's, Inf., Post.)  [] (19740) Provided manual therapy to mobilize LE, proximal hip and/or LS spine soft tissue/joints for the purpose of modulating pain, promoting relaxation,  increasing ROM, reducing/eliminating soft tissue swelling/inflammation/restriction, improving soft tissue extensibility and allowing for proper ROM for normal function with self care, mobility, lifting and ambulation. Charges:  Timed Code Treatment Minutes: 20   Total Treatment Minutes: 45      [] EVAL (LOW) 65094 (typically 20 minutes face-to-face)  [] EVAL (MOD) 18222 (typically 30 minutes face-to-face)  [] EVAL (HIGH) 62602 (typically 45 minutes face-to-face)  [] RE-EVAL     [x] NJ(79965) x     [] Dry needle 1 or 2 Muscles (00320)  [x] NMR (53570) x     [] Dry needle 3+ Muscles (23018)  [x] Manual (81176) x     [] Ultrasound (46535) x  [] TA (47451) x     [] Mech Traction (14219)  [] ES(attended) (49337)     [] ES (un) (92496):   [] Vasopump (09497) [] Ionto (27814)   [] Other:      Electronically signed by: Jen Estrada, PT [id number]    Note: If patient does not return for scheduled/recommended follow up visits, this note will serve as a discharge from care along with the most recent update on progress.

## 2022-07-28 ENCOUNTER — OFFICE VISIT (OUTPATIENT)
Dept: CARDIOLOGY CLINIC | Age: 67
End: 2022-07-28
Payer: MEDICARE

## 2022-07-28 VITALS
DIASTOLIC BLOOD PRESSURE: 70 MMHG | SYSTOLIC BLOOD PRESSURE: 130 MMHG | BODY MASS INDEX: 35.48 KG/M2 | HEART RATE: 86 BPM | OXYGEN SATURATION: 95 % | WEIGHT: 176 LBS | HEIGHT: 59 IN

## 2022-07-28 DIAGNOSIS — I48.92 LEFT ATRIAL FLUTTER BY ELECTROCARDIOGRAM (HCC): ICD-10-CM

## 2022-07-28 DIAGNOSIS — I48.0 PAROXYSMAL ATRIAL FIBRILLATION (HCC): Primary | ICD-10-CM

## 2022-07-28 DIAGNOSIS — I25.10 CAD IN NATIVE ARTERY: ICD-10-CM

## 2022-07-28 PROCEDURE — 99214 OFFICE O/P EST MOD 30 MIN: CPT | Performed by: NURSE PRACTITIONER

## 2022-07-28 PROCEDURE — G8417 CALC BMI ABV UP PARAM F/U: HCPCS | Performed by: NURSE PRACTITIONER

## 2022-07-28 PROCEDURE — G8427 DOCREV CUR MEDS BY ELIG CLIN: HCPCS | Performed by: NURSE PRACTITIONER

## 2022-07-28 PROCEDURE — 93000 ELECTROCARDIOGRAM COMPLETE: CPT | Performed by: NURSE PRACTITIONER

## 2022-07-28 PROCEDURE — 1123F ACP DISCUSS/DSCN MKR DOCD: CPT | Performed by: NURSE PRACTITIONER

## 2022-07-28 PROCEDURE — 3017F COLORECTAL CA SCREEN DOC REV: CPT | Performed by: NURSE PRACTITIONER

## 2022-07-28 PROCEDURE — 1036F TOBACCO NON-USER: CPT | Performed by: NURSE PRACTITIONER

## 2022-07-28 PROCEDURE — G8400 PT W/DXA NO RESULTS DOC: HCPCS | Performed by: NURSE PRACTITIONER

## 2022-07-28 PROCEDURE — 1090F PRES/ABSN URINE INCON ASSESS: CPT | Performed by: NURSE PRACTITIONER

## 2022-07-28 ASSESSMENT — ENCOUNTER SYMPTOMS
BLOOD IN STOOL: 0
DIARRHEA: 0
COUGH: 0
SORE THROAT: 0
CONSTIPATION: 0
SINUS PRESSURE: 0
COLOR CHANGE: 0
NAUSEA: 0
BACK PAIN: 0
VOMITING: 0
WHEEZING: 0
SHORTNESS OF BREATH: 0
ABDOMINAL PAIN: 0
TROUBLE SWALLOWING: 0

## 2022-08-01 ENCOUNTER — HOSPITAL ENCOUNTER (OUTPATIENT)
Dept: PHYSICAL THERAPY | Age: 67
Setting detail: THERAPIES SERIES
Discharge: HOME | End: 2022-08-01
Payer: MEDICARE

## 2022-08-01 PROCEDURE — 97112 NEUROMUSCULAR REEDUCATION: CPT

## 2022-08-01 PROCEDURE — 97140 MANUAL THERAPY 1/> REGIONS: CPT

## 2022-08-01 PROCEDURE — 97110 THERAPEUTIC EXERCISES: CPT

## 2022-08-01 NOTE — FLOWSHEET NOTE
Artery Disease - Ablasion, Diabetes, Hyperlipidemia, Hypertension, Depression, Osteoarthritis  Functional Scale/Score: FOTO: 49 (6/28/22), 50 (8/1/22)     Pain Scale: 6-8/10    SUBJECTIVE:  See eval  7/25/22:  Patient reports shoulder is doing a little better, has been able to move it a little easier. 7/27/22:  Patient reports shoulder is doing a little better, did well after last visit.,  8/1: Did okay last session; and over the weekend. \"It's doing okay\"    OBJECTIVE:  Observation:   Test measurements:       RESTRICTIONS/PRECAUTIONS:     Exercises/Interventions:     Therapeutic Ex (50980)   Min: 10 Reps/Resistance Notes/CUES   Pulleys:     Flexion 1 x 20 reps sitting        UE [place]:    Flexion 1 x 10 reps standing        NMR re-education (88489)  Min: Professor Rice 108  See below   Theraslide:  Lat Pull down                      Row                       Extension                       IR                       ER X 10 reps, yellow  X 10 reps, yellow  X 10 reps, yellow  X 10 reps,yellow  X 10 reps, yellow standing                  Therapeutic Activity (89941)  Min:     Patient education  See below                  Manual Intervention ([phone])  Min: 20     Soft tissue mob R upper quarter with focus on lev scap, rhomboids, all portions of RTC L sidelying   IASTM     Joint Mob     PROM X 5 reps all motions supine   Modalities  Min:     IFC with      CP after exercises     MH after exercises            Other Therapeutic Activities: Pt was educated on PT POC, Diagnosis, Prognosis, pathomechanics as well as frequency and duration of scheduling future physical therapy appointments. Time was also taken on this day to answer all patient questions and participation in PT. Reviewed appointment policy in detail with patient and patient verbalized understanding. Discussed at length anatomy and biomechanics of the shoulder, muscle reeducation, motor recruitment.      Home Exercise Program: Patient instructed in Codman's Pendulum, lower trap sets, table slides flex, ; written instructions with pictures issued, patient able to demonstrate exercises. 8/1: issued orange Tband and lat pulls, rows, and shoulder extension for HEP    ASSESSMENT:  Patient reports decreased functional mobility consistent with R shoulder tear. Patient would benefit from PT to increase functional mobility. GOALS:  Patient stated goal: \"Use arm better, no pain\"  [] Progressing: [] Met: [] Not Met: [] Adjusted     Therapist goals for Patient:   Short Term Goals: To be achieved in: 2-4 weeks  1. Patient reports pain </= 2/10 at rest, 4/10 with activity  [] Progressing: [] Met: [] Not Met: [] Adjusted  2. Patient will demonstrate PROM WFL within protocol limits  [] Progressing: [] Met: [] Not Met: [] Adjusted     Long Term Goals: To be achieved in at discharge:  1. Disability index score of  56 or more for the FOTO to assist with reaching prior level of function. [] Progressing: [] Met: [] Not Met: [] Adjusted  2. Patient will demonstrate increased AROM to WNL to performs ADLs independently (dressing, grooming, reaching for back pocket)  [] Progressing: [] Met: [] Not Met: [] Adjusted  3. Patient will demonstrate an increase in NM recruitment/activation and overall GH and scapular strength to within WNL for proper functional mobility  without compensatory movement patterns (reaching into wall cabinet, pouring milk/coffee, driving)  [] Progressing: [] Met: [] Not Met: [] Adjusted  4. Patient independent with written home exercise program  [] Progressing: [] Met: [] Not Met: [] Adjusted  5. Patient able to sleep thru night without waking due to shoulder pain.   [] Progressing: [] Met: [] Not Met: [] Adjusted     Treatment/Activity Tolerance:  [x] Patient tolerated treatment well [] Patient limited by fatique  [] Patient limited by pain  [] Patient limited by other medical complications  [] Other:     Overall Progression Towards Functional goals/ Treatment Progress Update:  [] Patient is progressing as expected towards functional goals listed. [] Progression is slowed due to complexities/Impairments listed. [] Progression has been slowed due to co-morbidities. [x] Plan just implemented, too soon to assess goals progression <30days   [] Goals require adjustment due to lack of progress  [] Patient is not progressing as expected and requires additional follow up with physician  [] Other    Prognosis for POC: [x] Good [] Fair  [] Poor    Patient requires continued skilled intervention: [x] Yes  [] No        PLAN: Monitor response. Instruct patient in shoulder IR/ER theraband ex for HEP  [] Continue per plan of care [] Alter current plan (see comments)  [x] Plan of care initiated [] Hold pending MD visit [] Discharge    Therapeutic Exercise and NMR EXR  [] (44683) Provided verbal/tactile cueing for activities related to strengthening, flexibility, endurance, ROM for improvements in LE, proximal hip, and core control with self care, mobility, lifting, ambulation. [x] (42543) Provided verbal/tactile cueing for activities related to improving balance, coordination, kinesthetic sense, posture, motor skill, proprioception  to assist with LE, proximal hip, and core control in self care, mobility, lifting, ambulation and eccentric single leg control.      NMR and Therapeutic Activities:    [x] (36187 or 55750) Provided verbal/tactile cueing for activities related to improving balance, coordination, kinesthetic sense, posture, motor skill, proprioception and motor activation to allow for proper function of core, proximal hip and LE with self care and ADLs and functional mobility.   [] (82080) Gait Re-education- Provided training and instruction to the patient for proper LE, core and proximal hip recruitment and positioning and eccentric body weight control with ambulation re-education including up and down stairs     Home Exercise Program:    [] (67232) Reviewed/Progressed HEP activities related to strengthening, flexibility, endurance, ROM of core, proximal hip and LE for functional self-care, mobility, lifting and ambulation/stair navigation   [x] (92718)Reviewed/Progressed HEP activities related to improving balance, coordination, kinesthetic sense, posture, motor skill, proprioception of core, proximal hip and LE for self care, mobility, lifting, and ambulation/stair navigation      Manual Treatments:  PROM / STM / Oscillations-Mobs:  G-I, II, III, IV (PA's, Inf., Post.)  [] (96290) Provided manual therapy to mobilize LE, proximal hip and/or LS spine soft tissue/joints for the purpose of modulating pain, promoting relaxation,  increasing ROM, reducing/eliminating soft tissue swelling/inflammation/restriction, improving soft tissue extensibility and allowing for proper ROM for normal function with self care, mobility, lifting and ambulation. Charges:  Timed Code Treatment Minutes: 45   Total Treatment Minutes: 45      [] EVAL (LOW) 53448 (typically 20 minutes face-to-face)  [] EVAL (MOD) 63158 (typically 30 minutes face-to-face)  [] EVAL (HIGH) 11597 (typically 45 minutes face-to-face)  [] RE-EVAL     [x] HO(43978) x     [] Dry needle 1 or 2 Muscles (46135)  [x] NMR (48208) x     [] Dry needle 3+ Muscles (85702)  [x] Manual (92062) x     [] Ultrasound (71660) x  [] TA (54044) x     [] Mech Traction (18660)  [] ES(attended) (40134)     [] ES (un) (91727):   [] Vasopump (20399) [] Ionto (76639)   [] Other:      Electronically signed by: Juliocesar Rivas, PTA [id number]    Note: If patient does not return for scheduled/recommended follow up visits, this note will serve as a discharge from care along with the most recent update on progress.

## 2022-08-03 ENCOUNTER — HOSPITAL ENCOUNTER (OUTPATIENT)
Dept: PHYSICAL THERAPY | Age: 67
Setting detail: THERAPIES SERIES
Discharge: HOME | End: 2022-08-03
Payer: MEDICARE

## 2022-08-03 PROCEDURE — 97112 NEUROMUSCULAR REEDUCATION: CPT

## 2022-08-03 PROCEDURE — 97140 MANUAL THERAPY 1/> REGIONS: CPT

## 2022-08-03 PROCEDURE — 97110 THERAPEUTIC EXERCISES: CPT

## 2022-08-03 NOTE — FLOWSHEET NOTE
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 signed (Y/N): faxed 22    Date of Patient follow up with Physician:      Progress Report: []  Yes  [x]  No     Date Range for reporting period:  Beginnin22  Ending:      Progress report due (10 Rx/or 30 days whichever is less):     Recertification due (POC duration/ or 90 days whichever is less):      Visit # POC/Insurance Allowable Auth Needed   5  [x]Yes  [email or web address] []No     Latex Allergy:  [x]NO      []YES  Preferred Language for Healthcare:   [x]English       []other:  History of Injury: Patient reports a history of R shoulder pain, had an MRI, showed tears in flexor tendons of R shoulder. Patient reports shoulder continues to be painful, has pain down arm, occasionally has weakness in R hand. Patient has been using a 2 lb weight (bicep curls). Patient reports pain with washing hair, putting on shirt, pulling on pants, sleeping is limited due to knee and shoulder pain. Patient uses ice  3x/day does help. Patient is taking Tylenol for pain. Patient has been getting in the pool at complex and seems to help.        Relevant Medical History:Coronary Artery Disease - Ablasion, Diabetes, Hyperlipidemia, Hypertension, Depression, Osteoarthritis  Functional Scale/Score: FOTO: 49 (6/28/22), 50 (8/1/22)     Pain Scale: 3-4/10    SUBJECTIVE:  See eval  7/25/22:  Patient reports shoulder is doing a little better, has been able to move it a little easier. 7/27/22:  Patient reports shoulder is doing a little better, did well after last visit.,  8/1: Did okay last session; and over the weekend. \"It's doing okay\"  8/3: It feels okay today. Just a bit uncomfortable    OBJECTIVE:  Observation:   Test measurements:       RESTRICTIONS/PRECAUTIONS:     Exercises/Interventions:     Therapeutic Ex (31536)   Min: 10 Reps/Resistance Notes/CUES   Pulleys:     Flexion 1 x 20 reps sitting   Ball roll on table Flexion 1 x 20 reps    UE [place]:    Flexion 1 x 10 reps standing        NMR re-education (55113)  Min: [address] Ex Program  See below   Theraslide:  Lat Pull down                      Row                       Extension                       IR                       ER X 10 reps, yellow  X 10 reps, yellow  X 10 reps, yellow  X 10 reps,yellow  X 10 reps, yellow standing                  Therapeutic Activity (44352)  Min:     Patient education  See below                  Manual Intervention ([id number])  Min: 20     Soft tissue mob R upper quarter with focus on lev scap, rhomboids, all portions of RTC L sidelying   IASTM     Joint Mob     PROM X 5 reps all motions supine   Modalities  Min:     IFC with      CP after exercises     MH after exercises            Other Therapeutic Activities: Pt was educated on PT POC, Diagnosis, Prognosis, pathomechanics as well as frequency and duration of scheduling future physical therapy appointments. Time was also taken on this day to answer all patient questions and participation in PT. Reviewed appointment policy in detail with patient and patient verbalized understanding.  Discussed at length anatomy and biomechanics of the shoulder, muscle reeducation, motor recruitment. Home Exercise Program: Patient instructed in Codman's Pendulum, lower trap sets, table slides flex, ; written instructions with pictures issued, patient able to demonstrate exercises. 8/1: issued orange Tband and lat pulls, rows, and shoulder extension for HEP    ASSESSMENT:  Patient reports decreased functional mobility consistent with R shoulder tear. Patient would benefit from PT to increase functional mobility. GOALS:  Patient stated goal: \"Use arm better, no pain\"  [] Progressing: [] Met: [] Not Met: [] Adjusted     Therapist goals for Patient:   Short Term Goals: To be achieved in: 2-4 weeks  1. Patient reports pain </= 2/10 at rest, 4/10 with activity  [] Progressing: [] Met: [] Not Met: [] Adjusted  2. Patient will demonstrate PROM WFL within protocol limits  [] Progressing: [] Met: [] Not Met: [] Adjusted     Long Term Goals: To be achieved in at discharge:  1. Disability index score of  56 or more for the FOTO to assist with reaching prior level of function. [] Progressing: [] Met: [] Not Met: [] Adjusted  2. Patient will demonstrate increased AROM to WNL to performs ADLs independently (dressing, grooming, reaching for back pocket)  [] Progressing: [] Met: [] Not Met: [] Adjusted  3. Patient will demonstrate an increase in NM recruitment/activation and overall GH and scapular strength to within WNL for proper functional mobility  without compensatory movement patterns (reaching into wall cabinet, pouring milk/coffee, driving)  [] Progressing: [] Met: [] Not Met: [] Adjusted  4. Patient independent with written home exercise program  [] Progressing: [] Met: [] Not Met: [] Adjusted  5. Patient able to sleep thru night without waking due to shoulder pain.   [] Progressing: [] Met: [] Not Met: [] Adjusted     Treatment/Activity Tolerance:  [x] Patient tolerated treatment well [] Patient limited by fatique  [] Patient limited by pain  [] Patient limited by other medical complications  [] Other:   8/3: tolerating therapy well with minimal complaints of discomfort. Improved ROM after session    Overall Progression Towards Functional goals/ Treatment Progress Update:  [] Patient is progressing as expected towards functional goals listed. [] Progression is slowed due to complexities/Impairments listed. [] Progression has been slowed due to co-morbidities. [x] Plan just implemented, too soon to assess goals progression <30days   [] Goals require adjustment due to lack of progress  [] Patient is not progressing as expected and requires additional follow up with physician  [] Other    Prognosis for POC: [x] Good [] Fair  [] Poor    Patient requires continued skilled intervention: [x] Yes  [] No        PLAN: Monitor response. Instruct patient in shoulder IR/ER theraband ex for HEP  [] Continue per plan of care [] Alter current plan (see comments)  [x] Plan of care initiated [] Hold pending MD visit [] Discharge    Therapeutic Exercise and NMR EXR  [] (10540) Provided verbal/tactile cueing for activities related to strengthening, flexibility, endurance, ROM for improvements in LE, proximal hip, and core control with self care, mobility, lifting, ambulation. [x] (89366) Provided verbal/tactile cueing for activities related to improving balance, coordination, kinesthetic sense, posture, motor skill, proprioception  to assist with LE, proximal hip, and core control in self care, mobility, lifting, ambulation and eccentric single leg control.      NMR and Therapeutic Activities:    [x] (17292 or 24606) Provided verbal/tactile cueing for activities related to improving balance, coordination, kinesthetic sense, posture, motor skill, proprioception and motor activation to allow for proper function of core, proximal hip and LE with self care and ADLs and functional mobility.   [] (77813) Gait Re-education- Provided training and instruction to the patient for proper

## 2022-08-08 ENCOUNTER — HOSPITAL ENCOUNTER (OUTPATIENT)
Dept: PHYSICAL THERAPY | Age: 67
Setting detail: THERAPIES SERIES
Discharge: HOME | End: 2022-08-08
Payer: MEDICARE

## 2022-08-08 NOTE — PLAN OF CARE
[facility] [facility] [address] [place] [address]  Phone: [phone]   Fax:     [phone]    Physical Therapy Discharge Summary    Dear Neva Reyes,    We had the pleasure of treating the following patient for physical therapy services at [address] [place] [address]. A summary of our findings can be found in the discharge summary below. This includes our final assessment. If you have any questions or concerns regarding these findings, please do not hesitate to contact me at the office phone number checked above. Thank you for the referral.       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 PT called patient; patient stated she forgot/overslept. Patient stated her knee pain was very bad, doesn't want to pursue additional PT to shoulder at this time.         Pain Scale: 3-4/10    Type: [x]Constant   Intermitment  []Radiating []Localized  []other:     Functional Limitations: []Sitting []Standing []Walking    []Squatting []Stairs            []ADL's  []Driving []Sports/Recreation  []Other:      OBJECTIVE:        ASSESSMENT: Patient has demonstrated significant progress toward PT goals, however goals were not completely meant. If patient requires additional PT in the future, please contact us. Sherryle Moder Response to Treatment:   []Patient met all goals and has responded well to treatment and will continue HEP   [x]Patient should continue to improve in reasonable time if they continue HEP   []Patient has plateaued and is no longer responding to skilled PT intervention    []Patient is getting worse and would benefit from return to referring MD   []Patient unable to adhere to initial POC      GOALS:    Patient stated goal: \"Use arm better, no pain\"  [x] Progressing: [] Met: [] Not Met: [] Adjusted     Therapist goals for Patient:  Short Term Goals: To be achieved in: 2-4 weeks  1. Patient reports pain </= 2/10 at rest, 4/10 with activity  [x] Progressing: [] Met: [] Not Met: [] Adjusted  2. Patient will demonstrate PROM WFL within protocol limits  [] Progressing: [x] Met: [] Not Met: [] Adjusted     Long Term Goals: To be achieved in at discharge:  1. Disability index score of  56 or more for the FOTO to assist with reaching prior level of function. [x] Progressing: [] Met: [] Not Met: [] Adjusted  2. Patient will demonstrate increased AROM to WNL to performs ADLs independently (dressing, grooming, reaching for back pocket)  [x] Progressing: [] Met: [] Not Met: [] Adjusted  3. Patient will demonstrate an increase in NM recruitment/activation and overall GH and scapular strength to within WNL for proper functional mobility  without compensatory movement patterns (reaching into wall cabinet, pouring milk/coffee, driving)  [x] Progressing: [] Met: [] Not Met: [] Adjusted  4. Patient independent with written home exercise program  [] Progressing: [] Met: [] Not Met: [] Adjusted  5. Patient able to sleep thru night without waking due to shoulder pain. [x] Progressing: [] Met: [] Not Met: [] Adjusted    PLAN: Patient discharged to independent HEP. Patient will need to maintain their HEP in order to prevent re-occurrence. Reason for Discharge:  [] Goals Met   [] Patient did not return after initial evaluation   [] Progress Plateaued  [x] Missed ___3__ scheduled appointments   [] No insurance coverage [x] Patient terminated therapy   [] MD discharged from PT [] Financial Limitations  [] Other:      Electronically signed by:  Sigifredo Treviño [place], [id number]

## 2022-08-08 NOTE — FLOWSHEET NOTE
[place] [facility] [address] [place] [address]  Phone: [phone]   Fax:     [phone]     Physical Therapy  Cancellation/No-show Note  Patient Name:  Geo Shankar  :  1955   Date:  2022  Cancelled visits to date: 0  No-shows to date: 3  Patient status for today's appointment patient:  []  Cancelled  []  Rescheduled appointment  [x]  No-show     Reason given by patient:  []  Patient ill  []  Conflicting appointment  []  No transportation    []  Conflict with work  []  No reason given  [x]  Other:     Comments:  Patient forgot. Phone call information:   [x]  Phone call made today to patient at _2:00 pm time at number provided      [x]  Patient answered, conversation as follows: \"Marshall Machado, this is Martha at [facility] in [place]. We had you scheduled today at 10:15 am and didn't hear from you. \"  Patient repsponded she forgot with all of the pain in her leg. Patient requested she be discharged at this time. 1 last remaining appointment has been removed from schedule.   []  Patient did not answer, message left as follows:  []  Phone call not made today    Electronically signed by:  Kian Scruggs, [id number]

## 2022-08-10 ENCOUNTER — APPOINTMENT (OUTPATIENT)
Dept: PHYSICAL THERAPY | Age: 67
End: 2022-08-10
Payer: MEDICARE

## 2022-09-28 ENCOUNTER — TELEPHONE (OUTPATIENT)
Dept: CARDIOLOGY CLINIC | Age: 67
End: 2022-09-28

## 2022-09-28 NOTE — TELEPHONE ENCOUNTER
CARDIAC CLEARANCE REQUEST    What type of procedure are you having: LEFT TOTAL KNEE REPLACEMENT    Are you taking any blood thinners: YES, LEO - LV FOR DR. Jorge Alberto Buenrostro MA TO SEE HOW LONG FOR ELIQUIS HOLD    When is your procedure scheduled for: 10/17/22    What physician is performing your procedure: DR. Jorge Alberto Buenrostro    Phone Number: [phone]    Fax number to send the letter: [phone]

## 2022-11-21 ENCOUNTER — HOSPITAL ENCOUNTER (OUTPATIENT)
Dept: PHYSICAL THERAPY | Age: 67
Setting detail: THERAPIES SERIES
Discharge: HOME | End: 2022-11-21
Payer: COMMERCIAL

## 2022-11-21 PROCEDURE — 97161 PT EVAL LOW COMPLEX 20 MIN: CPT

## 2022-11-21 PROCEDURE — 97530 THERAPEUTIC ACTIVITIES: CPT

## 2022-11-21 PROCEDURE — 97112 NEUROMUSCULAR REEDUCATION: CPT

## 2022-11-21 NOTE — PLAN OF CARE
[facility], [place] [address]. [place] [address]  Phone: [phone]   Fax:     [phone]                                                       Yannick Ortiz    Dear Dr. Nader Fay,    We had the pleasure of evaluating the following patient for physical therapy services at [facility]. A summary of our findings can be found in the initial assessment below. This includes our plan of care. If you have any questions or concerns regarding these findings, please do not hesitate to contact me at the office phone number checked above. Thank you for the referral.       Physician Signature:_______________________________Date:__________________  By signing above (or electronic signature), therapists plan is approved by physician        Patient: Conner Gunter   : 1955   MRN: [id number]  Referring Physician: Rebeca Cifuentes DO      Evaluation Date: 2022      Medical Diagnosis Information: s/p Total Left Knee Replacement Z96.652      Treatment Diagnosis: L Knee Pain M25.562                                         Insurance information: PT Insurance Information: [employer], $40 copay, visits based on medical necessity, [id number], [id number] not billable, prior authorization required via [employer] after initial evaluation     Precautions/ Contra-indications:   Latex Allergy:  [x]NO      []YES  Preferred Language for Healthcare:   [x]English       []other:    C-SSRS Triggered by Intake questionnaire (Past 2 wk assessment ):   [x] No, Questionnaire did not trigger screening.   [] Yes, Patient intake triggered C-SSRS Screening      [] C-SSRS Screening completed  [] PCP notified via Epic     SUBJECTIVE: Patient reports a history of L knee problems for several years. Patient underwent a Left Total Knee Re[place] 20[place] [facility]  [facility] stayed 2 nights, went home had home PT thru 11/7/22. .  Patient previously had an arthroscopic debridgement October 2021. Patient reports knee is tight, saw MD last week, happy with progress, patient is sleeping okay. Patient has moved into 2 family with no steps. Patient is still using ice. Knee still feels hot. Patient is amb with standard cane when leaves the house, walks without AD at home. Relevant Medical History:Coronary Artery Disease - Ablasion, Diabetes, Hyperlipidemia, Hypertension, Depression, Osteoarthritis  Functional Scale/Score: FOTO = 49 (11/21/22)    Pain Scale: 4-5/10  Easing factors: ice, rubbing it, tylenol  Provocative factors:      Type: [x]Constant   []Intermittent  []Radiating []Localized []other:     Numbness/Tingling: none    Occupation/School: retired    Living Status/Prior Level of Function: Independent with ADLs and IADLs,     OBJECTIVE:   Posture: WFL    Functional Mobility/Transfers: independent    Palpation: tenderness noted med and lat hamstrings, distal IT band, lat ret, along incision    Bandages/Dressings/Incisions: incision closed, healed    Gait: (include devices/WB status) Patient amb with standard cane with step thru gait independently    AROM (LEFT) RIGHT   HIP Flex [facility] WFL   Knee ext 3 0   Knee Flex 98 120   Ankle PF WFL WFL   Ankle DF [facility] WFL   Strength  LEFT RIGHT   HIP Flexors 4/5 5/5   Knee EXT (quad) 3+/5 5/5   Knee Flex (HS) 4/5 5/5   Ankle DF 5/5 5/5   Ankle PF 5/5 5/5                                                                                                                                                                             Reflexes/Sensation:    [x]Dermatomes/Myotomes intact    [x]Reflexes equal and normal bilaterally   []Other:    Joint mobility:   []Normal    [x]Hypo mild decreased patellar mobility sup/inf and med/lat   []Hyper                         [x] Patient history, allergies, meds reviewed. Medical chart reviewed. See intake form. Review Of Systems (ROS):  [x]Performed Review of systems (Integumentary, CardioPulmonary, Neurological) by intake and observation. Intake form has been scanned into medical record. Patient has been instructed to contact their primary care physician regarding ROS issues if not already being addressed at this time. Co-morbidities/Complexities (which will affect course of rehabilitation):   []None           Arthritic conditions   []Rheumatoid arthritis (M05.9)  [x]Osteoarthritis (M19.91)   Cardiovascular conditions   [x]Hypertension (I10)  [x]Hyperlipidemia (E78.5)  []Angina pectoris (I20)  []Atherosclerosis (I70)  []CVA Musculoskeletal conditions   []Disc pathology   []Congenital spine pathologies   []Prior surgical intervention  []Osteoporosis (M81.8)  []Osteopenia (M85.8)   Endocrine conditions   []Hypothyroid (E03.9)  []Hyperthyroid Gastrointestinal conditions   []Constipation (T58.89)   Metabolic conditions   []Morbid obesity (E66.01)  [x]Diabetes type 1(E10.65) or 2 (E11.65)   []Neuropathy (G60.9)     Pulmonary conditions   []Asthma (J45)  []Coughing   []COPD (J44.9)   Psychological Disorders  []Anxiety (F41.9)  [x]Depression (F32.9)   []Other:   []Other:     Coronary Artery Disease - Ablasion,      Barriers to/and or personal factors that will affect rehab potential:              []Age  []Sex    []Smoker              [x]Motivation/                      [x]Co-Morbidities              []Cognitive Function, education/learning barriers              []Environmental, home barriers              []profession/work barriers  []past PT/medical experience  []other:  Justification:     Falls Risk Assessment (30 days):   [x] Falls Risk assessed and no intervention required. [] Falls Risk assessed and Patient requires intervention due to being higher risk   TUG score (>12s at risk):     [] Falls education provided, including         ASSESSMENT: Patient presents with a history of s/p L TKA     .   Patient would benefit from PT to increase functional mobility. Functional Impairments:     []Noted lumbar/proximal hip/LE hypomobility   [x]Decreased LE functional ROM   [x]Decreased core/proximal hip strength and neuromuscular control   [x]Decreased LE functional strength   [x]Reduced balance/proprioceptive control   []other:      Functional Activity Limitations (from functional questionnaire and intake)   []Reduced ability to tolerate prolonged functional positions   []Reduced ability or difficulty with changes of positions or transfers between positions   [x]Reduced ability to maintain good posture and demonstrate good body mechanics with sitting, bending, and lifting   []Reduced ability to sleep   [] Reduced ability or tolerance with driving and/or computer work   [x]Reduced ability to perform lifting, carrying tasks   [x]Reduced ability to squat   []Reduced ability to forward bend   [x]Reduced ability to ambulate prolonged functional periods/distances/surfaces   [x]Reduced ability to ascend/descend stairs   []Reduced ability to run, hop or jump   []other:     Participation Restrictions   []Reduced participation in self care activities   [x]Reduced participation in home management activities   []Reduced participation in work activities   [x]Reduced participation in social activities. []Reduced participation in sport activities. Classification :    [x]Signs/symptoms consistent with post-surgical status including decreased ROM, strength and function.    []Signs/symptoms consistent with joint sprain/strain   []Signs/symptoms consistent with patella-femoral syndrome   []Signs/symptoms consistent with knee OA/hip OA   []Signs/symptoms consistent with internal derangement of knee/Hip   []Signs/symptoms consistent with functional hip weakness/NMR control      []Signs/symptoms consistent with tendinitis/tendinosis    []signs/symptoms consistent with pathology which may benefit from Dry needling      []other:      Prognosis/Rehab Potential:      []Excellent   [x]Good    []Fair   []Poor    Tolerance of evaluation/treatment:    []Excellent   [x]Good    []Fair   []Poor    Physical Therapy Evaluation Complexity Justification  [x] A history of present problem with:  [] no personal factors and/or comorbidities that impact the plan of care;  [x]1-2 personal factors and/or comorbidities that impact the plan of care  []3 personal factors and/or comorbidities that impact the plan of care  [x] An examination of body systems using standardized tests and measures addressing any of the following: body structures and functions (impairments), activity limitations, and/or participation restrictions;:  [] a total of 1-2 or more elements   [x] a total of 3 or more elements   [] a total of 4 or more elements   [x] A clinical presentation with:  [x] stable and/or uncomplicated characteristics   [] evolving clinical presentation with changing characteristics  [] unstable and unpredictable characteristics;   [x] Clinical decision making of [x] low, [] moderate, [] high complexity using standardized patient assessment instrument and/or measurable assessment of functional outcome. [x] EVAL (LOW) 86553 (typically 20 minutes face-to-face)  [] EVAL (MOD) 14980 (typically 30 minutes face-to-face)  [] EVAL (HIGH) 51621 (typically 45 minutes face-to-face)  [] RE-EVAL     PLAN: Patient will be seen 1-3 times, initiating NuStep, Leg Ext/Leg Curl (JOSEPH), calf stretch/incline board, knee flex stretch, soft tissue mob, patellar mob, manual stretch/PROM, modalities prn. Frequency/Duration:  1-3 days per week for 4-6 Weeks:  Interventions:  [x]  Therapeutic exercise including: strength training, ROM, for Lower extremity and core   [x]  NMR activation and proprioception for LE, Glutes and Core   [x]  Manual therapy as indicated for LE, Hip and spine to include: Dry Needling/IASTM, STM, PROM, Gr I-IV mobilizations, manipulation.    [x] Modalities as needed that may include: thermal agents, E-stim, Biofeedback, US, iontophoresis as indicated  [x] Patient education on joint protection, postural re-education, activity modification, progression of HEP. HEP instruction: Patient instructed in glut sets, quad sets, isometric hip add, SLR, heel slides, knee extension prop, calf stretch with towel, ankle pumps ; written instructions with pictures issued, patient able to demonstrate exercises. GOALS:  Patient stated goal: \"Get back to normal\"  [] Progressing: [] Met: [] Not Met: [] Adjusted    Therapist goals for Patient:   Short Term Goals: To be achieved in: 2 weeks  1. Independent in HEP and progression per patient tolerance, in order to prevent re-injury. [] Progressing: [] Met: [] Not Met: [] Adjusted  2. Patient will have a decrease in pain to facilitate improvement in movement, function, and ADLs as indicated by Functional Deficits. [] Progressing: [] Met: [] Not Met: [] Adjusted    Long Term Goals: To be achieved in: 4-6 weeks  1. Disability index score of  or more for the FOTO to assist with reaching prior level of function. [] Progressing: [] Met: [] Not Met: [] Adjusted  2. Patient will demonstrate increased AROM to 0-120 to allow for proper joint functioning as indicated by patients Functional Deficits. [] Progressing: [] Met: [] Not Met: [] Adjusted  3. Patient will demonstrate an increase in Strength to good proximal hip and LE strength and control, to allow for proper gait mechanics for at least 15 minutes without compensatory movement patterns. [] Progressing: [] Met: [] Not Met: [] Adjusted  5. Patient able to negotiate stairs with a recipocal pattern without increased symptoms or compensatory movement patterns.   [] Progressing: [] Met: [] Not Met: [] Adjusted     Electronically signed by:  Rain Jerez, PT [id number]      Note: If patient does not return for scheduled/recommended follow up visits, this note will serve as a discharge from care along with the most recent update on progress.

## 2022-11-21 NOTE — FLOWSHEET NOTE
[facility] - Outpatient Rehabilitation and Therapy, [place] [address]  Phone: [phone]   Fax:     [phone]      Physical Therapy Treatment Note/ Progress Report:     Date:  2022    Patient Name:  Valentino Bleak    :  1955  MRN: [id number]    Medical/Treatment Diagnosis Information:  Diagnosis: s/p Total Left Knee Replacement L00.336  Treatment Diagnosis: L Knee Pain J96.151    Insurance/Certification information:  PT Insurance Information: [facility], $40 copay, visits based on medical necessity, [id number], [id number] not billable, prior authorization required via [facility] after initial evaluation  Physician Information:  Savanna Ryan DO  Plan of care signed (Y/N): faxed 22    Date of Patient follow up with Physician:      Progress Report: []  Yes  [x]  No     Date Range for reporting period:  Beginnin22  Ending:      Progress report due (10 Rx/or 30 days whichever is less):     Recertification due (POC duration/ or 90 days whichever is less):      Visit # POC/Insurance Allowable Auth Needed   1 / [x]Yes   []No     Latex Allergy:  [x]NO      []YES    History of Injury:  Patient reports a history of L knee problems for several years. Patient [place] [facility] [place] [facility], stayed 2 nights, went home had home PT thru 22. .  Patient previously had an arthroscopic debridgement 2021. Patient reports knee is tight, saw MD last week, happy with progress, patient is sleeping okay. Patient has moved into 2 family with no steps. Patient is still using ice. Knee still feels hot. Patient is amb with standard cane when leaves the house, walks without AD at home.        Relevant Medical History:Coronary Artery Disease - Ablasion, Diabetes, Hyperlipidemia, Hypertension, Depression, Osteoarthritis  Functional Scale/Score: Sanmina-SCI = 49 (11/21/22)     Pain Scale: 4-5/10    SUBJECTIVE:  See eval    OBJECTIVE:  Observation:   Test measurements:   DATE:                   11/21  L Knee Extension 3   L Knee Flexion 98         RESTRICTIONS/PRECAUTIONS:     Exercises/Interventions:     Therapeutic Ex (53586)   Min: Reps/Resistance Notes/CUES   NuStep Flexion    Calf Stretch/Incline               NMR re-education (59548)  Min: [address] Ex Program  See below   Leg Extension     Leg Curl               Therapeutic Activity (22592)  Min:10     Patient education  See below                  Manual Intervention (15287)  Min:     Soft tissue mob     IASTM     Patellar Mob     PROM     Modalities  Min:     IFC with      CP after exercises     MH after exercises            Other Therapeutic Activities: Pt was educated on PT POC, Diagnosis, Prognosis, pathomechanics as well as frequency and duration of scheduling future physical therapy appointments. Time was also taken on this day to answer all patient questions and participation in PT. Reviewed appointment policy in detail with patient and patient verbalized understanding. Discussed at length anatomy and biomechanics of the knee, muscle reeducation, motor recruitment. Home Exercise Program: Patient instructed in glut sets, quad sets, isometric hip add, SLR, heel slides, knee extension prop, calf stretch with towel, ankle pumps ; written instructions with pictures issued, patient able to demonstrate exercises. ASSESSMENT:  Patient presents with a history of s/p L TKA     . Patient would benefit from PT to increase functional mobility. GOALS:  Patient stated goal: \"Get back to normal\"  [] Progressing: [] Met: [] Not Met: [] Adjusted     Therapist goals for Patient:   Short Term Goals: To be achieved in: 2 weeks  1. Independent in HEP and progression per patient tolerance, in order to prevent re-injury. [] Progressing: [] Met: [] Not Met: [] Adjusted  2.  Patient will have a decrease in pain to facilitate improvement in movement, function, and ADLs as indicated by Functional Deficits. [] Progressing: [] Met: [] Not Met: [] Adjusted     Long Term Goals: To be achieved in: 4-6 weeks  1. Disability index score of  or more for the FOTO to assist with reaching prior level of function. [] Progressing: [] Met: [] Not Met: [] Adjusted  2. Patient will demonstrate increased AROM to 0-120 to allow for proper joint functioning as indicated by patients Functional Deficits. [] Progressing: [] Met: [] Not Met: [] Adjusted  3. Patient will demonstrate an increase in Strength to good proximal hip and LE strength and control, to allow for proper gait mechanics for at least 15 minutes without compensatory movement patterns. [] Progressing: [] Met: [] Not Met: [] Adjusted  5. Patient able to negotiate stairs with a recipocal pattern without increased symptoms or compensatory movement patterns. [] Progressing: [] Met: [] Not Met: [] Adjusted          Treatment/Activity Tolerance:  [x] Patient tolerated treatment well [] Patient limited by fatique  [] Patient limited by pain  [] Patient limited by other medical complications  [] Other:     Overall Progression Towards Functional goals/ Treatment Progress Update:  [] Patient is progressing as expected towards functional goals listed. [] Progression is slowed due to complexities/Impairments listed. [] Progression has been slowed due to co-morbidities. [x] Plan just implemented, too soon to assess goals progression <30days   [] Goals require adjustment due to lack of progress  [] Patient is not progressing as expected and requires additional follow up with physician  [] Other    Prognosis for POC: [x] Good [] Fair  [] Poor    Patient requires continued skilled intervention: [x] Yes  [] No        PLAN: Initiate NuStep, calf stretch/incline, Leg Ext/Leg Curl/JOSEPH, soft tissue mob, patellar mob, modalities prn, PROM.   [] Continue per plan of care [] Alter current plan (see comments)  [x] Plan of care initiated [] Hold pending MD visit [] Discharge    Therapeutic Exercise and NMR EXR  [] (05846) Provided verbal/tactile cueing for activities related to strengthening, flexibility, endurance, ROM for improvements in LE, proximal hip, and core control with self care, mobility, lifting, ambulation. [x] (60883) Provided verbal/tactile cueing for activities related to improving balance, coordination, kinesthetic sense, posture, motor skill, proprioception  to assist with LE, proximal hip, and core control in self care, mobility, lifting, ambulation and eccentric single leg control.      NMR and Therapeutic Activities:    [x] (38308 or 50204) Provided verbal/tactile cueing for activities related to improving balance, coordination, kinesthetic sense, posture, motor skill, proprioception and motor activation to allow for proper function of core, proximal hip and LE with self care and ADLs and functional mobility.   [] (21872) Gait Re-education- Provided training and instruction to the patient for proper LE, core and proximal hip recruitment and positioning and eccentric body weight control with ambulation re-education including up and down stairs     Home Exercise Program:    [] (34532) Reviewed/Progressed HEP activities related to strengthening, flexibility, endurance, ROM of core, proximal hip and LE for functional self-care, mobility, lifting and ambulation/stair navigation   [x] (16566)Reviewed/Progressed HEP activities related to improving balance, coordination, kinesthetic sense, posture, motor skill, proprioception of core, proximal hip and LE for self care, mobility, lifting, and ambulation/stair navigation      Manual Treatments:  PROM / STM / Oscillations-Mobs:  G-I, II, III, IV (PA's, Inf., Post.)  [] (50683) Provided manual therapy to mobilize LE, proximal hip and/or LS spine soft tissue/joints for the purpose of modulating pain, promoting relaxation,  increasing ROM, reducing/eliminating soft tissue swelling/inflammation/restriction, improving soft tissue extensibility and allowing for proper ROM for normal function with self care, mobility, lifting and ambulation. Approval Dates:  CPT Code Units Approved Units Used  Date Updated:                     Charges:  Timed Code Treatment Minutes: 20   Total Treatment Minutes: 45      [x] EVAL (LOW) 78068 (typically 20 minutes face-to-face)  [] EVAL (MOD) 28803 (typically 30 minutes face-to-face)  [] EVAL (HIGH) 64195 (typically 45 minutes face-to-face)  [] RE-EVAL     [] RC(87971) x     [] Dry needle 1 or 2 Muscles (88408)  [x] NMR (67863) x     [] Dry needle 3+ Muscles (94892)  [] Manual (21838) x     [] Ultrasound (20664) x  [x] TA (76765) x     [] Mech Traction (81118)  [] ES(attended) (87829)     [] ES (un) (35453):   [] Vasopump (11269) [] Ionto (86407)   [] Other:      Electronically signed by: Godfrey Knight, PT [id number]    Note: If patient does not return for scheduled/recommended follow up visits, this note will serve as a discharge from care along with the most recent update on progress.

## 2022-11-23 ENCOUNTER — HOSPITAL ENCOUNTER (OUTPATIENT)
Dept: PHYSICAL THERAPY | Age: 67
Setting detail: THERAPIES SERIES
Discharge: HOME | End: 2022-11-23
Payer: COMMERCIAL

## 2022-11-23 NOTE — FLOWSHEET NOTE
[place] [facility] [place] [address]  Phone: [phone]   Fax:     [phone]     Physical Therapy  Cancellation/No-show Note  Patient Name:  Ruben Arango  :  1955   Date:  2022  Cancelled visits to date: 1  No-shows to date: 0    Patient status for today's appointment patient:  [x]  Cancelled  [x]  Rescheduled appointment  []  No-show     Reason given by patient:  []  Patient ill  []  Conflicting appointment  []  No transportation    []  Conflict with work  []  No reason given  [x]  Other:     Comments:  PT  called and cancelled patient for today due to approval not yet received from insurance company.     Phone call information:   []  Phone call made today to patient at _ time at number provided:      []  Patient answered, conversation as follows:    []  Patient did not answer, message left as follows:  []  Phone call not made today    Electronically signed by:  [facility], [address] [place] [address],[address]

## 2022-11-28 ENCOUNTER — HOSPITAL ENCOUNTER (OUTPATIENT)
Dept: PHYSICAL THERAPY | Age: 67
Setting detail: THERAPIES SERIES
End: 2022-11-28
Payer: COMMERCIAL

## 2022-11-28 NOTE — FLOWSHEET NOTE
[place] [facility] [place] [address]. [place] [address]  Phone: [phone]   Fax:     [phone]     Physical Therapy  Cancellation/No-show Note  Patient Name:  Eugenio Browning  :  1955   Date:  2022  Cancelled visits to date: 2  No-shows to date: 0    Patient status for today's appointment patient:  [x]  Cancelled  [x]  Rescheduled appointment  []  No-show     Reason given by patient:  []  Patient ill  []  Conflicting appointment  []  No transportation    []  Conflict with work  []  No reason given  [x]  Other:     Comments:  PT  called and cancelled patient for today due to approval not yet received from insurance company.  called insurance company, insurance company cannot say when approval would occur.     Phone call information:   []  Phone call made today to patient at _ time at number provided:      []  Patient answered, conversation as follows:    []  Patient did not answer, message left as follows:  []  Phone call not made today    Electronically signed by:  Kassi Lujan, [address] [facility] [address]

## 2022-11-30 ENCOUNTER — HOSPITAL ENCOUNTER (OUTPATIENT)
Dept: PHYSICAL THERAPY | Age: 67
Setting detail: THERAPIES SERIES
Discharge: HOME | End: 2022-11-30
Payer: COMMERCIAL

## 2022-11-30 PROCEDURE — 97140 MANUAL THERAPY 1/> REGIONS: CPT

## 2022-11-30 PROCEDURE — 97112 NEUROMUSCULAR REEDUCATION: CPT

## 2022-11-30 PROCEDURE — 97110 THERAPEUTIC EXERCISES: CPT

## 2022-11-30 NOTE — FLOWSHEET NOTE
[facility] - Outpatient Rehabilitation and Therapy, [place] [address]  Phone: [phone]   Fax:     [phone]      Physical Therapy Treatment Note/ Progress Report:     Date:  2022    Patient Name:  Harlan Acuña    :  1955  MRN: [id number]    Medical/Treatment Diagnosis Information:  Diagnosis: s/p Total Left Knee Replacement Y09.477  Treatment Diagnosis: L Knee Pain N23.822    Insurance/Certification information:  PT Insurance Information: [facility], $40 copay, visits based on medical necessity, [id number], [id number] not billable, prior authorization required via [facility] after initial evaluation  Physician Information:  Earline Gabriel DO  Plan of care signed (Y/N): faxed 22    Date of Patient follow up with Physician:      Progress Report: []  Yes  [x]  No     Date Range for reporting period:  Beginnin22  Ending:      Progress report due (10 Rx/or 30 days whichever is less):     Recertification due (POC duration/ or 90 days whichever is less):      Visit # POC/Insurance Allowable Auth Needed   2 12/ [x]Yes   []No     Latex Allergy:  [x]NO      []YES    History of Injury:  Patient reports a history of L knee problems for several years. Patient underwent a Left Total Knee Replacement 2022 [place] [facility] [place]. [facility], stayed 2 nights, went home had home PT thru 22. .  Patient previously had an arthroscopic debridgement 2021. Patient reports knee is tight, saw MD last week, happy with progress, patient is sleeping okay. Patient has moved into 2 family with no steps. Patient is still using ice. Knee still feels hot. Patient is amb with standard cane when leaves the house, walks without AD at home.        Relevant Medical History:Coronary Artery Disease - Ablasion, Diabetes, Hyperlipidemia, Hypertension, Depression, Osteoarthritis  Functional Scale/Score: Sanmina-SCI = 49 (11/21/22)     Pain Scale: 4-5/10    SUBJECTIVE:  See eval  11/30: Not really having much pain. Has a little numbness on the outside of her knee. Has been walking w/o cane all week. OBJECTIVE:  Observation:   Test measurements:   DATE:                   11/21  L Knee Extension 3   L Knee Flexion 98         RESTRICTIONS/PRECAUTIONS:     Exercises/Interventions:     Therapeutic Ex (65741)   Min: Reps/Resistance Notes/CUES   NuStep 6 min    Calf Stretch/Incline [id number]    Knee flexion stretch [id number]         NMR re-education (76672)  Min: [address] Ex Program  See below   JOSEPH  Leg Extension 8 and 9    5x5sec     Leg Curl 5x5sec              Therapeutic Activity (38792)  Min:     Patient education  See below                  Manual Intervention (49153)  Min:     Soft tissue mob Incision/peripatella/ITB    IASTM     Patellar Mob multidirectional    PROM 5x    Modalities  Min:     IFC with      CP after exercises     MH after exercises            Other Therapeutic Activities: Pt was educated on PT POC, Diagnosis, Prognosis, pathomechanics as well as frequency and duration of scheduling future physical therapy appointments. Time was also taken on this day to answer all patient questions and participation in PT. Reviewed appointment policy in detail with patient and patient verbalized understanding. Discussed at length anatomy and biomechanics of the knee, muscle reeducation, motor recruitment. Home Exercise Program: Patient instructed in glut sets, quad sets, isometric hip add, SLR, heel slides, knee extension prop, calf stretch with towel, ankle pumps ; written instructions with pictures issued, patient able to demonstrate exercises. 11/30: bent leg fall outs and clamshells. Patient reports that she is already doing hamstring stretches and seated hip IR/ER    ASSESSMENT:  Patient presents with a history of s/p L TKA     .   Patient would benefit from PT to increase functional mobility. GOALS:  Patient stated goal: \"Get back to normal\"  [] Progressing: [] Met: [] Not Met: [] Adjusted     Therapist goals for Patient:   Short Term Goals: To be achieved in: 2 weeks  1. Independent in HEP and progression per patient tolerance, in order to prevent re-injury. [] Progressing: [] Met: [] Not Met: [] Adjusted  2. Patient will have a decrease in pain to facilitate improvement in movement, function, and ADLs as indicated by Functional Deficits. [] Progressing: [] Met: [] Not Met: [] Adjusted     Long Term Goals: To be achieved in: 4-6 weeks  1. Disability index score of  or more for the FOTO to assist with reaching prior level of function. [] Progressing: [] Met: [] Not Met: [] Adjusted  2. Patient will demonstrate increased AROM to 0-120 to allow for proper joint functioning as indicated by patients Functional Deficits. [] Progressing: [] Met: [] Not Met: [] Adjusted  3. Patient will demonstrate an increase in Strength to good proximal hip and LE strength and control, to allow for proper gait mechanics for at least 15 minutes without compensatory movement patterns. [] Progressing: [] Met: [] Not Met: [] Adjusted  5. Patient able to negotiate stairs with a recipocal pattern without increased symptoms or compensatory movement patterns. [] Progressing: [] Met: [] Not Met: [] Adjusted          Treatment/Activity Tolerance:  [x] Patient tolerated treatment well [] Patient limited by fatique  [] Patient limited by pain  [] Patient limited by other medical complications  [] Other:     Overall Progression Towards Functional goals/ Treatment Progress Update:  [] Patient is progressing as expected towards functional goals listed. [] Progression is slowed due to complexities/Impairments listed. [] Progression has been slowed due to co-morbidities.   [x] Plan just implemented, too soon to assess goals progression <30days   [] Goals require adjustment due to lack of progress  [] Patient is not progressing as expected and requires additional follow up with physician  [] Other    Prognosis for POC: [x] Good [] Fair  [] Poor    Patient requires continued skilled intervention: [x] Yes  [] No        PLAN: Soft tissue mob, patellar mob, modalities prn, PROM. [] Continue per plan of care [] Alter current plan (see comments)  [x] Plan of care initiated [] Hold pending MD visit [] Discharge    Therapeutic Exercise and NMR EXR  [] (19428) Provided verbal/tactile cueing for activities related to strengthening, flexibility, endurance, ROM for improvements in LE, proximal hip, and core control with self care, mobility, lifting, ambulation. [x] (32133) Provided verbal/tactile cueing for activities related to improving balance, coordination, kinesthetic sense, posture, motor skill, proprioception  to assist with LE, proximal hip, and core control in self care, mobility, lifting, ambulation and eccentric single leg control.      NMR and Therapeutic Activities:    [x] (86551 or 10185) Provided verbal/tactile cueing for activities related to improving balance, coordination, kinesthetic sense, posture, motor skill, proprioception and motor activation to allow for proper function of core, proximal hip and LE with self care and ADLs and functional mobility.   [] (27141) Gait Re-education- Provided training and instruction to the patient for proper LE, core and proximal hip recruitment and positioning and eccentric body weight control with ambulation re-education including up and down stairs     Home Exercise Program:    [] (80327) Reviewed/Progressed HEP activities related to strengthening, flexibility, endurance, ROM of core, proximal hip and LE for functional self-care, mobility, lifting and ambulation/stair navigation   [x] (08838)Reviewed/Progressed HEP activities related to improving balance, coordination, kinesthetic sense, posture, motor skill, proprioception of core, proximal hip and LE for self care, mobility, lifting, and ambulation/stair navigation      Manual Treatments:  PROM / STM / Oscillations-Mobs:  G-I, II, III, IV (PA's, Inf., Post.)  [] (19751) Provided manual therapy to mobilize LE, proximal hip and/or LS spine soft tissue/joints for the purpose of modulating pain, promoting relaxation,  increasing ROM, reducing/eliminating soft tissue swelling/inflammation/restriction, improving soft tissue extensibility and allowing for proper ROM for normal function with self care, mobility, lifting and ambulation. Approval Dates:  CPT Code Units Approved Units Used  Date Updated:                     Charges:  Timed Code Treatment Minutes: 40   Total Treatment Minutes: 40      [] EVAL (LOW) 69294 (typically 20 minutes face-to-face)  [] EVAL (MOD) 69313 (typically 30 minutes face-to-face)  [] EVAL (HIGH) 99724 (typically 45 minutes face-to-face)  [] RE-EVAL     [x] QB(84142) x     [] Dry needle 1 or 2 Muscles (96539)  [x] NMR (83751) x     [] Dry needle 3+ Muscles (46194)  [x] Manual (54759) x     [] Ultrasound (21251) x  [] TA (28082) x     [] Mech Traction (61014)  [] ES(attended) (11986)     [] ES (un) (78547):   [] Vasopump (52121) [] Ionto (16342)   [] Other:      Electronically signed by: Monica Sanders, PTA 5529    Note: If patient does not return for scheduled/recommended follow up visits, this note will serve as a discharge from care along with the most recent update on progress.

## 2022-12-03 RX ORDER — APIXABAN 5 MG/1
TABLET, FILM COATED ORAL
Qty: 180 TABLET | Refills: 1 | Status: SHIPPED | OUTPATIENT
Start: 2022-12-03

## 2022-12-05 ENCOUNTER — APPOINTMENT (OUTPATIENT)
Dept: PHYSICAL THERAPY | Age: 67
End: 2022-12-05
Payer: COMMERCIAL

## 2022-12-07 ENCOUNTER — HOSPITAL ENCOUNTER (OUTPATIENT)
Dept: PHYSICAL THERAPY | Age: 67
Setting detail: THERAPIES SERIES
End: 2022-12-07
Payer: COMMERCIAL

## 2022-12-07 ENCOUNTER — APPOINTMENT (OUTPATIENT)
Dept: PHYSICAL THERAPY | Age: 67
End: 2022-12-07
Payer: COMMERCIAL

## 2022-12-09 ENCOUNTER — HOSPITAL ENCOUNTER (OUTPATIENT)
Dept: PHYSICAL THERAPY | Age: 67
Setting detail: THERAPIES SERIES
Discharge: HOME | End: 2022-12-09
Payer: COMMERCIAL

## 2022-12-09 PROCEDURE — 97140 MANUAL THERAPY 1/> REGIONS: CPT

## 2022-12-09 PROCEDURE — 97110 THERAPEUTIC EXERCISES: CPT

## 2022-12-09 PROCEDURE — 97112 NEUROMUSCULAR REEDUCATION: CPT

## 2022-12-09 NOTE — FLOWSHEET NOTE
[facility] - [facility], [place] [address]  Phone: [phone]   Fax:     [phone]      Physical Therapy Treatment Note/ Progress Report:     Date:  2022    Patient Name:  Dorian Shelley    :  1955  MRN: [id number]    Medical/Treatment Diagnosis Information:  Diagnosis: s/p Total Left Knee Replacement A61.947  Treatment Diagnosis: L Knee Pain E96.557    Insurance/Certification information:  PT Insurance Information: [email or web address], $40 copay, visits based on medical necessity, [id number], [id number] not billable, prior authorization required via [facility] after initial evaluation  Physician Information:  Yoel Cleveland DO  Plan of care signed (Y/N): faxed 22    Date of Patient follow up with Physician:      Progress Report: []  Yes  [x]  No     Date Range for reporting period:  Beginnin22  Ending:      Progress report due (10 Rx/or 30 days whichever is less):     Recertification due (POC duration/ or 90 days whichever is less):      Visit # POC/Insurance Allowable Auth Needed   3 12/ [x]Yes   []No     Latex Allergy:  [x]NO      []YES    History of Injury:  Patient reports a history of L knee problems for several years. Patient underwent a Left Total Knee Replacement 2022 at [facility]. Illa Man, stayed 2 nights, went home had home PT thru 22. .  Patient previously had an arthroscopic debridgement 2021. Patient reports knee is tight, saw MD last week, happy with progress, patient is sleeping okay. Patient has moved into 2 family with no steps. Patient is still using ice. Knee still feels hot. Patient is amb with standard cane when leaves the house, walks without AD at home.        Relevant Medical History:Coronary Artery Disease - Ablasion, Diabetes, Hyperlipidemia, Hypertension, Depression, Osteoarthritis  Functional Scale/Score: Sanmina-SCI = 49 (11/21/22)     Pain Scale: 4-5/10    SUBJECTIVE:  See eval  11/30: Not really having much pain. Has a little numbness on the outside of her knee. Has been walking w/o cane all week. 12/9: Tolerated last session well. Knee has been feeling pretty good    OBJECTIVE:  Observation:   Test measurements:   DATE:                   11/21  L Knee Extension 3   L Knee Flexion 98         RESTRICTIONS/PRECAUTIONS:     Exercises/Interventions:     Therapeutic Ex (74565)   Min: 15 Reps/Resistance Notes/CUES   NuStep 6 min    Calf Stretch/Incline [id number]    Knee flexion stretch [id number]         NMR re-education (04128)  Min: [address] Ex Program  See below   JOSEPH  Leg Extension 8 and 9    [id number]     Leg Curl [id number]              Therapeutic Activity (69540)  Min:     Patient education  See below                  Manual Intervention (41373)  Min: 20     Soft tissue mob Incision/peripatella/ITB    IASTM     Patellar Mob multidirectional    PROM 5x    Modalities  Min:     IFC with      CP after exercises     MH after exercises            Other Therapeutic Activities: Pt was educated on PT POC, Diagnosis, Prognosis, pathomechanics as well as frequency and duration of scheduling future physical therapy appointments. Time was also taken on this day to answer all patient questions and participation in PT. Reviewed appointment policy in detail with patient and patient verbalized understanding. Discussed at length anatomy and biomechanics of the knee, muscle reeducation, motor recruitment. Home Exercise Program: Patient instructed in glut sets, quad sets, isometric hip add, SLR, heel slides, knee extension prop, calf stretch with towel, ankle pumps ; written instructions with pictures issued, patient able to demonstrate exercises. 11/30: bent leg fall outs and clamshells.  Patient reports that she is already doing hamstring stretches and seated hip IR/ER    ASSESSMENT:  Patient presents with a history of s/p L TKA .  Patient would benefit from PT to increase functional mobility. GOALS:  Patient stated goal: \"Get back to normal\"  [] Progressing: [] Met: [] Not Met: [] Adjusted     Therapist goals for Patient:   Short Term Goals: To be achieved in: 2 weeks  1. Independent in HEP and progression per patient tolerance, in order to prevent re-injury. [] Progressing: [] Met: [] Not Met: [] Adjusted  2. Patient will have a decrease in pain to facilitate improvement in movement, function, and ADLs as indicated by Functional Deficits. [] Progressing: [] Met: [] Not Met: [] Adjusted     Long Term Goals: To be achieved in: 4-6 weeks  1. Disability index score of  or more for the FOTO to assist with reaching prior level of function. [] Progressing: [] Met: [] Not Met: [] Adjusted  2. Patient will demonstrate increased AROM to 0-120 to allow for proper joint functioning as indicated by patients Functional Deficits. [] Progressing: [] Met: [] Not Met: [] Adjusted  3. Patient will demonstrate an increase in Strength to good proximal hip and LE strength and control, to allow for proper gait mechanics for at least 15 minutes without compensatory movement patterns. [] Progressing: [] Met: [] Not Met: [] Adjusted  5. Patient able to negotiate stairs with a recipocal pattern without increased symptoms or compensatory movement patterns. [] Progressing: [] Met: [] Not Met: [] Adjusted          Treatment/Activity Tolerance:  [x] Patient tolerated treatment well [] Patient limited by fatique  [] Patient limited by pain  [] Patient limited by other medical complications  [] Other:     Overall Progression Towards Functional goals/ Treatment Progress Update:  [] Patient is progressing as expected towards functional goals listed. [] Progression is slowed due to complexities/Impairments listed. [] Progression has been slowed due to co-morbidities.   [x] Plan just implemented, too soon to assess goals progression <30days   [] Goals require adjustment due to lack of progress  [] Patient is not progressing as expected and requires additional follow up with physician  [] Other    Prognosis for POC: [x] Good [] Fair  [] Poor    Patient requires continued skilled intervention: [x] Yes  [] No        PLAN: Soft tissue mob, patellar mob, modalities prn, PROM. [] Continue per plan of care [] Alter current plan (see comments)  [x] Plan of care initiated [] Hold pending MD visit [] Discharge    Therapeutic Exercise and NMR EXR  [] (67540) Provided verbal/tactile cueing for activities related to strengthening, flexibility, endurance, ROM for improvements in LE, proximal hip, and core control with self care, mobility, lifting, ambulation. [x] (42933) Provided verbal/tactile cueing for activities related to improving balance, coordination, kinesthetic sense, posture, motor skill, proprioception  to assist with LE, proximal hip, and core control in self care, mobility, lifting, ambulation and eccentric single leg control.      NMR and Therapeutic Activities:    [x] (37260 or 38830) Provided verbal/tactile cueing for activities related to improving balance, coordination, kinesthetic sense, posture, motor skill, proprioception and motor activation to allow for proper function of core, proximal hip and LE with self care and ADLs and functional mobility.   [] (59709) Gait Re-education- Provided training and instruction to the patient for proper LE, core and proximal hip recruitment and positioning and eccentric body weight control with ambulation re-education including up and down stairs     Home Exercise Program:    [] (16880) Reviewed/Progressed HEP activities related to strengthening, flexibility, endurance, ROM of core, proximal hip and LE for functional self-care, mobility, lifting and ambulation/stair navigation   [x] (99532)Reviewed/Progressed HEP activities related to improving balance, coordination, kinesthetic sense, posture, motor skill, proprioception of core, proximal hip and LE for self care, mobility, lifting, and ambulation/stair navigation      Manual Treatments:  PROM / STM / Oscillations-Mobs:  G-I, II, III, IV (PA's, Inf., Post.)  [] (75590) Provided manual therapy to mobilize LE, proximal hip and/or LS spine soft tissue/joints for the purpose of modulating pain, promoting relaxation,  increasing ROM, reducing/eliminating soft tissue swelling/inflammation/restriction, improving soft tissue extensibility and allowing for proper ROM for normal function with self care, mobility, lifting and ambulation. Approval Dates:  CPT Code Units Approved Units Used  Date Updated:                     Charges:  Timed Code Treatment Minutes: 40   Total Treatment Minutes: 40      [] EVAL (LOW) 19019 (typically 20 minutes face-to-face)  [] EVAL (MOD) 12185 (typically 30 minutes face-to-face)  [] EVAL (HIGH) 48067 (typically 45 minutes face-to-face)  [] RE-EVAL     [x] (90641) x     [] Dry needle 1 or 2 Muscles (06316)  [x] NMR (57184) x     [] Dry needle 3+ Muscles (19364)  [x] Manual (59921) x     [] Ultrasound (45801) x  [] TA (48551) x     [] Mech Traction (94732)  [] ES(attended) (88808)     [] ES (un) (08164):   [] Vasopump (78430) [] Ionto (04307)   [] Other:      Electronically signed by: Checo Darby, PTA [id number]    Note: If patient does not return for scheduled/recommended follow up visits, this note will serve as a discharge from care along with the most recent update on progress.

## 2022-12-12 ENCOUNTER — HOSPITAL ENCOUNTER (OUTPATIENT)
Dept: PHYSICAL THERAPY | Age: 67
Setting detail: THERAPIES SERIES
Discharge: HOME | End: 2022-12-12
Payer: COMMERCIAL

## 2022-12-12 PROCEDURE — 97140 MANUAL THERAPY 1/> REGIONS: CPT

## 2022-12-12 PROCEDURE — 97110 THERAPEUTIC EXERCISES: CPT

## 2022-12-12 PROCEDURE — 97112 NEUROMUSCULAR REEDUCATION: CPT

## 2022-12-12 NOTE — FLOWSHEET NOTE
[facility] - Outpatient Rehabilitation and Therapy, [place] [address]  Phone: [phone]   Fax:     [phone]      Physical Therapy Treatment Note/ Progress Report:     Date:  2022    Patient Name:  Constance Halsted    :  1955  MRN: [id number]    Medical/Treatment Diagnosis Information:  Diagnosis: s/p Total Left Knee Replacement R51.354  Treatment Diagnosis: L Knee Pain B30.241    Insurance/Certification information:  PT Insurance Information: [facility], $40 copay, visits based on medical necessity, [id number], [phone] not billable, prior authorization required via [facility] after initial evaluation  Physician Information:  Darrin Johnson DO  Plan of care signed (Y/N): faxed 22    Date of Patient follow up with Physician:      Progress Report: []  Yes  [x]  No     Date Range for reporting period:  Beginnin22  Ending:      Progress report due (10 Rx/or 30 days whichever is less):     Recertification due (POC duration/ or 90 days whichever is less):      Visit # POC/Insurance Allowable Auth Needed   4 12/ [x]Yes   []No     Latex Allergy:  [x]NO      []YES    History of Injury:  Patient reports a history of L knee problems for several years. Patient underwent a Left Total Knee Replacement 2022 at [facility]. Lisa Vidales, stayed 2 nights, went home had home PT thru 22. .  Patient previously had an arthroscopic debridgement 2021. Patient reports knee is tight, saw MD last week, happy with progress, patient is sleeping okay. Patient has moved into 2 family with no steps. Patient is still using ice. Knee still feels hot. Patient is amb with standard cane when leaves the house, walks without AD at home.        Relevant Medical History:Coronary Artery Disease - Ablasion, Diabetes, Hyperlipidemia, Hypertension, Depression, Osteoarthritis  Functional Scale/Score: Sanmina-SCI = 49 (11/21/22)     Pain Scale: 1-2/10    SUBJECTIVE:  See eval  11/30: Not really having much pain. Has a little numbness on the outside of her knee. Has been walking w/o cane all week. 12/9: Tolerated last session well. Knee has been feeling pretty good  12/12/22:  Patient reports knee has been feeling pretty good, mostly stiff    OBJECTIVE:  Observation:   Test measurements:   DATE:                  11/2112/12  L Knee Extension 3 0   L Knee Flexion 98 110         RESTRICTIONS/PRECAUTIONS:     Exercises/Interventions:     Therapeutic Ex (80071)   Min: 15 Reps/Resistance Notes/CUES   NuStep 6 min Seat, UEs #6   Calf Stretch/Incline [id number] Incline board   Knee flexion stretch 1h36cle stairs        NMR re-education (19777)  Min: [address] [place] Ex Program  See below   Leg Extension 11 pounds, 2 x 10 reps L/R Seat #3, Tibia #2   Leg Curl 11 pounds, 2 x 10 reps L/R Seat #3, Tibia #2             Therapeutic Activity (83805)  Min:     Patient education  See below   Stairclimbing  Up and down x 4 steps #6\", x 2 reps, with reciprocal pattern Verbal cues, B handrails             Manual Intervention (36695)  Min: 20     Soft tissue mob Incision/peripatella/ITBand, med/lat hamstrings reclined   IASTM     Patellar Mob Sup/inf, med/lat x 5 reps each reclined   PROM 5x reclined   Modalities  Min:     IFC with      CP after exercises     MH after exercises            Other Therapeutic Activities: Pt was educated on PT POC, Diagnosis, Prognosis, pathomechanics as well as frequency and duration of scheduling future physical therapy appointments. Time was also taken on this day to answer all patient questions and participation in PT. Reviewed appointment policy in detail with patient and patient verbalized understanding. Discussed at length anatomy and biomechanics of the knee, muscle reeducation, motor recruitment.      Home Exercise Program: Patient instructed in glut sets, quad sets, isometric hip add, SLR, heel slides, knee extension prop, calf stretch with towel, ankle pumps ; written instructions with pictures issued, patient able to demonstrate exercises. 11/30: bent leg fall outs and clamshells. Patient reports that she is already doing hamstring stretches and seated hip IR/ER    ASSESSMENT:  Patient presents with a history of s/p L TKA     . Patient would benefit from PT to increase functional mobility. GOALS:  Patient stated goal: \"Get back to normal\"  [] Progressing: [] Met: [] Not Met: [] Adjusted     Therapist goals for Patient:   Short Term Goals: To be achieved in: 2 weeks  1. Independent in HEP and progression per patient tolerance, in order to prevent re-injury. [] Progressing: [] Met: [] Not Met: [] Adjusted  2. Patient will have a decrease in pain to facilitate improvement in movement, function, and ADLs as indicated by Functional Deficits. [] Progressing: [] Met: [] Not Met: [] Adjusted     Long Term Goals: To be achieved in: 4-6 weeks  1. Disability index score of  or more for the FOTO to assist with reaching prior level of function. [] Progressing: [] Met: [] Not Met: [] Adjusted  2. Patient will demonstrate increased AROM to 0-120 to allow for proper joint functioning as indicated by patients Functional Deficits. [] Progressing: [] Met: [] Not Met: [] Adjusted  3. Patient will demonstrate an increase in Strength to good proximal hip and LE strength and control, to allow for proper gait mechanics for at least 15 minutes without compensatory movement patterns. [] Progressing: [] Met: [] Not Met: [] Adjusted  5. Patient able to negotiate stairs with a recipocal pattern without increased symptoms or compensatory movement patterns.   [] Progressing: [] Met: [] Not Met: [] Adjusted          Treatment/Activity Tolerance:  [x] Patient tolerated treatment well [] Patient limited by fatique  [] Patient limited by pain  [] Patient limited by other medical complications  [] Other:     Overall Progression Towards Functional goals/ Treatment Progress Update:  [] Patient is progressing as expected towards functional goals listed. [] Progression is slowed due to complexities/Impairments listed. [] Progression has been slowed due to co-morbidities. [x] Plan just implemented, too soon to assess goals progression <30days   [] Goals require adjustment due to lack of progress  [] Patient is not progressing as expected and requires additional follow up with physician  [] Other    Prognosis for POC: [x] Good [] Fair  [] Poor    Patient requires continued skilled intervention: [x] Yes  [] No        PLAN: Soft tissue mob, patellar mob, modalities prn, PROM. Initiate 4\" steps ups and step downs  [] Continue per plan of care [] Alter current plan (see comments)  [x] Plan of care initiated [] Hold pending MD visit [] Discharge    Therapeutic Exercise and NMR EXR  [] (60476) Provided verbal/tactile cueing for activities related to strengthening, flexibility, endurance, ROM for improvements in LE, proximal hip, and core control with self care, mobility, lifting, ambulation. [x] (03196) Provided verbal/tactile cueing for activities related to improving balance, coordination, kinesthetic sense, posture, motor skill, proprioception  to assist with LE, proximal hip, and core control in self care, mobility, lifting, ambulation and eccentric single leg control.      NMR and Therapeutic Activities:    [x] (68136 or 22872) Provided verbal/tactile cueing for activities related to improving balance, coordination, kinesthetic sense, posture, motor skill, proprioception and motor activation to allow for proper function of core, proximal hip and LE with self care and ADLs and functional mobility.   [] (72789) Gait Re-education- Provided training and instruction to the patient for proper LE, core and proximal hip recruitment and positioning and eccentric body weight control with ambulation re-education including up and down stairs     Home Exercise Program:    [] (69900) Reviewed/Progressed HEP activities related to strengthening, flexibility, endurance, ROM of core, proximal hip and LE for functional self-care, mobility, lifting and ambulation/stair navigation   [x] (94527)Reviewed/Progressed HEP activities related to improving balance, coordination, kinesthetic sense, posture, motor skill, proprioception of core, proximal hip and LE for self care, mobility, lifting, and ambulation/stair navigation      Manual Treatments:  PROM / STM / Oscillations-Mobs:  G-I, II, III, IV (PA's, Inf., Post.)  [] (20490) Provided manual therapy to mobilize LE, proximal hip and/or LS spine soft tissue/joints for the purpose of modulating pain, promoting relaxation,  increasing ROM, reducing/eliminating soft tissue swelling/inflammation/restriction, improving soft tissue extensibility and allowing for proper ROM for normal function with self care, mobility, lifting and ambulation. Approval Dates:  CPT Code Units Approved Units Used  Date Updated:                     Charges:  Timed Code Treatment Minutes: 40   Total Treatment Minutes: 40      [] EVAL (LOW) 15108 (typically 20 minutes face-to-face)  [] EVAL (MOD) 16807 (typically 30 minutes face-to-face)  [] EVAL (HIGH) 00032 (typically 45 minutes face-to-face)  [] RE-EVAL     [x] RK(10879) x     [] Dry needle 1 or 2 Muscles (81352)  [x] NMR (91246) x     [] Dry needle 3+ Muscles (90641)  [x] Manual (47745) x     [] Ultrasound (37830) x  [] TA (83990) x     [] Mech Traction (31885)  [] ES(attended) (09768)     [] ES (un) (73210):   [] Vasopump (26848) [] Ionto (47289)   [] Other:      Electronically signed by: Kassi Lujan, PT [id number]    Note: If patient does not return for scheduled/recommended follow up visits, this note will serve as a discharge from care along with the most recent update on progress.

## 2022-12-14 ENCOUNTER — HOSPITAL ENCOUNTER (OUTPATIENT)
Dept: PHYSICAL THERAPY | Age: 67
Setting detail: THERAPIES SERIES
Discharge: HOME | End: 2022-12-14
Payer: COMMERCIAL

## 2022-12-14 PROCEDURE — 97112 NEUROMUSCULAR REEDUCATION: CPT

## 2022-12-14 PROCEDURE — 97110 THERAPEUTIC EXERCISES: CPT

## 2022-12-14 PROCEDURE — 97140 MANUAL THERAPY 1/> REGIONS: CPT

## 2022-12-14 NOTE — FLOWSHEET NOTE
[facility] - Outpatient Rehabilitation and Therapy, [place] [address]  Phone: [phone]   Fax:     [phone]      Physical Therapy Treatment Note/ Progress Report:     Date:  2022    Patient Name:  Michelle Edgar    :  1955  MRN: [id number]    Medical/Treatment Diagnosis Information:  Diagnosis: s/p Total Left Knee Replacement P82.943  Treatment Diagnosis: L Knee Pain D43.905    Insurance/Certification information:  PT Insurance Information: [facility], $40 copay, visits based on medical necessity, [id number], [id number] not billable, prior authorization required via [facility] after initial evaluation  Physician Information:  Telma Riley DO  Plan of care signed (Y/N): faxed 22    Date of Patient follow up with Physician:      Progress Report: []  Yes  [x]  No     Date Range for reporting period:  Beginnin22  Ending:      Progress report due (10 Rx/or 30 days whichever is less):     Recertification due (POC duration/ or 90 days whichever is less):      Visit # POC/Insurance Allowable Auth Needed   5 12/ [x]Yes   []No     Latex Allergy:  [x]NO      []YES    History of Injury:  Patient reports a history of L knee problems for several years. Patient underwent a Left Total Knee Replacement 2022 at [facility]. [place], stayed 2 nights, went home had home PT thru 22. .  Patient previously had an arthroscopic debridgement 2021. Patient reports knee is tight, saw MD last week, happy with progress, patient is sleeping okay. Patient has moved into 2 family with no steps. Patient is still using ice. Knee still feels hot. Patient is amb with standard cane when leaves the house, walks without AD at home.        Relevant Medical History:Coronary Artery Disease - Ablasion, Diabetes, Hyperlipidemia, Hypertension, Depression, Osteoarthritis  Functional Scale/Score: Sanmina-SCI = 49 (11/21/22)     Pain Scale: 1-2/10    SUBJECTIVE:  See eval  11/30: Not really having much pain. Has a little numbness on the outside of her knee. Has been walking w/o cane all week. 12/9: Tolerated last session well. Knee has been feeling pretty good  12/12/22:  Patient reports knee has been feeling pretty good, mostly stiff  12/14/22: Patient reports knee has been doing pretty good. OBJECTIVE:  Observation:   Test measurements:   DATE:                  11/2112/12 12/14  L Knee Extension 3 0 0   L Knee Flexion 98 110 112         RESTRICTIONS/PRECAUTIONS:     Exercises/Interventions:     Therapeutic Ex (93859)   Min: 15 Reps/Resistance Notes/CUES   NuStep 6 min Seat, UEs #6   Calf Stretch/Incline [id number] Incline board   Knee flexion stretch [id number] stairs   Leg Press 60 pounds, 2 x 10 reps 3 holes showing   NMR re-education (79843)  Min: [address] Ex Program  See below   Leg Extension 11 pounds, 3 x 10 reps L/R Seat #3, Tibia #2   Leg Curl 11 pounds, 3 x 10 reps L/R Seat #3, Tibia #2             Therapeutic Activity (90332)  Min:     Patient education  See below   Stairclimbing  Up and down x 4 steps #6\", x 2 reps, with reciprocal pattern Verbal cues, B handrails             Manual Intervention (15848)  Min: 20     Soft tissue mob Incision/peripatella/ITBand, med/lat hamstrings reclined   IASTM     Patellar Mob Sup/inf, med/lat x 5 reps each reclined   PROM 5x reclined   Modalities  Min:     IFC with      CP after exercises     MH after exercises            Other Therapeutic Activities: Pt was educated on PT POC, Diagnosis, Prognosis, pathomechanics as well as frequency and duration of scheduling future physical therapy appointments. Time was also taken on this day to answer all patient questions and participation in PT. Reviewed appointment policy in detail with patient and patient verbalized understanding.  Discussed at length anatomy and biomechanics of the knee, muscle reeducation, motor recruitment. Home Exercise Program: Patient instructed in glut sets, quad sets, isometric hip add, SLR, heel slides, knee extension prop, calf stretch with towel, ankle pumps ; written instructions with pictures issued, patient able to demonstrate exercises. 11/30: bent leg fall outs and clamshells. Patient reports that she is already doing hamstring stretches and seated hip IR/ER    ASSESSMENT:  Patient presents with a history of s/p L TKA     . Patient would benefit from PT to increase functional mobility. GOALS:  Patient stated goal: \"Get back to normal\"  [] Progressing: [] Met: [] Not Met: [] Adjusted     Therapist goals for Patient:   Short Term Goals: To be achieved in: 2 weeks  1. Independent in HEP and progression per patient tolerance, in order to prevent re-injury. [] Progressing: [] Met: [] Not Met: [] Adjusted  2. Patient will have a decrease in pain to facilitate improvement in movement, function, and ADLs as indicated by Functional Deficits. [] Progressing: [] Met: [] Not Met: [] Adjusted     Long Term Goals: To be achieved in: 4-6 weeks  1. Disability index score of  or more for the FOTO to assist with reaching prior level of function. [] Progressing: [] Met: [] Not Met: [] Adjusted  2. Patient will demonstrate increased AROM to 0-120 to allow for proper joint functioning as indicated by patients Functional Deficits. [] Progressing: [] Met: [] Not Met: [] Adjusted  3. Patient will demonstrate an increase in Strength to good proximal hip and LE strength and control, to allow for proper gait mechanics for at least 15 minutes without compensatory movement patterns. [] Progressing: [] Met: [] Not Met: [] Adjusted  5. Patient able to negotiate stairs with a recipocal pattern without increased symptoms or compensatory movement patterns.   [] Progressing: [] Met: [] Not Met: [] Adjusted          Treatment/Activity Tolerance:  [x] Patient tolerated treatment well [] Patient limited by fatique  [] Patient limited by pain  [] Patient limited by other medical complications  [] Other:     Overall Progression Towards Functional goals/ Treatment Progress Update:  [] Patient is progressing as expected towards functional goals listed. [] Progression is slowed due to complexities/Impairments listed. [] Progression has been slowed due to co-morbidities. [x] Plan just implemented, too soon to assess goals progression <30days   [] Goals require adjustment due to lack of progress  [] Patient is not progressing as expected and requires additional follow up with physician  [] Other    Prognosis for POC: [x] Good [] Fair  [] Poor    Patient requires continued skilled intervention: [x] Yes  [] No        PLAN: Soft tissue mob, patellar mob, modalities prn, PROM. Initiate BAPS board if able. [] Continue per plan of care [] Alter current plan (see comments)  [x] Plan of care initiated [] Hold pending MD visit [] Discharge    Therapeutic Exercise and NMR EXR  [] (35128) Provided verbal/tactile cueing for activities related to strengthening, flexibility, endurance, ROM for improvements in LE, proximal hip, and core control with self care, mobility, lifting, ambulation. [x] (20013) Provided verbal/tactile cueing for activities related to improving balance, coordination, kinesthetic sense, posture, motor skill, proprioception  to assist with LE, proximal hip, and core control in self care, mobility, lifting, ambulation and eccentric single leg control.      NMR and Therapeutic Activities:    [x] (24227 or 05057) Provided verbal/tactile cueing for activities related to improving balance, coordination, kinesthetic sense, posture, motor skill, proprioception and motor activation to allow for proper function of core, proximal hip and LE with self care and ADLs and functional mobility.   [] (73423) Gait Re-education- Provided training and instruction to the patient for proper LE, core and proximal hip recruitment and positioning and eccentric body weight control with ambulation re-education including up and down stairs     Home Exercise Program:    [] (29913) Reviewed/Progressed HEP activities related to strengthening, flexibility, endurance, ROM of core, proximal hip and LE for functional self-care, mobility, lifting and ambulation/stair navigation   [x] (79540)Reviewed/Progressed HEP activities related to improving balance, coordination, kinesthetic sense, posture, motor skill, proprioception of core, proximal hip and LE for self care, mobility, lifting, and ambulation/stair navigation      Manual Treatments:  PROM / STM / Oscillations-Mobs:  G-I, II, III, IV (PA's, Inf., Post.)  [] (54267) Provided manual therapy to mobilize LE, proximal hip and/or LS spine soft tissue/joints for the purpose of modulating pain, promoting relaxation,  increasing ROM, reducing/eliminating soft tissue swelling/inflammation/restriction, improving soft tissue extensibility and allowing for proper ROM for normal function with self care, mobility, lifting and ambulation.        Approval Dates:  CPT Code Units Approved Units Used  Date Updated:                     Charges:  Timed Code Treatment Minutes: 40   Total Treatment Minutes: 40      [] EVAL (LOW) 35892 (typically 20 minutes face-to-face)  [] EVAL (MOD) 48827 (typically 30 minutes face-to-face)  [] EVAL (HIGH) 11198 (typically 45 minutes face-to-face)  [] RE-EVAL     [x] RV(01669) x     [] Dry needle 1 or 2 Muscles (88755)  [x] NMR (87189) x     [] Dry needle 3+ Muscles (13786)  [x] Manual (25625) x     [] Ultrasound (05013) x  [] TA (37948) x     [] Mech Traction (23348)  [] ES(attended) (42716)     [] ES (un) (33604):   [] Vasopump (76496) [] Ionto (75704)   [] Other:      Electronically signed by: Jaquelin Church, PT [id number]    Note: If patient does not return for scheduled/recommended follow up visits, this note will serve as a discharge from care along with the most recent update on progress.

## 2022-12-19 ENCOUNTER — HOSPITAL ENCOUNTER (OUTPATIENT)
Dept: PHYSICAL THERAPY | Age: 67
Setting detail: THERAPIES SERIES
Discharge: HOME | End: 2022-12-19
Payer: COMMERCIAL

## 2022-12-19 PROCEDURE — 97112 NEUROMUSCULAR REEDUCATION: CPT

## 2022-12-19 PROCEDURE — 97110 THERAPEUTIC EXERCISES: CPT

## 2022-12-19 PROCEDURE — 97140 MANUAL THERAPY 1/> REGIONS: CPT

## 2022-12-19 NOTE — FLOWSHEET NOTE
[facility] - Outpatient Rehabilitation and Therapy, [place] [place] [address]  Phone: [phone]   Fax:     [phone]      Physical Therapy Treatment Note/ Progress Report:     Date:  2022    Patient Name:  Terell Recinos    :  1955  MRN: [id number]    Medical/Treatment Diagnosis Information:  Diagnosis: s/p Total Left Knee Replacement Z53.118  Treatment Diagnosis: L Knee Pain W97.162    Insurance/Certification information:  PT Insurance Information: [facility], $40 copay, visits based on medical necessity, [id number], [id number] not billable, prior authorization required via [facility] after initial evaluation  Physician Information:  Indiana Wells DO  Plan of care signed (Y/N): faxed 22    Date of Patient follow up with Physician:      Progress Report: []  Yes  [x]  No     Date Range for reporting period:  Beginnin22  Ending:      Progress report due (10 Rx/or 30 days whichever is less):     Recertification due (POC duration/ or 90 days whichever is less):      Visit # POC/Insurance Allowable Auth Needed   6 / [x]Yes   []No     Latex Allergy:  [x]NO      []YES    History of Injury:  Patient reports a history of L knee problems for several years. Patient [place] [facility], stayed 2 nights, went home had home PT thru 22. .  Patient previously had an arthroscopic debridgement 2021. Patient reports knee is tight, saw MD last week, happy with progress, patient is sleeping okay. Patient has moved into 2 family with no steps. Patient is still using ice. Knee still feels hot. Patient is amb with standard cane when leaves the house, walks without AD at home.        Relevant Medical History:Coronary Artery Disease - Ablasion, Diabetes, Hyperlipidemia, Hypertension, Depression, Osteoarthritis  Functional Scale/Score: Sanmina-SCI = 49 (11/21/22)     Pain Scale: 1-2/10    SUBJECTIVE:  See eval  11/30: Not really having much pain. Has a little numbness on the outside of her knee. Has been walking w/o cane all week. 12/9: Tolerated last session well. Knee has been feeling pretty good  12/12/22:  Patient reports knee has been feeling pretty good, mostly stiff  12/14/22: Patient reports knee has been doing pretty good. 12/19/22:  Patient reports knee is doing well. OBJECTIVE:  Observation:   Test measurements:   DATE:                  11/2112/12 12/14 12/19  L Knee Extension 3 0 0 0   L Knee Flexion 98 110 112 113         RESTRICTIONS/PRECAUTIONS:     Exercises/Interventions:     Therapeutic Ex (71219)   Min: 15 Reps/Resistance Notes/CUES   NuStep 6 min Seat, UEs #6   Calf Stretch/Incline [id number] Incline board   Knee flexion stretch 0r70gnd stairs   Leg Press 60 pounds, 2 x 10 reps 3 holes showing   NMR re-education (47636)  Min: [address] Ex Program  See below   Leg Extension 11 pounds, 3 x 10 reps L/R Seat #3, Tibia #2   Leg Curl 11 pounds, 3 x 10 reps L/R Seat #3, Tibia #2             Therapeutic Activity (26254)  Min:     Patient education  See below   Stairclimbing  Up and down x 4 steps #6\", x 2 reps, with reciprocal pattern Verbal cues, B handrails             Manual Intervention (04754)  Min: 20     Soft tissue mob Incision/peripatella/ITBand, med/lat hamstrings reclined   IASTM     Patellar Mob Sup/inf, med/lat x 5 reps each reclined   PROM 5x reclined   Modalities  Min:     IFC with      CP after exercises     MH after exercises            Other Therapeutic Activities: Pt was educated on PT POC, Diagnosis, Prognosis, pathomechanics as well as frequency and duration of scheduling future physical therapy appointments. Time was also taken on this day to answer all patient questions and participation in PT. Reviewed appointment policy in detail with patient and patient verbalized understanding.  Discussed at length anatomy and biomechanics of the knee, muscle reeducation, motor recruitment. Home Exercise Program: Patient instructed in glut sets, quad sets, isometric hip add, SLR, heel slides, knee extension prop, calf stretch with towel, ankle pumps ; written instructions with pictures issued, patient able to demonstrate exercises. 11/30: bent leg fall outs and clamshells. Patient reports that she is already doing hamstring stretches and seated hip IR/ER    ASSESSMENT:  Patient presents with a history of s/p L TKA     . Patient would benefit from PT to increase functional mobility. GOALS:  Patient stated goal: \"Get back to normal\"  [] Progressing: [] Met: [] Not Met: [] Adjusted     Therapist goals for Patient:   Short Term Goals: To be achieved in: 2 weeks  1. Independent in HEP and progression per patient tolerance, in order to prevent re-injury. [] Progressing: [] Met: [] Not Met: [] Adjusted  2. Patient will have a decrease in pain to facilitate improvement in movement, function, and ADLs as indicated by Functional Deficits. [] Progressing: [] Met: [] Not Met: [] Adjusted     Long Term Goals: To be achieved in: 4-6 weeks  1. Disability index score of  or more for the FOTO to assist with reaching prior level of function. [] Progressing: [] Met: [] Not Met: [] Adjusted  2. Patient will demonstrate increased AROM to 0-120 to allow for proper joint functioning as indicated by patients Functional Deficits. [] Progressing: [] Met: [] Not Met: [] Adjusted  3. Patient will demonstrate an increase in Strength to good proximal hip and LE strength and control, to allow for proper gait mechanics for at least 15 minutes without compensatory movement patterns. [] Progressing: [] Met: [] Not Met: [] Adjusted  5. Patient able to negotiate stairs with a recipocal pattern without increased symptoms or compensatory movement patterns.   [] Progressing: [] Met: [] Not Met: [] Adjusted          Treatment/Activity Tolerance:  [x] Patient tolerated treatment well [] Patient limited by fatique  [] Patient limited by pain  [] Patient limited by other medical complications  [] Other:     Overall Progression Towards Functional goals/ Treatment Progress Update:  [] Patient is progressing as expected towards functional goals listed. [] Progression is slowed due to complexities/Impairments listed. [] Progression has been slowed due to co-morbidities. [x] Plan just implemented, too soon to assess goals progression <30days   [] Goals require adjustment due to lack of progress  [] Patient is not progressing as expected and requires additional follow up with physician  [] Other    Prognosis for POC: [x] Good [] Fair  [] Poor    Patient requires continued skilled intervention: [x] Yes  [] No        PLAN: Soft tissue mob, patellar mob, modalities prn, PROM. Initiate BAPS board if able. [] Continue per plan of care [] Alter current plan (see comments)  [x] Plan of care initiated [] Hold pending MD visit [] Discharge    Therapeutic Exercise and NMR EXR  [] (56394) Provided verbal/tactile cueing for activities related to strengthening, flexibility, endurance, ROM for improvements in LE, proximal hip, and core control with self care, mobility, lifting, ambulation. [x] (06821) Provided verbal/tactile cueing for activities related to improving balance, coordination, kinesthetic sense, posture, motor skill, proprioception  to assist with LE, proximal hip, and core control in self care, mobility, lifting, ambulation and eccentric single leg control.      NMR and Therapeutic Activities:    [x] (64340 or 84406) Provided verbal/tactile cueing for activities related to improving balance, coordination, kinesthetic sense, posture, motor skill, proprioception and motor activation to allow for proper function of core, proximal hip and LE with self care and ADLs and functional mobility.   [] (30263) Gait Re-education- Provided training and instruction to the patient for proper LE, core and proximal hip recruitment and positioning and eccentric body weight control with ambulation re-education including up and down stairs     Home Exercise Program:    [] (52028) Reviewed/Progressed HEP activities related to strengthening, flexibility, endurance, ROM of core, proximal hip and LE for functional self-care, mobility, lifting and ambulation/stair navigation   [x] (59721)Reviewed/Progressed HEP activities related to improving balance, coordination, kinesthetic sense, posture, motor skill, proprioception of core, proximal hip and LE for self care, mobility, lifting, and ambulation/stair navigation      Manual Treatments:  PROM / STM / Oscillations-Mobs:  G-I, II, III, IV (PA's, Inf., Post.)  [] (54860) Provided manual therapy to mobilize LE, proximal hip and/or LS spine soft tissue/joints for the purpose of modulating pain, promoting relaxation,  increasing ROM, reducing/eliminating soft tissue swelling/inflammation/restriction, improving soft tissue extensibility and allowing for proper ROM for normal function with self care, mobility, lifting and ambulation.        Approval Dates:  CPT Code Units Approved Units Used  Date Updated:                     Charges:  Timed Code Treatment Minutes: 40   Total Treatment Minutes: 40      [] EVAL (LOW) 16665 (typically 20 minutes face-to-face)  [] EVAL (MOD) 21513 (typically 30 minutes face-to-face)  [] EVAL (HIGH) 42684 (typically 45 minutes face-to-face)  [] RE-EVAL     [x] HX(54685) x     [] Dry needle 1 or 2 Muscles (32912)  [x] NMR (31657) x     [] Dry needle 3+ Muscles (84113)  [x] Manual (49098) x     [] Ultrasound (20798) x  [] TA (31325) x     [] Mech Traction (89292)  [] ES(attended) (38889)     [] ES (un) (74804):   [] Vasopump (05606) [] Ionto (34678)   [] Other:      Electronically signed by: Mercedez Overton, PT 0610    Note: If patient does not return for scheduled/recommended follow up visits, this note will serve as a discharge from [facility] along with the most recent update on progress.

## 2022-12-21 ENCOUNTER — HOSPITAL ENCOUNTER (OUTPATIENT)
Dept: PHYSICAL THERAPY | Age: 67
Setting detail: THERAPIES SERIES
Discharge: HOME | End: 2022-12-21
Payer: COMMERCIAL

## 2022-12-21 PROCEDURE — 97110 THERAPEUTIC EXERCISES: CPT

## 2022-12-21 PROCEDURE — 97140 MANUAL THERAPY 1/> REGIONS: CPT

## 2022-12-21 PROCEDURE — 97112 NEUROMUSCULAR REEDUCATION: CPT

## 2022-12-21 NOTE — FLOWSHEET NOTE
[facility] - Outpatient [facility] Therapy, [place] [address]  Phone: [phone]   Fax:     [phone]      Physical Therapy Treatment Note/ Progress Report:     Date:  2022    Patient Name:  Eugenio Browning    :  1955  MRN: [id number]    Medical/Treatment Diagnosis Information:  Diagnosis: s/p Total Left Knee Replacement M81.574  Treatment Diagnosis: L Knee Pain E09.955    Insurance/Certification information:  PT Insurance Information: [facility], $40 copay, visits based on medical necessity, [id number], [phone] not billable, prior authorization required via [facility] after initial evaluation  Physician Information:  Lucia Montaño DO  Plan of care signed (Y/N): faxed 22    Date of Patient follow up with Physician:      Progress Report: []  Yes  [x]  No     Date Range for reporting period:  Beginnin22  Ending:      Progress report due (10 Rx/or 30 days whichever is less):     Recertification due (POC duration/ or 90 days whichever is less):      Visit # POC/Insurance Allowable Auth Needed   7 / [x]Yes   []No     Latex Allergy:  [x]NO      []YES    History of Injury:  Patient reports a history of L knee problems for several years. Patient underwent a Left Total Knee Replacement 2022 at [facility] [place]. Al [facility], stayed 2 nights, went home had home PT thru 22. .  Patient previously had an arthroscopic debridgement 2021. Patient reports knee is tight, saw MD last week, happy with progress, patient is sleeping okay. Patient has moved into 2 family with no steps. Patient is still using ice. Knee still feels hot. Patient is amb with standard cane when leaves the house, walks without AD at home.        Relevant Medical History:Coronary Artery Disease - Ablasion, Diabetes, Hyperlipidemia, Hypertension, Depression, Osteoarthritis  Functional Scale/Score: [employer]-SCI = 49 (11/21/22)     Pain Scale: 1-2/10    SUBJECTIVE:  See eval  11/30: Not really having much pain. Has a little numbness on the outside of her knee. Has been walking w/o cane all week. 12/9: Tolerated last session well. Knee has been feeling pretty good  12/12/22:  Patient reports knee has been feeling pretty good, mostly stiff  12/14/22: Patient reports knee has been doing pretty good. 12/19/22:  Patient reports knee is doing well.  12/21/22:  Patient reports knee is feeling pretty good. OBJECTIVE:  Observation:   Test measurements:   DATE:                  11/2112/12 12/14 12/19  L Knee Extension 3 0 0 0   L Knee Flexion 98 110 112 113         RESTRICTIONS/PRECAUTIONS:     Exercises/Interventions:     Therapeutic Ex (89386)   Min: 15 Reps/Resistance Notes/CUES   NuStep 6 min Seat, UEs #6   Calf Stretch/Incline 1f20mbk Incline board   Knee flexion stretch 1h78hbr stairs   Leg Press 60 pounds, 3 x 10 reps 3 holes showing   NMR re-education (13643)  Min: [place] Ex Program  See below   Leg Extension 11 pounds, 3 x 10 reps L/R Seat #3, Tibia #2   Leg Curl 11 pounds, 3 x 10 reps L/R Seat #3, Tibia #2   BAPS Level 2 x 15 reps CW, CCW Standing, R LE on ground, B handrails        Therapeutic Activity (13792)  Min:     Patient education  See below   Stairclimbing  Up and down x 4 steps #6\", x 2 reps, with reciprocal pattern Verbal cues, B handrails             Manual Intervention (32871)  Min: 20     Soft tissue mob Incision/peripatella/ITBand, med/lat hamstrings reclined   IASTM     Patellar Mob Sup/inf, med/lat x 5 reps each reclined   PROM 5x reclined   Modalities  Min:     IFC with      CP after exercises     MH after exercises            Other Therapeutic Activities: Pt was educated on PT POC, Diagnosis, Prognosis, pathomechanics as well as frequency and duration of scheduling future physical therapy appointments.  Time was also taken on this day to answer all patient questions and participation in PT. Reviewed appointment policy in detail with patient and patient verbalized understanding. Discussed at length anatomy and biomechanics of the knee, muscle reeducation, motor recruitment. Home Exercise Program: Patient instructed in glut sets, quad sets, isometric hip add, SLR, heel slides, knee extension prop, calf stretch with towel, ankle pumps ; written instructions with pictures issued, patient able to demonstrate exercises. 11/30: bent leg fall outs and clamshells. Patient reports that she is already doing hamstring stretches and seated hip IR/ER    ASSESSMENT:  Patient presents with a history of s/p L TKA     . Patient would benefit from PT to increase functional mobility. GOALS:  Patient stated goal: \"Get back to normal\"  [] Progressing: [] Met: [] Not Met: [] Adjusted     Therapist goals for Patient:   Short Term Goals: To be achieved in: 2 weeks  1. Independent in HEP and progression per patient tolerance, in order to prevent re-injury. [] Progressing: [] Met: [] Not Met: [] Adjusted  2. Patient will have a decrease in pain to facilitate improvement in movement, function, and ADLs as indicated by Functional Deficits. [] Progressing: [] Met: [] Not Met: [] Adjusted     Long Term Goals: To be achieved in: 4-6 weeks  1. Disability index score of  or more for the FOTO to assist with reaching prior level of function. [] Progressing: [] Met: [] Not Met: [] Adjusted  2. Patient will demonstrate increased AROM to 0-120 to allow for proper joint functioning as indicated by patients Functional Deficits. [] Progressing: [] Met: [] Not Met: [] Adjusted  3. Patient will demonstrate an increase in Strength to good proximal hip and LE strength and control, to allow for proper gait mechanics for at least 15 minutes without compensatory movement patterns. [] Progressing: [] Met: [] Not Met: [] Adjusted  5.  Patient able to negotiate stairs with a recipocal pattern without increased symptoms or compensatory movement patterns. [] Progressing: [] Met: [] Not Met: [] Adjusted          Treatment/Activity Tolerance:  [x] Patient tolerated treatment well [] Patient limited by fatique  [] Patient limited by pain  [] Patient limited by other medical complications  [] Other:     Overall Progression Towards Functional goals/ Treatment Progress Update:  [] Patient is progressing as expected towards functional goals listed. [] Progression is slowed due to complexities/Impairments listed. [] Progression has been slowed due to co-morbidities. [x] Plan just implemented, too soon to assess goals progression <30days   [] Goals require adjustment due to lack of progress  [] Patient is not progressing as expected and requires additional follow up with physician  [] Other    Prognosis for POC: [x] Good [] Fair  [] Poor    Patient requires continued skilled intervention: [x] Yes  [] No        PLAN: Soft tissue mob, patellar mob, modalities prn, focus on increasing AROM  [x] Continue per plan of care [] Alter current plan (see comments)  [] Plan of care initiated [] Hold pending MD visit [] Discharge    Therapeutic Exercise and NMR EXR  [] (54936) Provided verbal/tactile cueing for activities related to strengthening, flexibility, endurance, ROM for improvements in LE, proximal hip, and core control with self care, mobility, lifting, ambulation. [x] (88734) Provided verbal/tactile cueing for activities related to improving balance, coordination, kinesthetic sense, posture, motor skill, proprioception  to assist with LE, proximal hip, and core control in self care, mobility, lifting, ambulation and eccentric single leg control.      NMR and Therapeutic Activities:    [x] (27203 or 94410) Provided verbal/tactile cueing for activities related to improving balance, coordination, kinesthetic sense, posture, motor skill, proprioception and motor activation to allow for proper function of core, proximal hip and LE with self care and ADLs and functional mobility.   [] (74489) Gait Re-education- Provided training and instruction to the patient for proper LE, core and proximal hip recruitment and positioning and eccentric body weight control with ambulation re-education including up and down stairs     Home Exercise Program:    [] (35002) Reviewed/Progressed HEP activities related to strengthening, flexibility, endurance, ROM of core, proximal hip and LE for functional self-care, mobility, lifting and ambulation/stair navigation   [x] (39293)Reviewed/Progressed HEP activities related to improving balance, coordination, kinesthetic sense, posture, motor skill, proprioception of core, proximal hip and LE for self care, mobility, lifting, and ambulation/stair navigation      Manual Treatments:  PROM / STM / Oscillations-Mobs:  G-I, II, III, IV (PA's, Inf., Post.)  [] (96043) Provided manual therapy to mobilize LE, proximal hip and/or LS spine soft tissue/joints for the purpose of modulating pain, promoting relaxation,  increasing ROM, reducing/eliminating soft tissue swelling/inflammation/restriction, improving soft tissue extensibility and allowing for proper ROM for normal function with self care, mobility, lifting and ambulation.        Approval Dates:  CPT Code Units Approved Units Used  Date Updated:                     Charges:  Timed Code Treatment Minutes: 45   Total Treatment Minutes: 45      [] EVAL (LOW) 99780 (typically 20 minutes face-to-face)  [] EVAL (MOD) 84726 (typically 30 minutes face-to-face)  [] EVAL (HIGH) 39659 (typically 45 minutes face-to-face)  [] RE-EVAL     [x] EI(32144) x     [] Dry needle 1 or 2 Muscles (86303)  [x] NMR (80465) x     [] Dry needle 3+ Muscles (43599)  [x] Manual (68953) x     [] Ultrasound (02500) x  [] TA (82361) x     [] Mech Traction (81902)  [] ES(attended) (40622)     [] ES (un) (81561):   [] Vasopump (51717) [] Ionto (23639)   [] Other:      Electronically signed by: Lopez Barrientos PT [id number]    Note: If patient does not return for scheduled/recommended follow up visits, this note will serve as a discharge from care along with the most recent update on progress.

## 2022-12-28 ENCOUNTER — HOSPITAL ENCOUNTER (OUTPATIENT)
Dept: PHYSICAL THERAPY | Age: 67
Setting detail: THERAPIES SERIES
Discharge: HOME | End: 2022-12-28
Payer: COMMERCIAL

## 2022-12-28 NOTE — FLOWSHEET NOTE
[facility]  [place] [address]  Phone: [phone]   Fax:     [phone]     Physical Therapy  Cancellation/No-show Note  Patient Name:  Kings Moore  :  1955   Date:  2022  Cancelled visits to date: 2  No-shows to date: 0    Patient status for today's appointment patient:  [x]  Cancelled  []  Rescheduled appointment  []  No-show     Reason given by patient:  []  Patient ill  []  Conflicting appointment  []  No transportation    []  Conflict with work  [x]  No reason given  []  Other:     Comments:      Phone call information:   []  Phone call made today to patient at _ time at number provided:      []  Patient answered, conversation as follows:    []  Patient did not answer, message left as follows:  []  Phone call not made today    Electronically signed by:  Angie David, [address] [place] [address]

## 2023-01-04 ENCOUNTER — HOSPITAL ENCOUNTER (OUTPATIENT)
Dept: PHYSICAL THERAPY | Age: 68
Setting detail: THERAPIES SERIES
Discharge: HOME | End: 2023-01-04

## 2023-01-09 ENCOUNTER — HOSPITAL ENCOUNTER (OUTPATIENT)
Dept: PHYSICAL THERAPY | Age: 68
Setting detail: THERAPIES SERIES
Discharge: HOME | End: 2023-01-09
Payer: COMMERCIAL

## 2023-01-09 PROCEDURE — 97112 NEUROMUSCULAR REEDUCATION: CPT

## 2023-01-09 PROCEDURE — 97110 THERAPEUTIC EXERCISES: CPT

## 2023-01-09 PROCEDURE — 97140 MANUAL THERAPY 1/> REGIONS: CPT

## 2023-01-09 NOTE — FLOWSHEET NOTE
[facility] - Outpatient Rehabilitation and Therapy, [place]  [address] [place] [address]  Phone: [phone]   Fax:     [phone]      Physical Therapy Treatment Note/ Progress Report:     Date:  2023    Patient Name:  Melody Clement    :  1955  MRN: [id number]    Medical/Treatment Diagnosis Information:  Diagnosis: s/p Total Left Knee Replacement Z96.652  Treatment Diagnosis: L Knee Pain M25.562    Insurance/Certification information:  PT Insurance Information: [employer], $40 copay, visits based on medical necessity, [id number], [phone] not billable, prior authorization required via [facility] Medicaid after initial evaluation  Physician Information:  Elisabet Reyes DO  Plan of care signed (Y/N): faxed 22    Date of Patient follow up with Physician:      Progress Report: []  Yes  [x]  No     Date Range for reporting period:  Beginnin22  Ending:      Progress report due (10 Rx/or 30 days whichever is less):     Recertification due (POC duration/ or 90 days whichever is less):      Visit # POC/Insurance Allowable Auth Needed   7 / [x]Yes   []No     Latex Allergy:  [x]NO      []YES    History of Injury:  Patient reports a history of L knee problems for several years.  Patient underwent a Left Total Knee Replacement 2022 at [facility], stayed 2 nights, went home had home PT thru 22.  .  Patient previously had an arthroscopic debridgement 2021.  Patient reports knee is tight, saw MD last week, happy with progress, patient is sleeping okay.  Patient has moved into 2 family with no steps.  Patient is still using ice.  Knee still feels hot.  Patient is amb with standard cane when leaves the house, walks without AD at home.       Relevant Medical History:Coronary Artery Disease - Ablasion, Diabetes, Hyperlipidemia, Hypertension, Depression, Osteoarthritis  Functional Scale/Score: FOTO =  49 (11/21/22)     Pain Scale: 1-2/10    SUBJECTIVE:  See eval  11/30: Not really having much pain. Has a little numbness on the outside of her knee. Has been walking w/o cane all week. 12/9: Tolerated last session well. Knee has been feeling pretty good  12/12/22:  Patient reports knee has been feeling pretty good, mostly stiff  12/14/22: Patient reports knee has been doing pretty good. 12/19/22:  Patient reports knee is doing well.  12/21/22:  Patient reports knee is feeling pretty good. 1/9/23:  Patient saw MD, advised to cont PT to increase AROM. Patient reports she gets worn out at store if she has to walk with a cart, prefers a scooter.   OBJECTIVE:  Observation:   Test measurements:   DATE:                  11/2112/12 12/14 12/19 1/09  L Knee Extension 3 0 0 0 0   L Knee Flexion 98 110 112 113 115         RESTRICTIONS/PRECAUTIONS:     Exercises/Interventions:     Therapeutic Ex (14653)   Min: 15 Reps/Resistance Notes/CUES   NuStep 6 min Seat, UEs #6   Calf Stretch/Incline 8x85fvy Incline board   Knee flexion stretch 2o37sjv stairs   Leg Press 60 pounds, 3 x 10 reps 3 holes showing   NMR re-education (29067)  Min: [address] [place] Ex Program  See below   Leg Extension 11 pounds, 3 x 10 reps L/R Seat #3, Tibia #2   Leg Curl 11 pounds, 3 x 10 reps L/R Seat #3, Tibia #2   BAPS Level 2 x 15 reps CW, CCW Standing, R LE on ground, B handrails        Therapeutic Activity (16187)  Min:     Patient education  See below   Stairclimbing  Up and down x 4 steps #6\", x 3 reps, with reciprocal pattern Verbal cues, B handrails             Manual Intervention (24421)  Min: 20     Soft tissue mob Incision/peripatella/ITBand, med/lat hamstrings reclined   IASTM     Patellar Mob Sup/inf, med/lat x 5 reps each reclined   PROM 5x reclined   Modalities  Min:     IFC with      CP after exercises     MH after exercises            Other Therapeutic Activities: Pt was educated on PT POC, Diagnosis, Prognosis, pathomechanics as well as frequency and duration of scheduling future physical therapy appointments. Time was also taken on this day to answer all patient questions and participation in PT. Reviewed appointment policy in detail with patient and patient verbalized understanding. Discussed at length anatomy and biomechanics of the knee, muscle reeducation, motor recruitment. Home Exercise Program: Patient instructed in glut sets, quad sets, isometric hip add, SLR, heel slides, knee extension prop, calf stretch with towel, ankle pumps ; written instructions with pictures issued, patient able to demonstrate exercises. 11/30: bent leg fall outs and clamshells. Patient reports that she is already doing hamstring stretches and seated hip IR/ER    ASSESSMENT:  Patient presents with a history of s/p L TKA     . Patient would benefit from PT to increase functional mobility. GOALS:  Patient stated goal: \"Get back to normal\"  [] Progressing: [] Met: [] Not Met: [] Adjusted     Therapist goals for Patient:   Short Term Goals: To be achieved in: 2 weeks  1. Independent in HEP and progression per patient tolerance, in order to prevent re-injury. [] Progressing: [] Met: [] Not Met: [] Adjusted  2. Patient will have a decrease in pain to facilitate improvement in movement, function, and ADLs as indicated by Functional Deficits. [] Progressing: [] Met: [] Not Met: [] Adjusted     Long Term Goals: To be achieved in: 4-6 weeks  1. Disability index score of  or more for the FOTO to assist with reaching prior level of function. [] Progressing: [] Met: [] Not Met: [] Adjusted  2. Patient will demonstrate increased AROM to 0-120 to allow for proper joint functioning as indicated by patients Functional Deficits. [] Progressing: [] Met: [] Not Met: [] Adjusted  3.  Patient will demonstrate an increase in Strength to good proximal hip and LE strength and control, to allow for proper gait mechanics for at least 15 minutes without compensatory movement patterns. [] Progressing: [] Met: [] Not Met: [] Adjusted  5. Patient able to negotiate stairs with a recipocal pattern without increased symptoms or compensatory movement patterns. [] Progressing: [] Met: [] Not Met: [] Adjusted          Treatment/Activity Tolerance:  [x] Patient tolerated treatment well [] Patient limited by fatique  [] Patient limited by pain  [] Patient limited by other medical complications  [] Other:     Overall Progression Towards Functional goals/ Treatment Progress Update:  [] Patient is progressing as expected towards functional goals listed. [] Progression is slowed due to complexities/Impairments listed. [] Progression has been slowed due to co-morbidities. [x] Plan just implemented, too soon to assess goals progression <30days   [] Goals require adjustment due to lack of progress  [] Patient is not progressing as expected and requires additional follow up with physician  [] Other    Prognosis for POC: [x] Good [] Fair  [] Poor    Patient requires continued skilled intervention: [x] Yes  [] No        PLAN: Soft tissue mob, patellar mob, modalities prn, focus on increasing AROM  [x] Continue per plan of care [] Alter current plan (see comments)  [] Plan of care initiated [] Hold pending MD visit [] Discharge    Therapeutic Exercise and NMR EXR  [] (33490) Provided verbal/tactile cueing for activities related to strengthening, flexibility, endurance, ROM for improvements in LE, proximal hip, and core control with self care, mobility, lifting, ambulation. [x] (53384) Provided verbal/tactile cueing for activities related to improving balance, coordination, kinesthetic sense, posture, motor skill, proprioception  to assist with LE, proximal hip, and core control in self care, mobility, lifting, ambulation and eccentric single leg control.      NMR and Therapeutic Activities:    [x] (00762 or 10668) Provided verbal/tactile cueing for activities related to improving balance, coordination, kinesthetic sense, posture, motor skill, proprioception and motor activation to allow for proper function of core, proximal hip and LE with self care and ADLs and functional mobility.   [] (33341) Gait Re-education- Provided training and instruction to the patient for proper LE, core and proximal hip recruitment and positioning and eccentric body weight control with ambulation re-education including up and down stairs     Home Exercise Program:    [] (92479) Reviewed/Progressed HEP activities related to strengthening, flexibility, endurance, ROM of core, proximal hip and LE for functional self-care, mobility, lifting and ambulation/stair navigation   [x] (40714)Reviewed/Progressed HEP activities related to improving balance, coordination, kinesthetic sense, posture, motor skill, proprioception of core, proximal hip and LE for self care, mobility, lifting, and ambulation/stair navigation      Manual Treatments:  PROM / STM / Oscillations-Mobs:  G-I, II, III, IV (PA's, Inf., Post.)  [] (08173) Provided manual therapy to mobilize LE, proximal hip and/or LS spine soft tissue/joints for the purpose of modulating pain, promoting relaxation,  increasing ROM, reducing/eliminating soft tissue swelling/inflammation/restriction, improving soft tissue extensibility and allowing for proper ROM for normal function with self care, mobility, lifting and ambulation.        Approval Dates:  CPT Code Units Approved Units Used  Date Updated:                     Charges:  Timed Code Treatment Minutes: 45   Total Treatment Minutes: 45      [] EVAL (LOW) 88868 (typically 20 minutes face-to-face)  [] EVAL (MOD) 13371 (typically 30 minutes face-to-face)  [] EVAL (HIGH) 45807 (typically 45 minutes face-to-face)  [] RE-EVAL     [x] KE(08984) x     [] Dry needle 1 or 2 Muscles (37537)  [x] NMR (08380) x     [] Dry needle 3+ Muscles (41922)  [x] Manual (14089) x     [] Ultrasound (48086) x  [] TA (37189) x     [] Mech Traction (70550)  [] ES(attended) (85642)     [] ES (un) (98551):   [] Vasopump (45221) [] Ionto (05453)   [] Other:      Electronically signed by: Claudene Cohen, PT [id number]    Note: If patient does not return for scheduled/recommended follow up visits, this note will serve as a discharge from care along with the most recent update on progress.

## 2023-01-11 ENCOUNTER — HOSPITAL ENCOUNTER (OUTPATIENT)
Dept: PHYSICAL THERAPY | Age: 68
Setting detail: THERAPIES SERIES
Discharge: HOME | End: 2023-01-11
Payer: COMMERCIAL

## 2023-01-11 NOTE — PROGRESS NOTES
Hospitalist Progress Note      PCP: May Mccrary MD    Date of Admission: 6/14/2021    Chief Complaint: Abdominal pain    Hospital Course:  78 y.o. female with history of CAD, DM, obesity, presents following onset of abdominal pain yesterday morning. She woke up around 2 am, noted onset of epigastric sharp pain that radiated to her back. She had one episode of non bloody/ non bilious emesis consisting of previous evening's dinner. She had anorexia throughout the day, was able to tolerate some PO that wasn't associated with acute worsening of her abdominal pain. She denies chest pain, palpitations, cough, fever, chills. She does note some ROSS over the past 2 months, and recalls completing her second dose of covid vaccination the end of last month. Abd pain persisted prompting ER consultation where during her work up she required 3 lpm NC O2. EKG done in ED showed new onset Afib with RVR. Cardizem IV started and cardiology consulted. Subjective: Patient seen and examined. Patient feeling better today but still endorsing abdominal pain and anorexia. Will continue bowel rest, IVF and pain management. Patient endorsed some ROSS in the past month and continues to use 2L. Not on any O2 at home. D-dimer was ordered for hypoxia while in ED and was elevated. CTPA has been ordered. Will continue to monitor O2 sat and wean off supplemental O2 as necessary.         Medications:  Reviewed    Infusion Medications    dilTIAZem 2.5 mg/hr (06/15/21 0400)    dextrose      sodium chloride      sodium chloride 150 mL/hr at 06/15/21 0056     Scheduled Medications    piperacillin-tazobactam  3,375 mg Intravenous Q8H    aspirin  81 mg Oral Daily    citalopram  40 mg Oral Daily    insulin glargine  72 Units Subcutaneous Nightly    cetirizine  10 mg Oral Daily    insulin lispro  0-6 Units Subcutaneous 6 times per day    sodium chloride flush  5-40 mL Intravenous 2 times per day    enoxaparin  40 mg Subcutaneous Daily    vancomycin  750 mg Intravenous Q12H    vancomycin (VANCOCIN) intermittent dosing (placeholder)   Other RX Placeholder     PRN Meds: perflutren lipid microspheres, glucose, dextrose, glucagon (rDNA), dextrose, sodium chloride flush, sodium chloride, ondansetron **OR** ondansetron, polyethylene glycol, acetaminophen **OR** acetaminophen, albuterol, morphine **OR** morphine      Intake/Output Summary (Last 24 hours) at 6/15/2021 1532  Last data filed at 6/15/2021 1125  Gross per 24 hour   Intake 2576 ml   Output 2150 ml   Net 426 ml       Physical Exam Performed:    BP (!) 148/80   Pulse 97   Temp 97.5 °F (36.4 °C) (Axillary)   Resp 18   Ht 4' 11\" (1.499 m)   Wt 180 lb 5.4 oz (81.8 kg)   SpO2 95%   BMI 36.42 kg/m²     General appearance: No apparent distress, appears stated age and cooperative. HEENT: Pupils equal, round, and reactive to light. Conjunctivae/corneas clear. Neck: Supple, with full range of motion. No jugular venous distention. Trachea midline. Respiratory:  Normal respiratory effort. Clear to auscultation, bilaterally without Rales/Wheezes/Rhonchi. Cardiovascular: Regular rate and rhythm with normal S1/S2 without murmurs, rubs or gallops. Abdomen: Soft, non-tender, non-distended with normal bowel sounds. Musculoskeletal: No clubbing, cyanosis or edema bilaterally. Full range of motion without deformity. Skin: Skin color, texture, turgor normal.  No rashes or lesions. Neurologic:  Neurovascularly intact without any focal sensory/motor deficits.  Cranial nerves: II-XII intact, grossly non-focal.  Psychiatric: Alert and oriented, thought content appropriate, normal insight  Capillary Refill: Brisk,< 3 seconds   Peripheral Pulses: +2 palpable, equal bilaterally       Labs:   Recent Labs     06/14/21  1526 06/15/21  0534   WBC 10.2 7.3   HGB 13.3 11.9*   HCT 38.9 35.3*    242     Recent Labs     06/14/21  1526 06/15/21  0534    139   K 3.8 3.5    107   CO2 24 21 BUN 16 13   CREATININE 1.0 0.8   CALCIUM 9.0 8.1*     Recent Labs     06/14/21  1526 06/15/21  0534   AST 55* 31   ALT 70* 46*   BILIDIR  --  0.3   BILITOT 1.0 0.7   ALKPHOS 72 63     No results for input(s): INR in the last 72 hours. Recent Labs     06/14/21  1526 06/14/21  2249 06/15/21  0251   TROPONINI <0.01 <0.01 <0.01       Urinalysis:      Lab Results   Component Value Date    NITRU Negative 06/14/2021    WBCUA 6-10 06/16/2017    BACTERIA 2+ 04/27/2015    RBCUA 3-5 06/16/2017    BLOODU Negative 06/14/2021    SPECGRAV 1.021 06/14/2021    GLUCOSEU >=1000 06/14/2021       Radiology:  [address] organ? LIVER, GALLBLADDER, PANCREAS   Preliminary Result   1. Hepatic steatosis. 2. Unremarkable gallbladder. CT ABDOMEN PELVIS W IV CONTRAST Additional Contrast? None   Final Result   1. No acute process within the abdomen or pelvis. 2. No acute bowel inflammation or obstruction. 3. Stable bilateral renal cysts. 4. Patient status post hysterectomy. XR CHEST PORTABLE   Final Result   No acute cardiopulmonary disease.          CTA PULMONARY W CONTRAST    (Results Pending)           Assessment/Plan:    Acute pancreatitis  IVFs  NPO  RUQ u/s WNL  CT of the abdomen pelvis without any acute abnormality  IV prn pain control  Triglycerides 163  Previous episode of medication induced pancreatitis     New Onset Atrial fib  CAD history  Troponin trended WNL  HR fluctuating in 80s-100s  Echo completed today  Card consult  Continue cardizem IV 5 mg/hr, can switch to oral 120mg qd once no longer NPO  Can start Eliquis 5mg BID once no longer NPO  Will follow up in outpatient for management    Acute Hypoxemic resp failure  O2 sat 87% in ED required 3L  covid not detected  d dimer elevated at 735  On 2L sat 95%   Will order CTPA for PE rule out  Continue to monitor O2 sat and wean off supplemental O2 as necessary     SIRS  elev pct  WBC, lactic, BP, RR WNL  Awaiting blood cultures  RUQ u/s stairs to enter home
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
## 2023-01-16 ENCOUNTER — HOSPITAL ENCOUNTER (OUTPATIENT)
Dept: PHYSICAL THERAPY | Age: 68
Setting detail: THERAPIES SERIES
Discharge: HOME | End: 2023-01-16
Payer: COMMERCIAL

## 2023-01-16 PROCEDURE — 97110 THERAPEUTIC EXERCISES: CPT

## 2023-01-16 PROCEDURE — 97112 NEUROMUSCULAR REEDUCATION: CPT

## 2023-01-16 PROCEDURE — 97140 MANUAL THERAPY 1/> REGIONS: CPT
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
## 2023-01-18 ENCOUNTER — HOSPITAL ENCOUNTER (OUTPATIENT)
Dept: PHYSICAL THERAPY | Age: 68
Setting detail: THERAPIES SERIES
Discharge: HOME | End: 2023-01-18
Payer: COMMERCIAL

## 2023-01-18 PROCEDURE — 97140 MANUAL THERAPY 1/> REGIONS: CPT

## 2023-01-18 PROCEDURE — 97110 THERAPEUTIC EXERCISES: CPT

## 2023-01-18 PROCEDURE — 97112 NEUROMUSCULAR REEDUCATION: CPT
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
## 2023-01-23 ENCOUNTER — APPOINTMENT (OUTPATIENT)
Dept: PHYSICAL THERAPY | Age: 68
End: 2023-01-23
Payer: COMMERCIAL

## 2023-04-11 ENCOUNTER — APPOINTMENT (OUTPATIENT)
Dept: CT IMAGING | Age: 68
DRG: 417 | End: 2023-04-11
Payer: MEDICARE

## 2023-04-11 ENCOUNTER — HOSPITAL ENCOUNTER (INPATIENT)
Age: 68
LOS: 7 days | Discharge: HOME HEALTH | DRG: 417 | End: 2023-04-18
Attending: EMERGENCY MEDICINE | Admitting: INTERNAL MEDICINE
Payer: MEDICARE

## 2023-04-11 ENCOUNTER — APPOINTMENT (OUTPATIENT)
Dept: GENERAL RADIOLOGY | Age: 68
DRG: 417 | End: 2023-04-11
Payer: MEDICARE

## 2023-04-11 DIAGNOSIS — R10.9 ACUTE ABDOMINAL PAIN: ICD-10-CM

## 2023-04-11 DIAGNOSIS — R11.2 NAUSEA AND VOMITING, UNSPECIFIED VOMITING TYPE: ICD-10-CM

## 2023-04-11 DIAGNOSIS — N17.9 ACUTE KIDNEY INJURY (HCC): ICD-10-CM

## 2023-04-11 DIAGNOSIS — R10.11 RIGHT UPPER QUADRANT ABDOMINAL PAIN: Primary | ICD-10-CM

## 2023-04-11 DIAGNOSIS — K81.9 CHOLECYSTITIS: ICD-10-CM

## 2023-04-11 DIAGNOSIS — K85.00 IDIOPATHIC ACUTE PANCREATITIS WITHOUT INFECTION OR NECROSIS: ICD-10-CM

## 2023-04-11 PROBLEM — K85.90 ACUTE PANCREATITIS, UNSPECIFIED COMPLICATION STATUS, UNSPECIFIED PANCREATITIS TYPE: Status: ACTIVE | Noted: 2023-04-11

## 2023-04-11 PROBLEM — E11.9 INSULIN DEPENDENT TYPE 2 DIABETES MELLITUS (HCC): Status: ACTIVE | Noted: 2023-04-11

## 2023-04-11 PROBLEM — Z79.4 INSULIN DEPENDENT TYPE 2 DIABETES MELLITUS (HCC): Status: ACTIVE | Noted: 2023-04-11

## 2023-04-11 LAB
ALBUMIN SERPL-MCNC: 3.8 G/DL (ref 3.4–5)
ALBUMIN/GLOB SERPL: 1.3 {RATIO} (ref 1.1–2.2)
ALP SERPL-CCNC: 63 U/L (ref 40–129)
ALT SERPL-CCNC: 59 U/L (ref 10–40)
ANION GAP SERPL CALCULATED.3IONS-SCNC: 11 MMOL/L (ref 3–16)
AST SERPL-CCNC: 185 U/L (ref 15–37)
BACTERIA URNS QL MICRO: ABNORMAL /HPF
BASOPHILS # BLD: 0 K/UL (ref 0–0.2)
BASOPHILS NFR BLD: 0.4 %
BILIRUB SERPL-MCNC: 0.6 MG/DL (ref 0–1)
BILIRUB UR QL STRIP.AUTO: NEGATIVE
BUN SERPL-MCNC: 37 MG/DL (ref 7–20)
CALCIUM SERPL-MCNC: 9 MG/DL (ref 8.3–10.6)
CHLORIDE SERPL-SCNC: 109 MMOL/L (ref 99–110)
CLARITY UR: ABNORMAL
CO2 SERPL-SCNC: 23 MMOL/L (ref 21–32)
COLOR UR: YELLOW
CREAT SERPL-MCNC: 1.4 MG/DL (ref 0.6–1.2)
DEPRECATED RDW RBC AUTO: 16 % (ref 12.4–15.4)
EKG ATRIAL RATE: 66 BPM
EKG DIAGNOSIS: NORMAL
EKG P-R INTERVAL: 184 MS
EKG Q-T INTERVAL: 452 MS
EKG QRS DURATION: 80 MS
EKG QTC CALCULATION (BAZETT): 473 MS
EKG R AXIS: -28 DEGREES
EKG T AXIS: 55 DEGREES
EKG VENTRICULAR RATE: 66 BPM
EOSINOPHIL # BLD: 0.2 K/UL (ref 0–0.6)
EOSINOPHIL NFR BLD: 1.6 %
EPI CELLS #/AREA URNS HPF: ABNORMAL /HPF (ref 0–5)
GFR SERPLBLD CREATININE-BSD FMLA CKD-EPI: 41 ML/MIN/{1.73_M2}
GLUCOSE BLD-MCNC: 128 MG/DL (ref 70–99)
GLUCOSE BLD-MCNC: 159 MG/DL (ref 70–99)
GLUCOSE SERPL-MCNC: 183 MG/DL (ref 70–99)
GLUCOSE UR STRIP.AUTO-MCNC: NEGATIVE MG/DL
HCT VFR BLD AUTO: 35.1 % (ref 36–48)
HGB BLD-MCNC: 11 G/DL (ref 12–16)
HGB UR QL STRIP.AUTO: ABNORMAL
IMM GRANULOCYTES # BLD: 0.1 K/UL (ref 0–0.2)
IMM GRANULOCYTES NFR BLD: 0.8 %
KETONES UR STRIP.AUTO-MCNC: NEGATIVE MG/DL
LEUKOCYTE ESTERASE UR QL STRIP.AUTO: NEGATIVE
LIPASE SERPL-CCNC: >3000 U/L (ref 13–60)
LYMPHOCYTES # BLD: 1.3 K/UL (ref 1–5.1)
LYMPHOCYTES NFR BLD: 12.1 %
MCH RBC QN AUTO: 28.2 PG (ref 26–34)
MCHC RBC AUTO-ENTMCNC: 31.3 G/DL (ref 32–36.4)
MCV RBC AUTO: 90 FL (ref 80–100)
MONOCYTES # BLD: 0.6 K/UL (ref 0–1.3)
MONOCYTES NFR BLD: 5.7 %
NEUTROPHILS # BLD: 8.7 K/UL (ref 1.7–7.7)
NEUTROPHILS NFR BLD: 79.4 %
NITRITE UR QL STRIP.AUTO: NEGATIVE
NT-PROBNP SERPL-MCNC: 298 PG/ML (ref 0–124)
PERFORMED ON: ABNORMAL
PERFORMED ON: ABNORMAL
PH UR STRIP.AUTO: 5 [PH] (ref 5–8)
PLATELET # BLD AUTO: 463 K/UL (ref 135–450)
PMV BLD AUTO: 8.8 FL (ref 5–10.5)
POTASSIUM SERPL-SCNC: 4.2 MMOL/L (ref 3.5–5.1)
PROT SERPL-MCNC: 6.8 G/DL (ref 6.4–8.2)
PROT UR STRIP.AUTO-MCNC: NEGATIVE MG/DL
RBC # BLD AUTO: 3.9 M/UL (ref 4–5.2)
RBC #/AREA URNS HPF: ABNORMAL /HPF (ref 0–4)
SODIUM SERPL-SCNC: 143 MMOL/L (ref 136–145)
SP GR UR STRIP.AUTO: 1.01 (ref 1–1.03)
TROPONIN T SERPL-MCNC: <0.01 NG/ML
UA COMPLETE W REFLEX CULTURE PNL UR: ABNORMAL
UA DIPSTICK W REFLEX MICRO PNL UR: YES
URN SPEC COLLECT METH UR: ABNORMAL
UROBILINOGEN UR STRIP-ACNC: 0.2 E.U./DL
WBC # BLD AUTO: 10.9 K/UL (ref 4–11)
WBC #/AREA URNS HPF: ABNORMAL /HPF (ref 0–5)

## 2023-04-11 PROCEDURE — 85025 COMPLETE CBC W/AUTO DIFF WBC: CPT

## 2023-04-11 PROCEDURE — 84484 ASSAY OF TROPONIN QUANT: CPT

## 2023-04-11 PROCEDURE — 93005 ELECTROCARDIOGRAM TRACING: CPT | Performed by: EMERGENCY MEDICINE

## 2023-04-11 PROCEDURE — 96375 TX/PRO/DX INJ NEW DRUG ADDON: CPT

## 2023-04-11 PROCEDURE — 93010 ELECTROCARDIOGRAM REPORT: CPT | Performed by: INTERNAL MEDICINE

## 2023-04-11 PROCEDURE — 2500000003 HC RX 250 WO HCPCS: Performed by: EMERGENCY MEDICINE

## 2023-04-11 PROCEDURE — 6360000002 HC RX W HCPCS: Performed by: INTERNAL MEDICINE

## 2023-04-11 PROCEDURE — 2580000003 HC RX 258: Performed by: EMERGENCY MEDICINE

## 2023-04-11 PROCEDURE — 99285 EMERGENCY DEPT VISIT HI MDM: CPT

## 2023-04-11 PROCEDURE — 6370000000 HC RX 637 (ALT 250 FOR IP): Performed by: INTERNAL MEDICINE

## 2023-04-11 PROCEDURE — 96361 HYDRATE IV INFUSION ADD-ON: CPT

## 2023-04-11 PROCEDURE — 36415 COLL VENOUS BLD VENIPUNCTURE: CPT

## 2023-04-11 PROCEDURE — 96366 THER/PROPH/DIAG IV INF ADDON: CPT

## 2023-04-11 PROCEDURE — 74177 CT ABD & PELVIS W/CONTRAST: CPT

## 2023-04-11 PROCEDURE — 6360000004 HC RX CONTRAST MEDICATION: Performed by: EMERGENCY MEDICINE

## 2023-04-11 PROCEDURE — 96365 THER/PROPH/DIAG IV INF INIT: CPT

## 2023-04-11 PROCEDURE — A4216 STERILE WATER/SALINE, 10 ML: HCPCS | Performed by: EMERGENCY MEDICINE

## 2023-04-11 PROCEDURE — 81001 URINALYSIS AUTO W/SCOPE: CPT

## 2023-04-11 PROCEDURE — 71045 X-RAY EXAM CHEST 1 VIEW: CPT

## 2023-04-11 PROCEDURE — 83690 ASSAY OF LIPASE: CPT

## 2023-04-11 PROCEDURE — 6360000002 HC RX W HCPCS: Performed by: EMERGENCY MEDICINE

## 2023-04-11 PROCEDURE — 80053 COMPREHEN METABOLIC PANEL: CPT

## 2023-04-11 PROCEDURE — 1200000000 HC SEMI PRIVATE

## 2023-04-11 PROCEDURE — 83880 ASSAY OF NATRIURETIC PEPTIDE: CPT

## 2023-04-11 PROCEDURE — 2580000003 HC RX 258: Performed by: INTERNAL MEDICINE

## 2023-04-11 RX ORDER — INSULIN LISPRO 100 [IU]/ML
0-4 INJECTION, SOLUTION INTRAVENOUS; SUBCUTANEOUS NIGHTLY
Status: DISCONTINUED | OUTPATIENT
Start: 2023-04-11 | End: 2023-04-18 | Stop reason: HOSPADM

## 2023-04-11 RX ORDER — ONDANSETRON 2 MG/ML
4 INJECTION INTRAMUSCULAR; INTRAVENOUS EVERY 6 HOURS PRN
Status: DISCONTINUED | OUTPATIENT
Start: 2023-04-11 | End: 2023-04-18 | Stop reason: HOSPADM

## 2023-04-11 RX ORDER — 0.9 % SODIUM CHLORIDE 0.9 %
1000 INTRAVENOUS SOLUTION INTRAVENOUS ONCE
Status: COMPLETED | OUTPATIENT
Start: 2023-04-11 | End: 2023-04-11

## 2023-04-11 RX ORDER — CITALOPRAM 20 MG/1
40 TABLET ORAL DAILY
Status: DISCONTINUED | OUTPATIENT
Start: 2023-04-11 | End: 2023-04-18 | Stop reason: HOSPADM

## 2023-04-11 RX ORDER — FENOFIBRATE 160 MG/1
160 TABLET ORAL DAILY
Status: DISCONTINUED | OUTPATIENT
Start: 2023-04-11 | End: 2023-04-18 | Stop reason: HOSPADM

## 2023-04-11 RX ORDER — ATORVASTATIN CALCIUM 10 MG/1
10 TABLET, FILM COATED ORAL DAILY
Status: DISCONTINUED | OUTPATIENT
Start: 2023-04-11 | End: 2023-04-18 | Stop reason: HOSPADM

## 2023-04-11 RX ORDER — ONDANSETRON 4 MG/1
4 TABLET, ORALLY DISINTEGRATING ORAL EVERY 8 HOURS PRN
Status: DISCONTINUED | OUTPATIENT
Start: 2023-04-11 | End: 2023-04-18 | Stop reason: HOSPADM

## 2023-04-11 RX ORDER — SODIUM CHLORIDE 9 MG/ML
INJECTION, SOLUTION INTRAVENOUS PRN
Status: DISCONTINUED | OUTPATIENT
Start: 2023-04-11 | End: 2023-04-18 | Stop reason: HOSPADM

## 2023-04-11 RX ORDER — PEN NEEDLE, DIABETIC 32GX 5/32"
NEEDLE, DISPOSABLE MISCELLANEOUS
COMMUNITY
Start: 2023-03-07

## 2023-04-11 RX ORDER — ONDANSETRON 2 MG/ML
4 INJECTION INTRAMUSCULAR; INTRAVENOUS EVERY 6 HOURS PRN
Status: DISCONTINUED | OUTPATIENT
Start: 2023-04-11 | End: 2023-04-11 | Stop reason: SDUPTHER

## 2023-04-11 RX ORDER — PANTOPRAZOLE SODIUM 40 MG/1
40 TABLET, DELAYED RELEASE ORAL DAILY
Status: DISCONTINUED | OUTPATIENT
Start: 2023-04-11 | End: 2023-04-18 | Stop reason: HOSPADM

## 2023-04-11 RX ORDER — TROSPIUM CHLORIDE 20 MG/1
20 TABLET, FILM COATED ORAL
Status: DISCONTINUED | OUTPATIENT
Start: 2023-04-11 | End: 2023-04-18 | Stop reason: HOSPADM

## 2023-04-11 RX ORDER — INSULIN GLARGINE 100 [IU]/ML
70 INJECTION, SOLUTION SUBCUTANEOUS NIGHTLY
Status: DISCONTINUED | OUTPATIENT
Start: 2023-04-11 | End: 2023-04-13

## 2023-04-11 RX ORDER — LISINOPRIL AND HYDROCHLOROTHIAZIDE 20; 12.5 MG/1; MG/1
1 TABLET ORAL 2 TIMES DAILY
Status: DISCONTINUED | OUTPATIENT
Start: 2023-04-11 | End: 2023-04-18 | Stop reason: HOSPADM

## 2023-04-11 RX ORDER — DEXTROSE MONOHYDRATE 100 MG/ML
INJECTION, SOLUTION INTRAVENOUS CONTINUOUS PRN
Status: DISCONTINUED | OUTPATIENT
Start: 2023-04-11 | End: 2023-04-18 | Stop reason: HOSPADM

## 2023-04-11 RX ORDER — SODIUM CHLORIDE 0.9 % (FLUSH) 0.9 %
5-40 SYRINGE (ML) INJECTION PRN
Status: DISCONTINUED | OUTPATIENT
Start: 2023-04-11 | End: 2023-04-18 | Stop reason: HOSPADM

## 2023-04-11 RX ORDER — SODIUM CHLORIDE 0.9 % (FLUSH) 0.9 %
5-40 SYRINGE (ML) INJECTION EVERY 12 HOURS SCHEDULED
Status: DISCONTINUED | OUTPATIENT
Start: 2023-04-11 | End: 2023-04-18 | Stop reason: HOSPADM

## 2023-04-11 RX ORDER — POLYETHYLENE GLYCOL 3350 17 G/17G
17 POWDER, FOR SOLUTION ORAL DAILY PRN
Status: DISCONTINUED | OUTPATIENT
Start: 2023-04-11 | End: 2023-04-18 | Stop reason: HOSPADM

## 2023-04-11 RX ORDER — ACETAMINOPHEN 650 MG/1
650 SUPPOSITORY RECTAL EVERY 6 HOURS PRN
Status: DISCONTINUED | OUTPATIENT
Start: 2023-04-11 | End: 2023-04-18 | Stop reason: HOSPADM

## 2023-04-11 RX ORDER — ACETAMINOPHEN 325 MG/1
650 TABLET ORAL EVERY 6 HOURS PRN
Status: DISCONTINUED | OUTPATIENT
Start: 2023-04-11 | End: 2023-04-18 | Stop reason: HOSPADM

## 2023-04-11 RX ORDER — INSULIN LISPRO 100 [IU]/ML
0-8 INJECTION, SOLUTION INTRAVENOUS; SUBCUTANEOUS
Status: DISCONTINUED | OUTPATIENT
Start: 2023-04-11 | End: 2023-04-18 | Stop reason: HOSPADM

## 2023-04-11 RX ORDER — ONDANSETRON 2 MG/ML
4 INJECTION INTRAMUSCULAR; INTRAVENOUS ONCE
Status: COMPLETED | OUTPATIENT
Start: 2023-04-11 | End: 2023-04-11

## 2023-04-11 RX ORDER — SODIUM CHLORIDE 9 MG/ML
INJECTION, SOLUTION INTRAVENOUS CONTINUOUS
Status: DISCONTINUED | OUTPATIENT
Start: 2023-04-11 | End: 2023-04-13

## 2023-04-11 RX ORDER — ENOXAPARIN SODIUM 100 MG/ML
40 INJECTION SUBCUTANEOUS EVERY 24 HOURS
Status: DISCONTINUED | OUTPATIENT
Start: 2023-04-11 | End: 2023-04-15

## 2023-04-11 RX ORDER — MORPHINE SULFATE 2 MG/ML
2 INJECTION, SOLUTION INTRAMUSCULAR; INTRAVENOUS EVERY 4 HOURS PRN
Status: DISCONTINUED | OUTPATIENT
Start: 2023-04-11 | End: 2023-04-18 | Stop reason: HOSPADM

## 2023-04-11 RX ORDER — MORPHINE SULFATE 4 MG/ML
4 INJECTION, SOLUTION INTRAMUSCULAR; INTRAVENOUS ONCE
Status: COMPLETED | OUTPATIENT
Start: 2023-04-11 | End: 2023-04-11

## 2023-04-11 RX ORDER — INSULIN LISPRO 100 [IU]/ML
20 INJECTION, SOLUTION INTRAVENOUS; SUBCUTANEOUS
Status: DISCONTINUED | OUTPATIENT
Start: 2023-04-11 | End: 2023-04-13

## 2023-04-11 RX ORDER — TRAMADOL HYDROCHLORIDE 50 MG/1
50 TABLET ORAL EVERY 6 HOURS PRN
Status: DISCONTINUED | OUTPATIENT
Start: 2023-04-11 | End: 2023-04-12 | Stop reason: ALTCHOICE

## 2023-04-11 RX ADMIN — SODIUM CHLORIDE: 9 INJECTION, SOLUTION INTRAVENOUS at 11:52

## 2023-04-11 RX ADMIN — ATORVASTATIN CALCIUM 10 MG: 10 TABLET, FILM COATED ORAL at 17:28

## 2023-04-11 RX ADMIN — FAMOTIDINE 20 MG: 10 INJECTION, SOLUTION INTRAVENOUS at 11:54

## 2023-04-11 RX ADMIN — CITALOPRAM HYDROBROMIDE 40 MG: 20 TABLET ORAL at 17:26

## 2023-04-11 RX ADMIN — SODIUM CHLORIDE: 9 INJECTION, SOLUTION INTRAVENOUS at 16:16

## 2023-04-11 RX ADMIN — MORPHINE SULFATE 4 MG: 4 INJECTION, SOLUTION INTRAMUSCULAR; INTRAVENOUS at 10:26

## 2023-04-11 RX ADMIN — PANTOPRAZOLE SODIUM 40 MG: 40 TABLET, DELAYED RELEASE ORAL at 17:26

## 2023-04-11 RX ADMIN — SODIUM CHLORIDE 1000 ML: 9 INJECTION, SOLUTION INTRAVENOUS at 10:25

## 2023-04-11 RX ADMIN — PROMETHAZINE HYDROCHLORIDE: 25 INJECTION INTRAMUSCULAR; INTRAVENOUS at 12:22

## 2023-04-11 RX ADMIN — FENOFIBRATE 160 MG: 160 TABLET ORAL at 17:26

## 2023-04-11 RX ADMIN — ONDANSETRON 4 MG: 2 INJECTION INTRAMUSCULAR; INTRAVENOUS at 10:25

## 2023-04-11 RX ADMIN — INSULIN GLARGINE 70 UNITS: 100 INJECTION, SOLUTION SUBCUTANEOUS at 20:19

## 2023-04-11 RX ADMIN — TRAMADOL HYDROCHLORIDE 50 MG: 50 TABLET, COATED ORAL at 17:31

## 2023-04-11 RX ADMIN — TROSPIUM CHLORIDE 20 MG: 20 TABLET, FILM COATED ORAL at 17:26

## 2023-04-11 RX ADMIN — ENOXAPARIN SODIUM 40 MG: 100 INJECTION SUBCUTANEOUS at 17:27

## 2023-04-11 RX ADMIN — LISINOPRIL AND HYDROCHLOROTHIAZIDE 1 TABLET: 12.5; 2 TABLET ORAL at 20:19

## 2023-04-11 RX ADMIN — IOMEPROL INJECTION 85 ML: 714 INJECTION, SOLUTION INTRAVASCULAR at 10:56

## 2023-04-11 ASSESSMENT — PAIN DESCRIPTION - DESCRIPTORS
DESCRIPTORS: ACHING;STABBING
DESCRIPTORS: ACHING;STABBING
DESCRIPTORS: ACHING
DESCRIPTORS: ACHING;STABBING

## 2023-04-11 ASSESSMENT — PAIN SCALES - GENERAL
PAINLEVEL_OUTOF10: 10
PAINLEVEL_OUTOF10: 5
PAINLEVEL_OUTOF10: 4
PAINLEVEL_OUTOF10: 3
PAINLEVEL_OUTOF10: 4
PAINLEVEL_OUTOF10: 0
PAINLEVEL_OUTOF10: 5
PAINLEVEL_OUTOF10: 10

## 2023-04-11 ASSESSMENT — PAIN DESCRIPTION - FREQUENCY
FREQUENCY: CONTINUOUS
FREQUENCY: CONTINUOUS

## 2023-04-11 ASSESSMENT — PAIN DESCRIPTION - ORIENTATION
ORIENTATION: RIGHT;UPPER
ORIENTATION: RIGHT;MID;UPPER

## 2023-04-11 ASSESSMENT — PAIN DESCRIPTION - LOCATION
LOCATION: ABDOMEN

## 2023-04-11 ASSESSMENT — PAIN DESCRIPTION - ONSET
ONSET: ON-GOING
ONSET: ON-GOING

## 2023-04-11 ASSESSMENT — PAIN DESCRIPTION - PAIN TYPE: TYPE: ACUTE PAIN

## 2023-04-11 ASSESSMENT — PAIN - FUNCTIONAL ASSESSMENT
PAIN_FUNCTIONAL_ASSESSMENT: 0-10
PAIN_FUNCTIONAL_ASSESSMENT: ACTIVITIES ARE NOT PREVENTED
PAIN_FUNCTIONAL_ASSESSMENT: 0-10
PAIN_FUNCTIONAL_ASSESSMENT: ACTIVITIES ARE NOT PREVENTED
PAIN_FUNCTIONAL_ASSESSMENT: ACTIVITIES ARE NOT PREVENTED

## 2023-04-12 ENCOUNTER — APPOINTMENT (OUTPATIENT)
Dept: NUCLEAR MEDICINE | Age: 68
DRG: 417 | End: 2023-04-12
Payer: MEDICARE

## 2023-04-12 LAB
ANION GAP SERPL CALCULATED.3IONS-SCNC: 10 MMOL/L (ref 3–16)
BASOPHILS # BLD: 0 K/UL (ref 0–0.2)
BASOPHILS NFR BLD: 0.3 %
BUN SERPL-MCNC: 28 MG/DL (ref 7–20)
CALCIUM SERPL-MCNC: 8 MG/DL (ref 8.3–10.6)
CHLORIDE SERPL-SCNC: 108 MMOL/L (ref 99–110)
CO2 SERPL-SCNC: 25 MMOL/L (ref 21–32)
CREAT SERPL-MCNC: 1.1 MG/DL (ref 0.6–1.2)
DEPRECATED RDW RBC AUTO: 16.3 % (ref 12.4–15.4)
EOSINOPHIL # BLD: 0.1 K/UL (ref 0–0.6)
EOSINOPHIL NFR BLD: 1.5 %
GFR SERPLBLD CREATININE-BSD FMLA CKD-EPI: 54 ML/MIN/{1.73_M2}
GLUCOSE BLD-MCNC: 108 MG/DL (ref 70–99)
GLUCOSE BLD-MCNC: 111 MG/DL (ref 70–99)
GLUCOSE BLD-MCNC: 115 MG/DL (ref 70–99)
GLUCOSE BLD-MCNC: 124 MG/DL (ref 70–99)
GLUCOSE BLD-MCNC: 53 MG/DL (ref 70–99)
GLUCOSE BLD-MCNC: 57 MG/DL (ref 70–99)
GLUCOSE BLD-MCNC: 62 MG/DL (ref 70–99)
GLUCOSE BLD-MCNC: 62 MG/DL (ref 70–99)
GLUCOSE BLD-MCNC: 67 MG/DL (ref 70–99)
GLUCOSE BLD-MCNC: 68 MG/DL (ref 70–99)
GLUCOSE BLD-MCNC: 73 MG/DL (ref 70–99)
GLUCOSE BLD-MCNC: 85 MG/DL (ref 70–99)
GLUCOSE SERPL-MCNC: 70 MG/DL (ref 70–99)
HCT VFR BLD AUTO: 28.1 % (ref 36–48)
HGB BLD-MCNC: 9.4 G/DL (ref 12–16)
LIPASE SERPL-CCNC: 557 U/L (ref 13–60)
LYMPHOCYTES # BLD: 1.1 K/UL (ref 1–5.1)
LYMPHOCYTES NFR BLD: 13 %
MCH RBC QN AUTO: 28.7 PG (ref 26–34)
MCHC RBC AUTO-ENTMCNC: 33.5 G/DL (ref 31–36)
MCV RBC AUTO: 85.8 FL (ref 80–100)
MONOCYTES # BLD: 0.5 K/UL (ref 0–1.3)
MONOCYTES NFR BLD: 6.2 %
NEUTROPHILS # BLD: 6.6 K/UL (ref 1.7–7.7)
NEUTROPHILS NFR BLD: 79 %
PERFORMED ON: ABNORMAL
PERFORMED ON: NORMAL
PERFORMED ON: NORMAL
PLATELET # BLD AUTO: 341 K/UL (ref 135–450)
PMV BLD AUTO: 7.5 FL (ref 5–10.5)
POTASSIUM SERPL-SCNC: 4 MMOL/L (ref 3.5–5.1)
RBC # BLD AUTO: 3.28 M/UL (ref 4–5.2)
SODIUM SERPL-SCNC: 143 MMOL/L (ref 136–145)
WBC # BLD AUTO: 8.3 K/UL (ref 4–11)

## 2023-04-12 PROCEDURE — 6360000002 HC RX W HCPCS: Performed by: INTERNAL MEDICINE

## 2023-04-12 PROCEDURE — 6370000000 HC RX 637 (ALT 250 FOR IP): Performed by: INTERNAL MEDICINE

## 2023-04-12 PROCEDURE — 80048 BASIC METABOLIC PNL TOTAL CA: CPT

## 2023-04-12 PROCEDURE — 2500000003 HC RX 250 WO HCPCS: Performed by: INTERNAL MEDICINE

## 2023-04-12 PROCEDURE — 78226 HEPATOBILIARY SYSTEM IMAGING: CPT

## 2023-04-12 PROCEDURE — 2580000003 HC RX 258: Performed by: INTERNAL MEDICINE

## 2023-04-12 PROCEDURE — 36415 COLL VENOUS BLD VENIPUNCTURE: CPT

## 2023-04-12 PROCEDURE — 83690 ASSAY OF LIPASE: CPT

## 2023-04-12 PROCEDURE — 1200000000 HC SEMI PRIVATE

## 2023-04-12 PROCEDURE — 3430000000 HC RX DIAGNOSTIC RADIOPHARMACEUTICAL: Performed by: INTERNAL MEDICINE

## 2023-04-12 PROCEDURE — A9537 TC99M MEBROFENIN: HCPCS | Performed by: INTERNAL MEDICINE

## 2023-04-12 PROCEDURE — 85025 COMPLETE CBC W/AUTO DIFF WBC: CPT

## 2023-04-12 RX ORDER — OXYCODONE HYDROCHLORIDE 5 MG/1
5 TABLET ORAL EVERY 6 HOURS PRN
Status: DISCONTINUED | OUTPATIENT
Start: 2023-04-12 | End: 2023-04-18 | Stop reason: HOSPADM

## 2023-04-12 RX ORDER — DEXTROSE MONOHYDRATE 25 G/50ML
25 INJECTION, SOLUTION INTRAVENOUS PRN
Status: DISCONTINUED | OUTPATIENT
Start: 2023-04-12 | End: 2023-04-18 | Stop reason: HOSPADM

## 2023-04-12 RX ORDER — DEXTROSE MONOHYDRATE 25 G/50ML
25 INJECTION, SOLUTION INTRAVENOUS ONCE
Status: COMPLETED | OUTPATIENT
Start: 2023-04-12 | End: 2023-04-12

## 2023-04-12 RX ADMIN — DEXTROSE MONOHYDRATE 25 G: 25 INJECTION, SOLUTION INTRAVENOUS at 13:20

## 2023-04-12 RX ADMIN — OXYCODONE 5 MG: 5 TABLET ORAL at 21:38

## 2023-04-12 RX ADMIN — FENOFIBRATE 160 MG: 160 TABLET ORAL at 07:55

## 2023-04-12 RX ADMIN — Medication 6 MILLICURIE: at 11:50

## 2023-04-12 RX ADMIN — PIPERACILLIN AND TAZOBACTAM 3375 MG: 3; .375 INJECTION, POWDER, LYOPHILIZED, FOR SOLUTION INTRAVENOUS at 21:46

## 2023-04-12 RX ADMIN — ATORVASTATIN CALCIUM 10 MG: 10 TABLET, FILM COATED ORAL at 07:55

## 2023-04-12 RX ADMIN — SODIUM CHLORIDE: 9 INJECTION, SOLUTION INTRAVENOUS at 07:55

## 2023-04-12 RX ADMIN — TROSPIUM CHLORIDE 20 MG: 20 TABLET, FILM COATED ORAL at 05:20

## 2023-04-12 RX ADMIN — DEXTROSE MONOHYDRATE 125 ML: 100 INJECTION, SOLUTION INTRAVENOUS at 11:18

## 2023-04-12 RX ADMIN — TRAMADOL HYDROCHLORIDE 50 MG: 50 TABLET, COATED ORAL at 05:20

## 2023-04-12 RX ADMIN — LISINOPRIL AND HYDROCHLOROTHIAZIDE 1 TABLET: 12.5; 2 TABLET ORAL at 07:55

## 2023-04-12 RX ADMIN — LISINOPRIL AND HYDROCHLOROTHIAZIDE 1 TABLET: 12.5; 2 TABLET ORAL at 21:38

## 2023-04-12 RX ADMIN — DEXTROSE MONOHYDRATE: 100 INJECTION, SOLUTION INTRAVENOUS at 08:11

## 2023-04-12 RX ADMIN — ENOXAPARIN SODIUM 40 MG: 100 INJECTION SUBCUTANEOUS at 17:17

## 2023-04-12 RX ADMIN — MORPHINE SULFATE 2 MG: 2 INJECTION, SOLUTION INTRAMUSCULAR; INTRAVENOUS at 13:51

## 2023-04-12 RX ADMIN — PANTOPRAZOLE SODIUM 40 MG: 40 TABLET, DELAYED RELEASE ORAL at 07:55

## 2023-04-12 RX ADMIN — PIPERACILLIN AND TAZOBACTAM 4500 MG: 4; .5 INJECTION, POWDER, FOR SOLUTION INTRAVENOUS at 16:05

## 2023-04-12 RX ADMIN — DEXTROSE MONOHYDRATE 25 G: 25 INJECTION, SOLUTION INTRAVENOUS at 13:30

## 2023-04-12 RX ADMIN — TROSPIUM CHLORIDE 20 MG: 20 TABLET, FILM COATED ORAL at 16:05

## 2023-04-12 RX ADMIN — CITALOPRAM HYDROBROMIDE 40 MG: 20 TABLET ORAL at 07:55

## 2023-04-12 RX ADMIN — SODIUM CHLORIDE, PRESERVATIVE FREE 10 ML: 5 INJECTION INTRAVENOUS at 21:43

## 2023-04-12 RX ADMIN — SODIUM CHLORIDE, PRESERVATIVE FREE 10 ML: 5 INJECTION INTRAVENOUS at 07:59

## 2023-04-12 ASSESSMENT — PAIN - FUNCTIONAL ASSESSMENT
PAIN_FUNCTIONAL_ASSESSMENT: ACTIVITIES ARE NOT PREVENTED
PAIN_FUNCTIONAL_ASSESSMENT: PREVENTS OR INTERFERES SOME ACTIVE ACTIVITIES AND ADLS
PAIN_FUNCTIONAL_ASSESSMENT: PREVENTS OR INTERFERES SOME ACTIVE ACTIVITIES AND ADLS

## 2023-04-12 ASSESSMENT — PAIN DESCRIPTION - PAIN TYPE
TYPE: ACUTE PAIN
TYPE: ACUTE PAIN

## 2023-04-12 ASSESSMENT — PAIN SCALES - GENERAL
PAINLEVEL_OUTOF10: 9
PAINLEVEL_OUTOF10: 8
PAINLEVEL_OUTOF10: 8

## 2023-04-12 ASSESSMENT — PAIN DESCRIPTION - LOCATION
LOCATION: ABDOMEN

## 2023-04-12 ASSESSMENT — PAIN DESCRIPTION - FREQUENCY: FREQUENCY: INTERMITTENT

## 2023-04-12 ASSESSMENT — PAIN DESCRIPTION - ORIENTATION
ORIENTATION: RIGHT;UPPER

## 2023-04-12 ASSESSMENT — PAIN DESCRIPTION - DESCRIPTORS
DESCRIPTORS: SHARP
DESCRIPTORS: SHARP;STABBING
DESCRIPTORS: THROBBING;PRESSURE

## 2023-04-12 ASSESSMENT — PAIN DESCRIPTION - ONSET: ONSET: ON-GOING

## 2023-04-13 ENCOUNTER — APPOINTMENT (OUTPATIENT)
Dept: GENERAL RADIOLOGY | Age: 68
DRG: 417 | End: 2023-04-13
Payer: MEDICARE

## 2023-04-13 PROBLEM — K80.00 ACUTE CALCULOUS CHOLECYSTITIS: Status: ACTIVE | Noted: 2023-04-13

## 2023-04-13 LAB
ALBUMIN SERPL-MCNC: 3.3 G/DL (ref 3.4–5)
ALBUMIN/GLOB SERPL: 1.2 {RATIO} (ref 1.1–2.2)
ALP SERPL-CCNC: 62 U/L (ref 40–129)
ALT SERPL-CCNC: 62 U/L (ref 10–40)
ANION GAP SERPL CALCULATED.3IONS-SCNC: 10 MMOL/L (ref 3–16)
AST SERPL-CCNC: 70 U/L (ref 15–37)
BASOPHILS # BLD: 0 K/UL (ref 0–0.2)
BASOPHILS NFR BLD: 0.4 %
BILIRUB SERPL-MCNC: 0.9 MG/DL (ref 0–1)
BUN SERPL-MCNC: 22 MG/DL (ref 7–20)
CALCIUM SERPL-MCNC: 7.8 MG/DL (ref 8.3–10.6)
CHLORIDE SERPL-SCNC: 103 MMOL/L (ref 99–110)
CO2 SERPL-SCNC: 25 MMOL/L (ref 21–32)
CREAT SERPL-MCNC: 1.1 MG/DL (ref 0.6–1.2)
DEPRECATED RDW RBC AUTO: 16.1 % (ref 12.4–15.4)
EOSINOPHIL # BLD: 0.4 K/UL (ref 0–0.6)
EOSINOPHIL NFR BLD: 5.2 %
GFR SERPLBLD CREATININE-BSD FMLA CKD-EPI: 54 ML/MIN/{1.73_M2}
GLUCOSE BLD-MCNC: 106 MG/DL (ref 70–99)
GLUCOSE BLD-MCNC: 74 MG/DL (ref 70–99)
GLUCOSE BLD-MCNC: 77 MG/DL (ref 70–99)
GLUCOSE BLD-MCNC: 78 MG/DL (ref 70–99)
GLUCOSE BLD-MCNC: 83 MG/DL (ref 70–99)
GLUCOSE BLD-MCNC: 87 MG/DL (ref 70–99)
GLUCOSE BLD-MCNC: 93 MG/DL (ref 70–99)
GLUCOSE SERPL-MCNC: 67 MG/DL (ref 70–99)
HCT VFR BLD AUTO: 28.4 % (ref 36–48)
HGB BLD-MCNC: 9.3 G/DL (ref 12–16)
LIPASE SERPL-CCNC: 106 U/L (ref 13–60)
LYMPHOCYTES # BLD: 1.1 K/UL (ref 1–5.1)
LYMPHOCYTES NFR BLD: 15.5 %
MCH RBC QN AUTO: 28.4 PG (ref 26–34)
MCHC RBC AUTO-ENTMCNC: 32.8 G/DL (ref 31–36)
MCV RBC AUTO: 86.6 FL (ref 80–100)
MONOCYTES # BLD: 0.7 K/UL (ref 0–1.3)
MONOCYTES NFR BLD: 10.2 %
NEUTROPHILS # BLD: 4.7 K/UL (ref 1.7–7.7)
NEUTROPHILS NFR BLD: 68.7 %
PERFORMED ON: ABNORMAL
PERFORMED ON: NORMAL
PLATELET # BLD AUTO: 297 K/UL (ref 135–450)
PMV BLD AUTO: 7.5 FL (ref 5–10.5)
POTASSIUM SERPL-SCNC: 3.7 MMOL/L (ref 3.5–5.1)
PROT SERPL-MCNC: 6 G/DL (ref 6.4–8.2)
RBC # BLD AUTO: 3.27 M/UL (ref 4–5.2)
SODIUM SERPL-SCNC: 138 MMOL/L (ref 136–145)
WBC # BLD AUTO: 6.8 K/UL (ref 4–11)

## 2023-04-13 PROCEDURE — 36415 COLL VENOUS BLD VENIPUNCTURE: CPT

## 2023-04-13 PROCEDURE — 2580000003 HC RX 258: Performed by: INTERNAL MEDICINE

## 2023-04-13 PROCEDURE — 99222 1ST HOSP IP/OBS MODERATE 55: CPT | Performed by: SURGERY

## 2023-04-13 PROCEDURE — 1200000000 HC SEMI PRIVATE

## 2023-04-13 PROCEDURE — 94760 N-INVAS EAR/PLS OXIMETRY 1: CPT

## 2023-04-13 PROCEDURE — APPSS15 APP SPLIT SHARED TIME 0-15 MINUTES: Performed by: PHYSICIAN ASSISTANT

## 2023-04-13 PROCEDURE — 80053 COMPREHEN METABOLIC PANEL: CPT

## 2023-04-13 PROCEDURE — 6360000002 HC RX W HCPCS: Performed by: INTERNAL MEDICINE

## 2023-04-13 PROCEDURE — 83690 ASSAY OF LIPASE: CPT

## 2023-04-13 PROCEDURE — APPNB15 APP NON BILLABLE TIME 0-15 MINS: Performed by: PHYSICIAN ASSISTANT

## 2023-04-13 PROCEDURE — 71045 X-RAY EXAM CHEST 1 VIEW: CPT

## 2023-04-13 PROCEDURE — 85025 COMPLETE CBC W/AUTO DIFF WBC: CPT

## 2023-04-13 PROCEDURE — 2700000000 HC OXYGEN THERAPY PER DAY

## 2023-04-13 PROCEDURE — 6370000000 HC RX 637 (ALT 250 FOR IP): Performed by: ANESTHESIOLOGY

## 2023-04-13 PROCEDURE — 94640 AIRWAY INHALATION TREATMENT: CPT

## 2023-04-13 PROCEDURE — 6370000000 HC RX 637 (ALT 250 FOR IP): Performed by: INTERNAL MEDICINE

## 2023-04-13 RX ORDER — IPRATROPIUM BROMIDE AND ALBUTEROL SULFATE 2.5; .5 MG/3ML; MG/3ML
1 SOLUTION RESPIRATORY (INHALATION) ONCE
Status: COMPLETED | OUTPATIENT
Start: 2023-04-13 | End: 2023-04-13

## 2023-04-13 RX ORDER — INSULIN LISPRO 100 [IU]/ML
6 INJECTION, SOLUTION INTRAVENOUS; SUBCUTANEOUS
Status: DISCONTINUED | OUTPATIENT
Start: 2023-04-13 | End: 2023-04-18 | Stop reason: HOSPADM

## 2023-04-13 RX ORDER — FUROSEMIDE 10 MG/ML
20 INJECTION INTRAMUSCULAR; INTRAVENOUS DAILY
Status: COMPLETED | OUTPATIENT
Start: 2023-04-13 | End: 2023-04-15

## 2023-04-13 RX ORDER — INSULIN GLARGINE 100 [IU]/ML
10 INJECTION, SOLUTION SUBCUTANEOUS NIGHTLY
Status: DISCONTINUED | OUTPATIENT
Start: 2023-04-14 | End: 2023-04-18 | Stop reason: HOSPADM

## 2023-04-13 RX ORDER — INSULIN GLARGINE 100 [IU]/ML
25 INJECTION, SOLUTION SUBCUTANEOUS NIGHTLY
Status: DISCONTINUED | OUTPATIENT
Start: 2023-04-13 | End: 2023-04-13

## 2023-04-13 RX ADMIN — LISINOPRIL AND HYDROCHLOROTHIAZIDE 1 TABLET: 12.5; 2 TABLET ORAL at 09:02

## 2023-04-13 RX ADMIN — MORPHINE SULFATE 2 MG: 2 INJECTION, SOLUTION INTRAMUSCULAR; INTRAVENOUS at 05:46

## 2023-04-13 RX ADMIN — OXYCODONE 5 MG: 5 TABLET ORAL at 16:49

## 2023-04-13 RX ADMIN — PIPERACILLIN AND TAZOBACTAM 3375 MG: 3; .375 INJECTION, POWDER, LYOPHILIZED, FOR SOLUTION INTRAVENOUS at 16:59

## 2023-04-13 RX ADMIN — ENOXAPARIN SODIUM 40 MG: 100 INJECTION SUBCUTANEOUS at 16:50

## 2023-04-13 RX ADMIN — IPRATROPIUM BROMIDE AND ALBUTEROL SULFATE 1 AMPULE: .5; 2.5 SOLUTION RESPIRATORY (INHALATION) at 15:10

## 2023-04-13 RX ADMIN — PIPERACILLIN AND TAZOBACTAM 3375 MG: 3; .375 INJECTION, POWDER, LYOPHILIZED, FOR SOLUTION INTRAVENOUS at 05:43

## 2023-04-13 RX ADMIN — CITALOPRAM HYDROBROMIDE 40 MG: 20 TABLET ORAL at 09:02

## 2023-04-13 RX ADMIN — MORPHINE SULFATE 2 MG: 2 INJECTION, SOLUTION INTRAMUSCULAR; INTRAVENOUS at 10:24

## 2023-04-13 RX ADMIN — TROSPIUM CHLORIDE 20 MG: 20 TABLET, FILM COATED ORAL at 16:50

## 2023-04-13 RX ADMIN — SODIUM CHLORIDE: 9 INJECTION, SOLUTION INTRAVENOUS at 05:41

## 2023-04-13 RX ADMIN — FUROSEMIDE 20 MG: 10 INJECTION, SOLUTION INTRAMUSCULAR; INTRAVENOUS at 21:24

## 2023-04-13 RX ADMIN — MORPHINE SULFATE 2 MG: 2 INJECTION, SOLUTION INTRAMUSCULAR; INTRAVENOUS at 16:16

## 2023-04-13 RX ADMIN — LISINOPRIL AND HYDROCHLOROTHIAZIDE 1 TABLET: 12.5; 2 TABLET ORAL at 21:31

## 2023-04-13 ASSESSMENT — PAIN SCALES - GENERAL
PAINLEVEL_OUTOF10: 8
PAINLEVEL_OUTOF10: 8
PAINLEVEL_OUTOF10: 9
PAINLEVEL_OUTOF10: 7
PAINLEVEL_OUTOF10: 7

## 2023-04-13 ASSESSMENT — PAIN DESCRIPTION - LOCATION
LOCATION: ABDOMEN

## 2023-04-13 ASSESSMENT — PAIN DESCRIPTION - ORIENTATION
ORIENTATION: RIGHT;UPPER
ORIENTATION: RIGHT

## 2023-04-13 ASSESSMENT — PAIN DESCRIPTION - DESCRIPTORS
DESCRIPTORS: ACHING
DESCRIPTORS: STABBING
DESCRIPTORS: ACHING
DESCRIPTORS: STABBING;SHARP

## 2023-04-13 ASSESSMENT — PAIN - FUNCTIONAL ASSESSMENT
PAIN_FUNCTIONAL_ASSESSMENT: PREVENTS OR INTERFERES SOME ACTIVE ACTIVITIES AND ADLS
PAIN_FUNCTIONAL_ASSESSMENT: ACTIVITIES ARE NOT PREVENTED
PAIN_FUNCTIONAL_ASSESSMENT: 0-10

## 2023-04-14 ENCOUNTER — APPOINTMENT (OUTPATIENT)
Dept: GENERAL RADIOLOGY | Age: 68
DRG: 417 | End: 2023-04-14
Payer: MEDICARE

## 2023-04-14 LAB
ANION GAP SERPL CALCULATED.3IONS-SCNC: 12 MMOL/L (ref 3–16)
BASOPHILS # BLD: 0 K/UL (ref 0–0.2)
BASOPHILS NFR BLD: 0.5 %
BUN SERPL-MCNC: 17 MG/DL (ref 7–20)
CALCIUM SERPL-MCNC: 8.3 MG/DL (ref 8.3–10.6)
CHLORIDE SERPL-SCNC: 101 MMOL/L (ref 99–110)
CO2 SERPL-SCNC: 26 MMOL/L (ref 21–32)
CREAT SERPL-MCNC: 0.9 MG/DL (ref 0.6–1.2)
DEPRECATED RDW RBC AUTO: 15.2 % (ref 12.4–15.4)
EOSINOPHIL # BLD: 0.3 K/UL (ref 0–0.6)
EOSINOPHIL NFR BLD: 4.4 %
GFR SERPLBLD CREATININE-BSD FMLA CKD-EPI: >60 ML/MIN/{1.73_M2}
GLUCOSE BLD-MCNC: 108 MG/DL (ref 70–99)
GLUCOSE BLD-MCNC: 117 MG/DL (ref 70–99)
GLUCOSE BLD-MCNC: 128 MG/DL (ref 70–99)
GLUCOSE BLD-MCNC: 144 MG/DL (ref 70–99)
GLUCOSE BLD-MCNC: 171 MG/DL (ref 70–99)
GLUCOSE BLD-MCNC: 176 MG/DL (ref 70–99)
GLUCOSE SERPL-MCNC: 115 MG/DL (ref 70–99)
HCT VFR BLD AUTO: 27.9 % (ref 36–48)
HGB BLD-MCNC: 9.4 G/DL (ref 12–16)
LYMPHOCYTES # BLD: 0.8 K/UL (ref 1–5.1)
LYMPHOCYTES NFR BLD: 12.8 %
MCH RBC QN AUTO: 29 PG (ref 26–34)
MCHC RBC AUTO-ENTMCNC: 33.8 G/DL (ref 31–36)
MCV RBC AUTO: 85.6 FL (ref 80–100)
MONOCYTES # BLD: 0.5 K/UL (ref 0–1.3)
MONOCYTES NFR BLD: 8.1 %
NEUTROPHILS # BLD: 4.7 K/UL (ref 1.7–7.7)
NEUTROPHILS NFR BLD: 74.2 %
PERFORMED ON: ABNORMAL
PLATELET # BLD AUTO: 325 K/UL (ref 135–450)
PMV BLD AUTO: 7.3 FL (ref 5–10.5)
POTASSIUM SERPL-SCNC: 3.6 MMOL/L (ref 3.5–5.1)
RBC # BLD AUTO: 3.26 M/UL (ref 4–5.2)
SODIUM SERPL-SCNC: 139 MMOL/L (ref 136–145)
WBC # BLD AUTO: 6.4 K/UL (ref 4–11)

## 2023-04-14 PROCEDURE — BF502Z0 OTHER IMAGING OF BILE DUCTS USING FLUORESCING AGENT, INTRAOPERATIVE: ICD-10-PCS | Performed by: SURGERY

## 2023-04-14 PROCEDURE — 1200000000 HC SEMI PRIVATE

## 2023-04-14 PROCEDURE — 94761 N-INVAS EAR/PLS OXIMETRY MLT: CPT

## 2023-04-14 PROCEDURE — 7100000001 HC PACU RECOVERY - ADDTL 15 MIN: Performed by: SURGERY

## 2023-04-14 PROCEDURE — 80048 BASIC METABOLIC PNL TOTAL CA: CPT

## 2023-04-14 PROCEDURE — 3600000004 HC SURGERY LEVEL 4 BASE: Performed by: SURGERY

## 2023-04-14 PROCEDURE — 7100000000 HC PACU RECOVERY - FIRST 15 MIN: Performed by: SURGERY

## 2023-04-14 PROCEDURE — 3700000000 HC ANESTHESIA ATTENDED CARE: Performed by: SURGERY

## 2023-04-14 PROCEDURE — 6370000000 HC RX 637 (ALT 250 FOR IP): Performed by: SURGERY

## 2023-04-14 PROCEDURE — 47563 LAPARO CHOLECYSTECTOMY/GRAPH: CPT | Performed by: SURGERY

## 2023-04-14 PROCEDURE — 2580000003 HC RX 258: Performed by: SURGERY

## 2023-04-14 PROCEDURE — 88304 TISSUE EXAM BY PATHOLOGIST: CPT

## 2023-04-14 PROCEDURE — 2720000010 HC SURG SUPPLY STERILE: Performed by: SURGERY

## 2023-04-14 PROCEDURE — 94150 VITAL CAPACITY TEST: CPT

## 2023-04-14 PROCEDURE — 2500000003 HC RX 250 WO HCPCS: Performed by: SURGERY

## 2023-04-14 PROCEDURE — 3600000014 HC SURGERY LEVEL 4 ADDTL 15MIN: Performed by: SURGERY

## 2023-04-14 PROCEDURE — 36415 COLL VENOUS BLD VENIPUNCTURE: CPT

## 2023-04-14 PROCEDURE — 74300 X-RAY BILE DUCTS/PANCREAS: CPT

## 2023-04-14 PROCEDURE — 6370000000 HC RX 637 (ALT 250 FOR IP): Performed by: INTERNAL MEDICINE

## 2023-04-14 PROCEDURE — 2700000000 HC OXYGEN THERAPY PER DAY

## 2023-04-14 PROCEDURE — 2709999900 HC NON-CHARGEABLE SUPPLY: Performed by: SURGERY

## 2023-04-14 PROCEDURE — 6360000002 HC RX W HCPCS: Performed by: ANESTHESIOLOGY

## 2023-04-14 PROCEDURE — 6360000004 HC RX CONTRAST MEDICATION: Performed by: SURGERY

## 2023-04-14 PROCEDURE — A4217 STERILE WATER/SALINE, 500 ML: HCPCS | Performed by: SURGERY

## 2023-04-14 PROCEDURE — 6360000002 HC RX W HCPCS: Performed by: SURGERY

## 2023-04-14 PROCEDURE — 85025 COMPLETE CBC W/AUTO DIFF WBC: CPT

## 2023-04-14 PROCEDURE — 3700000001 HC ADD 15 MINUTES (ANESTHESIA): Performed by: SURGERY

## 2023-04-14 PROCEDURE — 6360000002 HC RX W HCPCS: Performed by: INTERNAL MEDICINE

## 2023-04-14 PROCEDURE — 0FT44ZZ RESECTION OF GALLBLADDER, PERCUTANEOUS ENDOSCOPIC APPROACH: ICD-10-PCS | Performed by: SURGERY

## 2023-04-14 PROCEDURE — 2580000003 HC RX 258: Performed by: INTERNAL MEDICINE

## 2023-04-14 RX ORDER — BUPIVACAINE HYDROCHLORIDE 5 MG/ML
INJECTION, SOLUTION EPIDURAL; INTRACAUDAL
Status: COMPLETED | OUTPATIENT
Start: 2023-04-14 | End: 2023-04-14

## 2023-04-14 RX ORDER — MEPERIDINE HYDROCHLORIDE 25 MG/ML
12.5 INJECTION INTRAMUSCULAR; INTRAVENOUS; SUBCUTANEOUS
Status: DISCONTINUED | OUTPATIENT
Start: 2023-04-14 | End: 2023-04-14 | Stop reason: HOSPADM

## 2023-04-14 RX ORDER — FENTANYL CITRATE 50 UG/ML
25 INJECTION, SOLUTION INTRAMUSCULAR; INTRAVENOUS EVERY 5 MIN PRN
Status: DISCONTINUED | OUTPATIENT
Start: 2023-04-14 | End: 2023-04-14 | Stop reason: HOSPADM

## 2023-04-14 RX ORDER — SODIUM CHLORIDE 0.9 % (FLUSH) 0.9 %
5-40 SYRINGE (ML) INJECTION PRN
Status: DISCONTINUED | OUTPATIENT
Start: 2023-04-14 | End: 2023-04-14 | Stop reason: HOSPADM

## 2023-04-14 RX ORDER — MAGNESIUM HYDROXIDE 1200 MG/15ML
LIQUID ORAL CONTINUOUS PRN
Status: COMPLETED | OUTPATIENT
Start: 2023-04-14 | End: 2023-04-14

## 2023-04-14 RX ORDER — FENTANYL CITRATE 50 UG/ML
50 INJECTION, SOLUTION INTRAMUSCULAR; INTRAVENOUS EVERY 5 MIN PRN
Status: COMPLETED | OUTPATIENT
Start: 2023-04-14 | End: 2023-04-14

## 2023-04-14 RX ORDER — SODIUM CHLORIDE 0.9 % (FLUSH) 0.9 %
5-40 SYRINGE (ML) INJECTION EVERY 12 HOURS SCHEDULED
Status: DISCONTINUED | OUTPATIENT
Start: 2023-04-14 | End: 2023-04-14 | Stop reason: HOSPADM

## 2023-04-14 RX ORDER — ONDANSETRON 2 MG/ML
4 INJECTION INTRAMUSCULAR; INTRAVENOUS
Status: COMPLETED | OUTPATIENT
Start: 2023-04-14 | End: 2023-04-14

## 2023-04-14 RX ORDER — SODIUM CHLORIDE 9 MG/ML
INJECTION, SOLUTION INTRAVENOUS PRN
Status: DISCONTINUED | OUTPATIENT
Start: 2023-04-14 | End: 2023-04-14 | Stop reason: HOSPADM

## 2023-04-14 RX ADMIN — OXYCODONE 5 MG: 5 TABLET ORAL at 16:59

## 2023-04-14 RX ADMIN — PIPERACILLIN AND TAZOBACTAM 3375 MG: 3; .375 INJECTION, POWDER, LYOPHILIZED, FOR SOLUTION INTRAVENOUS at 09:20

## 2023-04-14 RX ADMIN — FENTANYL CITRATE 50 MCG: 50 INJECTION, SOLUTION INTRAMUSCULAR; INTRAVENOUS at 13:23

## 2023-04-14 RX ADMIN — MORPHINE SULFATE 2 MG: 2 INJECTION, SOLUTION INTRAMUSCULAR; INTRAVENOUS at 09:34

## 2023-04-14 RX ADMIN — TROSPIUM CHLORIDE 20 MG: 20 TABLET, FILM COATED ORAL at 05:44

## 2023-04-14 RX ADMIN — INSULIN GLARGINE 10 UNITS: 100 INJECTION, SOLUTION SUBCUTANEOUS at 22:15

## 2023-04-14 RX ADMIN — ONDANSETRON 4 MG: 2 INJECTION INTRAMUSCULAR; INTRAVENOUS at 14:08

## 2023-04-14 RX ADMIN — SODIUM CHLORIDE, PRESERVATIVE FREE 10 ML: 5 INJECTION INTRAVENOUS at 09:23

## 2023-04-14 RX ADMIN — LISINOPRIL AND HYDROCHLOROTHIAZIDE 1 TABLET: 12.5; 2 TABLET ORAL at 19:45

## 2023-04-14 RX ADMIN — FENTANYL CITRATE 50 MCG: 50 INJECTION, SOLUTION INTRAMUSCULAR; INTRAVENOUS at 13:50

## 2023-04-14 RX ADMIN — PIPERACILLIN AND TAZOBACTAM 3375 MG: 3; .375 INJECTION, POWDER, LYOPHILIZED, FOR SOLUTION INTRAVENOUS at 01:15

## 2023-04-14 RX ADMIN — OXYCODONE 5 MG: 5 TABLET ORAL at 06:38

## 2023-04-14 RX ADMIN — ENOXAPARIN SODIUM 40 MG: 100 INJECTION SUBCUTANEOUS at 16:59

## 2023-04-14 RX ADMIN — FUROSEMIDE 20 MG: 10 INJECTION, SOLUTION INTRAMUSCULAR; INTRAVENOUS at 09:20

## 2023-04-14 RX ADMIN — FENTANYL CITRATE 50 MCG: 50 INJECTION, SOLUTION INTRAMUSCULAR; INTRAVENOUS at 13:28

## 2023-04-14 RX ADMIN — FENTANYL CITRATE 50 MCG: 50 INJECTION, SOLUTION INTRAMUSCULAR; INTRAVENOUS at 13:59

## 2023-04-14 RX ADMIN — PIPERACILLIN AND TAZOBACTAM 3375 MG: 3; .375 INJECTION, POWDER, LYOPHILIZED, FOR SOLUTION INTRAVENOUS at 17:07

## 2023-04-14 RX ADMIN — LISINOPRIL AND HYDROCHLOROTHIAZIDE 1 TABLET: 12.5; 2 TABLET ORAL at 09:25

## 2023-04-14 ASSESSMENT — PAIN DESCRIPTION - DESCRIPTORS
DESCRIPTORS: ACHING;SHARP;DISCOMFORT
DESCRIPTORS: ACHING

## 2023-04-14 ASSESSMENT — PAIN SCALES - GENERAL
PAINLEVEL_OUTOF10: 10
PAINLEVEL_OUTOF10: 10
PAINLEVEL_OUTOF10: 8
PAINLEVEL_OUTOF10: 8
PAINLEVEL_OUTOF10: 10
PAINLEVEL_OUTOF10: 10
PAINLEVEL_OUTOF10: 4
PAINLEVEL_OUTOF10: 7

## 2023-04-14 ASSESSMENT — PAIN DESCRIPTION - ORIENTATION
ORIENTATION: RIGHT

## 2023-04-14 ASSESSMENT — PAIN DESCRIPTION - LOCATION
LOCATION: ABDOMEN

## 2023-04-14 ASSESSMENT — PAIN - FUNCTIONAL ASSESSMENT
PAIN_FUNCTIONAL_ASSESSMENT: 0-10
PAIN_FUNCTIONAL_ASSESSMENT: ACTIVITIES ARE NOT PREVENTED

## 2023-04-15 LAB
GLUCOSE BLD-MCNC: 150 MG/DL (ref 70–99)
GLUCOSE BLD-MCNC: 165 MG/DL (ref 70–99)
GLUCOSE BLD-MCNC: 171 MG/DL (ref 70–99)
GLUCOSE BLD-MCNC: 174 MG/DL (ref 70–99)
PERFORMED ON: ABNORMAL

## 2023-04-15 PROCEDURE — 1200000000 HC SEMI PRIVATE

## 2023-04-15 PROCEDURE — 97535 SELF CARE MNGMENT TRAINING: CPT

## 2023-04-15 PROCEDURE — 97530 THERAPEUTIC ACTIVITIES: CPT

## 2023-04-15 PROCEDURE — 6360000002 HC RX W HCPCS: Performed by: SURGERY

## 2023-04-15 PROCEDURE — 6370000000 HC RX 637 (ALT 250 FOR IP): Performed by: INTERNAL MEDICINE

## 2023-04-15 PROCEDURE — 6370000000 HC RX 637 (ALT 250 FOR IP): Performed by: SURGERY

## 2023-04-15 PROCEDURE — 2580000003 HC RX 258: Performed by: SURGERY

## 2023-04-15 PROCEDURE — 99024 POSTOP FOLLOW-UP VISIT: CPT | Performed by: SURGERY

## 2023-04-15 PROCEDURE — 97166 OT EVAL MOD COMPLEX 45 MIN: CPT

## 2023-04-15 PROCEDURE — 97161 PT EVAL LOW COMPLEX 20 MIN: CPT

## 2023-04-15 RX ORDER — AMOXICILLIN AND CLAVULANATE POTASSIUM 875; 125 MG/1; MG/1
1 TABLET, FILM COATED ORAL EVERY 12 HOURS SCHEDULED
Status: COMPLETED | OUTPATIENT
Start: 2023-04-15 | End: 2023-04-18

## 2023-04-15 RX ADMIN — AMOXICILLIN AND CLAVULANATE POTASSIUM 1 TABLET: 875; 125 TABLET, FILM COATED ORAL at 20:33

## 2023-04-15 RX ADMIN — SODIUM CHLORIDE, PRESERVATIVE FREE 10 ML: 5 INJECTION INTRAVENOUS at 20:36

## 2023-04-15 RX ADMIN — ATORVASTATIN CALCIUM 10 MG: 10 TABLET, FILM COATED ORAL at 08:36

## 2023-04-15 RX ADMIN — CITALOPRAM HYDROBROMIDE 40 MG: 20 TABLET ORAL at 08:36

## 2023-04-15 RX ADMIN — APIXABAN 5 MG: 5 TABLET, FILM COATED ORAL at 09:55

## 2023-04-15 RX ADMIN — LISINOPRIL AND HYDROCHLOROTHIAZIDE 1 TABLET: 12.5; 2 TABLET ORAL at 08:39

## 2023-04-15 RX ADMIN — SODIUM CHLORIDE, PRESERVATIVE FREE 10 ML: 5 INJECTION INTRAVENOUS at 08:37

## 2023-04-15 RX ADMIN — PANTOPRAZOLE SODIUM 40 MG: 40 TABLET, DELAYED RELEASE ORAL at 08:36

## 2023-04-15 RX ADMIN — MORPHINE SULFATE 2 MG: 2 INJECTION, SOLUTION INTRAMUSCULAR; INTRAVENOUS at 08:35

## 2023-04-15 RX ADMIN — PIPERACILLIN AND TAZOBACTAM 3375 MG: 3; .375 INJECTION, POWDER, LYOPHILIZED, FOR SOLUTION INTRAVENOUS at 00:39

## 2023-04-15 RX ADMIN — FENOFIBRATE 160 MG: 160 TABLET ORAL at 08:36

## 2023-04-15 RX ADMIN — OXYCODONE 5 MG: 5 TABLET ORAL at 18:14

## 2023-04-15 RX ADMIN — TROSPIUM CHLORIDE 20 MG: 20 TABLET, FILM COATED ORAL at 05:11

## 2023-04-15 RX ADMIN — FUROSEMIDE 20 MG: 10 INJECTION, SOLUTION INTRAMUSCULAR; INTRAVENOUS at 08:36

## 2023-04-15 RX ADMIN — INSULIN GLARGINE 10 UNITS: 100 INJECTION, SOLUTION SUBCUTANEOUS at 20:37

## 2023-04-15 RX ADMIN — PIPERACILLIN AND TAZOBACTAM 3375 MG: 3; .375 INJECTION, POWDER, LYOPHILIZED, FOR SOLUTION INTRAVENOUS at 10:00

## 2023-04-15 RX ADMIN — APIXABAN 5 MG: 5 TABLET, FILM COATED ORAL at 20:33

## 2023-04-15 RX ADMIN — TROSPIUM CHLORIDE 20 MG: 20 TABLET, FILM COATED ORAL at 16:15

## 2023-04-15 RX ADMIN — METOPROLOL TARTRATE 25 MG: 25 TABLET, FILM COATED ORAL at 09:55

## 2023-04-15 RX ADMIN — ACETAMINOPHEN 650 MG: 325 TABLET ORAL at 20:34

## 2023-04-15 ASSESSMENT — PAIN SCALES - GENERAL
PAINLEVEL_OUTOF10: 7
PAINLEVEL_OUTOF10: 7
PAINLEVEL_OUTOF10: 0
PAINLEVEL_OUTOF10: 7
PAINLEVEL_OUTOF10: 5

## 2023-04-15 ASSESSMENT — PAIN DESCRIPTION - LOCATION
LOCATION: ABDOMEN

## 2023-04-15 ASSESSMENT — PAIN - FUNCTIONAL ASSESSMENT: PAIN_FUNCTIONAL_ASSESSMENT: ACTIVITIES ARE NOT PREVENTED

## 2023-04-15 ASSESSMENT — PAIN DESCRIPTION - DESCRIPTORS
DESCRIPTORS: SHARP
DESCRIPTORS: ACHING;DISCOMFORT
DESCRIPTORS: DULL
DESCRIPTORS: DULL

## 2023-04-15 ASSESSMENT — PAIN DESCRIPTION - ORIENTATION
ORIENTATION: RIGHT

## 2023-04-16 LAB
ALBUMIN SERPL-MCNC: 3.2 G/DL (ref 3.4–5)
ALBUMIN/GLOB SERPL: 1 {RATIO} (ref 1.1–2.2)
ALP SERPL-CCNC: 97 U/L (ref 40–129)
ALT SERPL-CCNC: 44 U/L (ref 10–40)
ANION GAP SERPL CALCULATED.3IONS-SCNC: 12 MMOL/L (ref 3–16)
AST SERPL-CCNC: 32 U/L (ref 15–37)
BILIRUB SERPL-MCNC: 1.1 MG/DL (ref 0–1)
BUN SERPL-MCNC: 20 MG/DL (ref 7–20)
CALCIUM SERPL-MCNC: 8.3 MG/DL (ref 8.3–10.6)
CHLORIDE SERPL-SCNC: 97 MMOL/L (ref 99–110)
CO2 SERPL-SCNC: 29 MMOL/L (ref 21–32)
CREAT SERPL-MCNC: 1 MG/DL (ref 0.6–1.2)
DEPRECATED RDW RBC AUTO: 15.7 % (ref 12.4–15.4)
GFR SERPLBLD CREATININE-BSD FMLA CKD-EPI: >60 ML/MIN/{1.73_M2}
GLUCOSE BLD-MCNC: 142 MG/DL (ref 70–99)
GLUCOSE BLD-MCNC: 156 MG/DL (ref 70–99)
GLUCOSE BLD-MCNC: 173 MG/DL (ref 70–99)
GLUCOSE BLD-MCNC: 188 MG/DL (ref 70–99)
GLUCOSE SERPL-MCNC: 137 MG/DL (ref 70–99)
HCT VFR BLD AUTO: 31.6 % (ref 36–48)
HGB BLD-MCNC: 10.4 G/DL (ref 12–16)
MAGNESIUM SERPL-MCNC: 2.2 MG/DL (ref 1.8–2.4)
MCH RBC QN AUTO: 28.5 PG (ref 26–34)
MCHC RBC AUTO-ENTMCNC: 32.9 G/DL (ref 31–36)
MCV RBC AUTO: 86.4 FL (ref 80–100)
PERFORMED ON: ABNORMAL
PLATELET # BLD AUTO: 414 K/UL (ref 135–450)
PMV BLD AUTO: 7.3 FL (ref 5–10.5)
POTASSIUM SERPL-SCNC: 3.6 MMOL/L (ref 3.5–5.1)
PROT SERPL-MCNC: 6.5 G/DL (ref 6.4–8.2)
RBC # BLD AUTO: 3.65 M/UL (ref 4–5.2)
SODIUM SERPL-SCNC: 138 MMOL/L (ref 136–145)
WBC # BLD AUTO: 10.8 K/UL (ref 4–11)

## 2023-04-16 PROCEDURE — 6370000000 HC RX 637 (ALT 250 FOR IP): Performed by: SURGERY

## 2023-04-16 PROCEDURE — 2580000003 HC RX 258: Performed by: SURGERY

## 2023-04-16 PROCEDURE — 85027 COMPLETE CBC AUTOMATED: CPT

## 2023-04-16 PROCEDURE — 80053 COMPREHEN METABOLIC PANEL: CPT

## 2023-04-16 PROCEDURE — 6370000000 HC RX 637 (ALT 250 FOR IP): Performed by: INTERNAL MEDICINE

## 2023-04-16 PROCEDURE — 99024 POSTOP FOLLOW-UP VISIT: CPT | Performed by: SURGERY

## 2023-04-16 PROCEDURE — 2580000003 HC RX 258: Performed by: INTERNAL MEDICINE

## 2023-04-16 PROCEDURE — 36415 COLL VENOUS BLD VENIPUNCTURE: CPT

## 2023-04-16 PROCEDURE — 1200000000 HC SEMI PRIVATE

## 2023-04-16 PROCEDURE — 83735 ASSAY OF MAGNESIUM: CPT

## 2023-04-16 RX ORDER — SODIUM CHLORIDE 9 MG/ML
INJECTION, SOLUTION INTRAVENOUS ONCE
Status: COMPLETED | OUTPATIENT
Start: 2023-04-16 | End: 2023-04-16

## 2023-04-16 RX ORDER — BISACODYL 10 MG
10 SUPPOSITORY, RECTAL RECTAL ONCE
Status: COMPLETED | OUTPATIENT
Start: 2023-04-16 | End: 2023-04-16

## 2023-04-16 RX ADMIN — METOPROLOL TARTRATE 25 MG: 25 TABLET, FILM COATED ORAL at 09:45

## 2023-04-16 RX ADMIN — SODIUM CHLORIDE, PRESERVATIVE FREE 10 ML: 5 INJECTION INTRAVENOUS at 09:48

## 2023-04-16 RX ADMIN — BISACODYL 10 MG: 10 SUPPOSITORY RECTAL at 22:18

## 2023-04-16 RX ADMIN — TROSPIUM CHLORIDE 20 MG: 20 TABLET, FILM COATED ORAL at 06:55

## 2023-04-16 RX ADMIN — SODIUM CHLORIDE: 9 INJECTION, SOLUTION INTRAVENOUS at 14:59

## 2023-04-16 RX ADMIN — FENOFIBRATE 160 MG: 160 TABLET ORAL at 09:45

## 2023-04-16 RX ADMIN — CITALOPRAM HYDROBROMIDE 40 MG: 20 TABLET ORAL at 09:45

## 2023-04-16 RX ADMIN — TROSPIUM CHLORIDE 20 MG: 20 TABLET, FILM COATED ORAL at 17:41

## 2023-04-16 RX ADMIN — LISINOPRIL AND HYDROCHLOROTHIAZIDE 1 TABLET: 12.5; 2 TABLET ORAL at 20:45

## 2023-04-16 RX ADMIN — APIXABAN 5 MG: 5 TABLET, FILM COATED ORAL at 09:45

## 2023-04-16 RX ADMIN — APIXABAN 5 MG: 5 TABLET, FILM COATED ORAL at 20:45

## 2023-04-16 RX ADMIN — OXYCODONE 5 MG: 5 TABLET ORAL at 12:50

## 2023-04-16 RX ADMIN — METOPROLOL TARTRATE 25 MG: 25 TABLET, FILM COATED ORAL at 02:26

## 2023-04-16 RX ADMIN — PANTOPRAZOLE SODIUM 40 MG: 40 TABLET, DELAYED RELEASE ORAL at 09:45

## 2023-04-16 RX ADMIN — AMOXICILLIN AND CLAVULANATE POTASSIUM 1 TABLET: 875; 125 TABLET, FILM COATED ORAL at 20:45

## 2023-04-16 RX ADMIN — METOPROLOL TARTRATE 25 MG: 25 TABLET, FILM COATED ORAL at 20:45

## 2023-04-16 RX ADMIN — ATORVASTATIN CALCIUM 10 MG: 10 TABLET, FILM COATED ORAL at 09:45

## 2023-04-16 RX ADMIN — INSULIN GLARGINE 10 UNITS: 100 INJECTION, SOLUTION SUBCUTANEOUS at 20:46

## 2023-04-16 RX ADMIN — AMOXICILLIN AND CLAVULANATE POTASSIUM 1 TABLET: 875; 125 TABLET, FILM COATED ORAL at 09:44

## 2023-04-16 ASSESSMENT — PAIN SCALES - GENERAL: PAINLEVEL_OUTOF10: 8

## 2023-04-17 ENCOUNTER — APPOINTMENT (OUTPATIENT)
Dept: GENERAL RADIOLOGY | Age: 68
DRG: 417 | End: 2023-04-17
Payer: MEDICARE

## 2023-04-17 LAB
GLUCOSE BLD-MCNC: 157 MG/DL (ref 70–99)
GLUCOSE BLD-MCNC: 161 MG/DL (ref 70–99)
GLUCOSE BLD-MCNC: 164 MG/DL (ref 70–99)
GLUCOSE BLD-MCNC: 179 MG/DL (ref 70–99)
PERFORMED ON: ABNORMAL

## 2023-04-17 PROCEDURE — 2700000000 HC OXYGEN THERAPY PER DAY

## 2023-04-17 PROCEDURE — 97530 THERAPEUTIC ACTIVITIES: CPT

## 2023-04-17 PROCEDURE — 6370000000 HC RX 637 (ALT 250 FOR IP): Performed by: SURGERY

## 2023-04-17 PROCEDURE — 6370000000 HC RX 637 (ALT 250 FOR IP): Performed by: INTERNAL MEDICINE

## 2023-04-17 PROCEDURE — 71045 X-RAY EXAM CHEST 1 VIEW: CPT

## 2023-04-17 PROCEDURE — 99024 POSTOP FOLLOW-UP VISIT: CPT | Performed by: PHYSICIAN ASSISTANT

## 2023-04-17 PROCEDURE — 97535 SELF CARE MNGMENT TRAINING: CPT

## 2023-04-17 PROCEDURE — 2580000003 HC RX 258: Performed by: SURGERY

## 2023-04-17 PROCEDURE — APPNB15 APP NON BILLABLE TIME 0-15 MINS: Performed by: PHYSICIAN ASSISTANT

## 2023-04-17 PROCEDURE — 1200000000 HC SEMI PRIVATE

## 2023-04-17 PROCEDURE — 94761 N-INVAS EAR/PLS OXIMETRY MLT: CPT

## 2023-04-17 PROCEDURE — APPSS15 APP SPLIT SHARED TIME 0-15 MINUTES: Performed by: PHYSICIAN ASSISTANT

## 2023-04-17 RX ORDER — BENZONATATE 100 MG/1
100 CAPSULE ORAL 3 TIMES DAILY PRN
Status: DISCONTINUED | OUTPATIENT
Start: 2023-04-17 | End: 2023-04-18 | Stop reason: HOSPADM

## 2023-04-17 RX ADMIN — LISINOPRIL AND HYDROCHLOROTHIAZIDE 1 TABLET: 12.5; 2 TABLET ORAL at 21:14

## 2023-04-17 RX ADMIN — AMOXICILLIN AND CLAVULANATE POTASSIUM 1 TABLET: 875; 125 TABLET, FILM COATED ORAL at 21:14

## 2023-04-17 RX ADMIN — FENOFIBRATE 160 MG: 160 TABLET ORAL at 09:12

## 2023-04-17 RX ADMIN — SODIUM CHLORIDE, PRESERVATIVE FREE 10 ML: 5 INJECTION INTRAVENOUS at 21:15

## 2023-04-17 RX ADMIN — ATORVASTATIN CALCIUM 10 MG: 10 TABLET, FILM COATED ORAL at 09:13

## 2023-04-17 RX ADMIN — APIXABAN 5 MG: 5 TABLET, FILM COATED ORAL at 09:12

## 2023-04-17 RX ADMIN — CITALOPRAM HYDROBROMIDE 40 MG: 20 TABLET ORAL at 09:13

## 2023-04-17 RX ADMIN — METOPROLOL TARTRATE 25 MG: 25 TABLET, FILM COATED ORAL at 09:12

## 2023-04-17 RX ADMIN — BENZONATATE 100 MG: 100 CAPSULE ORAL at 17:45

## 2023-04-17 RX ADMIN — APIXABAN 5 MG: 5 TABLET, FILM COATED ORAL at 21:14

## 2023-04-17 RX ADMIN — PANTOPRAZOLE SODIUM 40 MG: 40 TABLET, DELAYED RELEASE ORAL at 09:12

## 2023-04-17 RX ADMIN — BENZONATATE 100 MG: 100 CAPSULE ORAL at 11:43

## 2023-04-17 RX ADMIN — INSULIN GLARGINE 10 UNITS: 100 INJECTION, SOLUTION SUBCUTANEOUS at 21:13

## 2023-04-17 RX ADMIN — SODIUM CHLORIDE, PRESERVATIVE FREE 10 ML: 5 INJECTION INTRAVENOUS at 09:15

## 2023-04-17 RX ADMIN — AMOXICILLIN AND CLAVULANATE POTASSIUM 1 TABLET: 875; 125 TABLET, FILM COATED ORAL at 09:12

## 2023-04-17 RX ADMIN — TROSPIUM CHLORIDE 20 MG: 20 TABLET, FILM COATED ORAL at 06:32

## 2023-04-17 RX ADMIN — TROSPIUM CHLORIDE 20 MG: 20 TABLET, FILM COATED ORAL at 16:12

## 2023-04-17 RX ADMIN — METOPROLOL TARTRATE 25 MG: 25 TABLET, FILM COATED ORAL at 21:15

## 2023-04-17 RX ADMIN — LISINOPRIL AND HYDROCHLOROTHIAZIDE 1 TABLET: 12.5; 2 TABLET ORAL at 09:20

## 2023-04-17 ASSESSMENT — PAIN SCALES - GENERAL: PAINLEVEL_OUTOF10: 0

## 2023-04-17 NOTE — FLOWSHEET NOTE
Pt was given a dulcolax suppository overnight with no success. No BM and no report of passing gas. Up to bathroom and voided urine. No c/o abd pain. Abd remains distended.

## 2023-04-17 NOTE — FLOWSHEET NOTE
Pt c/o increased abdominal bloat. Abdomen is distended. Surgical sites have dressings intact. Pt states she has not passed gas or had a BM since surgery on 4/14/23. This RN could not hear any bowel sounds so another RN listened and could only hear a slight sound in the LLQ. Pt was bladder scanned for 385 ml. Pt is not c/o abd pain. Pt very concerned that her abd is getting larger.  Will notify MD.

## 2023-04-17 NOTE — PLAN OF CARE
Problem: Discharge Planning  Goal: Discharge to home or other facility with appropriate resources  Outcome: Progressing   Continuing to work with patient and health care team on discharge plan. Discharge instructions and medication management will be reviewed prior to discharge. Problem: Pain  Goal: Verbalizes/displays adequate comfort level or baseline comfort level  Outcome: Progressing   Pt able to express presence/absence of pain and rate pain appropriately using numerical scale. Pain/discomfort being managed with PRN analgesics per MD orders (see MAR). Pain assessed every shift and after interventions. Problem: Safety - Adult  Goal: Free from fall injury  4/16/2023 2159 by Ericka Auguste RN  Outcome: Progressing  4/16/2023 1151 by Sveta Sanchez RN  Outcome: Progressing  [facility] (Taken 4/15/2023 2242 by Ericka Auguste RN)  Free From Fall Injury: Instruct family/caregiver on patient safety   Pt free from falls this shift. Fall precautions in place at all times. Call light always within reach. Pt able and agreeable to contact for safety appropriately.     Problem: Neurosensory - Adult  Goal: Achieves stable or improved neurological status  Outcome: Progressing     Problem: Respiratory - Adult  Goal: Achieves optimal ventilation and oxygenation  Outcome: Progressing     Problem: Cardiovascular - Adult  Goal: Maintains optimal cardiac output and hemodynamic stability  Outcome: Progressing     Problem: Skin/Tissue Integrity - Adult  Goal: Skin integrity remains intact  Outcome: Progressing     Problem: Gastrointestinal - Adult  Goal: Minimal or absence of nausea and vomiting  Outcome: Progressing  Goal: Maintains or returns to baseline bowel function  Outcome: Progressing     Problem: Genitourinary - Adult  Goal: Absence of urinary retention  Outcome: Progressing     Problem: Infection - Adult  Goal: Absence of infection at discharge  Outcome: Progressing     Problem: Metabolic/Fluid and Electrolytes -

## 2023-04-17 NOTE — ACP (ADVANCE CARE PLANNING)
changes to patient's previously recorded wishes    Follow-up plan:    [] Schedule follow-up conversation to continue planning  [] Referred individual to Provider for additional questions/concerns   [] Advised patient/agent/surrogate to review completed ACP document and update if needed with changes in condition, patient preferences or care setting    [] This note routed to one or more involved healthcare providers       Electronically signed by DORETHA Rivas on 4/17/2023 at 10:25 AM

## 2023-04-17 NOTE — DISCHARGE INSTR - COC
Agency   Name:     [facility]  Address:  [address],  [address],  [place] [address]  Phone:     [phone]  Fax:         [phone]    Dialysis Facility (if applicable)   Name:  Address:  Dialysis Schedule:  Phone:  Fax:    / signature: Electronically signed by DORETHA Palomares on 4/18/2023 at 4:25 PM      PHYSICIAN SECTION    Prognosis: Fair    Condition at Discharge: Stable    Rehab Potential (if transferring to Rehab): Fair    Recommended Labs or Other Treatments After Discharge: none    Physician Certification: I certify the above information and transfer of Simone Brambila  is necessary for the continuing treatment of the diagnosis listed and that she requires Assisted Living for less 30 days.      Update Admission H&P: No change in H&P    PHYSICIAN SIGNATURE:  Electronically signed by Miguel Leyva MD on 4/18/23 at 3:52 PM EDT

## 2023-04-17 NOTE — FLOWSHEET NOTE
Dr. Speedy Woodruff returned called and advised to give a Dulcolax suppository x 1 tonight and cont to monitor.

## 2023-04-17 NOTE — PLAN OF CARE
Problem: Discharge Planning  Goal: Discharge to home or other facility with appropriate resources  4/17/2023 0932 by Claudia Sams RN  Outcome: Progressing  4/16/2023 2159 by Lexy Gastelum RN  Outcome: Progressing  Flowsheets (Taken 4/16/2023 2043)  Discharge to home or other facility with appropriate resources: Identify barriers to discharge with patient and caregiver     Problem: Pain  Goal: Verbalizes/displays adequate comfort level or baseline comfort level  4/17/2023 0932 by Claudia Sams RN  Outcome: Progressing  4/16/2023 2159 by Lexy Gastelum RN  Outcome: Progressing     Problem: Safety - Adult  Goal: Free from fall injury  4/17/2023 0932 by Claudia Sams RN  Outcome: Cam[facility] Arte[facility] (Taken 4/16/2023 2206 by Lexy Gastelum RN)  Free From Fall Injury: Instruct family/caregiver on patient safety  4/16/2023 2159 by Lexy Gastelum RN  Outcome: Progressing     Problem: Neurosensory - Adult  Goal: Achieves stable or improved neurological status  4/17/2023 0932 by Claudia Sams RN  Outcome: Progressing  4/16/2023 2159 by Lexy Gastelum RN  Outcome: Progressing  Flowsheets (Taken 4/16/2023 2043)  Achieves stable or improved neurological status: Assess for and report changes in neurological status     Problem: Respiratory - Adult  Goal: Achieves optimal ventilation and oxygenation  4/17/2023 0932 by Claudia Sams RN  Outcome: Progressing  4/16/2023 2159 by Lexy Gastelum RN  Outcome: Progressing  Flowsheets (Taken 4/16/2023 2043)  Achieves optimal ventilation and oxygenation: Assess for changes in respiratory status     Problem: Cardiovascular - Adult  Goal: Maintains optimal cardiac output and hemodynamic stability  4/17/2023 0932 by Claudia Sams RN  Outcome: Progressing  4/16/2023 2159 by Lexy Gastelum RN  Outcome: Progressing     Problem: Skin/Tissue Integrity - Adult  Goal: Skin integrity remains intact  4/17/2023 0932 by Claudia Sams RN  Outcome: Progressing  Flowsheets (Taken

## 2023-04-18 VITALS
SYSTOLIC BLOOD PRESSURE: 127 MMHG | BODY MASS INDEX: 38.27 KG/M2 | TEMPERATURE: 97.5 F | DIASTOLIC BLOOD PRESSURE: 74 MMHG | HEART RATE: 61 BPM | WEIGHT: 189.82 LBS | RESPIRATION RATE: 16 BRPM | OXYGEN SATURATION: 96 % | HEIGHT: 59 IN

## 2023-04-18 LAB
GLUCOSE BLD-MCNC: 174 MG/DL (ref 70–99)
GLUCOSE BLD-MCNC: 176 MG/DL (ref 70–99)
GLUCOSE BLD-MCNC: 181 MG/DL (ref 70–99)
PERFORMED ON: ABNORMAL

## 2023-04-18 PROCEDURE — 94680 O2 UPTK RST&XERS DIR SIMPLE: CPT

## 2023-04-18 PROCEDURE — APPSS15 APP SPLIT SHARED TIME 0-15 MINUTES: Performed by: PHYSICIAN ASSISTANT

## 2023-04-18 PROCEDURE — 2580000003 HC RX 258: Performed by: SURGERY

## 2023-04-18 PROCEDURE — APPNB15 APP NON BILLABLE TIME 0-15 MINS: Performed by: PHYSICIAN ASSISTANT

## 2023-04-18 PROCEDURE — 6370000000 HC RX 637 (ALT 250 FOR IP): Performed by: SURGERY

## 2023-04-18 PROCEDURE — 6370000000 HC RX 637 (ALT 250 FOR IP): Performed by: INTERNAL MEDICINE

## 2023-04-18 PROCEDURE — 99024 POSTOP FOLLOW-UP VISIT: CPT | Performed by: PHYSICIAN ASSISTANT

## 2023-04-18 RX ORDER — OXYCODONE HYDROCHLORIDE 5 MG/1
5 TABLET ORAL EVERY 6 HOURS PRN
Qty: 30 TABLET | Refills: 0 | Status: SHIPPED | OUTPATIENT
Start: 2023-04-18 | End: 2023-04-23

## 2023-04-18 RX ORDER — TROSPIUM CHLORIDE 20 MG/1
20 TABLET, FILM COATED ORAL
Qty: 60 TABLET | Refills: 3 | Status: SHIPPED | OUTPATIENT
Start: 2023-04-18

## 2023-04-18 RX ADMIN — CITALOPRAM HYDROBROMIDE 40 MG: 20 TABLET ORAL at 09:37

## 2023-04-18 RX ADMIN — APIXABAN 5 MG: 5 TABLET, FILM COATED ORAL at 09:37

## 2023-04-18 RX ADMIN — PANTOPRAZOLE SODIUM 40 MG: 40 TABLET, DELAYED RELEASE ORAL at 09:37

## 2023-04-18 RX ADMIN — AMOXICILLIN AND CLAVULANATE POTASSIUM 1 TABLET: 875; 125 TABLET, FILM COATED ORAL at 09:37

## 2023-04-18 RX ADMIN — METOPROLOL TARTRATE 25 MG: 25 TABLET, FILM COATED ORAL at 09:37

## 2023-04-18 RX ADMIN — LISINOPRIL AND HYDROCHLOROTHIAZIDE 1 TABLET: 12.5; 2 TABLET ORAL at 09:37

## 2023-04-18 RX ADMIN — SODIUM CHLORIDE, PRESERVATIVE FREE 10 ML: 5 INJECTION INTRAVENOUS at 09:41

## 2023-04-18 RX ADMIN — FENOFIBRATE 160 MG: 160 TABLET ORAL at 09:37

## 2023-04-18 RX ADMIN — TROSPIUM CHLORIDE 20 MG: 20 TABLET, FILM COATED ORAL at 06:08

## 2023-04-18 RX ADMIN — OXYCODONE 5 MG: 5 TABLET ORAL at 03:52

## 2023-04-18 RX ADMIN — ATORVASTATIN CALCIUM 10 MG: 10 TABLET, FILM COATED ORAL at 09:37

## 2023-04-18 RX ADMIN — TROSPIUM CHLORIDE 20 MG: 20 TABLET, FILM COATED ORAL at 16:24

## 2023-04-18 ASSESSMENT — PAIN DESCRIPTION - LOCATION: LOCATION: ABDOMEN

## 2023-04-18 ASSESSMENT — PAIN SCALES - GENERAL: PAINLEVEL_OUTOF10: 7

## 2023-04-18 NOTE — PROGRESS NOTES
General and Vascular Surgery                                                           Daily Progress Note                                                                 Pt Name: Shai Posey  Medical Record Number: [id number]  Date of Birth 1955   Today's Date: 4/17/2023    Chief Complaint: abdominal pain    ASSESSMENT/PLAN  Cholecystitis s/p lap kenji with cholangiogram 4/14/23    -improving postop  -tolerating regular diet  -Denies any nausea or emesis  -+ flatulence. +BM last night after suppository  -Incision sites ok  -OOB, ambulate  -OK to D/C home from surgical standpoint when medically appropriate. F/U Dr. Kaykay Salomon 2 weeks      Flavia Sharmaine reports soreness, denies pain. Tolerating diet. +flatus, + BM    OBJECTIVE  VITALS:   Vitals:    04/17/23 0400 04/17/23 0443 04/17/23 0720 04/17/23 0741   BP: 132/72  110/60    Pulse: 78  61    Resp: 18  18 18   Temp: 98.5 °F (36.9 °C)  98.3 °F (36.8 °C)    TempSrc: Oral  Oral    SpO2: 94%  98% 95%   Weight:  210 lb 5.1 oz (95.4 kg)     Height:         No intake/output data recorded. GENERAL: alert, no distress  LUNGS: clear to ausculation, without wheezes, rales or rhonci  HEART: normal rate and regular rhythm  ABDOMEN: soft, ND, appropriately tender. Incision sites healing well. NO erythema or drainage.    EXTREMITY: no cyanosis, clubbing or edema      LABS  CBC:   Lab Results   Component Value Date/Time    WBC 10.8 04/16/2023 08:08 AM    RBC 3.65 04/16/2023 08:08 AM    HGB 10.4 04/16/2023 08:08 AM    HCT 31.6 04/16/2023 08:08 AM    MCV 86.4 04/16/2023 08:08 AM    MCH 28.5 04/16/2023 08:08 AM    MCHC 32.9 04/16/2023 08:08 AM    RDW 15.7 04/16/2023 08:08 AM     04/16/2023 08:08 AM    MPV 7.3 04/16/2023 08:08 AM     BMP:    Lab Results   Component Value Date/Time     04/16/2023 08:08 AM    K 3.6 04/16/2023 08:08 AM    K 4.2 04/11/2023 10:00 AM    CL 97 04/16/2023 08:08 AM    CO2
Home O2 evaluation performed with the following results. Patient qualifies for 2 lpm via nasal cannula at all times.        04/18/23 1615   Resting (Room Air)   SpO2 87   Resting (On O2)   SpO2 93   O2 Device Nasal cannula   O2 Flow Rate (l/min) 2 l/min   After Walk   Does the Patient Qualify for Home O2 Yes   Liter Flow at Rest 2   Liter Flow on Exertion 2   Does the Patient Need Portable Oxygen Tanks Yes
Hospitalist Progress Note      PCP: Zoraida Macario    Date of Admission: 4/11/2023        Hospital Course: The patient who has hypertension, diabetes, was admitted with acute onset of abdominal pain, managed for suspected gallstone pancreatitis, work-up ongoing. Subjective:   Pt had elisee yesterday  [id number] [address] for DC per surgery  PT/OT recs rehab DC  Coughing+  Denies a h/o COPD/Asthma  Some orthopnea.  No PND/SOA        Medications:  Reviewed    Infusion Medications    sodium chloride      dextrose Stopped (04/12/23 0816)     Scheduled Medications    metoprolol tartrate  25 mg Oral BID    apixaban  5 mg Oral BID    amoxicillin-clavulanate  1 tablet Oral 2 times per day    [Held by provider] insulin lispro  6 Units SubCUTAneous TID WC    insulin glargine  10 Units SubCUTAneous Nightly    pantoprazole  40 mg Oral Daily    lisinopril-hydroCHLOROthiazide  1 tablet Oral BID    fenofibrate  160 mg Oral Daily    atorvastatin  10 mg Oral Daily    citalopram  40 mg Oral Daily    sodium chloride flush  5-40 mL IntraVENous 2 times per day    trospium  20 mg Oral BID AC    insulin lispro  0-8 Units SubCUTAneous TID WC    insulin lispro  0-4 Units SubCUTAneous Nightly     PRN Meds: benzocaine, oxyCODONE, dextrose, sodium chloride flush, sodium chloride, ondansetron **OR** ondansetron, polyethylene glycol, acetaminophen **OR** acetaminophen, glucose, dextrose bolus **OR** dextrose bolus, glucagon (rDNA), dextrose, morphine    No intake or output data in the 24 hours ending 04/17/23 1110      Physical Exam Performed:    /60   Pulse 61   Temp 98.3 °F (36.8 °C) (Oral)   Resp 18   Ht 4' 10.5\" (1.486 m)   Wt 210 lb 5.1 oz (95.4 kg)   SpO2 95%   BMI 43.21 kg/m²     Alert and oriented x3  In mild distress   Abdomen mildly distended , incisions clean and dry   CVS s 1 and s2, rrr  Lungs CTA   Alert and oriented x3  No edema         Labs:   Recent Labs     04/16/23  0808   WBC 10.8   HGB 10.4*   HCT 31.6*   
Patient Discharged. Discharged instructions given; answer all questions; Denies any need at this time; Going home with Home Health care; on oxygen 2 l/min, and a walker. IV removed without complications. Transport patient on a wheel chair down to her ride to home.
Physical Therapy  Facility/Department: [address] MED SURG  Physical Therapy Treatment Note    Name: Reji Farrell  : 1955  MRN: [id number]  Date of Service: 2023    Discharge Recommendations:  Patient would benefit from continued therapy after discharge, Subacute/Skilled Nursing Facility   PT Equipment Recommendations  Equipment Needed: No    Reji Farrell scored a 17/24 on the AM-PAC short mobility form. Current research shows that an AM-PAC score of 17 or less is typically not associated with a discharge to the patient's home setting. Based on the patient's AM-PAC score and their current functional mobility deficits, it is recommended that the patient have 3-5 sessions per week of Physical Therapy at d/c to increase the patient's independence. Please see assessment section for further patient specific details. If patient discharges prior to next session this note will serve as a discharge summary. Please see below for the latest assessment towards goals. Patient Diagnosis(es): The primary encounter diagnosis was Idiopathic acute pancreatitis without infection or necrosis. Diagnoses of Acute abdominal pain, Nausea and vomiting, unspecified vomiting type, Acute kidney injury ([address] and Cholecystitis were also pertinent to this visit. Past Medical History:  has a past medical history of Adnexal mass, AF (paroxysmal atrial fibrillation) ([address] Arthritis, CAD (coronary artery disease), Cerebral artery occlusion with cerebral infarction ([address] Depression, Diabetes mellitus ([address] Hx of atrial flutter, Hyperlipidemia, Hypersomnia, Hypertension, and Pancreatitis. Past Surgical History:  has a past surgical history that includes Hysterectomy; Appendectomy; ablation of dysrhythmic focus; joint replacement (Left);  section; Tonsillectomy; and Cholecystectomy, laparoscopic (N/A, 2023).     Assessment   Body Structures, Functions, Activity Limitations Requiring Skilled Therapeutic
08:08 AM    BUN 20 04/16/2023 08:08 AM    LABALBU 3.2 04/16/2023 08:08 AM    CREATININE 1.0 04/16/2023 08:08 AM    CALCIUM 8.3 04/16/2023 08:08 AM    GFRAA >60 04/06/2022 04:33 AM    GFRAA >60 05/28/2013 11:26 AM    LABGLOM >60 04/16/2023 08:08 AM    GLUCOSE 137 04/16/2023 08:08 AM         Wilfredo Eldridge PA-C  Electronically signed 4/18/2023 at 12:47 PM      As above  Doing well from abdominal standpoint  Still on oxygen  Feels better overall    Ok for discharge once medically stable  No other surgical plans    Will sign off, please call with questions    Electronically signed by Yu Story MD on 4/18/2023 at 2:36 PM
Judgement: Decreased awareness of need for safety  Problem Solving: Decreased awareness of errors  Insights: Decreased awareness of deficits  Initiation: Requires cues for some  Sequencing: Does not require cues  Orientation  Overall Orientation Status: Within Functional Limits  Orientation Level: Oriented X4                  Education Given To: Patient  Education Provided: Role of Therapy;Transfer Training;Energy Conservation  Education Provided Comments: d/c recs  Education Method: Demonstration;Verbal  Barriers to Learning: None  Education Outcome: Verbalized understanding;Continued education needed        AM-PAC Score  AM-PAC Inpatient Daily Activity Raw Score: 17 (04/17/23 1126)  AM-PAC Inpatient ADL T-Scale Score : 37.26 (04/17/23 1126)  ADL Inpatient CMS 0-100% Score: 50.11 (04/17/23 1126)  ADL Inpatient CMS G-Code Modifier : CK (04/17/23 1126)    Goals  Short Term Goals  Time Frame for Short Term Goals: by 3-5 sessions pt will complete  Short Term Goal 1: SBA w/ toileting  Short Term Goal 2: SBA using RW for household t/f  Short Term Goal 3: LB dressing w/ SBA using A/E prn  Short Term Goal 4: attempt to wean off O2 for fxl mobility using RW  Short Term Goal 5: sponge bathing w/ min A  Patient Goals   Patient goals : return home when able       Therapy Time   Individual Concurrent Group Co-treatment   Time In [facility]         Time Out [address]         Minutes [address] [facility], OTR/L [address]

## 2023-04-18 NOTE — DISCHARGE SUMMARY
Diabetic, HTN, Pancreatitis FINDINGS: Normal cardiomediastinal silhouette. No acute airspace infiltrate. No pneumothorax or pleural effusion     No acute cardiopulmonary findings     FL CHOLANGIOGRAM OR    Result Date: 4/14/2023  EXAMINATION: SPOT IMAGES FROM AN INTRAOPERATIVE CHOLANGIOGRAM 4/14/2023 12:36 pm COMPARISON: None. HISTORY: ORDERING SYSTEM PROVIDED HISTORY: acute pancreatitis TECHNOLOGIST PROVIDED HISTORY: Reason for exam:->acute pancreatitis FLUOROSCOPY DOSE AND TYPE: 23.5 seconds fluoroscopy Total fluoroscopic dose 13.41 mGy. 1 spot film FINDINGS: Contrast is injected in bile ducts. Contrast flows into small bowel. No obstructing distal common duct filling defect. No obstructing distal common duct filling defect. EKG     Rhythm: normal sinus   Rate: normal  Clinical Impression: no acute changes        The patient was seen and examined on day of discharge and this discharge summary is in conjunction with any daily progress note from day of discharge. Time Spent on discharge is 30 minutes  in the examination, evaluation, counseling and review of medications and discharge plan. Note that more than 30 minutes was spent in preparing discharge papers, discussing discharge with patient, medication review, etc.       Signed:    Rakel Rodriguez MD   4/18/2023      Thank you Aelxandra Rizzo for the opportunity to be involved in this patient's care.  If you have any questions or concerns please feel free to contact me at [facility] has [address] Is a keeping the food down

## 2023-04-18 NOTE — PLAN OF CARE
Problem: Discharge Planning  Goal: Discharge to home or other facility with appropriate resources  4/18/2023 1720 by Ravi Fuentes RN  Outcome: Completed  4/18/2023 1220 by Ravi Fuentes RN  Outcome: Progressing     Problem: Pain  Goal: Verbalizes/displays adequate comfort level or baseline comfort level  4/18/2023 1720 by Ravi Fuentes RN  Outcome: Completed  4/18/2023 1220 by Ravi Fuentes RN  Outcome: Progressing     Problem: Safety - Adult  Goal: Free from fall injury  4/18/2023 1720 by Ravi Fuentes RN  Outcome: Completed  4/18/2023 1220 by Ravi Fuentes RN  Outcome: Progressing     Problem: Neurosensory - Adult  Goal: Achieves stable or improved neurological status  4/18/2023 1720 by Ravi Fuentes RN  Outcome: Completed  4/18/2023 1220 by Ravi Fuentes RN  Outcome: Progressing     Problem: Respiratory - Adult  Goal: Achieves optimal ventilation and oxygenation  4/18/2023 1720 by Ravi Fuentes RN  Outcome: Completed  4/18/2023 1220 by Ravi Fuentes RN  Outcome: Progressing     Problem: Cardiovascular - Adult  Goal: Maintains optimal cardiac output and hemodynamic stability  4/18/2023 1720 by Ravi Fuentes RN  Outcome: Completed  4/18/2023 1220 by Ravi Fuentes RN  Outcome: Progressing     Problem: Skin/Tissue Integrity - Adult  Goal: Skin integrity remains intact  4/18/2023 1720 by Ravi Fuentes RN  Outcome: Completed  4/18/2023 1220 by Ravi Fuentes RN  Outcome: Progressing     Problem: Musculoskeletal - Adult  Goal: Return mobility to safest level of function  4/18/2023 1720 by Ravi Fuentes RN  Outcome: Completed  4/18/2023 1220 by Ravi Fuentes RN  Outcome: Progressing     Problem: Gastrointestinal - Adult  Goal: Minimal or absence of nausea and vomiting  4/18/2023 1720 by Ravi Fuentes RN  Outcome: Completed  4/18/2023 1220 by Ravi Fuentes RN  Outcome: Progressing  Goal: Maintains or returns to baseline bowel function  4/18/2023 1720 by Ravi Fuentes RN  Outcome: Completed  4/18/2023

## 2023-04-18 NOTE — CARE COORDINATION
04/17/23 1029   IMM Letter   IMM Letter given to Patient/Family/Significant other/Guardian/POA/by: Provided to patient at bedside by DORETHA Matos, LAURAW. IMM Letter date given: 04/17/23   IMM Letter time given: 1012     Provided to patient at bedside by DORETHA Matos. Education provided to patient, patient reported no questions and verbalized understanding. Patient aware of 4 hours allotted time to determine if they choose to pursue Medicare appeal process.
CASE MANAGEMENT DISCHARGE SUMMARY:    DISCHARGE DATE: 4/18/2023    DISCHARGED TO: home with daughter and granddaughter  For scheduling purposes, call daughter: Mira Arellano #[phone], notified Alex Bowles in admissions with [facility]     DME COMPANY: [employer]    Phone: [phone]   [place] oxygen and rolling walker delivered to patient at bedside.       HOME CARE AGENCY:     [address] [place] [address]   Address:  [address] [place] [address],  [address], [address] [id number]  Phone:     [phone]  Fax:         [phone]    TRANSPORTATION: daughter              TIME: once DME is delivered     PREFERRED PHARMACY: [facility]              NUMBER: [phone]    DORETHA Stanley, [facility], Social Work/Case Management   [phone]  Electronically signed by DORETHA Stanley on 4/18/2023 at 4:43 PM
Discharge Planning:   Call received from BRANDO Martinez who states anticipated discharge home for patient today. Confirmed home oxygen evaluation has been ordered. Referral placed with Yola at [facility] for home oxygen needs. Requested home care order, and home oxygen evaluation. NEED: Oxygen order if patient qualifies, and completed FRITZ. DORETHA Faith, LSW, Social Work/Case Management   [phone]  Electronically signed by DORETHA Faith on 4/18/2023 at 3:50 PM    Met with patient and daughter at bedside. Discussed discharge plan. Patient declines going to a skilled nursing facility at discharge and reports she would prefer to return home. Discussed home care and outpatient therapy options with family. Patient hesitant but agreeable to home care. Provided patient home care list. Patient reports no preference and is agreeable to referral with [facility] or any other agency in network with her insurance and able to accept patient. The Plan for Transition of Care is related to the following treatment goals: [facility]     The Patient and daughter were provided with a choice of provider and agrees   with the discharge plan. [x] Yes [] No    Freedom of choice list was provided with basic dialogue that supports the patient's individualized plan of care/goals, treatment preferences and shares the quality data associated with the providers. [x] Yes [] No    Referral placed with Bonny Heredia at [address].      Electronically signed by DORETHA Faith on 4/18/2023 at 4:18 PM
Discharge Planning:   Voicemail received from [facility] in admissions at [facility] #[phone], confirming they can accept patient. Called to [facility] in admissions, requested they initiate precert. Discharging to Facility/ Agency   Name: [facility] of [facility]  Address:  [address] [place] [address], [address]   Phone:  [phone]  Fax:  [phone]    DORETHA Almonte, LSW, Social Work/Case Management   [phone]  Electronically signed by DORETHA Almonte on 4/17/2023 at 2:44 PM    Call received from BRANDO Martinez who requested I speak with daughter at bedside. Met with patient and daughter Dhara So) at bedside. Discussed discharge plan. Daughter inquired about [place] [address] [facility]. Notified that they are out of network with patient's insurance, education provided. Daughter states [facility] is the next choice. Notified that precert has been initiated. Family inquired about need for SNF placement at discharge. Educated family on therapy and physician recommendation for skilled nursing facility placement at discharge. Family aware that they have the right for patient to return home at discharge with home care. Notified that patient has the ability to determine discharge destination.      Electronically signed by DORETHA Almonte on 4/17/2023 at 4:44 PM
[facility]    Referral received from  to follow for home care services. I will follow for needs, and speak with patient to verify demos.     Cherie Bains RN, BSN CTN  [facility] [phone]
[facility]    DC order noted, all docs needed have been faxed to [facility] for home care services.     Home care to see patient within 24-48 hrs    Kathryn Kraus RN, BSN CTN  [facility] [phone]
admissions  Sonia Bacon: spoke to [facility], they are out of network with Luke Pires. The Plan for Transition of Care is related to the following treatment goals of Acute abdominal pain [R10.9]  Acute kidney injury ([address] [N17.9]  Idiopathic acute pancreatitis without infection or necrosis [K85.00]  Nausea and vomiting, unspecified vomiting type [R11.2]  Acute pancreatitis, unspecified complication status, unspecified pancreatitis type [K85.90]    The Patient and/or patient representative Ping Lynn and her family were provided with a choice of provider and agrees with the discharge plan. Freedom of choice list with basic dialogue that supports the patient's individualized plan of care/goals and shares the quality data associated with the providers was provided to: (P) Patient   Patient Representative Name:       The Patient and/or Patient Representative Agree with the Discharge Plan? (P) Yes    DORETHA Epps  Case Management Department     04/17/23 1017   Service Assessment   Patient Orientation Alert and Oriented   Cognition Alert   History Provided By Patient   Primary Caregiver Self   Accompanied By/Relationship no family at bedside   Support Systems Children;Family Members   Patient's [address] is: Legal Next of [address]   PCP Verified by CM Yes   Last Visit to PCP Within last 6 months   Prior Functional Level Independent in ADLs/IADLs   Current Functional Level Assistance with the following:;Bathing;Feeding;Cooking; Shopping;Mobility;Housework; Toileting   Can patient return to prior living arrangement No   Ability to make needs known: Good   Family able to assist with home care needs: No   Would you like for me to discuss the discharge plan with any other family members/significant others, and if so, who? No   Financial Resources Medicaid; Medicare   Community Resources ECF/Home Care   CM/SW Referral ADLs/IADLs;DME   Social/Functional History   Lives With Family  (17 year old anup,

## 2023-04-18 NOTE — PLAN OF CARE
Problem: Discharge Planning  Goal: Discharge to home or other facility with appropriate resources  Outcome: Progressing     Problem: Pain  Goal: Verbalizes/displays adequate comfort level or baseline comfort level  Outcome: Progressing     Problem: Safety - Adult  Goal: Free from fall injury  Outcome: Progressing     Problem: Neurosensory - Adult  Goal: Achieves stable or improved neurological status  Outcome: Progressing     Problem: Respiratory - Adult  Goal: Achieves optimal ventilation and oxygenation  Outcome: Progressing     Problem: Cardiovascular - Adult  Goal: Maintains optimal cardiac output and hemodynamic stability  Outcome: Progressing     Problem: Skin/Tissue Integrity - Adult  Goal: Skin integrity remains intact  Outcome: Progressing     Problem: Musculoskeletal - Adult  Goal: Return mobility to safest level of function  Outcome: Progressing     Problem: Gastrointestinal - Adult  Goal: Minimal or absence of nausea and vomiting  Outcome: Progressing  Goal: Maintains or returns to baseline bowel function  Outcome: Progressing     Problem: Genitourinary - Adult  Goal: Absence of urinary retention  Outcome: Progressing     Problem: Infection - Adult  Goal: Absence of infection at discharge  Outcome: Progressing     Problem: Metabolic/Fluid and Electrolytes - Adult  Goal: Electrolytes maintained within normal limits  Outcome: Progressing     Problem: ABCDS Injury Assessment  Goal: Absence of physical injury  Outcome: Progressing     Problem: Chronic Conditions and Co-morbidities  Goal: Patient's chronic conditions and co-morbidity symptoms are monitored and maintained or improved  Outcome: Progressing no hematuria/no dysuria

## 2023-04-24 ENCOUNTER — OFFICE VISIT (OUTPATIENT)
Dept: SURGERY | Age: 68
End: 2023-04-24

## 2023-04-24 DIAGNOSIS — K80.00 ACUTE CALCULOUS CHOLECYSTITIS: Primary | ICD-10-CM

## 2023-04-24 PROCEDURE — 99024 POSTOP FOLLOW-UP VISIT: CPT | Performed by: SURGERY

## 2023-04-24 NOTE — PROGRESS NOTES
Tammy Triana (:  1955) is a 70 y.o. female,Established patient, here for evaluation of the following chief complaint(s):  Post-Op Check (Pt is here today for a postop lap kenji. )         ASSESSMENT/PLAN:  1. Acute calculous cholecystitis      Follow up with me as needed           Subjective   SUBJECTIVE/OBJECTIVE:  HPI  Patient presents s/p Laparoscopic Cholecystectomy with Cholangiogram. Patient is two weeks post op. Pain level is minor. Incision appearance: well healed x 4. Post op complications: none. Pathology report reviewed with patient and showed cholecystitis. Follow up prn. Review of Systems       Objective   Physical Exam       Electronically signed by Stacy Reyes MD on 2023 at 2:24 PM        An electronic signature was used to authenticate this note.     --Stacy Reyes MD

## 2023-04-25 ENCOUNTER — TELEPHONE (OUTPATIENT)
Dept: SURGERY | Age: 68
End: 2023-04-25

## 2023-05-11 ENCOUNTER — TRANSCRIBE ORDERS (OUTPATIENT)
Dept: ADMINISTRATIVE | Age: 68
End: 2023-05-11

## 2023-05-11 DIAGNOSIS — R06.09 DYSPNEA ON EXERTION: ICD-10-CM

## 2023-05-11 DIAGNOSIS — J96.11 CHRONIC HYPOXEMIC RESPIRATORY FAILURE (HCC): Primary | ICD-10-CM

## 2023-05-17 NOTE — TELEPHONE ENCOUNTER
Last OV: 7/28/22  Next OV: X due yearly  Last refill: 12/3/22  Most recent Labs:  4/16/23  Last EKG (if needed): 4/11/23

## 2023-05-19 RX ORDER — APIXABAN 5 MG/1
TABLET, FILM COATED ORAL
Qty: 180 TABLET | Refills: 0 | Status: SHIPPED | OUTPATIENT
Start: 2023-05-19

## 2023-06-05 ENCOUNTER — HOSPITAL ENCOUNTER (OUTPATIENT)
Dept: CT IMAGING | Age: 68
Discharge: HOME | End: 2023-06-05
Payer: MEDICARE

## 2023-06-05 DIAGNOSIS — J96.11 CHRONIC HYPOXEMIC RESPIRATORY FAILURE (HCC): ICD-10-CM

## 2023-06-05 LAB
PERFORMED ON: ABNORMAL
POC CREATININE: 1.2 MG/DL (ref 0.6–1.2)
POC SAMPLE TYPE: ABNORMAL

## 2023-06-05 PROCEDURE — 82565 ASSAY OF CREATININE: CPT

## 2023-06-05 PROCEDURE — 6360000004 HC RX CONTRAST MEDICATION: Performed by: PHYSICIAN ASSISTANT

## 2023-06-05 PROCEDURE — 71260 CT THORAX DX C+: CPT

## 2023-06-05 RX ADMIN — IOPAMIDOL 75 ML: 755 INJECTION, SOLUTION INTRAVENOUS at 14:47

## 2023-07-10 LAB — MAMMOGRAPHY, EXTERNAL: NORMAL

## 2023-07-25 NOTE — PROGRESS NOTES
left atrium is mildly dilated. Normal LV size and wall motion. EF is    60%. Indeterminate diastolic   function. The right ventricle is normal in size and function. Trivial Mitral and Tricuspid regurgitation. Stress test: 9/5/20  1. No ECG changes to suggest ischemia following administration of      Lexiscan. 2. Myocardial perfusion imaging was performed in conjunction and      will be reported separately. EP Procedures:  1. Atrial fibrillation (PVI, CAFE) and left atrial flutter (roof line, mitral isthmus, anterior wall) ablation on 12/28/21, Dr. Milton Skill:    Paroxysmal atrial fibrillation   - first seen on telemetry 6/14/21   - s/p PVI, CAFE RFA on 12/28/21 with Dr. Denzel Stone   - 7 day monitor 1/22 showed a 32 beat run of SVT, no other arrhythmias and Dr. Denzel Stone had recommended ILR at that time   - EKG today with SR   - CHADS2-VASc 4 (age, gender, DM, CAD) on Eliquis 5mg BID   - continue to monitor for symptoms of atrial fibrillation/flutter and if symptomatic,should get EKG done    Left atrial flutter   - seen on EP study s/p roof line, mitral isthmus, anterior wall RFA on 12/28/21   - see above    CAD   - seen on coronary CTA    - on statin    Thank you for allowing to us to participate in the care of [facility]. Return in about 1 year (around 7/31/2024) for an appointment with Miladys Rogers NP.      SRIDHAR Ferguson  [facility]  [address], [address], [address]  Phone: [phone]  Fax: [phone]    Electronically signed by SRIDHAR Alonzo - CNP on 7/31/2023 at 3:51 PM

## 2023-07-31 ENCOUNTER — OFFICE VISIT (OUTPATIENT)
Dept: CARDIOLOGY CLINIC | Age: 68
End: 2023-07-31
Payer: MEDICARE

## 2023-07-31 VITALS
HEART RATE: 67 BPM | SYSTOLIC BLOOD PRESSURE: 128 MMHG | OXYGEN SATURATION: 96 % | DIASTOLIC BLOOD PRESSURE: 68 MMHG | HEIGHT: 59 IN | WEIGHT: 188 LBS | BODY MASS INDEX: 37.9 KG/M2

## 2023-07-31 DIAGNOSIS — I48.0 PAROXYSMAL ATRIAL FIBRILLATION (HCC): Primary | ICD-10-CM

## 2023-07-31 DIAGNOSIS — I48.92 LEFT ATRIAL FLUTTER BY ELECTROCARDIOGRAM (HCC): ICD-10-CM

## 2023-07-31 DIAGNOSIS — I25.10 CAD IN NATIVE ARTERY: ICD-10-CM

## 2023-07-31 PROCEDURE — 93000 ELECTROCARDIOGRAM COMPLETE: CPT | Performed by: NURSE PRACTITIONER

## 2023-07-31 PROCEDURE — 1123F ACP DISCUSS/DSCN MKR DOCD: CPT | Performed by: NURSE PRACTITIONER

## 2023-07-31 PROCEDURE — 99214 OFFICE O/P EST MOD 30 MIN: CPT | Performed by: NURSE PRACTITIONER

## 2023-07-31 PROCEDURE — 3074F SYST BP LT 130 MM HG: CPT | Performed by: NURSE PRACTITIONER

## 2023-07-31 PROCEDURE — 3078F DIAST BP <80 MM HG: CPT | Performed by: NURSE PRACTITIONER

## 2023-07-31 ASSESSMENT — ENCOUNTER SYMPTOMS
WHEEZING: 0
ABDOMINAL PAIN: 0
CONSTIPATION: 0
VOMITING: 0
SINUS PRESSURE: 0
SHORTNESS OF BREATH: 1
TROUBLE SWALLOWING: 0
COUGH: 0
SORE THROAT: 0
BLOOD IN STOOL: 0
BACK PAIN: 0
DIARRHEA: 0
NAUSEA: 0
COLOR CHANGE: 0

## 2023-08-17 RX ORDER — APIXABAN 5 MG/1
TABLET, FILM COATED ORAL
Qty: 180 TABLET | Refills: 2 | Status: SHIPPED | OUTPATIENT
Start: 2023-08-17

## 2023-10-03 ENCOUNTER — PREP FOR PROCEDURE (OUTPATIENT)
Dept: OPHTHALMOLOGY | Age: 68
End: 2023-10-03

## 2023-10-03 RX ORDER — PHENYLEPHRINE HYDROCHLORIDE 25 MG/ML
1 SOLUTION/ DROPS OPHTHALMIC SEE ADMIN INSTRUCTIONS
Status: CANCELLED | OUTPATIENT
Start: 2023-10-03

## 2023-10-03 RX ORDER — SODIUM CHLORIDE 0.9 % (FLUSH) 0.9 %
5-40 SYRINGE (ML) INJECTION EVERY 12 HOURS SCHEDULED
Status: CANCELLED | OUTPATIENT
Start: 2023-10-03

## 2023-10-03 RX ORDER — TROPICAMIDE 10 MG/ML
1 SOLUTION/ DROPS OPHTHALMIC SEE ADMIN INSTRUCTIONS
Status: CANCELLED | OUTPATIENT
Start: 2023-10-03

## 2023-10-03 RX ORDER — KETOROLAC TROMETHAMINE 5 MG/ML
1 SOLUTION OPHTHALMIC SEE ADMIN INSTRUCTIONS
Status: CANCELLED | OUTPATIENT
Start: 2023-10-03

## 2023-10-03 RX ORDER — SODIUM CHLORIDE 0.9 % (FLUSH) 0.9 %
5-40 SYRINGE (ML) INJECTION PRN
Status: CANCELLED | OUTPATIENT
Start: 2023-10-03

## 2023-10-03 RX ORDER — SODIUM CHLORIDE 9 MG/ML
INJECTION, SOLUTION INTRAVENOUS PRN
Status: CANCELLED | OUTPATIENT
Start: 2023-10-03

## 2023-10-05 RX ORDER — FEXOFENADINE HCL 180 MG/1
180 TABLET ORAL DAILY
COMMUNITY

## 2023-10-05 RX ORDER — KETOROLAC TROMETHAMINE 5 MG/ML
1 SOLUTION OPHTHALMIC 4 TIMES DAILY
COMMUNITY

## 2023-10-05 RX ORDER — MONTELUKAST SODIUM 10 MG/1
10 TABLET ORAL NIGHTLY
COMMUNITY

## 2023-10-05 RX ORDER — BUDESONIDE, GLYCOPYRROLATE, AND FORMOTEROL FUMARATE 160; 9; 4.8 UG/1; UG/1; UG/1
2 AEROSOL, METERED RESPIRATORY (INHALATION) 2 TIMES DAILY
COMMUNITY

## 2023-10-05 RX ORDER — DORZOLAMIDE HYDROCHLORIDE AND TIMOLOL MALEATE 20; 5 MG/ML; MG/ML
1 SOLUTION/ DROPS OPHTHALMIC 2 TIMES DAILY
COMMUNITY

## 2023-10-05 RX ORDER — TROSPIUM CHLORIDE 20 MG/1
20 TABLET, FILM COATED ORAL 2 TIMES DAILY
COMMUNITY

## 2023-10-05 RX ORDER — BRIMONIDINE TARTRATE 2 MG/ML
1 SOLUTION/ DROPS OPHTHALMIC 3 TIMES DAILY
COMMUNITY

## 2023-10-06 NOTE — PROGRESS NOTES
or a loose fitting shirt. Bring eye drops. Wash your face  no make up, moisturizer, no aftershave. no jewelry. Do not wear any make-up or nail polish on your fingers or toes      For your safety, please do not wear any jewelry or body piercing's on the day of surgery. All jewelry must be removed. If you have dentures, they will be removed before going to operating room. For your convenience, we will provide you with a container. If you wear contact lenses or glasses, they will be removed, please bring a case for them. If you have a living will and a durable power of  for healthcare, please bring in a copy. As part of our patient safety program to minimize surgical site infections, we ask you to do the following:    Please notify your surgeon if you develop any illness between         now and the  day of your surgery. This includes a cough, cold, fever, sore throat, nausea,         or vomiting, and diarrhea, etc.   Please notify your surgeon if you experience dizziness, shortness         of breath or blurred vision between now and the time of your surgery. Do not shave your operative site 96 hours prior to surgery. For face and neck surgery, men may use an electric razor 48 hours   prior to surgery. You may shower the night before surgery or the morning of   your surgery with an antibacterial soap. You will need to bring a photo ID and insurance card    [facility] has an onsite pharmacy, would you like to utilize our pharmacy     If you will be staying overnight and use a C-pap machine, please bring   your C-pap to hospital     Our goal is to provide you with excellent care, therefore, visitors will be limited to two(2) in the room at a time so that we may focus on providing this care for you. Please contact pre-admission testing if you have any further questions.                  [facility] phone number:  [phone]  Please note these are generalized

## 2023-10-09 ENCOUNTER — ANESTHESIA EVENT (OUTPATIENT)
Dept: SURGERY | Age: 68
End: 2023-10-09
Payer: MEDICARE

## 2023-10-09 RX ORDER — DEXTROSE MONOHYDRATE 100 MG/ML
INJECTION, SOLUTION INTRAVENOUS CONTINUOUS PRN
OUTPATIENT
Start: 2023-10-09

## 2023-10-09 RX ORDER — SODIUM CHLORIDE 0.9 % (FLUSH) 0.9 %
5-40 SYRINGE (ML) INJECTION EVERY 12 HOURS SCHEDULED
OUTPATIENT
Start: 2023-10-09

## 2023-10-09 RX ORDER — SODIUM CHLORIDE 9 MG/ML
INJECTION, SOLUTION INTRAVENOUS PRN
OUTPATIENT
Start: 2023-10-09

## 2023-10-09 RX ORDER — SODIUM CHLORIDE 0.9 % (FLUSH) 0.9 %
5-40 SYRINGE (ML) INJECTION PRN
OUTPATIENT
Start: 2023-10-09

## 2023-10-09 NOTE — PERIOP NOTE
[facility] [facility]  [phone]        Pre-Op Phone Call:     Patient Name: Amaya Lara     Telephone Information:   Mobile [phone]     Home phone:  [phone]    Surgery Time:    8:15 AM     Arrival Time:  6:45     Left extended Message:  Yes     Message left with: Spoke with daughter     Recent change in health status:  No     Advised of transportation/ policy:  Yes     NPO policy reviewed: Yes     Advised to take morning heart/blood pressure medications with sips of water morning of surgery? Yes     Instructed to bring eye drops, photo identification, and insurance card day of surgery? Yes     Advised to wear short sleeved button down shirt (no T-shirt underneath):  Yes     Advised not to wear jewelry, hairpins, or pantyhose day of surgery? Yes     Advised not to wear make-up and to wash face day of surgery?   Yes    Remarks: Spoke with daughter         Electronically signed by:  Thony Jacobs RN at 10/9/2023 9:54 AM

## 2023-10-10 ENCOUNTER — HOSPITAL ENCOUNTER (OUTPATIENT)
Age: 68
Setting detail: OUTPATIENT SURGERY
Discharge: HOME | End: 2023-10-10
Attending: OPHTHALMOLOGY | Admitting: OPHTHALMOLOGY
Payer: MEDICARE

## 2023-10-10 ENCOUNTER — ANESTHESIA (OUTPATIENT)
Dept: SURGERY | Age: 68
End: 2023-10-10
Payer: MEDICARE

## 2023-10-10 VITALS
OXYGEN SATURATION: 95 % | SYSTOLIC BLOOD PRESSURE: 141 MMHG | RESPIRATION RATE: 17 BRPM | HEART RATE: 62 BPM | WEIGHT: 188 LBS | DIASTOLIC BLOOD PRESSURE: 68 MMHG | HEIGHT: 59 IN | BODY MASS INDEX: 37.9 KG/M2 | TEMPERATURE: 97 F

## 2023-10-10 LAB
GLUCOSE BLD-MCNC: 136 MG/DL (ref 70–99)
GLUCOSE BLD-MCNC: 146 MG/DL (ref 70–99)
PERFORMED ON: ABNORMAL
PERFORMED ON: ABNORMAL

## 2023-10-10 PROCEDURE — 2580000003 HC RX 258: Performed by: ANESTHESIOLOGY

## 2023-10-10 PROCEDURE — 6370000000 HC RX 637 (ALT 250 FOR IP): Performed by: OPHTHALMOLOGY

## 2023-10-10 PROCEDURE — 6360000002 HC RX W HCPCS: Performed by: OPHTHALMOLOGY

## 2023-10-10 PROCEDURE — 2709999900 HC NON-CHARGEABLE SUPPLY: Performed by: OPHTHALMOLOGY

## 2023-10-10 PROCEDURE — 3700000001 HC ADD 15 MINUTES (ANESTHESIA): Performed by: OPHTHALMOLOGY

## 2023-10-10 PROCEDURE — V2632 POST CHMBR INTRAOCULAR LENS: HCPCS | Performed by: OPHTHALMOLOGY

## 2023-10-10 PROCEDURE — 3600000014 HC SURGERY LEVEL 4 ADDTL 15MIN: Performed by: OPHTHALMOLOGY

## 2023-10-10 PROCEDURE — 3700000000 HC ANESTHESIA ATTENDED CARE: Performed by: OPHTHALMOLOGY

## 2023-10-10 PROCEDURE — 2500000003 HC RX 250 WO HCPCS: Performed by: OPHTHALMOLOGY

## 2023-10-10 PROCEDURE — 3600000004 HC SURGERY LEVEL 4 BASE: Performed by: OPHTHALMOLOGY

## 2023-10-10 PROCEDURE — 7100000010 HC PHASE II RECOVERY - FIRST 15 MIN: Performed by: OPHTHALMOLOGY

## 2023-10-10 PROCEDURE — 6360000002 HC RX W HCPCS

## 2023-10-10 DEVICE — ACRYSOF(R) IQ ASPHERIC NATURAL IOL, SINGLE-PIECE ACRYLIC FOLDABLE PCL, UV WITH BLUE LIGHTFILTER, 13.0MM LENGTH, 6.0MM ANTERIORASYMMETRIC BICONVEX OPTIC, PLANAR HAPTICS.
Type: IMPLANTABLE DEVICE | Site: EYE | Status: FUNCTIONAL
Brand: ACRYSOF®

## 2023-10-10 RX ORDER — PREDNISOLONE ACETATE 10 MG/ML
SUSPENSION/ DROPS OPHTHALMIC
Status: COMPLETED | OUTPATIENT
Start: 2023-10-10 | End: 2023-10-10

## 2023-10-10 RX ORDER — SODIUM CHLORIDE 0.9 % (FLUSH) 0.9 %
5-40 SYRINGE (ML) INJECTION PRN
Status: CANCELLED | OUTPATIENT
Start: 2023-10-10

## 2023-10-10 RX ORDER — PHENYLEPHRINE HYDROCHLORIDE 25 MG/ML
1 SOLUTION/ DROPS OPHTHALMIC SEE ADMIN INSTRUCTIONS
Status: COMPLETED | OUTPATIENT
Start: 2023-10-10 | End: 2023-10-10

## 2023-10-10 RX ORDER — TETRACAINE HYDROCHLORIDE 5 MG/ML
SOLUTION OPHTHALMIC
Status: COMPLETED | OUTPATIENT
Start: 2023-10-10 | End: 2023-10-10

## 2023-10-10 RX ORDER — SODIUM CHLORIDE 9 MG/ML
INJECTION, SOLUTION INTRAVENOUS PRN
Status: CANCELLED | OUTPATIENT
Start: 2023-10-10

## 2023-10-10 RX ORDER — SODIUM CHLORIDE 0.9 % (FLUSH) 0.9 %
5-40 SYRINGE (ML) INJECTION EVERY 12 HOURS SCHEDULED
Status: DISCONTINUED | OUTPATIENT
Start: 2023-10-10 | End: 2023-10-10 | Stop reason: HOSPADM

## 2023-10-10 RX ORDER — TROPICAMIDE 10 MG/ML
1 SOLUTION/ DROPS OPHTHALMIC SEE ADMIN INSTRUCTIONS
Status: COMPLETED | OUTPATIENT
Start: 2023-10-10 | End: 2023-10-10

## 2023-10-10 RX ORDER — KETOROLAC TROMETHAMINE 5 MG/ML
1 SOLUTION OPHTHALMIC SEE ADMIN INSTRUCTIONS
Status: COMPLETED | OUTPATIENT
Start: 2023-10-10 | End: 2023-10-10

## 2023-10-10 RX ORDER — SODIUM CHLORIDE 0.9 % (FLUSH) 0.9 %
5-40 SYRINGE (ML) INJECTION PRN
Status: DISCONTINUED | OUTPATIENT
Start: 2023-10-10 | End: 2023-10-10 | Stop reason: HOSPADM

## 2023-10-10 RX ORDER — CIPROFLOXACIN HYDROCHLORIDE 3.5 MG/ML
SOLUTION/ DROPS TOPICAL
Status: COMPLETED | OUTPATIENT
Start: 2023-10-10 | End: 2023-10-10

## 2023-10-10 RX ORDER — SODIUM CHLORIDE 0.9 % (FLUSH) 0.9 %
5-40 SYRINGE (ML) INJECTION PRN
Status: DISCONTINUED | OUTPATIENT
Start: 2023-10-10 | End: 2023-10-10 | Stop reason: SDUPTHER

## 2023-10-10 RX ORDER — DEXTROSE MONOHYDRATE 100 MG/ML
INJECTION, SOLUTION INTRAVENOUS CONTINUOUS PRN
Status: DISCONTINUED | OUTPATIENT
Start: 2023-10-10 | End: 2023-10-10 | Stop reason: HOSPADM

## 2023-10-10 RX ORDER — MIDAZOLAM HYDROCHLORIDE 1 MG/ML
INJECTION INTRAMUSCULAR; INTRAVENOUS PRN
Status: DISCONTINUED | OUTPATIENT
Start: 2023-10-10 | End: 2023-10-10 | Stop reason: SDUPTHER

## 2023-10-10 RX ORDER — SODIUM CHLORIDE 0.9 % (FLUSH) 0.9 %
5-40 SYRINGE (ML) INJECTION EVERY 12 HOURS SCHEDULED
Status: DISCONTINUED | OUTPATIENT
Start: 2023-10-10 | End: 2023-10-10 | Stop reason: SDUPTHER

## 2023-10-10 RX ORDER — SODIUM CHLORIDE 9 MG/ML
INJECTION, SOLUTION INTRAVENOUS PRN
Status: DISCONTINUED | OUTPATIENT
Start: 2023-10-10 | End: 2023-10-10 | Stop reason: HOSPADM

## 2023-10-10 RX ORDER — BALANCED SALT SOLUTION 6.4; .75; .48; .3; 3.9; 1.7 MG/ML; MG/ML; MG/ML; MG/ML; MG/ML; MG/ML
SOLUTION OPHTHALMIC
Status: COMPLETED | OUTPATIENT
Start: 2023-10-10 | End: 2023-10-10

## 2023-10-10 RX ORDER — SODIUM CHLORIDE 0.9 % (FLUSH) 0.9 %
5-40 SYRINGE (ML) INJECTION EVERY 12 HOURS SCHEDULED
Status: CANCELLED | OUTPATIENT
Start: 2023-10-10

## 2023-10-10 RX ORDER — SODIUM CHLORIDE 9 MG/ML
INJECTION, SOLUTION INTRAVENOUS PRN
Status: DISCONTINUED | OUTPATIENT
Start: 2023-10-10 | End: 2023-10-10 | Stop reason: SDUPTHER

## 2023-10-10 RX ADMIN — KETOROLAC TROMETHAMINE 1 DROP: 0.5 SOLUTION OPHTHALMIC at 07:31

## 2023-10-10 RX ADMIN — KETOROLAC TROMETHAMINE 1 DROP: 0.5 SOLUTION OPHTHALMIC at 07:28

## 2023-10-10 RX ADMIN — TROPICAMIDE 1 DROP: 10 SOLUTION/ DROPS OPHTHALMIC at 07:31

## 2023-10-10 RX ADMIN — KETOROLAC TROMETHAMINE 1 DROP: 0.5 SOLUTION OPHTHALMIC at 07:25

## 2023-10-10 RX ADMIN — TROPICAMIDE 1 DROP: 10 SOLUTION/ DROPS OPHTHALMIC at 07:25

## 2023-10-10 RX ADMIN — PHENYLEPHRINE HYDROCHLORIDE 1 DROP: 25 SOLUTION/ DROPS OPHTHALMIC at 07:31

## 2023-10-10 RX ADMIN — MIDAZOLAM 0.5 MG: 1 INJECTION INTRAMUSCULAR; INTRAVENOUS at 08:25

## 2023-10-10 RX ADMIN — MIDAZOLAM 0.5 MG: 1 INJECTION INTRAMUSCULAR; INTRAVENOUS at 08:28

## 2023-10-10 RX ADMIN — PHENYLEPHRINE HYDROCHLORIDE 1 DROP: 25 SOLUTION/ DROPS OPHTHALMIC at 07:28

## 2023-10-10 RX ADMIN — SODIUM CHLORIDE: 9 INJECTION, SOLUTION INTRAVENOUS at 07:32

## 2023-10-10 RX ADMIN — TROPICAMIDE 1 DROP: 10 SOLUTION/ DROPS OPHTHALMIC at 07:27

## 2023-10-10 RX ADMIN — PHENYLEPHRINE HYDROCHLORIDE 1 DROP: 25 SOLUTION/ DROPS OPHTHALMIC at 07:25

## 2023-10-10 ASSESSMENT — PAIN SCALES - GENERAL
PAINLEVEL_OUTOF10: 0
PAINLEVEL_OUTOF10: 0

## 2023-10-10 ASSESSMENT — LIFESTYLE VARIABLES: SMOKING_STATUS: 0

## 2023-10-10 ASSESSMENT — PAIN - FUNCTIONAL ASSESSMENT: PAIN_FUNCTIONAL_ASSESSMENT: 0-10

## 2023-10-10 ASSESSMENT — ENCOUNTER SYMPTOMS
SHORTNESS OF BREATH: 1
DYSPNEA ACTIVITY LEVEL: NO INTERVAL CHANGE

## 2023-10-10 NOTE — ANESTHESIA PRE PROCEDURE
Department of Anesthesiology  Preprocedure Note       Name:  Shamar Bergman   Age:  70 y.o.  :  1955                                          MRN:  [id number]         Date:  10/10/2023      Surgeon: Dr. Papi Spivey MD    Procedure: lap kenji    Medications prior to admission:   Prior to Admission medications    Medication Sig Start Date End Date Taking?  Authorizing Provider   brimonidine (ALPHAGAN) 0.2 % ophthalmic solution 1 drop 3 times daily    Francis Patel MD   Budeson-Glycopyrrol-Formoterol (BREZTRI AEROSPHERE) 160-9-4.8 MCG/ACT AERO Inhale 2 puffs into the lungs in the morning and at bedtime  Patient not taking: Reported on 10/6/2023    Francis Patel MD   CARBOXYMETHYLCELLULOSE SODIUM OP Apply 1 drop to eye daily    Francis Patel MD   dorzolamide-timolol (COSOPT) 22.3-6.8 MG/ML ophthalmic solution 1 drop 2 times daily    Francis Patel MD   fexofenadine (ALLEGRA ALLERGY) 180 MG tablet Take 1 tablet by mouth daily    Francis Patel MD   insulin lispro protamine & lispro (HUMALOG MIX) (75-25) 100 UNIT per ML SUSP injection vial Inject 27 Units into the skin 2 times daily (with meals)  Inject 27 units into skin every morning, at noon and in the evening before meals and 5 units prior to snacks    Francis Patel MD   ketorolac (ACULAR) 0.5 % ophthalmic solution 1 drop 4 times daily    Francis Patel MD   montelukast (SINGULAIR) 10 MG tablet Take 1 tablet by mouth nightly  Patient not taking: Reported on 10/6/2023    Francis Patel MD   timolol (BETIMOL) 0.5 % ophthalmic solution 1 drop 2 times daily    Francis Patel MD   trospium (SANCTURA) 20 MG tablet Take 1 tablet by mouth 2 times daily    Francis Patel MD   ELIQUIS 5 MG TABS tablet TAKE 1 TABLET BY MOUTH TWICE DAILY 23   Noy Espino MD   vitamin D (CHOLECALCIFEROL) 25 MCG (1000 UT) TABS tablet Take 1 tablet by mouth daily 23   Marcella Arguello, APRN - CNP

## 2023-10-10 NOTE — H&P
Date of Surgery Update:  Hubert Nguyen was seen, history and physical examination reviewed, and patient examined by me today. There have been no significant clinical changes since the completion of the previous history and physical.    Electronically signed by:  Vernon Marcus MD,10/10/2023,7:18 AM

## 2023-10-10 NOTE — DISCHARGE INSTRUCTIONS
[address] [facility] [address]   [phone]       Post-Operative Instructions for Day of Cataract Surgery with Dr. Parminder Puri    Patient Name: Stacy Shah  : 1955  MRN: [id number]    Medication Eye # Times   Ofloxacin/Vigamox/Polytrim (TAN cap) left eye  4   Prednisolone (PINK or WHITE cap) left eye  4   Ketorolac (GREY cap) left eye  4               Bring your eye drops with you to each appointment! 1. A responsible adult, 18 years or older must be in attendance for 24 hours following discharge. 2. Rest quietly today. 3. Start with liquids first.  If no nausea, proceed with a light meal.  Drink at least 6 glasses of fluid after surgery. 4. Do not drive or operate heavy machinery for 24 hours or as directed. 5. No alcoholic beverages for 24 hours post op. 6. Do not make any legal or important decisions for the next 24 hours. 7. Check your temperature over the next five days and call your physician if greater than 101.0 F.    8. Notify your physician if you have rash, hives, difficulty breathing, or severe nausea or vomiting. 5. Contact your family physician for questions concerning your current home medications. 10. If pain intensifies or pain is unrelieved with pain medication as ordered, call your physician    ADDITIONAL INSTRUCTIONS    The post op drops are VERY IMPORTANT. Use them as directed on your drop schedule. Always bring your post-op bag, drops and instruction sheet to all of your visits. Tape your protective shield over your eye at bedtime or naptime for one week to prevent accidentally rubbing or bumping your eye. If you have a patch or a shield on the eye, it is to remain on until you see your doctor on the day following your surgery. Keep the area around your eye clean with a clean face cloth moistened with warm water. Keep soap, lotion, shampoo, hairspray make-up, etc. away from your eye for 7 days.   Do

## 2023-10-10 NOTE — OP NOTE
Operative Note        [address] [facility]  [address] [facility]. [address] [place] [address]  [phone]      Name:  Lyla Murray  :  1955    Age:   70 y.o. Medical Record Number:  [id number]  Date of Procedure:  10/10/2023     Preoperative diagnosis:   1. Cataract left eye     Postoperative diagnosis: Same    Procedure:   1. Phacoemulsification with posterior chamber intraocular lens insertion left eye     Surgeon: Case Amador MD   Anesthesia: Topical with intracameral lidocaine  Complications:None  Estimated blood loss: None    Indications for procedure: The patient has a visually significant cataract in the left eye that interferes with activities of daily living. The patient desires to improve their vision. Following discussion of all risks, benefits, and alternatives, the patient agreed to have the procedure done. Informed consent was signed. Operative Procedure: The patient was taken to the holding area where the operative site was confirmed. The patient was brought to the operating room and placed in the supine position. The operative eye was prepped and draped in the usual sterile fashion for ophthalmic surgery using betadine and sterile disposable drapes. A lid speculum was placed and the operating microscope swung into position. Under the operating microscope, a temporal paracentesis was created. The anterior chamber was then deepened with epishugarcaine followed by Viscoat. A temporal biplanar clear corneal incision was created with a 2.4mm keratome. A cystotome and Utrata forceps were used to fashion a continuous curvilinear capsulorrhexis. Balanced salt solution was used to hydrodissect and hydrodelineate the nucleus. The phacoemulsification tip was introduced into the eye and the nucleus was removed using a two-handed divide and conquer with assistance of the Faina chopper through the paracentesis.  The nucleus was removed with a total CDE of

## 2023-10-10 NOTE — ANESTHESIA POSTPROCEDURE EVALUATION
Department of Anesthesiology  Postprocedure Note    Patient: Cynthia Beverly  MRN: [id number]  YOB: 1955  Date of evaluation: 10/10/2023      Procedure Summary     Date: 10/10/23 Room / Location: [address]    Anesthesia Start: 5010 Anesthesia Stop: 3367    Procedure: PHACOEMULSIFICATION WITH INTRAOCULAR LENS IMPLANT - LEFT EYE (Left: Eye) Diagnosis:       Combined forms of age-related cataract of left eye      (Combined forms of age-related cataract of left eye [H25.812])    Surgeons: Lisa Estrella MD Responsible Provider: Yue Bryant MD    Anesthesia Type: MAC ASA Status: 3          Anesthesia Type: No value filed.     Valerie Phase I: Valerie Score: 10    Valerie Phase II: Valerie Score: 10      Anesthesia Post Evaluation    Patient location during evaluation: bedside  Patient participation: complete - patient participated  Level of consciousness: awake and alert  Pain score: 0  Airway patency: patent  Nausea & Vomiting: no vomiting  Complications: no  Cardiovascular status: blood pressure returned to baseline  Respiratory status: acceptable  Hydration status: euvolemic  Pain management: adequate

## 2023-10-14 ENCOUNTER — APPOINTMENT (OUTPATIENT)
Dept: CT IMAGING | Age: 68
End: 2023-10-14
Payer: MEDICARE

## 2023-10-14 ENCOUNTER — APPOINTMENT (OUTPATIENT)
Dept: GENERAL RADIOLOGY | Age: 68
End: 2023-10-14
Payer: MEDICARE

## 2023-10-14 ENCOUNTER — HOSPITAL ENCOUNTER (EMERGENCY)
Age: 68
Discharge: HOME | End: 2023-10-14
Attending: EMERGENCY MEDICINE
Payer: MEDICARE

## 2023-10-14 VITALS
WEIGHT: 187.39 LBS | TEMPERATURE: 99 F | OXYGEN SATURATION: 96 % | SYSTOLIC BLOOD PRESSURE: 167 MMHG | HEIGHT: 59 IN | RESPIRATION RATE: 18 BRPM | DIASTOLIC BLOOD PRESSURE: 79 MMHG | HEART RATE: 65 BPM | BODY MASS INDEX: 37.78 KG/M2
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
## 2023-10-14 DIAGNOSIS — S92.511A CLOSED DISPLACED FRACTURE OF PROXIMAL PHALANX OF LESSER TOE OF RIGHT FOOT, INITIAL ENCOUNTER: ICD-10-CM

## 2023-10-14 DIAGNOSIS — S01.111A RIGHT EYELID LACERATION, INITIAL ENCOUNTER: ICD-10-CM

## 2023-10-14 DIAGNOSIS — S02.85XB OPEN FRACTURE OF ORBIT, INITIAL ENCOUNTER (HCC): ICD-10-CM

## 2023-10-14 DIAGNOSIS — S00.83XA CONTUSION OF FACE, INITIAL ENCOUNTER: ICD-10-CM

## 2023-10-14 PROCEDURE — 73030 X-RAY EXAM OF SHOULDER: CPT

## 2023-10-14 PROCEDURE — 6360000002 HC RX W HCPCS: Performed by: EMERGENCY MEDICINE

## 2023-10-14 PROCEDURE — 72125 CT NECK SPINE W/O DYE: CPT

## 2023-10-14 PROCEDURE — 2500000003 HC RX 250 WO HCPCS: Performed by: EMERGENCY MEDICINE

## 2023-10-14 PROCEDURE — 70480 CT ORBIT/EAR/FOSSA W/O DYE: CPT

## 2023-10-14 PROCEDURE — 73630 X-RAY EXAM OF FOOT: CPT

## 2023-10-14 PROCEDURE — 90715 TDAP VACCINE 7 YRS/> IM: CPT | Performed by: EMERGENCY MEDICINE

## 2023-10-14 PROCEDURE — 90471 IMMUNIZATION ADMIN: CPT | Performed by: EMERGENCY MEDICINE

## 2023-10-14 PROCEDURE — 70450 CT HEAD/BRAIN W/O DYE: CPT

## 2023-10-14 PROCEDURE — 6370000000 HC RX 637 (ALT 250 FOR IP): Performed by: EMERGENCY MEDICINE

## 2023-10-14 PROCEDURE — 99284 EMERGENCY DEPT VISIT MOD MDM: CPT

## 2023-10-14 RX ORDER — AMOXICILLIN 500 MG/1
500 CAPSULE ORAL 3 TIMES DAILY
Qty: 21 CAPSULE | Refills: 0 | Status: SHIPPED | OUTPATIENT
Start: 2023-10-14 | End: 2023-10-21

## 2023-10-14 RX ORDER — OXYCODONE HYDROCHLORIDE AND ACETAMINOPHEN 5; 325 MG/1; MG/1
1 TABLET ORAL ONCE
Status: COMPLETED | OUTPATIENT
Start: 2023-10-14 | End: 2023-10-14

## 2023-10-14 RX ADMIN — TETANUS TOXOID, REDUCED DIPHTHERIA TOXOID AND ACELLULAR PERTUSSIS VACCINE, ADSORBED 0.5 ML: 5; 2.5; 8; 8; 2.5 SUSPENSION INTRAMUSCULAR at 06:49

## 2023-10-14 RX ADMIN — LIDOCAINE HYDROCHLORIDE 20 ML: 10; .005 INJECTION, SOLUTION EPIDURAL; INFILTRATION; INTRACAUDAL; PERINEURAL at 06:52

## 2023-10-14 RX ADMIN — OXYCODONE HYDROCHLORIDE AND ACETAMINOPHEN 1 TABLET: 5; 325 TABLET ORAL at 06:50

## 2023-10-14 ASSESSMENT — PAIN SCALES - GENERAL
PAINLEVEL_OUTOF10: 9

## 2023-10-14 ASSESSMENT — PATIENT HEALTH QUESTIONNAIRE - PHQ9
SUM OF ALL RESPONSES TO PHQ QUESTIONS 1-9: 0
2. FEELING DOWN, DEPRESSED OR HOPELESS: 0
SUM OF ALL RESPONSES TO PHQ QUESTIONS 1-9: 0
SUM OF ALL RESPONSES TO PHQ9 QUESTIONS 1 & 2: 0
1. LITTLE INTEREST OR PLEASURE IN DOING THINGS: 0

## 2023-10-14 ASSESSMENT — LIFESTYLE VARIABLES
HOW MANY STANDARD DRINKS CONTAINING ALCOHOL DO YOU HAVE ON A TYPICAL DAY: PATIENT DOES NOT DRINK
HOW OFTEN DO YOU HAVE A DRINK CONTAINING ALCOHOL: NEVER

## 2023-10-14 ASSESSMENT — PAIN - FUNCTIONAL ASSESSMENT
PAIN_FUNCTIONAL_ASSESSMENT: 0-10
PAIN_FUNCTIONAL_ASSESSMENT: 0-10

## 2023-10-14 NOTE — ED NOTES
Pt and her daughter state that the Pt has SOB upon exertion at baseline and states she has a pulmonology appointment on November 9th, but has home oxygen to use as needed. Pt sating at 94-95% on room air but feeling SOB after walking to ED room 3 and getting into the bed. Pt placed on 1L NC for comfort and support. Pt o2 98-99%, and feels she's breathing easier. Bed remains locked, lowered, rails x2, call light in reach, daughter remains at bedside.       Bharath Sapp RN  10/14/23 3910

## 2023-10-14 NOTE — ED NOTES
Handoff report to Pushpa Calderon RN who states understanding and had no further questions.       Jaqui Dawn RN  10/14/23 7854

## 2023-10-14 NOTE — ED PROVIDER NOTES
[facility] of Care Note:    I assumed care of this patient at 0700 from Dr. Osbaldo Reid, please see Osbaldo Reid note for more detail. Briefly, this pt is a 4year-old female who fell out of bed this morning hitting her right head and foot. Medical decision making: Right supraorbital ridge/eyelid laceration with inferior orbital wall fracture without evidence of nerve impingement, muscle entrapment. Patient will be placed on prophylactic antibiotics for 7 days. Tylenol for pain. Do not blow nose. Follow-up with ear nose and throat in 3 to 5 days. Stitches out in 10 days. Wound precautions were given. Tetanus was updated. Patient does not have evidence of hyphema although she does have mild subconjunctival hemorrhage. No evidence of retrobulbar hematoma. No evidence of vitreous hemorrhage. Patient has baseline severe visual deficit in the right eye at baseline and did have light sensation and ability to see fingers at bedside appropriately which according to the patient is her baseline. No active epistaxis. Patient also has a mildly displaced right third proximal phalangeal fracture. Cast shoe. Orthopedic follow-up. RICE. Tylenol for pain. Patient has a follow-up appointment with [facility] in 3 days was advised to keep this appointment. There is no current evidence of retinal or vitreous hemorrhage. FINAL IMPRESSION      1. Fall from bed, initial encounter    2. Right eyelid laceration, initial encounter    3. Open fracture of orbit, initial encounter ([address])    4. Contusion of face, initial encounter    5.  Closed displaced fracture of proximal phalanx of lesser toe of right foot, initial encounter              Raymond Whitmore MD  10/14/23 5370

## 2023-10-14 NOTE — ED TRIAGE NOTES
Pt from home where she lives with her 13year old granddaughter. Pt fell out of bed and then called her daughter to bring her to the ED for concern of Laceration Right side of eye, Pt just had cataract surgery on her Left eye that is currently covered with an eye shield. Pt presents A&0x4, breathing equal and easy at rest but easily sob upon moving. Pt into a hospital gown, non skid socks, allergy and fall risk bracelets applied. Pt denies LOC, is on eliquis. Ice pack provided. Pt bundled with a warm blanket. Daughter at bedside. The nights plan of care, goals, fall prevention and safety have been reviewed with the pt who acknowledges understanding. Bed locked. Lowered. Rails x2. Call light in reach. Bedside table next to bed. Opportunity for questions, concerns, medication review offered. No further questions or needs made known at this time.

## 2023-10-14 NOTE — ED PROVIDER NOTES
[facility]    CHIEF COMPLAINT  Laceration (Pt fell out of bed, has a LAC upper right eye, bleeding controled. Pt recently had cataract surgery on her Left eye/ has eye shield on. Pt states no LOC. )       HISTORY OF PRESENT ILLNESS  Ryan Sheriff is a 70 y.o. female with history of atrial fibrillation on Eliquis, diabetes, heart failure on NC oxygen baseline who presents to the ED after falling out of bed this morning with a laceration on her right upper eyelid and some pain and swelling around her right eye. She has very limited vision in this eye to begin with because she needs to have cataract surgery. She does have cataract surgery on the left side a few days ago and is due to have surgery on the right in several days. She feels like her vision is somewhat worse than her already very limited baseline. She does not wear glasses or contacts. She is also complaining of pain in her left shoulder/neck and right leg and foot. Denies dyspnea, chest pain, back pain, abdominal pain, vomiting.     I have reviewed the following from the nursing documentation:    Past Medical History:   Diagnosis Date    Adnexal mass     AF (paroxysmal atrial fibrillation) (HCC)     Arthritis     CAD (coronary artery disease)     Cerebral artery occlusion with cerebral infarction (HCC)     CHF (congestive heart failure) (HCC)     grade 2 diastolic dysfunction    Chronic cough     CKD (chronic kidney disease)     stage 3    Depression     Diabetes mellitus (HCC)     Heart murmur     Hx of atrial flutter     Hyperlipidemia     Hypersomnia     Hypertension     MI, old     Pancreatitis     Sleep apnea     without treatment     Past Surgical History:   Procedure Laterality Date    ABLATION OF DYSRHYTHMIC FOCUS      A fib    APPENDECTOMY       SECTION      x3    CHOLECYSTECTOMY, LAPAROSCOPIC N/A 2023    LAPAROSCOPIC CHOLECYSTECTOMY WITH CHOLANGIOGRAM performed by Primitivo Liriano MD at the patient/family expressed understanding and agreement with the plan. PROCEDURES  None    CRITICAL CARE  N/A    CLINICAL IMPRESSION  1. Fall from bed, initial encounter    2. Right eyelid laceration, initial encounter        DISPOSITION  pending workup      Yuki Hightower MD    Note: This chart was created using voice recognition dictation software. Efforts were made by me to ensure accuracy, however some errors may be present due to limitations of this technology and occasionally words are not transcribed correctly.         Yuki Hightower MD  10/14/23 3012

## 2023-10-17 RX ORDER — KETOROLAC TROMETHAMINE 5 MG/ML
1 SOLUTION OPHTHALMIC SEE ADMIN INSTRUCTIONS
Status: CANCELLED | OUTPATIENT
Start: 2023-10-17

## 2023-10-17 RX ORDER — TROPICAMIDE 10 MG/ML
1 SOLUTION/ DROPS OPHTHALMIC SEE ADMIN INSTRUCTIONS
Status: CANCELLED | OUTPATIENT
Start: 2023-10-17

## 2023-10-17 RX ORDER — SODIUM CHLORIDE 0.9 % (FLUSH) 0.9 %
5-40 SYRINGE (ML) INJECTION EVERY 12 HOURS SCHEDULED
Status: CANCELLED | OUTPATIENT
Start: 2023-10-17

## 2023-10-17 RX ORDER — SODIUM CHLORIDE 0.9 % (FLUSH) 0.9 %
5-40 SYRINGE (ML) INJECTION PRN
Status: CANCELLED | OUTPATIENT
Start: 2023-10-17

## 2023-10-17 RX ORDER — SODIUM CHLORIDE 9 MG/ML
INJECTION, SOLUTION INTRAVENOUS PRN
Status: CANCELLED | OUTPATIENT
Start: 2023-10-17

## 2023-10-17 RX ORDER — PHENYLEPHRINE HYDROCHLORIDE 25 MG/ML
1 SOLUTION/ DROPS OPHTHALMIC SEE ADMIN INSTRUCTIONS
Status: CANCELLED | OUTPATIENT
Start: 2023-10-17

## 2023-10-18 ENCOUNTER — OFFICE VISIT (OUTPATIENT)
Dept: ENT CLINIC | Age: 68
End: 2023-10-18
Payer: MEDICARE

## 2023-10-18 ENCOUNTER — OFFICE VISIT (OUTPATIENT)
Dept: ORTHOPEDIC SURGERY | Age: 68
End: 2023-10-18

## 2023-10-18 VITALS
HEIGHT: 59 IN | OXYGEN SATURATION: 94 % | HEART RATE: 77 BPM | SYSTOLIC BLOOD PRESSURE: 166 MMHG | DIASTOLIC BLOOD PRESSURE: 82 MMHG | RESPIRATION RATE: 16 BRPM | WEIGHT: 184 LBS | BODY MASS INDEX: 37.09 KG/M2

## 2023-10-18 VITALS — WEIGHT: 184 LBS | BODY MASS INDEX: 37.09 KG/M2 | HEIGHT: 59 IN

## 2023-10-18 DIAGNOSIS — S02.85XA ORBITAL FRACTURE, CLOSED, INITIAL ENCOUNTER (HCC): Primary | ICD-10-CM

## 2023-10-18 DIAGNOSIS — Z48.02 ENCOUNTER FOR REMOVAL OF SUTURES: ICD-10-CM

## 2023-10-18 DIAGNOSIS — S92.501A CLOSED FRACTURE OF PHALANX OF RIGHT THIRD TOE, INITIAL ENCOUNTER: Primary | ICD-10-CM

## 2023-10-18 DIAGNOSIS — S01.81XA FACIAL LACERATION, INITIAL ENCOUNTER: ICD-10-CM

## 2023-10-18 PROCEDURE — 1123F ACP DISCUSS/DSCN MKR DOCD: CPT | Performed by: STUDENT IN AN ORGANIZED HEALTH CARE EDUCATION/TRAINING PROGRAM

## 2023-10-18 PROCEDURE — 3077F SYST BP >= 140 MM HG: CPT | Performed by: STUDENT IN AN ORGANIZED HEALTH CARE EDUCATION/TRAINING PROGRAM

## 2023-10-18 PROCEDURE — 3079F DIAST BP 80-89 MM HG: CPT | Performed by: STUDENT IN AN ORGANIZED HEALTH CARE EDUCATION/TRAINING PROGRAM

## 2023-10-18 PROCEDURE — 99204 OFFICE O/P NEW MOD 45 MIN: CPT | Performed by: STUDENT IN AN ORGANIZED HEALTH CARE EDUCATION/TRAINING PROGRAM

## 2023-10-18 RX ORDER — OXYCODONE HYDROCHLORIDE AND ACETAMINOPHEN 5; 325 MG/1; MG/1
1 TABLET ORAL EVERY 6 HOURS PRN
Qty: 12 TABLET | Refills: 0 | Status: SHIPPED | OUTPATIENT
Start: 2023-10-18 | End: 2023-10-21

## 2023-10-18 NOTE — PROGRESS NOTES
amount of numbness in the distribution of the infraorbital nerve. She was evaluated by ophthalmology and cleared from a visual perspective. She also had some sutures placed for a laceration of her right brow. States that the pain and swelling is improved. REVIEW OF SYSTEMS  The following systems were reviewed and revealed the following in addition to any already discussed in the HPI:    PHYSICAL EXAM    GENERAL: No acute distress, alert and oriented, no hoarseness, strong voice  EYES: EOMI, Anti-icteric evidence of right-sided subconjunctival hemorrhage and periorbital hematoma. No evidence of entrapment. Mild decrease sensation of the right infraorbital nerve. There is a 1.5 cm laceration with 3 nylon sutures. These were removed. The wound edges were well approximated. HENT:   Head: Normocephalic and atraumatic. Face:  Symmetric, facial nerve intact  Right Ear: Normal external ear, normal external auditory canal, intact tympanic membrane with normal mobility and aerated middle ear  Left Ear: Normal external ear, normal external auditory canal, intact tympanic membrane with normal mobility and aerated middle ear  Mouth/Oral Cavity:  normal lips, Uvula is midline, no mucosal lesions, no trismus, normal dentition, normal salivary quality/flow  Oropharynx/Larynx:  normal oropharynx, plus tonsils  Nose:Normal external nasal appearance. Normal mucosa   NECK: Normal range of motion, no thyromegaly, trachea is midline, no lymphadenopathy, no neck masses, no crepitus  CHEST: Normal respiratory effort, no retractions, breathing comfortably  SKIN: No rashes, normal appearing skin, no evidence of skin lesions/tumors  Neuro:  cranial nerve II-XII intact; normal gait  Cardio:  no edema        PROCEDURE      This note was generated completely or in part utilizing Dragon dictation speech recognition software.   Occasionally, words are mistranscribed and despite editing, the text may contain inaccuracies due to

## 2023-11-09 ENCOUNTER — OFFICE VISIT (OUTPATIENT)
Dept: PULMONOLOGY | Age: 68
End: 2023-11-09
Payer: MEDICARE

## 2023-11-09 VITALS
RESPIRATION RATE: 18 BRPM | DIASTOLIC BLOOD PRESSURE: 70 MMHG | WEIGHT: 185.4 LBS | HEART RATE: 85 BPM | SYSTOLIC BLOOD PRESSURE: 134 MMHG | BODY MASS INDEX: 37.38 KG/M2 | TEMPERATURE: 97.7 F | HEIGHT: 59 IN | OXYGEN SATURATION: 96 %

## 2023-11-09 DIAGNOSIS — R06.09 DYSPNEA ON EXERTION: Primary | ICD-10-CM

## 2023-11-09 PROCEDURE — 1123F ACP DISCUSS/DSCN MKR DOCD: CPT | Performed by: INTERNAL MEDICINE

## 2023-11-09 PROCEDURE — 3078F DIAST BP <80 MM HG: CPT | Performed by: INTERNAL MEDICINE

## 2023-11-09 PROCEDURE — 3074F SYST BP LT 130 MM HG: CPT | Performed by: INTERNAL MEDICINE

## 2023-11-09 PROCEDURE — 99204 OFFICE O/P NEW MOD 45 MIN: CPT | Performed by: INTERNAL MEDICINE

## 2023-11-09 NOTE — PROGRESS NOTES
[address] [place],[address], SLEEP, AND CRITICAL CARE    Lul Reich (:  1955) is a 70 y.o. female,New patient, here for evaluation of the following chief complaint(s):  New Patient         ASSESSMENT/PLAN:  1. Dyspnea on exertion  Assessment & Plan:  -Multifactorial  -Suspicious for mild acute pulmonary edema which should explain the waxing and waning nature of her oxygen requirement  -Needed formalized 6-minute walk test to document exertional hypoxemia  -Formal PFTs  -At this point she can hold on inhalers as there is no symptomatic relief. Orders:  -     Full PFT Study With Bronchodilator; Future  -     6 Minute Walk Test; Future      Return in about 4 weeks (around 2023). Future Appointments   Date Time Provider [address]   2023 10:30 AM Enrique Back MD [facility]   2024 12:00 PM [facility]            Subjective   SUBJECTIVE/OBJECTIVE:  Never smoker presents for evaluation of dyspnea on exertion. This was recently complicated by observed exertional hypoxemia at her PCPs office. She was set up with home oxygen. Subsequently home health nursing has reported that patient has no desaturation events which patient and daughter corroborate using home pulse oximeter. Previous CT scan shows background hazy faint groundglass diffusely versus mosaic attenuation. Patient previously had an echocardiogram performed that showed some atrial enlargement but no overt evidence of systolic CHF        Review of Systems   Constitutional: Negative. Respiratory:  Positive for shortness of breath. Negative for cough and wheezing. Musculoskeletal: Negative. Objective   Physical Exam     Gen:  No acute distress. Eyes: EOMI. Anicteric sclera. No conjunctival injection. ENT: No discharge. External appearance of ears and nose normal.  Neck: Trachea midline. No mass   Resp:  No crackles. No wheezes. No rhonchi. No dullness on percussion.   CV:

## 2023-11-10 PROBLEM — R06.09 DYSPNEA ON EXERTION: Status: ACTIVE | Noted: 2023-11-10

## 2023-11-10 ASSESSMENT — ENCOUNTER SYMPTOMS
COUGH: 0
WHEEZING: 0
SHORTNESS OF BREATH: 1

## 2023-11-10 NOTE — ASSESSMENT & PLAN NOTE
-Multifactorial  -Suspicious for mild acute pulmonary edema which should explain the waxing and waning nature of her oxygen requirement  -Needed formalized 6-minute walk test to document exertional hypoxemia  -Formal PFTs  -At this point she can hold on inhalers as there is no symptomatic relief.

## 2023-12-07 ENCOUNTER — APPOINTMENT (OUTPATIENT)
Dept: GENERAL RADIOLOGY | Age: 68
End: 2023-12-07
Payer: MEDICARE

## 2023-12-07 ENCOUNTER — PATIENT MESSAGE (OUTPATIENT)
Dept: PULMONOLOGY | Age: 68
End: 2023-12-07

## 2023-12-07 ENCOUNTER — HOSPITAL ENCOUNTER (EMERGENCY)
Age: 68
Discharge: HOME | End: 2023-12-07
Attending: EMERGENCY MEDICINE
Payer: MEDICARE

## 2023-12-07 VITALS
BODY MASS INDEX: 39.2 KG/M2 | TEMPERATURE: 98.9 F | DIASTOLIC BLOOD PRESSURE: 84 MMHG | HEART RATE: 86 BPM | WEIGHT: 186.73 LBS | HEIGHT: 58 IN | OXYGEN SATURATION: 95 % | RESPIRATION RATE: 18 BRPM | SYSTOLIC BLOOD PRESSURE: 185 MMHG

## 2023-12-07 DIAGNOSIS — M17.11 OSTEOARTHRITIS OF RIGHT KNEE, UNSPECIFIED OSTEOARTHRITIS TYPE: Primary | ICD-10-CM

## 2023-12-07 PROCEDURE — 73562 X-RAY EXAM OF KNEE 3: CPT

## 2023-12-07 PROCEDURE — 99283 EMERGENCY DEPT VISIT LOW MDM: CPT

## 2023-12-07 RX ORDER — METHYLPREDNISOLONE 4 MG/1
TABLET ORAL
Qty: 1 KIT | Refills: 0 | Status: SHIPPED | OUTPATIENT
Start: 2023-12-07 | End: 2023-12-13

## 2023-12-07 ASSESSMENT — PAIN SCALES - GENERAL
PAINLEVEL_OUTOF10: 10
PAINLEVEL_OUTOF10: 7

## 2023-12-07 ASSESSMENT — PAIN - FUNCTIONAL ASSESSMENT
PAIN_FUNCTIONAL_ASSESSMENT: 0-10
PAIN_FUNCTIONAL_ASSESSMENT: 0-10

## 2023-12-07 ASSESSMENT — PAIN DESCRIPTION - LOCATION: LOCATION: KNEE

## 2023-12-07 ASSESSMENT — PAIN DESCRIPTION - FREQUENCY
FREQUENCY: CONTINUOUS
FREQUENCY: CONTINUOUS

## 2023-12-07 ASSESSMENT — PAIN DESCRIPTION - DESCRIPTORS: DESCRIPTORS: ACHING

## 2023-12-07 ASSESSMENT — PAIN DESCRIPTION - ORIENTATION
ORIENTATION: RIGHT
ORIENTATION: RIGHT

## 2023-12-07 ASSESSMENT — PAIN DESCRIPTION - PAIN TYPE
TYPE: ACUTE PAIN
TYPE: ACUTE PAIN

## 2023-12-07 NOTE — ED NOTES
Patient walking with a limp.  Took patient back to ED room 4 in a w/c      Khadijah Mims RN  12/07/23 0104

## 2023-12-07 NOTE — ED NOTES
Discharge and education instructions reviewed with patient. Teach-back successful. Pt verbalized understanding. Pt denied questions at this time. No acute distress noted. Patient instructed to follow-up as noted - return to emergency department if symptoms worsen. Patient verbalized understanding. Discharged per EDMD with discharge instructions. Pt discharged per to private vehicle. She was taken by w/c to her car. Patient stable upon departure. Thanked patient for choosing [facility]) for care.          Cande Mcdonald RN  12/07/23 3693

## 2023-12-07 NOTE — ED PROVIDER NOTES
[facility] Name: Leigha Salazar   MRN: [id number]   [address] 1955   Date of evaluation: 12/7/2023   Provider: Brenton Johns MD   PCP: Ashley Alvarado PA-C   Note Started: 12:58 PM EST 12/7/23       CHIEF COMPLAINT  Knee Injury (C/o pain right knee x 2 days. States she fell 2 weeks ago on right knee)       HISTORY OF PRESENT ILLNESS  Leigha Salzaar is a 70 y.o. female who  has a past medical history of Adnexal mass, AF (paroxysmal atrial fibrillation) ([address]), Arthritis, CAD (coronary artery disease), Cerebral artery occlusion with cerebral infarction ([address]), CHF (congestive heart failure) ([address]), Chronic cough, CKD (chronic kidney disease), Depression, Diabetes mellitus ([address]), Fall, Heart murmur, Hx of atrial flutter, Hyperlipidemia, Hypersomnia, Hypertension, MI, old, Pancreatitis, and Sleep apnea. who presents to the ED complaining of right knee pain. Patient has a history of knee problems. She rolled out of bed a couple of weeks ago. She works at a local [employer]. The knee has been getting steadily worse since she fell. She does have a history of previous fiberoptic right knee surgery years ago. She has had a left total knee replacement. She is also diabetic. History of heart disease previous stroke and CHF. States her blood sugars have been under control lately and it usually runs around 80 9 in the morning.     I have reviewed the following from the nursing documentation:        HISTORY :      Past Medical History:   Diagnosis Date    Adnexal mass     AF (paroxysmal atrial fibrillation) (HCC)     Arthritis     CAD (coronary artery disease)     Cerebral artery occlusion with cerebral infarction [facility])     CHF (congestive heart failure) ([facility])     grade 2 diastolic dysfunction    Chronic cough     CKD (chronic kidney disease)     stage 3    Depression     Diabetes mellitus ([address])     Fall     Heart murmur     Hx of atrial flutter

## 2024-01-05 ENCOUNTER — OFFICE VISIT (OUTPATIENT)
Dept: PULMONOLOGY | Age: 69
End: 2024-01-05
Payer: MEDICAID

## 2024-01-05 VITALS
HEIGHT: 58 IN | RESPIRATION RATE: 18 BRPM | DIASTOLIC BLOOD PRESSURE: 75 MMHG | HEART RATE: 87 BPM | OXYGEN SATURATION: 98 % | SYSTOLIC BLOOD PRESSURE: 115 MMHG | TEMPERATURE: 97.6 F | BODY MASS INDEX: 36.94 KG/M2 | WEIGHT: 176 LBS

## 2024-01-05 DIAGNOSIS — R06.09 DYSPNEA ON EXERTION: Primary | ICD-10-CM

## 2024-01-05 PROCEDURE — 1123F ACP DISCUSS/DSCN MKR DOCD: CPT | Performed by: INTERNAL MEDICINE

## 2024-01-05 PROCEDURE — 99213 OFFICE O/P EST LOW 20 MIN: CPT | Performed by: INTERNAL MEDICINE

## 2024-01-05 PROCEDURE — G8400 PT W/DXA NO RESULTS DOC: HCPCS | Performed by: INTERNAL MEDICINE

## 2024-01-05 PROCEDURE — 3017F COLORECTAL CA SCREEN DOC REV: CPT | Performed by: INTERNAL MEDICINE

## 2024-01-05 PROCEDURE — G8417 CALC BMI ABV UP PARAM F/U: HCPCS | Performed by: INTERNAL MEDICINE

## 2024-01-05 PROCEDURE — 3078F DIAST BP <80 MM HG: CPT | Performed by: INTERNAL MEDICINE

## 2024-01-05 PROCEDURE — 1090F PRES/ABSN URINE INCON ASSESS: CPT | Performed by: INTERNAL MEDICINE

## 2024-01-05 PROCEDURE — G8427 DOCREV CUR MEDS BY ELIG CLIN: HCPCS | Performed by: INTERNAL MEDICINE

## 2024-01-05 PROCEDURE — 3074F SYST BP LT 130 MM HG: CPT | Performed by: INTERNAL MEDICINE

## 2024-01-05 PROCEDURE — G8484 FLU IMMUNIZE NO ADMIN: HCPCS | Performed by: INTERNAL MEDICINE

## 2024-01-05 PROCEDURE — 1036F TOBACCO NON-USER: CPT | Performed by: INTERNAL MEDICINE

## 2024-01-05 ASSESSMENT — ENCOUNTER SYMPTOMS
COUGH: 0
WHEEZING: 0
SHORTNESS OF BREATH: 1

## 2024-01-05 NOTE — PROGRESS NOTES
[facility] PULMONARY, SLEEP, AND CRITICAL CARE    Melody Clement (:  1955) is a 68 y.o. female,New patient, here for evaluation of the following chief complaint(s):  Follow-up (dyspnea)         ASSESSMENT/PLAN:  1. Dyspnea on exertion  Assessment & Plan:  -Suspect this was due to mild pulmonary edema seen previously versus some degree of deconditioning  -In case pulmonary function testing was within normal limits arrival relatively low suspicion for primary pulmonary etiology  -Okay to discontinue bronchodilator therapy especially as there is been no symptomatic improvement  -Can discontinue home oxygen has not needed for a while        Return if symptoms worsen or fail to improve.  No future appointments.           Subjective   SUBJECTIVE/OBJECTIVE:  Never smoker presents for evaluation of dyspnea on exertion.    -Since last visit symptoms are much more improved.  -Recent PFTs without evidence of airflow obstruction or respiratory pathology  -No symptomatic relief with bronchodilator therapy        Review of Systems   Constitutional: Negative.    Respiratory:  Positive for shortness of breath. Negative for cough and wheezing.    Musculoskeletal: Negative.           Objective   Physical Exam     Gen:  No acute distress.   Eyes: EOMI. Anicteric sclera. No conjunctival injection.   ENT: No discharge.External appearance of ears and nose normal.  Neck: Trachea midline. No mass   Resp:  No crackles. No wheezes. No rhonchi. No dullness on percussion.  CV: Regular rate. Regular rhythm. No murmur or rub. No edema.   Neuro:  no focal neurologic deficit.  Moves all extremities  Psych: Awake and alert, Oriented x 3.  Judgement and insight appropriate.  Mood stable.    I spent 23 minutes on this case today, >50% was face-to-face in discussion of the diagnosis and the coordination of care in regards to the treatment plan.  All questions answered.     An electronic signature was used to authenticate this

## 2024-01-05 NOTE — ASSESSMENT & PLAN NOTE
-Suspect this was due to mild pulmonary edema seen previously versus some degree of deconditioning  -In case pulmonary function testing was within normal limits arrival relatively low suspicion for primary pulmonary etiology  -Okay to discontinue bronchodilator therapy especially as there is been no symptomatic improvement  -Can discontinue home oxygen has not needed for a while

## 2024-02-05 ENCOUNTER — OFFICE VISIT (OUTPATIENT)
Dept: PRIMARY CARE CLINIC | Age: 69
End: 2024-02-05
Payer: MEDICAID

## 2024-02-05 VITALS
BODY MASS INDEX: 38.62 KG/M2 | HEART RATE: 79 BPM | SYSTOLIC BLOOD PRESSURE: 122 MMHG | OXYGEN SATURATION: 95 % | DIASTOLIC BLOOD PRESSURE: 64 MMHG | TEMPERATURE: 98.4 F | HEIGHT: 58 IN | WEIGHT: 184 LBS

## 2024-02-05 DIAGNOSIS — E11.36 TYPE 2 DIABETES MELLITUS WITH DIABETIC CATARACT, WITH LONG-TERM CURRENT USE OF INSULIN (HCC): ICD-10-CM

## 2024-02-05 DIAGNOSIS — E11.319 DIABETIC RETINOPATHY OF BOTH EYES ASSOCIATED WITH TYPE 2 DIABETES MELLITUS, MACULAR EDEMA PRESENCE UNSPECIFIED, UNSPECIFIED RETINOPATHY SEVERITY (HCC): ICD-10-CM

## 2024-02-05 DIAGNOSIS — F41.9 ANXIETY AND DEPRESSION: ICD-10-CM

## 2024-02-05 DIAGNOSIS — F32.A ANXIETY AND DEPRESSION: ICD-10-CM

## 2024-02-05 DIAGNOSIS — G47.30 SLEEP APNEA, UNSPECIFIED TYPE: ICD-10-CM

## 2024-02-05 DIAGNOSIS — Z13.29 SCREENING FOR THYROID DISORDER: ICD-10-CM

## 2024-02-05 DIAGNOSIS — Z12.11 SCREEN FOR COLON CANCER: ICD-10-CM

## 2024-02-05 DIAGNOSIS — Z91.81 AT HIGH RISK FOR FALLS: ICD-10-CM

## 2024-02-05 DIAGNOSIS — R05.1 ACUTE COUGH: ICD-10-CM

## 2024-02-05 DIAGNOSIS — E78.5 HYPERLIPIDEMIA, UNSPECIFIED HYPERLIPIDEMIA TYPE: ICD-10-CM

## 2024-02-05 DIAGNOSIS — Z13.0 SCREENING FOR DEFICIENCY ANEMIA: ICD-10-CM

## 2024-02-05 DIAGNOSIS — Z76.89 ENCOUNTER TO ESTABLISH CARE: Primary | ICD-10-CM

## 2024-02-05 DIAGNOSIS — E55.9 VITAMIN D DEFICIENCY: ICD-10-CM

## 2024-02-05 DIAGNOSIS — I48.0 PAROXYSMAL ATRIAL FIBRILLATION (HCC): ICD-10-CM

## 2024-02-05 DIAGNOSIS — Z12.31 ENCOUNTER FOR SCREENING MAMMOGRAM FOR MALIGNANT NEOPLASM OF BREAST: ICD-10-CM

## 2024-02-05 DIAGNOSIS — Z79.4 TYPE 2 DIABETES MELLITUS WITH DIABETIC CATARACT, WITH LONG-TERM CURRENT USE OF INSULIN (HCC): ICD-10-CM

## 2024-02-05 DIAGNOSIS — E66.01 SEVERE OBESITY (BMI 35.0-39.9) WITH COMORBIDITY (HCC): ICD-10-CM

## 2024-02-05 PROCEDURE — 3017F COLORECTAL CA SCREEN DOC REV: CPT

## 2024-02-05 PROCEDURE — G8400 PT W/DXA NO RESULTS DOC: HCPCS

## 2024-02-05 PROCEDURE — G8417 CALC BMI ABV UP PARAM F/U: HCPCS

## 2024-02-05 PROCEDURE — G8427 DOCREV CUR MEDS BY ELIG CLIN: HCPCS

## 2024-02-05 PROCEDURE — G8484 FLU IMMUNIZE NO ADMIN: HCPCS

## 2024-02-05 PROCEDURE — 99204 OFFICE O/P NEW MOD 45 MIN: CPT

## 2024-02-05 PROCEDURE — 1090F PRES/ABSN URINE INCON ASSESS: CPT

## 2024-02-05 PROCEDURE — 3046F HEMOGLOBIN A1C LEVEL >9.0%: CPT

## 2024-02-05 PROCEDURE — 3074F SYST BP LT 130 MM HG: CPT

## 2024-02-05 PROCEDURE — 1123F ACP DISCUSS/DSCN MKR DOCD: CPT

## 2024-02-05 PROCEDURE — 1036F TOBACCO NON-USER: CPT

## 2024-02-05 PROCEDURE — 3078F DIAST BP <80 MM HG: CPT

## 2024-02-05 PROCEDURE — 2022F DILAT RTA XM EVC RTNOPTHY: CPT

## 2024-02-05 RX ORDER — BENZONATATE 200 MG/1
200 CAPSULE ORAL 3 TIMES DAILY PRN
Qty: 60 CAPSULE | Refills: 0 | Status: SHIPPED | OUTPATIENT
Start: 2024-02-05 | End: 2024-02-15

## 2024-02-05 NOTE — PROGRESS NOTES
Melody Clement (:  1955) is a 68 y.o. female,New patient, here for evaluation of the following chief complaint(s):  Establish Care, Diabetes, Hypertension, and Anxiety    Melody is here today to establish care.  She has a significant medical history.  The following diagnoses were addressed today:    DM: Checks sugars daily in the morning 260's this morning, but most of the time in the 160's.  Takes her fast acting insulin BID (does not do a lunch time dose), takes 5 units with snacks. Taking Lantus at night (states 72 units). Does not have an Endocrinologist that she sees regularly (has seen one in the past).     Sleep Apnea: Does not use a CPAP at home due to the cost. Will need a new referral for Sleep Medicine for retesting.      Cardiac: History of stroke and MI; ablation history for afib: Still on eliquis.  Follows with cardiology.     Depression/Anxiety: Thinks a lot about people who have passed away.  Is losing her eye sight, and feels like she has lost some of her independence and her mood has decreased.  Also in pain with her knees. Also raising her 15 year old granddaughter.     Right knee pain: Due to have a knee replacement.     DM FOOT EXAM:   Visual inspection:  Deformity/amputation: absent  Skin lesions/pre-ulcerative calluses: absent  Edema: right- negative, left- negative    Sensory exam:  Monofilament sensation: normal  (minimum of 5 random plantar locations tested, avoiding callused areas - > 1 area with absence of sensation is + for neuropathy)    Plus at least one of the following:  Pulses: normal,   Pinprick: Intact  Proprioception: N/A  Vibration (128 Hz): N/A        2024     1:54 PM 10/14/2023     6:25 AM   PHQ Scores   PHQ2 Score 4 0   PHQ9 Score 8 0     Interpretation of Total Score Depression Severity: 1-4 = Minimal depression, 5-9 = Mild depression, 10-14 = Moderate depression, 15-19 = Moderately severe depression, 20-27 = Severe depression        2024     1:56 PM

## 2024-02-06 LAB
CREAT UR-MCNC: 63.4 MG/DL (ref 28–259)
MICROALBUMIN UR DL<=1MG/L-MCNC: <1.2 MG/DL
MICROALBUMIN/CREAT UR: NORMAL MG/G (ref 0–30)

## 2024-02-07 ENCOUNTER — PATIENT MESSAGE (OUTPATIENT)
Dept: PRIMARY CARE CLINIC | Age: 69
End: 2024-02-07

## 2024-02-16 ENCOUNTER — PATIENT MESSAGE (OUTPATIENT)
Dept: PRIMARY CARE CLINIC | Age: 69
End: 2024-02-16

## 2024-02-16 RX ORDER — INSULIN GLARGINE 100 [IU]/ML
70 INJECTION, SOLUTION SUBCUTANEOUS NIGHTLY
Qty: 10 ML | Refills: 1 | Status: SHIPPED | OUTPATIENT
Start: 2024-02-16

## 2024-02-16 NOTE — TELEPHONE ENCOUNTER
From: Melody Clement  To: Bijal Jay  Sent: 2/16/2024 12:37 PM EST  Subject: Refill med    Please refill Lantus   Thanks

## 2024-02-21 RX ORDER — APIXABAN 5 MG/1
TABLET, FILM COATED ORAL
Qty: 180 TABLET | Refills: 2 | Status: SHIPPED | OUTPATIENT
Start: 2024-02-21

## 2024-02-21 NOTE — TELEPHONE ENCOUNTER
Last O/V:  23  Next O/V:  None (due yearly)  Last Refills: 23  Last Labs:  CMP:  24  Last EK23

## 2024-02-24 ENCOUNTER — PATIENT MESSAGE (OUTPATIENT)
Dept: PRIMARY CARE CLINIC | Age: 69
End: 2024-02-24

## 2024-02-26 NOTE — TELEPHONE ENCOUNTER
Last OV: 02/05/2024 Return in about 3 months (around 5/5/2024) for medication check; Call in one month with update on Zoloft..     Next OV: None scheduled.

## 2024-02-26 NOTE — TELEPHONE ENCOUNTER
From: Melody Clement  To: Bijal Jay  Sent: 2/24/2024 7:54 AM EST  Subject: Refill    Please refill metoprolol   Thanks

## 2024-03-05 ENCOUNTER — TELEPHONE (OUTPATIENT)
Dept: CARDIOLOGY CLINIC | Age: 69
End: 2024-03-05

## 2024-03-05 NOTE — TELEPHONE ENCOUNTER
CARDIAC CLEARANCE     Procedure:Knee replacement surgery  : Elisabet Burgos MD  Date: 3/7/24    Medications needing held: Eliquis     How long?: 5 days prior    Phone Number and Contact Name for Physicians office: [phone]  Fax number to send information: [phone] also fax to [phone]    Clinical staff:    Cardiologist: Dr Morris  Last Appointment: 7/31/23  Next Appointment: X  Cardiac History: Medical history of CAD (seen on coronary CTA 10/21), DM and atrial fibrillation.     Received fax from [facility] needing cardiac clearance and requesting patient hold Eliquis for 5 days prior to procedure.    Scanned in chart.  Please advise.

## 2024-03-05 NOTE — TELEPHONE ENCOUNTER
Called patient spoke with daughter Melody states patient last dose of Eliquis was Friday 3/1/24.   Patient has been holding Eliquis for 4 days

## 2024-03-05 NOTE — TELEPHONE ENCOUNTER
Dr Hoang from [facility] has requested advisement on Melodyorlando Clement to undergo Right Total Knee Replacement surgery. They have requested patient to hold Eliquis for 5 days prior to surgery.    Patient last seen in office on 7/31/23 for management of  Atrial fibrillation (PVI, CAFE) and left atrial flutter (roof line, mitral isthmus, anterior wall) ablation on 12/28/21, Dr. Amor  CHADS2-VASc 4 (age, gender, DM, CAD) on Eliquis 5mg BID    EF is    60%. Indeterminate diastolic   function.    Please advise.

## 2024-03-05 NOTE — TELEPHONE ENCOUNTER
Please call patient to find out if she has already started holding her Eliquis as her surgery is on 3/7 and it is 3/5 with a 5 day hold request. Thanks.

## 2024-03-05 NOTE — TELEPHONE ENCOUNTER
Katie (daughter/POA) called in stating that she sent a surgery history physical form about 3 weeks ago and following up b/c the facility doing Melody's surgery hasn't received information. Katie re-faxed and asked if this can be completed and faxed over by tmrw or else they will have to reschedule Melody's surgery and Katie can't reschedule.     Form faxed and placed in Cardiac Clearance folder in the back.     Katie's callback: [phone]

## 2024-03-05 NOTE — TELEPHONE ENCOUNTER
Patient is cleared for surgery from cardiac standpoint and since patient is holding Eliquis for last 4 days patient should be okay for surgery on 3/7

## 2024-03-06 ENCOUNTER — TELEPHONE (OUTPATIENT)
Dept: PRIMARY CARE CLINIC | Age: 69
End: 2024-03-06

## 2024-03-11 DIAGNOSIS — F41.9 ANXIETY AND DEPRESSION: ICD-10-CM

## 2024-03-11 DIAGNOSIS — F32.A ANXIETY AND DEPRESSION: ICD-10-CM

## 2024-03-11 NOTE — TELEPHONE ENCOUNTER
Called patient and discussed her medication. Patient states she notices a difference and feels better on the medication. No side effects, she likes where the dosage is at.

## 2024-03-14 RX ORDER — TROSPIUM CHLORIDE 20 MG/1
20 TABLET, FILM COATED ORAL 2 TIMES DAILY
Qty: 60 TABLET | Refills: 0 | Status: SHIPPED | OUTPATIENT
Start: 2024-03-14 | End: 2024-03-14

## 2024-03-14 RX ORDER — TROSPIUM CHLORIDE 20 MG/1
20 TABLET, FILM COATED ORAL 2 TIMES DAILY
Qty: 180 TABLET | Refills: 0 | Status: SHIPPED | OUTPATIENT
Start: 2024-03-14

## 2024-03-14 NOTE — TELEPHONE ENCOUNTER
Last OV: 02/05/2024 Return in about 3 months (around 5/5/2024) for medication check; Call in one month with update on Zoloft.    Next OV: None Scheduled.

## 2024-03-29 ENCOUNTER — HOSPITAL ENCOUNTER (OUTPATIENT)
Dept: PHYSICAL THERAPY | Age: 69
Setting detail: THERAPIES SERIES
Discharge: HOME | End: 2024-03-29
Payer: MEDICARE

## 2024-03-29 DIAGNOSIS — R29.898 RIGHT LEG WEAKNESS: ICD-10-CM

## 2024-03-29 DIAGNOSIS — M25.461 EFFUSION OF RIGHT KNEE: ICD-10-CM

## 2024-03-29 DIAGNOSIS — M25.561 RIGHT KNEE PAIN, UNSPECIFIED CHRONICITY: Primary | ICD-10-CM

## 2024-03-29 DIAGNOSIS — R26.89 DECREASED FUNCTIONAL MOBILITY: ICD-10-CM

## 2024-03-29 DIAGNOSIS — M25.661 KNEE STIFFNESS, RIGHT: ICD-10-CM

## 2024-03-29 PROCEDURE — 97110 THERAPEUTIC EXERCISES: CPT | Performed by: PHYSICAL THERAPIST

## 2024-03-29 PROCEDURE — 97112 NEUROMUSCULAR REEDUCATION: CPT | Performed by: PHYSICAL THERAPIST

## 2024-03-29 PROCEDURE — 97161 PT EVAL LOW COMPLEX 20 MIN: CPT | Performed by: PHYSICAL THERAPIST

## 2024-03-29 PROCEDURE — 97016 VASOPNEUMATIC DEVICE THERAPY: CPT | Performed by: PHYSICAL THERAPIST

## 2024-03-29 NOTE — PLAN OF CARE
with Strap  - 2 x daily - 7 x weekly - 1 sets - 10 reps - 10 hold  - Seated Knee Flexion AAROM  - 2 x daily - 7 x weekly - 1 sets - 10 reps - 10 hold  - Supine Ankle Pumps  - 8 x daily - 7 x weekly - 3 sets - 10 reps  - Long Sitting Quad Set  - 5 x daily - 7 x weekly - 1 sets - 10 reps - 10 hold  - Supine Straight Leg Raises  - 2 x daily - 7 x weekly - 3 sets - 5-10 reps      ASSESSMENT   Assessment:   Melody Clement is a 69 y.o. female presenting today to Outpatient PT with signs and symptoms consistent with s/p R TKA.    Pt. presents with the functional impairments and activity limitations listed below and would benefit from Outpatient PT to address the below impairments as well as improve pain, and restore function.     Functional Impairments:   Noted lumbar/proximal hip/LE joint hypomobility  Decreased LE functional ROM  Decreased core/proximal hip strength and neuromuscular control  Decreased LE functional strength   Reduced balance/proprioceptive control    Functional Activity Limitations (from functional questionnaire and intake):  Reduced ability to tolerate prolonged functional positions  Reduced ability or difficulty with changes of positions or transfers between positions  Reduced ability to sleep  Reduced ability or tolerance with driving and/or computer work  Reduced ability to perform lifting, reaching, carrying tasks  Reduced ability to squat  Reduced ability to forward bend  Reduced ability to ambulate prolonged functional periods/distances/surfaces  Reduced ability to ascend/descend stairs  reduced ability to kneel  difficulty with self care    Participation Restrictions:   Reduced participation in self care  Reduced participation in home management   Reduced participation in work activities    Classification :   Signs/symptoms consistent with post-surgical status including decreased ROM, strength and function.       Barriers to/and or personal factors that will affect rehab potential:

## 2024-04-02 ENCOUNTER — HOSPITAL ENCOUNTER (OUTPATIENT)
Dept: PHYSICAL THERAPY | Age: 69
Setting detail: THERAPIES SERIES
Discharge: HOME | End: 2024-04-02
Payer: MEDICARE

## 2024-04-05 ENCOUNTER — HOSPITAL ENCOUNTER (OUTPATIENT)
Dept: PHYSICAL THERAPY | Age: 69
Setting detail: THERAPIES SERIES
Discharge: HOME | End: 2024-04-05
Payer: MEDICARE

## 2024-04-05 PROCEDURE — 97110 THERAPEUTIC EXERCISES: CPT | Performed by: PHYSICAL THERAPIST

## 2024-04-05 PROCEDURE — 97530 THERAPEUTIC ACTIVITIES: CPT | Performed by: PHYSICAL THERAPIST

## 2024-04-05 PROCEDURE — 97016 VASOPNEUMATIC DEVICE THERAPY: CPT | Performed by: PHYSICAL THERAPIST

## 2024-04-05 PROCEDURE — 97112 NEUROMUSCULAR REEDUCATION: CPT | Performed by: PHYSICAL THERAPIST

## 2024-04-05 NOTE — FLOWSHEET NOTE
[facility]- Outpatient Rehabilitation and Therapy [address] [place] [address] office: [phone] fax: [phone]         Physical Therapy: TREATMENT/PROGRESS NOTE   Patient: Melody Clement (69 y.o. female)   Examination Date: 2024   :  1955 MRN: [id number]   Visit #: 2   Insurance Allowable Auth Needed   [place] []Yes    [x]No    Insurance: Payor: [employer] [place]ARE / Plan: [employer] [place] / Product Type: *No Product type* /   Insurance ID: [id number] - (Medicare Managed)  Secondary Insurance (if applicable):    Treatment Diagnosis:     ICD-10-CM    1. Right knee pain, unspecified chronicity  M25.561       2. Decreased functional mobility  R26.89       3. Right leg weakness  R29.898       4. Effusion of right knee  M25.461       5. Knee stiffness, right  M25.661          Medical Diagnosis:  Presence of right artificial knee joint [Z96.651] On 2024   Referring Physician: Elisabet Reyes DO  PCP: Bijal Jay APRN - CNP       Plan of care signed (Y/N): Y    Date of Patient follow up with Physician: May 3rd     Progress Report/POC: NO  POC update due: (10 visits /OR AUTH LIMITS, whichever is less)  2024                                             Precautions/ Contra-indications:           Latex allergy:  NO  Pacemaker:    NO  Contraindications for Manipulation: NA  Relevant Medical History: DM- takes insulin and medication before meals and Lantus before bed time (tested at 103 this morning), HLD, HTN- on meds, Depression- on meds, L TKA 1.5 years ago, OA, mild MI- in - no sx needed,  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 Pt and pt' s daughter deny history of stroke/TIA, afib or flutter or heart failure or ablation sx.     Red Flags:  None      SUBJECTIVE EXAMINATION     Evaluation Patient stated complaint: Pt presents s/p R TKA . She did have

## 2024-04-09 ENCOUNTER — PATIENT MESSAGE (OUTPATIENT)
Dept: PRIMARY CARE CLINIC | Age: 69
End: 2024-04-09

## 2024-04-09 ENCOUNTER — HOSPITAL ENCOUNTER (OUTPATIENT)
Dept: PHYSICAL THERAPY | Age: 69
Setting detail: THERAPIES SERIES
Discharge: HOME | End: 2024-04-09
Payer: MEDICARE

## 2024-04-09 PROCEDURE — 97110 THERAPEUTIC EXERCISES: CPT | Performed by: PHYSICAL THERAPIST

## 2024-04-09 PROCEDURE — 97530 THERAPEUTIC ACTIVITIES: CPT | Performed by: PHYSICAL THERAPIST

## 2024-04-09 PROCEDURE — 97016 VASOPNEUMATIC DEVICE THERAPY: CPT | Performed by: PHYSICAL THERAPIST

## 2024-04-09 PROCEDURE — 97112 NEUROMUSCULAR REEDUCATION: CPT | Performed by: PHYSICAL THERAPIST

## 2024-04-09 NOTE — TELEPHONE ENCOUNTER
From: Melody Clement  To: Bijal Jay  Sent: 4/9/2024 11:35 AM EDT  Subject: refill    Can you please send over refill for Novalog Insulin and the BD pin needles Lory 32gauge 4mm  Thanks.

## 2024-04-09 NOTE — FLOWSHEET NOTE
[facility]- Outpatient Rehabilitation and Therapy [address] [place] [address] office: [phone] fax: [phone]         Physical Therapy: TREATMENT/PROGRESS NOTE   Patient: Melody Clement (69 y.o. female)   Examination Date: 2024   :  1955 MRN: [id number]   Visit #: 3   Insurance Allowable Auth Needed   [place] []Yes    [x]No    Insurance: Payor: [employer] [place] / Plan: [employer] [place] / Product Type: *No Product type* /   Insurance ID: [id number] - (Medicare Managed)  Secondary Insurance (if applicable):    Treatment Diagnosis:     ICD-10-CM    1. Right knee pain, unspecified chronicity  M25.561       2. Decreased functional mobility  R26.89       3. Right leg weakness  R29.898       4. Effusion of right knee  M25.461       5. Knee stiffness, right  M25.661          Medical Diagnosis:  Presence of right artificial knee joint [Z96.651] On 2024   Referring Physician: Elisabet Reyes DO  PCP: Bijal Jay APRN - CNP       Plan of care signed (Y/N): Y    Date of Patient follow up with Physician: May 3rd     Progress Report/POC: NO  POC update due: (10 visits /OR AUTH LIMITS, whichever is less)  2024                                             Precautions/ Contra-indications:           Latex allergy:  NO  Pacemaker:    NO  Contraindications for Manipulation: NA  Relevant Medical History: DM- takes insulin and medication before meals and Lantus before bed time (tested at 103 this morning), HLD, HTN- on meds, Depression- on meds, L TKA 1.5 years ago, OA, mild MI- in - no sx needed,  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 Pt and pt' s daughter deny history of stroke/TIA, afib or flutter or heart failure or ablation sx.     Red Flags:  None      SUBJECTIVE EXAMINATION     Evaluation Patient stated complaint: Pt presents s/p R TKA . She did have

## 2024-04-09 NOTE — TELEPHONE ENCOUNTER
Last OV: 02/05/2024 Return in about 3 months (around 5/5/2024) for medication check; Call in one month with update on Zoloft..     Next OV: None scheduled. (Reached out to patient via [email or web address] to get scheduled)

## 2024-04-10 ENCOUNTER — TELEPHONE (OUTPATIENT)
Dept: PRIMARY CARE CLINIC | Age: 69
End: 2024-04-10

## 2024-04-10 RX ORDER — PEN NEEDLE, DIABETIC 32GX 5/32"
1 NEEDLE, DISPOSABLE MISCELLANEOUS
Qty: 100 EACH | Refills: 1 | Status: SHIPPED | OUTPATIENT
Start: 2024-04-10

## 2024-04-10 RX ORDER — INSULIN ASPART 100 [IU]/ML
25 INJECTION, SOLUTION INTRAVENOUS; SUBCUTANEOUS
Qty: 5 ADJUSTABLE DOSE PRE-FILLED PEN SYRINGE | Refills: 0 | Status: SHIPPED | OUTPATIENT
Start: 2024-04-10 | End: 2024-04-11 | Stop reason: ALTCHOICE

## 2024-04-10 NOTE — TELEPHONE ENCOUNTER
Initiated Prior Authorization on Cover [employer] for patients insulin, Novolog 100u/mL. Sent as expedited review. If no response within 5 days, follow up with KEY: ASHLEY.

## 2024-04-11 ENCOUNTER — TELEPHONE (OUTPATIENT)
Dept: PRIMARY CARE CLINIC | Age: 69
End: 2024-04-11

## 2024-04-11 DIAGNOSIS — E11.36 TYPE 2 DIABETES MELLITUS WITH DIABETIC CATARACT, WITH LONG-TERM CURRENT USE OF INSULIN (HCC): Primary | ICD-10-CM

## 2024-04-11 DIAGNOSIS — Z79.4 TYPE 2 DIABETES MELLITUS WITH DIABETIC CATARACT, WITH LONG-TERM CURRENT USE OF INSULIN (HCC): Primary | ICD-10-CM

## 2024-04-11 RX ORDER — INSULIN ASPART INJECTION 100 [IU]/ML
INJECTION, SOLUTION SUBCUTANEOUS
Qty: 5 ADJUSTABLE DOSE PRE-FILLED PEN SYRINGE | Refills: 1 | Status: SHIPPED | OUTPATIENT
Start: 2024-04-11

## 2024-04-11 NOTE — TELEPHONE ENCOUNTER
Called patient and notified him that a follow up appointment will need to be scheduled with Dr. Kvng Hoyt.  Patient stated understanding and was transferred to scheduling.     Appointment was scheduled for 6/17/22 with Dr. Kvng Hoyt.   Patient and daughter notified via [employer]. Counseled to call with any questions or concerns.

## 2024-04-11 NOTE — TELEPHONE ENCOUNTER
Received denial from [employer] for patient's Novolog. Their formulary alternatives are Fiasp which is comparable to Novolog and Admelog, which is comparable to Humalog. Please advise and send new rx to pharmacy.

## 2024-04-12 ENCOUNTER — HOSPITAL ENCOUNTER (OUTPATIENT)
Dept: PHYSICAL THERAPY | Age: 69
Setting detail: THERAPIES SERIES
Discharge: HOME | End: 2024-04-12
Payer: MEDICARE

## 2024-04-12 PROCEDURE — 97530 THERAPEUTIC ACTIVITIES: CPT | Performed by: PHYSICAL THERAPIST

## 2024-04-12 PROCEDURE — 97112 NEUROMUSCULAR REEDUCATION: CPT | Performed by: PHYSICAL THERAPIST

## 2024-04-12 PROCEDURE — 97110 THERAPEUTIC EXERCISES: CPT | Performed by: PHYSICAL THERAPIST

## 2024-04-12 PROCEDURE — 97016 VASOPNEUMATIC DEVICE THERAPY: CPT | Performed by: PHYSICAL THERAPIST

## 2024-04-12 NOTE — FLOWSHEET NOTE
[facility]- Outpatient Rehabilitation and Therapy [address] [place] [address] office: [phone] fax: [phone]         Physical Therapy: TREATMENT/PROGRESS NOTE   Patient: Melody Clement (69 y.o. female)   Examination Date: 2024   :  1955 MRN: [id number]   Visit #: 4   Insurance Allowable Auth Needed   [place] []Yes    [x]No    Insurance: Payor: [employer] [place] / Plan: [employer] [place] / Product Type: *No Product type* /   Insurance ID: [id number] - (Medicare Managed)  Secondary Insurance (if applicable):    Treatment Diagnosis:     ICD-10-CM    1. Right knee pain, unspecified chronicity  M25.561       2. Decreased functional mobility  R26.89       3. Right leg weakness  R29.898       4. Effusion of right knee  M25.461       5. Knee stiffness, right  M25.661          Medical Diagnosis:  Presence of right artificial knee joint [Z96.651] On 2024   Referring Physician: Elisabet Reyes DO  PCP: Bijal Jay APRN - CNP       Plan of care signed (Y/N): Y    Date of Patient follow up with Physician: May 3rd     Progress Report/POC: NO  POC update due: (10 visits /OR AUTH LIMITS, whichever is less)  2024                                             Precautions/ Contra-indications:           Latex allergy:  NO  Pacemaker:    NO  Contraindications for Manipulation: NA  Relevant Medical History: DM- takes insulin and medication before meals and Lantus before bed time (tested at 103 this morning), HLD, HTN- on meds, Depression- on meds, L TKA 1.5 years ago, OA, mild MI- in - no sx needed,  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 Pt and pt' s daughter deny history of stroke/TIA, afib or flutter or heart failure or ablation sx.     Red Flags:  None      SUBJECTIVE EXAMINATION     Evaluation Patient stated complaint: Pt presents s/p R TKA . She did have

## 2024-04-16 ENCOUNTER — HOSPITAL ENCOUNTER (OUTPATIENT)
Dept: PHYSICAL THERAPY | Age: 69
Setting detail: THERAPIES SERIES
End: 2024-04-16
Payer: MEDICARE

## 2024-04-17 ENCOUNTER — OFFICE VISIT (OUTPATIENT)
Dept: PRIMARY CARE CLINIC | Age: 69
End: 2024-04-17
Payer: MEDICARE

## 2024-04-17 VITALS
HEART RATE: 72 BPM | DIASTOLIC BLOOD PRESSURE: 60 MMHG | SYSTOLIC BLOOD PRESSURE: 112 MMHG | HEIGHT: 58 IN | BODY MASS INDEX: 37.57 KG/M2 | WEIGHT: 179 LBS | TEMPERATURE: 98.5 F | OXYGEN SATURATION: 97 %

## 2024-04-17 DIAGNOSIS — H91.93 HEARING PROBLEM OF BOTH EARS: ICD-10-CM

## 2024-04-17 DIAGNOSIS — Z79.4 INSULIN DEPENDENT TYPE 2 DIABETES MELLITUS (HCC): ICD-10-CM

## 2024-04-17 DIAGNOSIS — E11.9 INSULIN DEPENDENT TYPE 2 DIABETES MELLITUS (HCC): ICD-10-CM

## 2024-04-17 DIAGNOSIS — J01.10 ACUTE NON-RECURRENT FRONTAL SINUSITIS: Primary | ICD-10-CM

## 2024-04-17 DIAGNOSIS — R05.1 ACUTE COUGH: ICD-10-CM

## 2024-04-17 PROCEDURE — 3078F DIAST BP <80 MM HG: CPT

## 2024-04-17 PROCEDURE — 3052F HG A1C>EQUAL 8.0%<EQUAL 9.0%: CPT

## 2024-04-17 PROCEDURE — 3074F SYST BP LT 130 MM HG: CPT

## 2024-04-17 PROCEDURE — 99213 OFFICE O/P EST LOW 20 MIN: CPT

## 2024-04-17 PROCEDURE — 1123F ACP DISCUSS/DSCN MKR DOCD: CPT

## 2024-04-17 RX ORDER — BLOOD SUGAR DIAGNOSTIC
1 STRIP MISCELLANEOUS 4 TIMES DAILY
Qty: 100 EACH | Refills: 5 | Status: SHIPPED | OUTPATIENT
Start: 2024-04-17

## 2024-04-17 RX ORDER — TROSPIUM CHLORIDE 20 MG/1
20 TABLET, FILM COATED ORAL 2 TIMES DAILY
Qty: 180 TABLET | Refills: 0 | Status: SHIPPED | OUTPATIENT
Start: 2024-04-17

## 2024-04-17 RX ORDER — BLOOD SUGAR DIAGNOSTIC
1 STRIP MISCELLANEOUS 4 TIMES DAILY
COMMUNITY
Start: 2024-01-19 | End: 2024-04-17 | Stop reason: SDUPTHER

## 2024-04-17 RX ORDER — PREDNISONE 20 MG/1
40 TABLET ORAL DAILY
Qty: 10 TABLET | Refills: 0 | Status: SHIPPED | OUTPATIENT
Start: 2024-04-17 | End: 2024-04-22

## 2024-04-17 NOTE — PROGRESS NOTES
to AVS.    Return if symptoms worsen or fail to improve.     Subjective   Cough  This is a new problem. The current episode started in the past 7 days. The problem has been waxing and waning. The problem occurs hourly. The cough is Productive of sputum. Associated symptoms include nasal congestion, postnasal drip and rhinorrhea. Pertinent negatives include no fever, myalgias or sore throat. Nothing aggravates the symptoms. She has tried OTC cough suppressant for the symptoms. The treatment provided mild relief.     Review of Systems   Constitutional: Negative.  Negative for fever.   HENT:  Positive for hearing loss, postnasal drip, rhinorrhea and voice change. Negative for sore throat.    Eyes: Negative.    Respiratory:  Positive for cough.    Cardiovascular: Negative.    Gastrointestinal: Negative.    Endocrine: Negative.    Genitourinary: Negative.    Musculoskeletal: Negative.  Negative for myalgias.   Skin: Negative.    Allergic/Immunologic: Negative.    Neurological: Negative.    Hematological: Negative.    Psychiatric/Behavioral: Negative.        Objective   Physical Exam  Vitals and nursing note reviewed.   Constitutional:       Appearance: Normal appearance.   HENT:      Head: Normocephalic.      Right Ear: Tympanic membrane normal.      Left Ear: Tympanic membrane normal.      Nose: Congestion and rhinorrhea present.      Mouth/Throat:      Mouth: Mucous membranes are moist.      Pharynx: Oropharynx is clear.   Eyes:      Conjunctiva/sclera: Conjunctivae normal.   Cardiovascular:      Rate and Rhythm: Normal rate and regular rhythm.      Pulses: Normal pulses.      Heart sounds: Normal heart sounds.   Pulmonary:      Effort: Pulmonary effort is normal.      Breath sounds: Normal breath sounds.   Abdominal:      General: Bowel sounds are normal.      Palpations: Abdomen is soft.   Musculoskeletal:         General: Normal range of motion.      Cervical back: Normal range of motion.   Skin:     General: Skin

## 2024-04-17 NOTE — PATIENT INSTRUCTIONS
Coricidin HBP cough suppressant for over the counter treatment.  Ocean spray (saline spray) for nasal dryness.  Place humidifier in bedroom at night.

## 2024-04-18 ASSESSMENT — ENCOUNTER SYMPTOMS
VOICE CHANGE: 1
SORE THROAT: 0
ALLERGIC/IMMUNOLOGIC NEGATIVE: 1
GASTROINTESTINAL NEGATIVE: 1
COUGH: 1
RHINORRHEA: 1
EYES NEGATIVE: 1

## 2024-04-19 ENCOUNTER — HOSPITAL ENCOUNTER (OUTPATIENT)
Dept: PHYSICAL THERAPY | Age: 69
Setting detail: THERAPIES SERIES
Discharge: HOME | End: 2024-04-19
Payer: MEDICARE

## 2024-04-19 PROCEDURE — 97016 VASOPNEUMATIC DEVICE THERAPY: CPT | Performed by: PHYSICAL THERAPIST

## 2024-04-19 PROCEDURE — 97530 THERAPEUTIC ACTIVITIES: CPT | Performed by: PHYSICAL THERAPIST

## 2024-04-19 PROCEDURE — 97110 THERAPEUTIC EXERCISES: CPT | Performed by: PHYSICAL THERAPIST

## 2024-04-19 PROCEDURE — 97112 NEUROMUSCULAR REEDUCATION: CPT | Performed by: PHYSICAL THERAPIST

## 2024-04-19 NOTE — FLOWSHEET NOTE
[facility]- Outpatient Rehabilitation and Therapy [address] [place] [address] office: [phone] fax: [phone]         Physical Therapy: TREATMENT/PROGRESS NOTE   Patient: Melody Clement (69 y.o. female)   Examination Date: 2024   :  1955 MRN: [id number]   Visit #: 5   Insurance Allowable Auth Needed   MN []Yes    [x]No    Insurance: Payor: [employer] [place] / Plan: [employer] [place] / Product Type: *No Product type* /   Insurance ID: [id number] - (Medicare Managed)  Secondary Insurance (if applicable):    Treatment Diagnosis:     ICD-10-CM    1. Right knee pain, unspecified chronicity  M25.561       2. Decreased functional mobility  R26.89       3. Right leg weakness  R29.898       4. Effusion of right knee  M25.461       5. Knee stiffness, right  M25.661          Medical Diagnosis:  Presence of right artificial knee joint [Z96.651] On 2024   Referring Physician: Elisabet Reyes DO  PCP: Bijal Jay APRN - CNP       Plan of care signed (Y/N): Y    Date of Patient follow up with Physician: May 3rd     Progress Report/POC: NO  POC update due: (10 visits /OR AUTH LIMITS, whichever is less)  2024                                             Precautions/ Contra-indications:           Latex allergy:  NO  Pacemaker:    NO  Contraindications for Manipulation: NA  Relevant Medical History: DM- takes insulin and medication before meals and Lantus before bed time (tested at 103 this morning), HLD, HTN- on meds, Depression- on meds, L TKA 1.5 years ago, OA, mild MI- in - no sx needed,  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 Pt and pt' s daughter deny history of stroke/TIA, afib or flutter or heart failure or ablation sx.     Red Flags:  None      SUBJECTIVE EXAMINATION     Evaluation Patient stated complaint: Pt presents s/p R TKA . She did have

## 2024-04-23 ENCOUNTER — HOSPITAL ENCOUNTER (OUTPATIENT)
Dept: PHYSICAL THERAPY | Age: 69
Setting detail: THERAPIES SERIES
Discharge: HOME | End: 2024-04-23
Payer: MEDICARE

## 2024-04-23 PROCEDURE — 97016 VASOPNEUMATIC DEVICE THERAPY: CPT

## 2024-04-23 PROCEDURE — 97530 THERAPEUTIC ACTIVITIES: CPT

## 2024-04-23 PROCEDURE — 97110 THERAPEUTIC EXERCISES: CPT

## 2024-04-23 PROCEDURE — 97112 NEUROMUSCULAR REEDUCATION: CPT

## 2024-04-23 NOTE — FLOWSHEET NOTE
[facility]- Outpatient Rehabilitation and Therapy [address] [place] [address] office: [phone] fax: [phone]         Physical Therapy: TREATMENT/PROGRESS NOTE   Patient: Melody Clement (69 y.o. female)   Examination Date: 2024   :  1955 MRN: [id number]   Visit #: 6   Insurance Allowable Auth Needed   MN []Yes    [x]No    Insurance: Payor: [employer] [place] / Plan: [employer] [place] / Product Type: *No Product type* /   Insurance ID: [id number] - (Medicare Managed)  Secondary Insurance (if applicable):    Treatment Diagnosis:     ICD-10-CM    1. Right knee pain, unspecified chronicity  M25.561       2. Decreased functional mobility  R26.89       3. Right leg weakness  R29.898       4. Effusion of right knee  M25.461       5. Knee stiffness, right  M25.661          Medical Diagnosis:  Presence of right artificial knee joint [Z96.651] On 2024   Referring Physician: Elisabet Reyes DO  PCP: Bijal Jay APRN - CNP       Plan of care signed (Y/N): Y    Date of Patient follow up with Physician: May 3rd     Progress Report/POC: NO  POC update due: (10 visits /OR AUTH LIMITS, whichever is less)  2024                                             Precautions/ Contra-indications:           Latex allergy:  NO  Pacemaker:    NO  Contraindications for Manipulation: NA  Relevant Medical History: DM- takes insulin and medication before meals and Lantus before bed time (tested at 103 this morning), HLD, HTN- on meds, Depression- on meds, L TKA 1.5 years ago, OA, mild MI- in - no sx needed,  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 Pt and pt' s daughter deny history of stroke/TIA, afib or flutter or heart failure or ablation sx.     Red Flags:  None      SUBJECTIVE EXAMINATION     Evaluation Patient stated complaint: Pt presents s/p R TKA . She did have

## 2024-04-29 ENCOUNTER — PATIENT MESSAGE (OUTPATIENT)
Dept: PRIMARY CARE CLINIC | Age: 69
End: 2024-04-29

## 2024-04-29 RX ORDER — INSULIN GLARGINE 100 [IU]/ML
70 INJECTION, SOLUTION SUBCUTANEOUS NIGHTLY
Qty: 10 ML | Refills: 1 | Status: SHIPPED | OUTPATIENT
Start: 2024-04-29

## 2024-04-29 NOTE — TELEPHONE ENCOUNTER
Last Appt: 04/17/2024 ACUTE     Last OV: 02/05/2024 Return in about 3 months (around 5/5/2024) for medication check;     Next OV: 05/10/2024 w/ SRIDHAR Gordon-CNP

## 2024-04-29 NOTE — TELEPHONE ENCOUNTER
From: Melody Clement  To: Bijal Jay  Sent: 4/29/2024 9:42 AM EDT  Subject: refill    Lantus insulin long actiing she takes at night

## 2024-04-30 ENCOUNTER — HOSPITAL ENCOUNTER (OUTPATIENT)
Dept: PHYSICAL THERAPY | Age: 69
Setting detail: THERAPIES SERIES
Discharge: HOME | End: 2024-04-30
Payer: MEDICARE

## 2024-04-30 PROCEDURE — 97530 THERAPEUTIC ACTIVITIES: CPT

## 2024-04-30 PROCEDURE — 97110 THERAPEUTIC EXERCISES: CPT

## 2024-04-30 PROCEDURE — 97016 VASOPNEUMATIC DEVICE THERAPY: CPT

## 2024-04-30 PROCEDURE — 97112 NEUROMUSCULAR REEDUCATION: CPT

## 2024-05-03 ENCOUNTER — HOSPITAL ENCOUNTER (OUTPATIENT)
Dept: PHYSICAL THERAPY | Age: 69
Setting detail: THERAPIES SERIES
Discharge: HOME | End: 2024-05-03
Payer: MEDICARE

## 2024-05-03 PROCEDURE — 97530 THERAPEUTIC ACTIVITIES: CPT | Performed by: PHYSICAL THERAPIST

## 2024-05-03 PROCEDURE — 97016 VASOPNEUMATIC DEVICE THERAPY: CPT | Performed by: PHYSICAL THERAPIST

## 2024-05-03 PROCEDURE — 97110 THERAPEUTIC EXERCISES: CPT | Performed by: PHYSICAL THERAPIST

## 2024-05-03 PROCEDURE — 97112 NEUROMUSCULAR REEDUCATION: CPT | Performed by: PHYSICAL THERAPIST

## 2024-05-03 NOTE — PLAN OF CARE
Date: 2024   :  1955 MRN: [id number]   Visit #: 8   Insurance Allowable Auth Needed   [place] []Yes    [x]No    Insurance: Payor: [employer] [place] MEDICARE / Plan: [place] [employer] [place] / Product Type: *No Product type* /   Insurance ID: [id number] - (Medicare Managed)  Secondary Insurance (if applicable):    Treatment Diagnosis:     ICD-10-CM    1. Right knee pain, unspecified chronicity  M25.561       2. Decreased functional mobility  R26.89       3. Right leg weakness  R29.898       4. Effusion of right knee  M25.461       5. Knee stiffness, right  M25.661          Medical Diagnosis:  Presence of right artificial knee joint [Z96.651] On 2024   Referring Physician: Elisabet Reyes DO  PCP: Bijal Jay APRN - CNP       Plan of care signed (Y/N): Y    Date of Patient follow up with Physician: 6 weeks from May 3rd     Progress Report/POC: YES, Date Range for this report: 3/29/24 to 5/3/24  POC update due: (10 visits /OR AUTH LIMITS, whichever is less)  6/3/2024                                             Precautions/ Contra-indications:           Latex allergy:  NO  Pacemaker:    NO  Contraindications for Manipulation: NA  Relevant Medical History: DM- takes insulin and medication before meals and Lantus before bed time (tested at 103 this morning), HLD, HTN- on meds, Depression- on meds, L TKA 1.5 years ago, OA, mild MI- in - no sx needed,  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 Pt and pt' s daughter deny history of stroke/TIA, afib or flutter or heart failure or ablation sx.     Red Flags:  None      SUBJECTIVE EXAMINATION     Evaluation Patient stated complaint: Pt presents s/p R TKA . She did have some home health PT for 2 weeks. She does not think then she had any PT last week. Prior to sx was not walking with AD. Saw MD one week after sx.      Patient stated complaint:   5/3: 8

## 2024-05-07 ENCOUNTER — HOSPITAL ENCOUNTER (OUTPATIENT)
Dept: PHYSICAL THERAPY | Age: 69
Setting detail: THERAPIES SERIES
Discharge: HOME | End: 2024-05-07
Payer: MEDICARE

## 2024-05-07 PROCEDURE — 97110 THERAPEUTIC EXERCISES: CPT | Performed by: PHYSICAL THERAPIST

## 2024-05-07 PROCEDURE — 97016 VASOPNEUMATIC DEVICE THERAPY: CPT | Performed by: PHYSICAL THERAPIST

## 2024-05-07 PROCEDURE — 97112 NEUROMUSCULAR REEDUCATION: CPT | Performed by: PHYSICAL THERAPIST

## 2024-05-07 PROCEDURE — 97530 THERAPEUTIC ACTIVITIES: CPT | Performed by: PHYSICAL THERAPIST

## 2024-05-10 ENCOUNTER — HOSPITAL ENCOUNTER (OUTPATIENT)
Dept: PHYSICAL THERAPY | Age: 69
Setting detail: THERAPIES SERIES
End: 2024-05-10
Payer: MEDICARE

## 2024-05-10 DIAGNOSIS — E11.9 INSULIN DEPENDENT TYPE 2 DIABETES MELLITUS (HCC): Primary | ICD-10-CM

## 2024-05-10 DIAGNOSIS — Z79.4 INSULIN DEPENDENT TYPE 2 DIABETES MELLITUS (HCC): Primary | ICD-10-CM

## 2024-05-10 RX ORDER — INSULIN GLARGINE 100 [IU]/ML
70 INJECTION, SOLUTION SUBCUTANEOUS NIGHTLY
Qty: 10 ADJUSTABLE DOSE PRE-FILLED PEN SYRINGE | Refills: 3 | Status: SHIPPED | OUTPATIENT
Start: 2024-05-10

## 2024-05-14 ENCOUNTER — HOSPITAL ENCOUNTER (OUTPATIENT)
Dept: PHYSICAL THERAPY | Age: 69
Setting detail: THERAPIES SERIES
Discharge: HOME | End: 2024-05-14
Payer: MEDICARE

## 2024-05-14 PROCEDURE — 97110 THERAPEUTIC EXERCISES: CPT

## 2024-05-14 PROCEDURE — 97016 VASOPNEUMATIC DEVICE THERAPY: CPT

## 2024-05-14 PROCEDURE — 97530 THERAPEUTIC ACTIVITIES: CPT

## 2024-05-14 PROCEDURE — 97112 NEUROMUSCULAR REEDUCATION: CPT

## 2024-05-14 PROCEDURE — 97140 MANUAL THERAPY 1/> REGIONS: CPT

## 2024-05-14 NOTE — FLOWSHEET NOTE
(Mobilization/manipulation, manual lymphatic drainage, manual traction) for the purpose of modulating pain, promoting relaxation,  increasing ROM, reducing/eliminating soft tissue swelling/inflammation/restriction, improving soft tissue extensibility and allowing for proper ROM for normal function with self care, mobility, lifting and ambulation  (38141) VASOPNEUMATIC      GOALS     Patient stated goal: walk without cane  Status: [] Progressing: [x] Met: but still antalgic [] Not Met: [] Adjusted    Therapist goals for Patient:   Short Term Goals: To be achieved in: 2 weeks  Independent in HEP and progression per patient tolerance, in order to progress toward full function and prevent re-injury.    Status: [] Progressing: [x] Met: [] Not Met: [] Adjusted  Patient will have a decrease in pain to 2/10 to help facilitate improvement in movement, function, and ADLs as indicated by functional deficits.   Status: [x] Progressing: [] Met: [x] Not Met: [] Adjusted    Long Term Goals: To be achieved in: 8-10 weeks  Disability index score of 50% or less for the WOMAC to assist with return top prior level of function.   Status: [x] Progressing: [] Met: [x] Not Met: [] Adjusted  Improve knee AROM to 0-116 degrees or  better to allow for proper joint functioning as indicated by patients functional deficits.  Status: [x] Progressing: [] Met: [x] Not Met: [] Adjusted  Pt to improve strength to 4+/5 or better of quadriceps and hamstrings to allow for proper muscle and joint use in functional mobility, ADLs and prior level of function   Status: [] Progressing: [x] Met: [] Not Met: [] Adjusted  Patient will return to  walk 2 blocks and up/down 1 flight of stairs  step over pattern without AD without increased symptoms or restriction to work towards return to prior level of function.        Status: [x] Progressing: [] Met: [x] Not Met: [] Adjusted  Patient will increase LE function to allow independence in all self-care activities.

## 2024-05-15 ENCOUNTER — PATIENT MESSAGE (OUTPATIENT)
Dept: PRIMARY CARE CLINIC | Age: 69
End: 2024-05-15

## 2024-05-15 ENCOUNTER — TELEPHONE (OUTPATIENT)
Dept: PRIMARY CARE CLINIC | Age: 69
End: 2024-05-15

## 2024-05-15 NOTE — TELEPHONE ENCOUNTER
From: Melody Clement  To: Bijal Jay  Sent: 5/15/2024 8:33 AM EDT  Subject: refill    Mirabeggron 50mg TB24 know as Myrbetriq  Vitamin D 25mcg  Please refill therse medication. See you Friday morning our scheduled visit.

## 2024-05-15 NOTE — TELEPHONE ENCOUNTER
Received fax from [facility] regarding patients Mirabegron 50mg ER Tablets. Her plan does not cover this medication. The preferred alternative is Mybetriq Tabs or Gemtesa Tabs. Please advise.

## 2024-05-15 NOTE — TELEPHONE ENCOUNTER
Last OV: 02/05/2024 Return in about 3 months (around 5/5/2024) for medication check; Call in one month with update on Zoloft..     Next OV: With Primary Care (Bijal Jay, SRIDHAR - CNP)  05/17/2024 at 8:20 AM

## 2024-05-17 ENCOUNTER — PROCEDURE VISIT (OUTPATIENT)
Dept: AUDIOLOGY | Age: 69
End: 2024-05-17
Payer: MEDICARE

## 2024-05-17 ENCOUNTER — APPOINTMENT (OUTPATIENT)
Dept: PHYSICAL THERAPY | Age: 69
End: 2024-05-17
Payer: MEDICARE

## 2024-05-17 ENCOUNTER — OFFICE VISIT (OUTPATIENT)
Dept: PRIMARY CARE CLINIC | Age: 69
End: 2024-05-17

## 2024-05-17 VITALS
BODY MASS INDEX: 37.16 KG/M2 | HEIGHT: 58 IN | HEART RATE: 76 BPM | OXYGEN SATURATION: 94 % | SYSTOLIC BLOOD PRESSURE: 94 MMHG | TEMPERATURE: 98.1 F | WEIGHT: 177 LBS | DIASTOLIC BLOOD PRESSURE: 40 MMHG

## 2024-05-17 DIAGNOSIS — E55.9 VITAMIN D DEFICIENCY: ICD-10-CM

## 2024-05-17 DIAGNOSIS — H90.3 SENSORINEURAL HEARING LOSS, BILATERAL: Primary | ICD-10-CM

## 2024-05-17 DIAGNOSIS — E11.9 INSULIN DEPENDENT TYPE 2 DIABETES MELLITUS (HCC): ICD-10-CM

## 2024-05-17 DIAGNOSIS — F41.9 ANXIETY AND DEPRESSION: ICD-10-CM

## 2024-05-17 DIAGNOSIS — Z79.4 INSULIN DEPENDENT TYPE 2 DIABETES MELLITUS (HCC): ICD-10-CM

## 2024-05-17 DIAGNOSIS — Z13.0 SCREENING FOR DEFICIENCY ANEMIA: ICD-10-CM

## 2024-05-17 DIAGNOSIS — J02.0 STREP THROAT: ICD-10-CM

## 2024-05-17 DIAGNOSIS — F32.A ANXIETY AND DEPRESSION: ICD-10-CM

## 2024-05-17 DIAGNOSIS — I10 ESSENTIAL HYPERTENSION: ICD-10-CM

## 2024-05-17 DIAGNOSIS — Z00.00 WELCOME TO MEDICARE PREVENTIVE VISIT: Primary | ICD-10-CM

## 2024-05-17 DIAGNOSIS — J02.9 SORE THROAT: ICD-10-CM

## 2024-05-17 DIAGNOSIS — Z13.29 SCREENING FOR THYROID DISORDER: ICD-10-CM

## 2024-05-17 DIAGNOSIS — H91.8X3 ASYMMETRICAL HEARING LOSS: ICD-10-CM

## 2024-05-17 DIAGNOSIS — I48.0 PAROXYSMAL ATRIAL FIBRILLATION (HCC): ICD-10-CM

## 2024-05-17 DIAGNOSIS — E78.00 PURE HYPERCHOLESTEROLEMIA: ICD-10-CM

## 2024-05-17 LAB
25(OH)D3 SERPL-MCNC: 26.3 NG/ML
ALBUMIN SERPL-MCNC: 4.1 G/DL (ref 3.4–5)
ALBUMIN/GLOB SERPL: 1.3 {RATIO} (ref 1.1–2.2)
ALP SERPL-CCNC: 67 U/L (ref 40–129)
ALT SERPL-CCNC: 15 U/L (ref 10–40)
ANION GAP SERPL CALCULATED.3IONS-SCNC: 13 MMOL/L (ref 3–16)
AST SERPL-CCNC: 21 U/L (ref 15–37)
BASOPHILS # BLD: 0.1 K/UL (ref 0–0.2)
BASOPHILS NFR BLD: 1 %
BILIRUB SERPL-MCNC: 0.4 MG/DL (ref 0–1)
BUN SERPL-MCNC: 29 MG/DL (ref 7–20)
CALCIUM SERPL-MCNC: 9.3 MG/DL (ref 8.3–10.6)
CHLORIDE SERPL-SCNC: 102 MMOL/L (ref 99–110)
CHOLEST SERPL-MCNC: 114 MG/DL (ref 0–199)
CO2 SERPL-SCNC: 25 MMOL/L (ref 21–32)
CREAT SERPL-MCNC: 1 MG/DL (ref 0.6–1.2)
DEPRECATED RDW RBC AUTO: 14.4 % (ref 12.4–15.4)
EOSINOPHIL # BLD: 0.3 K/UL (ref 0–0.6)
EOSINOPHIL NFR BLD: 4.7 %
GFR SERPLBLD CREATININE-BSD FMLA CKD-EPI: 61 ML/MIN/{1.73_M2}
GLUCOSE SERPL-MCNC: 223 MG/DL (ref 70–99)
HCT VFR BLD AUTO: 36.1 % (ref 36–48)
HDLC SERPL-MCNC: 26 MG/DL (ref 40–60)
HGB BLD-MCNC: 12.1 G/DL (ref 12–16)
LDLC SERPL CALC-MCNC: ABNORMAL MG/DL
LDLC SERPL-MCNC: 45 MG/DL
LYMPHOCYTES # BLD: 1.4 K/UL (ref 1–5.1)
LYMPHOCYTES NFR BLD: 20.7 %
MCH RBC QN AUTO: 29.2 PG (ref 26–34)
MCHC RBC AUTO-ENTMCNC: 33.5 G/DL (ref 31–36)
MCV RBC AUTO: 87 FL (ref 80–100)
MONOCYTES # BLD: 0.5 K/UL (ref 0–1.3)
MONOCYTES NFR BLD: 7.4 %
NEUTROPHILS # BLD: 4.5 K/UL (ref 1.7–7.7)
NEUTROPHILS NFR BLD: 66.2 %
PLATELET # BLD AUTO: 314 K/UL (ref 135–450)
PMV BLD AUTO: 8.8 FL (ref 5–10.5)
POTASSIUM SERPL-SCNC: 4.2 MMOL/L (ref 3.5–5.1)
PROT SERPL-MCNC: 7.3 G/DL (ref 6.4–8.2)
RBC # BLD AUTO: 4.16 M/UL (ref 4–5.2)
S PYO AG THROAT QL: POSITIVE
SODIUM SERPL-SCNC: 140 MMOL/L (ref 136–145)
TRIGL SERPL-MCNC: 307 MG/DL (ref 0–150)
TSH SERPL DL<=0.005 MIU/L-ACNC: 2.11 UIU/ML (ref 0.27–4.2)
VLDLC SERPL CALC-MCNC: ABNORMAL MG/DL
WBC # BLD AUTO: 6.9 K/UL (ref 4–11)

## 2024-05-17 PROCEDURE — 92567 TYMPANOMETRY: CPT | Performed by: AUDIOLOGIST

## 2024-05-17 PROCEDURE — 92557 COMPREHENSIVE HEARING TEST: CPT | Performed by: AUDIOLOGIST

## 2024-05-17 RX ORDER — AMOXICILLIN 500 MG/1
500 CAPSULE ORAL 2 TIMES DAILY
Qty: 20 CAPSULE | Refills: 0 | Status: SHIPPED | OUTPATIENT
Start: 2024-05-17 | End: 2024-05-27

## 2024-05-17 ASSESSMENT — PATIENT HEALTH QUESTIONNAIRE - PHQ9
7. TROUBLE CONCENTRATING ON THINGS, SUCH AS READING THE NEWSPAPER OR WATCHING TELEVISION: SEVERAL DAYS
3. TROUBLE FALLING OR STAYING ASLEEP: NEARLY EVERY DAY
SUM OF ALL RESPONSES TO PHQ QUESTIONS 1-9: 10
SUM OF ALL RESPONSES TO PHQ QUESTIONS 1-9: 10
6. FEELING BAD ABOUT YOURSELF - OR THAT YOU ARE A FAILURE OR HAVE LET YOURSELF OR YOUR FAMILY DOWN: SEVERAL DAYS
SUM OF ALL RESPONSES TO PHQ9 QUESTIONS 1 & 2: 2
1. LITTLE INTEREST OR PLEASURE IN DOING THINGS: SEVERAL DAYS
4. FEELING TIRED OR HAVING LITTLE ENERGY: MORE THAN HALF THE DAYS
2. FEELING DOWN, DEPRESSED OR HOPELESS: SEVERAL DAYS
8. MOVING OR SPEAKING SO SLOWLY THAT OTHER PEOPLE COULD HAVE NOTICED. OR THE OPPOSITE, BEING SO FIGETY OR RESTLESS THAT YOU HAVE BEEN MOVING AROUND A LOT MORE THAN USUAL: NOT AT ALL
5. POOR APPETITE OR OVEREATING: SEVERAL DAYS
9. THOUGHTS THAT YOU WOULD BE BETTER OFF DEAD, OR OF HURTING YOURSELF: NOT AT ALL
SUM OF ALL RESPONSES TO PHQ QUESTIONS 1-9: 10
10. IF YOU CHECKED OFF ANY PROBLEMS, HOW DIFFICULT HAVE THESE PROBLEMS MADE IT FOR YOU TO DO YOUR WORK, TAKE CARE OF THINGS AT HOME, OR GET ALONG WITH OTHER PEOPLE: SOMEWHAT DIFFICULT
SUM OF ALL RESPONSES TO PHQ QUESTIONS 1-9: 10

## 2024-05-17 ASSESSMENT — ANXIETY QUESTIONNAIRES
3. WORRYING TOO MUCH ABOUT DIFFERENT THINGS: SEVERAL DAYS
7. FEELING AFRAID AS IF SOMETHING AWFUL MIGHT HAPPEN: MORE THAN HALF THE DAYS
1. FEELING NERVOUS, ANXIOUS, OR ON EDGE: NOT AT ALL
IF YOU CHECKED OFF ANY PROBLEMS ON THIS QUESTIONNAIRE, HOW DIFFICULT HAVE THESE PROBLEMS MADE IT FOR YOU TO DO YOUR WORK, TAKE CARE OF THINGS AT HOME, OR GET ALONG WITH OTHER PEOPLE: NOT DIFFICULT AT ALL
GAD7 TOTAL SCORE: 4
4. TROUBLE RELAXING: NOT AT ALL
5. BEING SO RESTLESS THAT IT IS HARD TO SIT STILL: NOT AT ALL
6. BECOMING EASILY ANNOYED OR IRRITABLE: NOT AT ALL
2. NOT BEING ABLE TO STOP OR CONTROL WORRYING: SEVERAL DAYS

## 2024-05-17 NOTE — PATIENT INSTRUCTIONS
You will need gloves, floss, a toothbrush, and a container to hold water if you are not near a sink. Wash and dry your hands well and put on gloves. Start by flossing:  Gently work a piece of floss between each of the teeth toward the gums. A plastic flossing tool may make this easier, and they are available at most [facility].  Curve the floss around each tooth into a U-shape and gently slide it under the gum line.  Move the floss firmly up and down several times to scrape off the plaque.  After you've finished flossing, throw away the used floss and begin brushing:  Wet the brush and apply toothpaste.  Place the brush at a 45-degree angle where the teeth meet the gums. Press firmly, and move the brush in small circles over the surface of the teeth.  Be careful not to brush too hard. Vigorous brushing can make the gums pull away from the teeth and can scratch the tooth enamel.  Brush all surfaces of the teeth, on the tongue side and on the cheek side. Pay special attention to the front teeth and all surfaces of the back teeth.  Brush chewing surfaces with short back-and-forth strokes.  After you've finished, help the person rinse the remaining toothpaste from their mouth.  Where can you learn more?  Go to https://www.[employer].net/patientEd and enter F944 to learn more about \"Learning About Dental Care for Older Adults.\"  Current as of: August 6, 2023               Content Version: 14.0  © 2752-8030 [employer].   Care instructions adapted under license by [employer]. If you have questions about a medical condition or this instruction, always ask your healthcare professional. [employer] disclaims any warranty or liability for your use of this information.           Hearing Loss: Care Instructions  Overview     Hearing loss is a sudden or slow decrease in how well you hear. It can range from slight to profound. Permanent hearing loss can occur with aging. It also can happen when you are

## 2024-05-17 NOTE — PROGRESS NOTES
Medicare Annual Wellness Visit    Melody Clement is here for Medicare AWV (Patient presents today for AWV and Follow up. She states she feels like her sinuses are bothering her again. She did get better after she was seen last. Very congested. Sitting in her throat. Hard to get up. Has tried OTC Mucinex, Zyrtec. Blood Sugars have been ok, a little lesly at times. Doesn't feel like Sertraline has helped much. ), Diabetes, Hypertension, Anxiety, Congestion, and Cough    Assessment & Plan   Welcome to Medicare preventive visit  Sore throat  -     POCT rapid strep A  Strep throat  -     amoxicillin (AMOXIL) 500 MG capsule; Take 1 capsule by mouth 2 times daily for 10 days, Disp-20 capsule, R-0Normal  Paroxysmal atrial fibrillation (HCC)  Essential hypertension  Insulin dependent type 2 diabetes mellitus (HCC)  Anxiety and depression    - POCT Rapid Strep: POSITIVE; Antibiotic therapy sent to pharmacy. Instructed on use.   - Discussed symptomatic management.  May take tylenol or ibuprofen as needed for pain or fever.  May use throat lozenges, warm teas and honey, salt water gargles, humidified air, drink cold fluids for relief. Increase fluid intake. May return to activity when fever free for 24 hours without the use of fever-reducing medications, such as tylenol or ibuprofen.   - Must complete 12 hours of antibiotics prior to returning to normal activity.   - Change toothbrush after 2 days of antibiotics.  - Increase Zoloft to 100 mg daily. Call at the end of the month with an update.  - Recommendations for Preventive Services Due: see orders and patient instructions/AVS.  - Recommended screening schedule for the next 5-10 years is provided to the patient in written form: see - Patient Instructions/AVS.     Return in 3 months (on 8/17/2024).     Subjective   The following acute and/or chronic problems were also addressed today:    Diabetes:  Insulin dependent. Last A1C was 9.0.  Is trying to be more mindful of

## 2024-05-17 NOTE — PROGRESS NOTES
Melody Clement   1955, 69 y.o. female   [id number]       Referring Provider: Bijal Jay APRN - CNP   Referral Type: In an order in Epic    Reason for Visit: Evaluation of suspected change in hearing    ADULT AUDIOLOGIC EVALUATION      Melody Clement is a 69 y.o. female seen today, 5/17/2024 , for an initial audiologic evaluation.     AUDIOLOGIC AND OTHER PERTINENT MEDICAL HISTORY:      Melody Clement noted a perceived gradual decline in hearing with LE>RE. No additional significant otologic or medical history was reported.     Melody Clement denied otalgia, aural fullness, otorrhea, tinnitus, dizziness, imbalance, history of falls, history of occupational/recreational noise exposure, history of head trauma, history of ear surgery, and family history of hearing loss.    Date: 5/17/2024     IMPRESSIONS:      Today's results revealed an asymmetric sensorineural hearing loss, bilaterally. Asymmetries > 10 dBHL noted from 500-3000Hz and in word recognition with left ear (76%) worse than right (100%). Hearing loss significant enough to create hearing difficulty in at least some listening situations. Discussed benefits of amplification pending medical clearance. Recommended consult with ENT physician due to noted asymmetries. Gave patient information for hearing speech and deaf center and [facility] regarding possible hearing aid benefit through [employer]. Patient is scheduled with Elias Cuellar MD on 6/12/24.     ASSESSMENT AND FINDINGS:     Otoscopy revealed: Clear ear canals bilaterally    RIGHT EAR:  Hearing Sensitivity: Mild sloping to moderately-severe sensorineural hearing loss.   Speech Recognition Threshold: 35 dB HL  Word Recognition: Excellent 100%, based on NU-6 25-word list at 75 dBHL using recorded speech stimuli.    Tympanometry: Normal peak pressure and compliance, Type A tympanogram, consistent with normal middle ear function.  Acoustic Reflexes: Ipsilateral: Could not maintain seal.

## 2024-05-17 NOTE — PATIENT INSTRUCTIONS
Evaluation\" with an audiologist to discuss your lifestyle, features of hearing aid technology, and styles of hearing aids available.  It is recommended that you contact your insurance company to determine if you have a hearing aid benefit, as this may dictate who you can see for these services.  Have hearing tests as your doctor suggests. They can show whether your hearing has changed. Your hearing aid may need to be adjusted.  Use other assistive devices as needed. These may include:  Telephone amplifiers and hearing aids that can connect to a television, stereo, radio, or microphone.  Devices that use lights or vibrations. These alert you to the doorbell, a ringing telephone, or a baby monitor.  Television closed-captioning. This shows the words at the bottom of the screen. Most new TVs can do this.  TTY (text telephone). This lets you type messages back and forth on the telephone instead of talking or listening. These devices are also called TDD. When messages are typed on the keyboard, they are sent over the phone line to a receiving TTY. The message is shown on a monitor.  Use pagers, fax machines, text, and email if it is hard for you to communicate by telephone.  Try to learn a listening technique called speech-reading. It is not lip-reading. You pay attention to people's gestures, expressions, posture, and tone of voice. These clues can help you understand what a person is saying. Face the person you are talking to, and have him or her face you. Make sure the lighting is good. You need to see the other person's face clearly.  Think about counseling if you need help to adjust to your hearing loss.    When should you call for help?  Watch closely for changes in your health, and be sure to contact your doctor if:    You think your hearing is getting worse.     You have new symptoms, such as dizziness or nausea.

## 2024-05-18 LAB
EST. AVERAGE GLUCOSE BLD GHB EST-MCNC: 162.8 MG/DL
HBA1C MFR BLD: 7.3 %

## 2024-05-20 DIAGNOSIS — E78.1 HYPERTRIGLYCERIDEMIA: Primary | ICD-10-CM

## 2024-05-20 RX ORDER — OMEGA-3-ACID ETHYL ESTERS 1 G/1
2 CAPSULE, LIQUID FILLED ORAL 2 TIMES DAILY
Qty: 360 CAPSULE | Refills: 1 | Status: SHIPPED | OUTPATIENT
Start: 2024-05-20

## 2024-05-21 ENCOUNTER — HOSPITAL ENCOUNTER (OUTPATIENT)
Dept: PHYSICAL THERAPY | Age: 69
Setting detail: THERAPIES SERIES
Discharge: HOME | End: 2024-05-21
Payer: MEDICARE

## 2024-05-21 PROCEDURE — 97530 THERAPEUTIC ACTIVITIES: CPT

## 2024-05-21 PROCEDURE — 97110 THERAPEUTIC EXERCISES: CPT

## 2024-05-21 PROCEDURE — 97016 VASOPNEUMATIC DEVICE THERAPY: CPT

## 2024-05-21 PROCEDURE — 97112 NEUROMUSCULAR REEDUCATION: CPT

## 2024-05-21 NOTE — FLOWSHEET NOTE
[facility]- Outpatient Rehabilitation and Therapy [address] [place] [address] office: [phone] fax: [phone]         Physical Therapy: TREATMENT/PROGRESS NOTE   Patient: Melody Clement (69 y.o. female)   Examination Date: 2024   :  1955 MRN: [id number]   Visit #: 11   Insurance Allowable Auth Needed   [place] []Yes    [x]No    Insurance: Payor: [employer] [place]ICARE / Plan: [employer] [place] MEDICARE / Product Type: *No Product type* /   Insurance ID: [id number] - (Medicare Managed)  Secondary Insurance (if applicable):    Treatment Diagnosis:     ICD-10-CM    1. Right knee pain, unspecified chronicity  M25.561       2. Decreased functional mobility  R26.89       3. Right leg weakness  R29.898       4. Effusion of right knee  M25.461       5. Knee stiffness, right  M25.661          Medical Diagnosis:  Presence of right artificial knee joint [Z96.651] On 2024   Referring Physician: Elisabet Reyes DO  PCP: Bijal Jay APRN - CNP       Plan of care signed (Y/N): Y    Date of Patient follow up with Physician: 6 weeks from May 3rd     Progress Report/POC: NO  POC update due: (10 visits /OR AUTH LIMITS, whichever is less)  6/3/2024                                             Precautions/ Contra-indications:           Latex allergy:  NO  Pacemaker:    NO  Contraindications for Manipulation: NA  Relevant Medical History: DM- takes insulin and medication before meals and Lantus before bed time (tested at 103 this morning), HLD, HTN- on meds, Depression- on meds, L TKA 1.5 years ago, OA, mild MI- in - no sx needed,  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 Pt and pt' s daughter deny history of stroke/TIA, afib or flutter or heart failure or ablation sx.     Red Flags:  None      SUBJECTIVE EXAMINATION     Evaluation Patient stated complaint: Pt presents s/p R TKA . She

## 2024-05-28 ENCOUNTER — PATIENT MESSAGE (OUTPATIENT)
Dept: PRIMARY CARE CLINIC | Age: 69
End: 2024-05-28

## 2024-05-28 RX ORDER — SERTRALINE HYDROCHLORIDE 100 MG/1
100 TABLET, FILM COATED ORAL DAILY
Qty: 90 TABLET | Refills: 0 | Status: SHIPPED | OUTPATIENT
Start: 2024-05-28

## 2024-05-28 RX ORDER — PEN NEEDLE, DIABETIC 32GX 5/32"
1 NEEDLE, DISPOSABLE MISCELLANEOUS
Qty: 100 EACH | Refills: 2 | Status: SHIPPED | OUTPATIENT
Start: 2024-05-28

## 2024-05-31 ENCOUNTER — HOSPITAL ENCOUNTER (OUTPATIENT)
Dept: PHYSICAL THERAPY | Age: 69
Setting detail: THERAPIES SERIES
Discharge: HOME | End: 2024-05-31
Payer: MEDICARE

## 2024-05-31 PROCEDURE — 97110 THERAPEUTIC EXERCISES: CPT | Performed by: PHYSICAL THERAPIST

## 2024-05-31 PROCEDURE — 97112 NEUROMUSCULAR REEDUCATION: CPT | Performed by: PHYSICAL THERAPIST

## 2024-05-31 PROCEDURE — 97530 THERAPEUTIC ACTIVITIES: CPT | Performed by: PHYSICAL THERAPIST

## 2024-05-31 NOTE — PLAN OF CARE
1x/week to continue to work on and monitor knee flex ROM progress and progress her functional strengthening while pt does HEP b/t visits.     Medical Necessity Documentation:  I certify that this patient meets the below criteria necessary for medical necessity for care and/or justification of therapy services:  The patient has functional impairments and/or activity limitations and would benefit from continued outpatient therapy services to address the deficits outlined in the patients goals  The patient has a musculoskeletal condition(s) with a corresponding ICD-10 code that is of complexity and severity that require skilled therapeutic intervention. This has a direct and significant impact on the need for therapy and significantly impacts the rate of recovery.   The patient has associated co-morbidities along with primary diagnosis which significantly impact the rate of recovery and contribute to complexities that require skilled therapeutic intervention  The patient has associated variables that influence the amount of treatment to include:  Social support, self-efficacy/motivation, prognosis, time since onset/acuity. - cannot drive herself so may influence attendance of PT in future    Prognosis for POC: [x] Good [] Fair  [] Poor    Patient requires continued skilled intervention: [x] Yes  [] No      CHARGE CAPTURE     PT CHARGE GRID   CPT Code (TIMED) minutes # CPT Code (UNTIMED) #     Therex (78084)   1  EVAL:LOW (18816 - Typically 20 minutes face-to-face)     Neuromusc. Re-ed (74871)  1  Re-Eval (85808)     Manual (19501)    Estim Unattended (02315)     Ther. Act (40181)  1  [facility]. Traction (91077)     Gait (62802)    Dry Needle 1-2 muscle (56878)     Aquatic Therex (83299)    Dry Needle 3+ muscle (20561)     Iontophoresis (70014)    VASO (97983)     Ultrasound (50955)    Group Therapy (89379)     Estim Attended (73462)    Canalith Repositioning (64607)     Other:    Other:    Total Timed Code Tx Minutes 40 3

## 2024-06-12 ENCOUNTER — OFFICE VISIT (OUTPATIENT)
Dept: ENT CLINIC | Age: 69
End: 2024-06-12
Payer: MEDICARE

## 2024-06-12 VITALS
BODY MASS INDEX: 35.26 KG/M2 | OXYGEN SATURATION: 94 % | SYSTOLIC BLOOD PRESSURE: 131 MMHG | WEIGHT: 168 LBS | HEART RATE: 96 BPM | HEIGHT: 58 IN | DIASTOLIC BLOOD PRESSURE: 75 MMHG

## 2024-06-12 DIAGNOSIS — H93.19 TINNITUS, UNSPECIFIED LATERALITY: ICD-10-CM

## 2024-06-12 DIAGNOSIS — H90.3 ASYMMETRIC SNHL (SENSORINEURAL HEARING LOSS): Primary | ICD-10-CM

## 2024-06-12 PROCEDURE — 3078F DIAST BP <80 MM HG: CPT | Performed by: STUDENT IN AN ORGANIZED HEALTH CARE EDUCATION/TRAINING PROGRAM

## 2024-06-12 PROCEDURE — 3075F SYST BP GE 130 - 139MM HG: CPT | Performed by: STUDENT IN AN ORGANIZED HEALTH CARE EDUCATION/TRAINING PROGRAM

## 2024-06-12 PROCEDURE — 1123F ACP DISCUSS/DSCN MKR DOCD: CPT | Performed by: STUDENT IN AN ORGANIZED HEALTH CARE EDUCATION/TRAINING PROGRAM

## 2024-06-12 PROCEDURE — 99213 OFFICE O/P EST LOW 20 MIN: CPT | Performed by: STUDENT IN AN ORGANIZED HEALTH CARE EDUCATION/TRAINING PROGRAM

## 2024-06-12 NOTE — PROGRESS NOTES
crepitus  CHEST: Normal respiratory effort, no retractions, breathing comfortably  SKIN: No rashes, normal appearing skin, no evidence of skin lesions/tumors  Neuro:  cranial nerve II-XII intact; normal gait  Cardio:  no edema        PROCEDURE      This note was generated completely or in part utilizing Dragon dictation speech recognition software.  Occasionally, words are mistranscribed and despite editing, the text may contain inaccuracies due to incorrect word recognition.  If further clarification is needed please contact the office at [phone].    An electronic signature was used to authenticate this note.    --Elias Cuellar MD

## 2024-06-27 ENCOUNTER — HOSPITAL ENCOUNTER (OUTPATIENT)
Dept: CT IMAGING | Age: 69
Discharge: HOME | End: 2024-06-27
Payer: MEDICARE

## 2024-06-27 ENCOUNTER — OFFICE VISIT (OUTPATIENT)
Dept: PRIMARY CARE CLINIC | Age: 69
End: 2024-06-27
Payer: MEDICARE

## 2024-06-27 ENCOUNTER — HOSPITAL ENCOUNTER (OUTPATIENT)
Age: 69
Discharge: HOME | End: 2024-06-27
Payer: MEDICARE

## 2024-06-27 VITALS
WEIGHT: 180 LBS | DIASTOLIC BLOOD PRESSURE: 72 MMHG | BODY MASS INDEX: 37.79 KG/M2 | TEMPERATURE: 97.8 F | OXYGEN SATURATION: 97 % | SYSTOLIC BLOOD PRESSURE: 142 MMHG | HEART RATE: 77 BPM | HEIGHT: 58 IN

## 2024-06-27 DIAGNOSIS — R10.33 PERIUMBILICAL ABDOMINAL PAIN: ICD-10-CM

## 2024-06-27 DIAGNOSIS — R10.33 PERIUMBILICAL ABDOMINAL PAIN: Primary | ICD-10-CM

## 2024-06-27 LAB
ALBUMIN SERPL-MCNC: 4.2 G/DL (ref 3.4–5)
ALBUMIN/GLOB SERPL: 1.5 {RATIO} (ref 1.1–2.2)
ALP SERPL-CCNC: 57 U/L (ref 40–129)
ALT SERPL-CCNC: 15 U/L (ref 10–40)
ANION GAP SERPL CALCULATED.3IONS-SCNC: 10 MMOL/L (ref 3–16)
AST SERPL-CCNC: 23 U/L (ref 15–37)
BASOPHILS # BLD: 0.1 K/UL (ref 0–0.2)
BASOPHILS NFR BLD: 1 %
BILIRUB SERPL-MCNC: 0.3 MG/DL (ref 0–1)
BUN SERPL-MCNC: 25 MG/DL (ref 7–20)
CALCIUM SERPL-MCNC: 9.5 MG/DL (ref 8.3–10.6)
CHLORIDE SERPL-SCNC: 104 MMOL/L (ref 99–110)
CO2 SERPL-SCNC: 26 MMOL/L (ref 21–32)
CREAT SERPL-MCNC: 1.2 MG/DL (ref 0.6–1.2)
CREAT SERPL-MCNC: 1.3 MG/DL (ref 0.6–1.2)
CRP SERPL-MCNC: <3 MG/L (ref 0–5.1)
DEPRECATED RDW RBC AUTO: 14.7 % (ref 12.4–15.4)
EOSINOPHIL # BLD: 0.3 K/UL (ref 0–0.6)
EOSINOPHIL NFR BLD: 3.7 %
ERYTHROCYTE [SEDIMENTATION RATE] IN BLOOD BY WESTERGREN METHOD: 12 MM/HR (ref 0–30)
GFR SERPLBLD CREATININE-BSD FMLA CKD-EPI: 44 ML/MIN/{1.73_M2}
GFR SERPLBLD CREATININE-BSD FMLA CKD-EPI: 49 ML/MIN/{1.73_M2}
GLUCOSE SERPL-MCNC: 176 MG/DL (ref 70–99)
HCT VFR BLD AUTO: 34.9 % (ref 36–48)
HGB BLD-MCNC: 11.8 G/DL (ref 12–16)
LIPASE SERPL-CCNC: 63 U/L (ref 13–60)
LYMPHOCYTES # BLD: 1.8 K/UL (ref 1–5.1)
LYMPHOCYTES NFR BLD: 24 %
MCH RBC QN AUTO: 29.6 PG (ref 26–34)
MCHC RBC AUTO-ENTMCNC: 33.9 G/DL (ref 31–36)
MCV RBC AUTO: 87.3 FL (ref 80–100)
MONOCYTES # BLD: 0.6 K/UL (ref 0–1.3)
MONOCYTES NFR BLD: 7.8 %
NEUTROPHILS # BLD: 4.7 K/UL (ref 1.7–7.7)
NEUTROPHILS NFR BLD: 63.5 %
PLATELET # BLD AUTO: 306 K/UL (ref 135–450)
PMV BLD AUTO: 8.4 FL (ref 5–10.5)
POTASSIUM SERPL-SCNC: 4.5 MMOL/L (ref 3.5–5.1)
PROT SERPL-MCNC: 7 G/DL (ref 6.4–8.2)
RBC # BLD AUTO: 3.99 M/UL (ref 4–5.2)
SODIUM SERPL-SCNC: 140 MMOL/L (ref 136–145)
WBC # BLD AUTO: 7.4 K/UL (ref 4–11)

## 2024-06-27 PROCEDURE — 80053 COMPREHEN METABOLIC PANEL: CPT

## 2024-06-27 PROCEDURE — 6360000004 HC RX CONTRAST MEDICATION

## 2024-06-27 PROCEDURE — 86140 C-REACTIVE PROTEIN: CPT

## 2024-06-27 PROCEDURE — 36415 COLL VENOUS BLD VENIPUNCTURE: CPT

## 2024-06-27 PROCEDURE — 85652 RBC SED RATE AUTOMATED: CPT

## 2024-06-27 PROCEDURE — 83690 ASSAY OF LIPASE: CPT

## 2024-06-27 PROCEDURE — 1123F ACP DISCUSS/DSCN MKR DOCD: CPT

## 2024-06-27 PROCEDURE — 3077F SYST BP >= 140 MM HG: CPT

## 2024-06-27 PROCEDURE — 99213 OFFICE O/P EST LOW 20 MIN: CPT

## 2024-06-27 PROCEDURE — 3078F DIAST BP <80 MM HG: CPT

## 2024-06-27 PROCEDURE — 74177 CT ABD & PELVIS W/CONTRAST: CPT

## 2024-06-27 PROCEDURE — 82565 ASSAY OF CREATININE: CPT

## 2024-06-27 PROCEDURE — 85025 COMPLETE CBC W/AUTO DIFF WBC: CPT

## 2024-06-27 RX ADMIN — IOMEPROL INJECTION 100 ML: 714 INJECTION, SOLUTION INTRAVASCULAR at 16:00

## 2024-06-27 NOTE — PROGRESS NOTES
Melody Clement (:  1955) is a 69 y.o. female,Established patient, here for evaluation of the following chief complaint(s):  Abdominal Pain (Patient presents today with four to five day history of symptoms. Complains of abdominal pain. States it is sharp. On right side of belly button. Does not radiate. Has been having normal bowel movements. No nausea, vomiting or diarrhea. Rates pain 8/10. Feels better when she lays down. )    Melody is here today with her daughter with concerns for abdominal pain that has been ongoing for two days.  Daughter states that she called her last night due to the severity of the pain.  It is located to the right of her umbilicus, rates it 8/10 and feels sharp.  Very tender with palpation. Nothing makes it better or worse.  Symptoms do not change with eating.  No increase in belching or flatus.  She denies n/v/d, fever. No skin changes, skin is warm and dry.     Assessment & Plan   1. Periumbilical abdominal pain  -     Lipase; Future  -     Comprehensive Metabolic Panel; Future  -     C-Reactive Protein; Future  -     Sedimentation Rate; Future  -     CBC with Auto Differential; Future  -     CT ABDOMEN PELVIS W IV CONTRAST Additional Contrast? Radiologist Recommendation; Future    - Labs ordered to be drawn today in the lab.   - STAT CT ordered. Called and scheduled for today at [facility].   - Will call with results.   - Patient already follows with Dr. Blair if surgical needs are evident.   - Patient instructed to go to the ER if pain worsens or fever develops.   - Patient education added to AVS.     Return if symptoms worsen or fail to improve.     Subjective   Abdominal Pain  This is a new problem. The current episode started yesterday. The onset quality is sudden. The problem occurs constantly. The problem has been unchanged. The pain is located in the periumbilical region. The pain is at a severity of 8/10. The pain is severe. The quality of the pain is sharp. The

## 2024-06-28 DIAGNOSIS — K42.9 UMBILICAL HERNIA WITHOUT OBSTRUCTION AND WITHOUT GANGRENE: Primary | ICD-10-CM

## 2024-06-28 DIAGNOSIS — Z79.4 INSULIN DEPENDENT TYPE 2 DIABETES MELLITUS (HCC): ICD-10-CM

## 2024-06-28 DIAGNOSIS — R79.89 ELEVATED SERUM CREATININE: ICD-10-CM

## 2024-06-28 DIAGNOSIS — E11.9 INSULIN DEPENDENT TYPE 2 DIABETES MELLITUS (HCC): ICD-10-CM

## 2024-06-28 RX ORDER — TROSPIUM CHLORIDE 20 MG/1
20 TABLET, FILM COATED ORAL 2 TIMES DAILY
Qty: 180 TABLET | Refills: 0 | Status: CANCELLED | OUTPATIENT
Start: 2024-06-28

## 2024-06-28 RX ORDER — FENOFIBRATE 160 MG/1
160 TABLET ORAL DAILY
Qty: 90 TABLET | Refills: 0 | Status: CANCELLED | OUTPATIENT
Start: 2024-06-28

## 2024-06-28 NOTE — TELEPHONE ENCOUNTER
Last Appt: 06/27/2024 ACUTE ONLY     Last OV: 05/17/2024 Return in 3 months (on 8/17/2024).     Next OV: None scheduled.

## 2024-07-02 ASSESSMENT — ENCOUNTER SYMPTOMS
NAUSEA: 0
DIARRHEA: 0
ALLERGIC/IMMUNOLOGIC NEGATIVE: 1
BELCHING: 0
EYES NEGATIVE: 1
RESPIRATORY NEGATIVE: 1
ABDOMINAL DISTENTION: 0
ABDOMINAL PAIN: 1
VOMITING: 0

## 2024-07-03 ENCOUNTER — HOSPITAL ENCOUNTER (OUTPATIENT)
Dept: MRI IMAGING | Age: 69
Discharge: HOME | End: 2024-07-03
Attending: STUDENT IN AN ORGANIZED HEALTH CARE EDUCATION/TRAINING PROGRAM
Payer: MEDICARE

## 2024-07-03 DIAGNOSIS — H90.3 ASYMMETRIC SNHL (SENSORINEURAL HEARING LOSS): ICD-10-CM

## 2024-07-03 PROCEDURE — A9579 GAD-BASE MR CONTRAST NOS,1ML: HCPCS | Performed by: STUDENT IN AN ORGANIZED HEALTH CARE EDUCATION/TRAINING PROGRAM

## 2024-07-03 PROCEDURE — 6360000004 HC RX CONTRAST MEDICATION: Performed by: STUDENT IN AN ORGANIZED HEALTH CARE EDUCATION/TRAINING PROGRAM

## 2024-07-03 PROCEDURE — 70553 MRI BRAIN STEM W/O & W/DYE: CPT

## 2024-07-03 RX ADMIN — GADOTERIDOL 15 ML: 279.3 INJECTION, SOLUTION INTRAVENOUS at 13:24

## 2024-07-08 ENCOUNTER — PREP FOR PROCEDURE (OUTPATIENT)
Dept: SURGERY | Age: 69
End: 2024-07-08

## 2024-07-08 ENCOUNTER — OFFICE VISIT (OUTPATIENT)
Dept: SURGERY | Age: 69
End: 2024-07-08
Payer: MEDICARE

## 2024-07-08 VITALS
WEIGHT: 180 LBS | DIASTOLIC BLOOD PRESSURE: 80 MMHG | BODY MASS INDEX: 37.62 KG/M2 | SYSTOLIC BLOOD PRESSURE: 128 MMHG | HEART RATE: 87 BPM

## 2024-07-08 DIAGNOSIS — K43.6 INCARCERATED VENTRAL HERNIA: Primary | ICD-10-CM

## 2024-07-08 DIAGNOSIS — K43.6 INCARCERATED VENTRAL HERNIA: ICD-10-CM

## 2024-07-08 PROCEDURE — 1123F ACP DISCUSS/DSCN MKR DOCD: CPT | Performed by: SURGERY

## 2024-07-08 PROCEDURE — 3074F SYST BP LT 130 MM HG: CPT | Performed by: SURGERY

## 2024-07-08 PROCEDURE — 3079F DIAST BP 80-89 MM HG: CPT | Performed by: SURGERY

## 2024-07-08 PROCEDURE — 99213 OFFICE O/P EST LOW 20 MIN: CPT | Performed by: SURGERY

## 2024-07-08 RX ORDER — SODIUM CHLORIDE 0.9 % (FLUSH) 0.9 %
5-40 SYRINGE (ML) INJECTION EVERY 12 HOURS SCHEDULED
Status: CANCELLED | OUTPATIENT
Start: 2024-07-08

## 2024-07-08 RX ORDER — SODIUM CHLORIDE 0.9 % (FLUSH) 0.9 %
5-40 SYRINGE (ML) INJECTION PRN
Status: CANCELLED | OUTPATIENT
Start: 2024-07-08

## 2024-07-08 RX ORDER — SODIUM CHLORIDE 9 MG/ML
INJECTION, SOLUTION INTRAVENOUS PRN
Status: CANCELLED | OUTPATIENT
Start: 2024-07-08

## 2024-07-08 ASSESSMENT — ENCOUNTER SYMPTOMS
ABDOMINAL PAIN: 1
ABDOMINAL DISTENTION: 1

## 2024-07-08 NOTE — PROGRESS NOTES
Melody Clement (:  1955) is a 69 y.o. female,Established patient, here for evaluation of the following chief complaint(s):  Surgical Consult (hernia)      Assessment & Plan   1. Incarcerated ventral hernia    Repair    The risks, benefits and alternatives to the planned procedure were discussed. Patient expressed an understanding and is willing to proceed.         Subjective   HPI  Chief Complaint: hernia    Patient referred by Bijal Jay for evaluation of a hernia. Patient reports symptoms of bulging and now constant pain. Location of symptoms is the umbilicus. Symptoms were first noted months ago but have worsened over the past week and the hernia is no longer able to reduce. Previous evaluation includes CT showing a fat containing hernia at the location of her pain. Patient has a history of lap kenji. Will plan following treatment: repair of incarcerated ventral hernia.  CT scan imaging was independently reviewed by me today      Past Medical History:   Diagnosis Date    Adnexal mass     AF (paroxysmal atrial fibrillation) (HCC)     Arthritis     CAD (coronary artery disease)     Cerebral artery occlusion with cerebral infarction (HCC)     CHF (congestive heart failure) (HCC)     grade 2 diastolic dysfunction    Chronic cough     CKD (chronic kidney disease)     stage 3    Depression     Diabetes mellitus (HCC)     Fall     Heart murmur     Hx of atrial flutter     Hyperlipidemia     Hypersomnia     Hypertension     MI, old     Pancreatitis     Sleep apnea     without treatment       Past Surgical History:   Procedure Laterality Date    ABLATION OF DYSRHYTHMIC FOCUS      A fib    APPENDECTOMY       SECTION      x3    CHOLECYSTECTOMY, LAPAROSCOPIC N/A 2023    LAPAROSCOPIC CHOLECYSTECTOMY WITH CHOLANGIOGRAM performed by Elias Blair MD at [facility] OR    EYE SURGERY Left 10/10/2023    PHACOEMULSIFICATION WITH INTRAOCULAR LENS IMPLANT - LEFT EYE performed by Vladimir Jules MD

## 2024-07-08 NOTE — PATIENT INSTRUCTIONS
GENERAL SURGERY  Pre-operative Information    Your surgeon is Dr. Rossy MD    Your procedure is scheduled at:      [facility]  [address]  [place], [place] [address]    Date: 7/26/24    Arrival time: 6a    Surgery time: 730a    ? Check in for surgery on the first floor of the main hospital ?    Planned anesthesia: General anesthesia with planned admission after surgery      A responsible adult (18+ years) must be present at the time of check in and remain until discharge.  You will not be able to drive home after surgery or for the next 24 hours, due to anesthesia.    Please arrange a ride to and from the hospital.     Nothing to eat or drink after midnight the night before surgery  No tobacco or nicotine after midnight prior to surgery  On the morning of surgery, only take the medications you were instructed to take with a sip of water.   Please refrain from taking all vitamins and/or nutritional supplements for 48 hours prior to your procedure.   If you take a blood thinner (Coumadin, Plavix stop 7/21/24, Xaretlo, Eliquis, and over the counter fish oil (Omega 3)), you may be asked to stop this medication up to 5 days prior to surgery, if this was not addressed, please contact the office for clarification.      If you were asked to get a History and Physical (H&P) from your primary doctor (PCP) prior to surgery, please make these arrangements within 30 days of the procedure.    You must bring your photo ID and insurance card with you.   Leave jewelry and valuables at home.   Wear comfortable, loose-fitting clothing on the day of surgery.   Dentures and hearing aids may be worn, but may be removed by OR staff prior to surgery.   If you wear glasses/contacts, please wear glasses on the day of your procedure.     If you develop cough, flu, or COVID-19 symptoms before surgery, please notify the office.   More detailed post-operative instructions will be provided on the day of surgery.

## 2024-07-11 NOTE — PROGRESS NOTES
[facility] Pre-Admission Testing Electronic Communication Worksheet for OR/ENDO Procedures        Patient: Melody Clement    DOS: 7/26    Transportation Confirmed: [x] YES    []  NO    History and Physical:  [] YES    [x]  NO  [] N/A  If yes, please list doctor or Urgent Care and date of H&P:     Additional Clearance(Cardiac, Pulmonary, etc):  [] YES    [x]  NO    Pre-Admission Testing Visit:  [] YES    [x]  NO If no, do labs/testing need to be done DOS?  [] YES    [x]  NO    Medication Reconciliation Complete:  [x] YES    []  NO        Additional Notes:                Interview Complete: [x] YES    []  NO          Frannie Auguste RN  10:09 AM

## 2024-07-11 NOTE — PROGRESS NOTES
[facility] PRE-OPERATIVE INSTRUCTIONS    Day of Procedure:  7/26               Arrival time:   0600               Surgery time:  730    Take the following medications with a sip of water:  Follow your MD/Surgeons pre-procedure instructions regarding your medications     Do not eat or drink anything after 12:00 midnight prior to your surgery.  This includes water chewing gum, mints and ice chips.   You may brush your teeth and gargle the morning of your surgery, but do not swallow the water     Please see your family doctor/pediatrician for a history and physical and/or concerning medications.   Bring any test results/reports from your physicians office.   If you are under the care of a heart doctor or specialist doctor, please be aware that you may be asked to them for clearance    You may be asked to stop blood thinners such as Coumadin, Plavix, Fragmin, Lovenox, etc., or any anti-inflammatories such as:  Aspirin, Ibuprofen, Advil, Naproxen prior to your surgery.    We also ask that you stop any OTC medications such as fish oil, vitamin E, glucosamine, garlic, Multivitamins, COQ 10, etc.    We ask that you do not smoke 24 hours prior to surgery  We ask that you do not  drink any alcoholic beverages 24 hours prior to surgery     You must make arrangements for a responsible adult to take you home after your surgery.    For your safety you will not be allowed to leave alone or drive yourself home.  Your surgery will be cancelled if you do not have a ride home.     Also for your safety, you must have someone stay with you the first 24 hours after your surgery.     A parent or legal guardian must accompany a child scheduled for surgery and plan to stay at the hospital until the child is discharged.    Please do not bring other children with you.    For your comfort, please wear simple loose fitting clothing to the hospital.  Please do not bring valuables.    Do not wear any make-up or nail polish on your

## 2024-07-12 RX ORDER — PEN NEEDLE, DIABETIC 32GX 5/32"
1 NEEDLE, DISPOSABLE MISCELLANEOUS
Qty: 100 EACH | Refills: 2 | Status: SHIPPED | OUTPATIENT
Start: 2024-07-12

## 2024-07-12 NOTE — TELEPHONE ENCOUNTER
Medication:   Requested Prescriptions     Pending Prescriptions Disp Refills    vitamin D (CHOLECALCIFEROL) 25 MCG (1000 UT) TABS tablet 90 tablet 0     Sig: Take 1 tablet by mouth daily    BD PEN NEEDLE PAT 2ND GEN 32G X 4 MM MISC 100 each 2     Sig: Inject 1 each as directed 4 times daily (before meals and nightly)       Last Filled:  5/15/24    Patient Phone Number: [phone] (home)     Last appt: 6/27/2024   Next appt: Visit date not found

## 2024-07-16 ENCOUNTER — TELEPHONE (OUTPATIENT)
Dept: CARDIOLOGY CLINIC | Age: 69
End: 2024-07-16

## 2024-07-16 RX ORDER — FENOFIBRATE 160 MG/1
160 TABLET ORAL DAILY
Qty: 30 TABLET | Refills: 0 | Status: SHIPPED | OUTPATIENT
Start: 2024-07-16

## 2024-07-16 NOTE — TELEPHONE ENCOUNTER
Medication Refill    When was your last appointment with cardiology?    (If 1 yr or longer, please schedule appointment)    (If patient has been told they do not need to follow-up - medications should be filled by PCP)  07/2023    When did you last have labs drawn?     Medication needing refilled?  fenofibrate (TRIGLIDE)     Dosage of the medication?  160 MG tablet     How are you taking this medication (QD, BID, TID, QID, PRN)?  Take 1 tablet by mouth daily     Do you want a 30 or 90 day supply?  30    When will you run out of your medication?     Which Pharmacy are we sending this medication to?  [id number], [place] - [facility] [address]     Pharmacy Phone:[phone]   Pharmacy Fax:[phone]

## 2024-07-16 NOTE — TELEPHONE ENCOUNTER
Cory daughter called back.  Melody, is now scheduled  8/2 with Dr. Morris at [facility].

## 2024-07-16 NOTE — TELEPHONE ENCOUNTER
Sent 30 day supply. She needs a follow up with Dr Morris. She has not been seen since 2022. Please call to schedule.

## 2024-07-17 ENCOUNTER — PATIENT MESSAGE (OUTPATIENT)
Dept: PRIMARY CARE CLINIC | Age: 69
End: 2024-07-17

## 2024-07-17 DIAGNOSIS — M79.2 NEUROPATHIC PAIN: Primary | ICD-10-CM

## 2024-07-17 RX ORDER — FENOFIBRATE 160 MG/1
160 TABLET ORAL DAILY
Qty: 90 TABLET | OUTPATIENT
Start: 2024-07-17

## 2024-07-17 RX ORDER — GABAPENTIN 100 MG/1
100 CAPSULE ORAL DAILY
Qty: 90 CAPSULE | Refills: 0 | Status: SHIPPED | OUTPATIENT
Start: 2024-07-17 | End: 2024-10-15

## 2024-07-17 NOTE — TELEPHONE ENCOUNTER
Eliana Carrillo MA 7/17/2024 12:07 PM EDT      ----- Message -----  From: Melody Clement  Sent: 7/17/2024 11:14 AM EDT  To: [facility] Clinical Support  Subject: Melody Clement     If you think that will help let's, try it, please.

## 2024-07-25 ENCOUNTER — ANESTHESIA EVENT (OUTPATIENT)
Dept: OPERATING ROOM | Age: 69
End: 2024-07-25
Payer: MEDICARE

## 2024-07-26 ENCOUNTER — HOSPITAL ENCOUNTER (OUTPATIENT)
Age: 69
Setting detail: OUTPATIENT SURGERY
Discharge: HOME | End: 2024-07-26
Attending: SURGERY | Admitting: SURGERY
Payer: MEDICARE

## 2024-07-26 ENCOUNTER — ANESTHESIA (OUTPATIENT)
Dept: OPERATING ROOM | Age: 69
End: 2024-07-26
Payer: MEDICARE

## 2024-07-26 VITALS
TEMPERATURE: 97.8 F | OXYGEN SATURATION: 93 % | BODY MASS INDEX: 37.79 KG/M2 | HEIGHT: 58 IN | RESPIRATION RATE: 16 BRPM | DIASTOLIC BLOOD PRESSURE: 71 MMHG | HEART RATE: 77 BPM | WEIGHT: 180 LBS | SYSTOLIC BLOOD PRESSURE: 152 MMHG

## 2024-07-26 DIAGNOSIS — K43.6 INCARCERATED VENTRAL HERNIA: ICD-10-CM

## 2024-07-26 LAB
GLUCOSE BLD-MCNC: 128 MG/DL (ref 70–99)
GLUCOSE BLD-MCNC: 83 MG/DL (ref 70–99)
PERFORMED ON: ABNORMAL
PERFORMED ON: NORMAL

## 2024-07-26 PROCEDURE — 88302 TISSUE EXAM BY PATHOLOGIST: CPT

## 2024-07-26 PROCEDURE — 6360000002 HC RX W HCPCS

## 2024-07-26 PROCEDURE — 2580000003 HC RX 258

## 2024-07-26 PROCEDURE — 2580000003 HC RX 258: Performed by: ANESTHESIOLOGY

## 2024-07-26 PROCEDURE — A4217 STERILE WATER/SALINE, 500 ML: HCPCS | Performed by: SURGERY

## 2024-07-26 PROCEDURE — 6360000002 HC RX W HCPCS: Performed by: SURGERY

## 2024-07-26 PROCEDURE — 3700000001 HC ADD 15 MINUTES (ANESTHESIA): Performed by: SURGERY

## 2024-07-26 PROCEDURE — 6360000002 HC RX W HCPCS: Performed by: ANESTHESIOLOGY

## 2024-07-26 PROCEDURE — 2709999900 HC NON-CHARGEABLE SUPPLY: Performed by: SURGERY

## 2024-07-26 PROCEDURE — 7100000000 HC PACU RECOVERY - FIRST 15 MIN: Performed by: SURGERY

## 2024-07-26 PROCEDURE — 7100000011 HC PHASE II RECOVERY - ADDTL 15 MIN: Performed by: SURGERY

## 2024-07-26 PROCEDURE — 7100000010 HC PHASE II RECOVERY - FIRST 15 MIN: Performed by: SURGERY

## 2024-07-26 PROCEDURE — C1781 MESH (IMPLANTABLE): HCPCS | Performed by: SURGERY

## 2024-07-26 PROCEDURE — 94760 N-INVAS EAR/PLS OXIMETRY 1: CPT

## 2024-07-26 PROCEDURE — 2500000003 HC RX 250 WO HCPCS

## 2024-07-26 PROCEDURE — 3600000003 HC SURGERY LEVEL 3 BASE: Performed by: SURGERY

## 2024-07-26 PROCEDURE — 6370000000 HC RX 637 (ALT 250 FOR IP): Performed by: ANESTHESIOLOGY

## 2024-07-26 PROCEDURE — 3600000013 HC SURGERY LEVEL 3 ADDTL 15MIN: Performed by: SURGERY

## 2024-07-26 PROCEDURE — 94640 AIRWAY INHALATION TREATMENT: CPT

## 2024-07-26 PROCEDURE — 7100000001 HC PACU RECOVERY - ADDTL 15 MIN: Performed by: SURGERY

## 2024-07-26 PROCEDURE — 2580000003 HC RX 258: Performed by: SURGERY

## 2024-07-26 PROCEDURE — 49594 RPR AA HRN 1ST 3-10 NCR/STRN: CPT | Performed by: SURGERY

## 2024-07-26 PROCEDURE — 3700000000 HC ANESTHESIA ATTENDED CARE: Performed by: SURGERY

## 2024-07-26 DEVICE — PATCH HERN M DIA2.5IN CIR W/ STRP SEPRA TECHNOLOGY ABSRB: Type: IMPLANTABLE DEVICE | Site: ABDOMEN | Status: FUNCTIONAL

## 2024-07-26 RX ORDER — ACETAMINOPHEN 325 MG/1
650 TABLET ORAL
Status: DISCONTINUED | OUTPATIENT
Start: 2024-07-26 | End: 2024-07-26 | Stop reason: HOSPADM

## 2024-07-26 RX ORDER — SODIUM CHLORIDE 0.9 % (FLUSH) 0.9 %
5-40 SYRINGE (ML) INJECTION EVERY 12 HOURS SCHEDULED
Status: DISCONTINUED | OUTPATIENT
Start: 2024-07-26 | End: 2024-07-26 | Stop reason: SDUPTHER

## 2024-07-26 RX ORDER — IPRATROPIUM BROMIDE AND ALBUTEROL SULFATE 2.5; .5 MG/3ML; MG/3ML
1 SOLUTION RESPIRATORY (INHALATION) ONCE
Status: COMPLETED | OUTPATIENT
Start: 2024-07-26 | End: 2024-07-26

## 2024-07-26 RX ORDER — ROCURONIUM BROMIDE 10 MG/ML
INJECTION, SOLUTION INTRAVENOUS PRN
Status: DISCONTINUED | OUTPATIENT
Start: 2024-07-26 | End: 2024-07-26 | Stop reason: SDUPTHER

## 2024-07-26 RX ORDER — ONDANSETRON 2 MG/ML
INJECTION INTRAMUSCULAR; INTRAVENOUS PRN
Status: DISCONTINUED | OUTPATIENT
Start: 2024-07-26 | End: 2024-07-26 | Stop reason: SDUPTHER

## 2024-07-26 RX ORDER — ONDANSETRON 2 MG/ML
4 INJECTION INTRAMUSCULAR; INTRAVENOUS
Status: COMPLETED | OUTPATIENT
Start: 2024-07-26 | End: 2024-07-26

## 2024-07-26 RX ORDER — BUPIVACAINE HYDROCHLORIDE 5 MG/ML
INJECTION, SOLUTION EPIDURAL; INTRACAUDAL
Status: COMPLETED | OUTPATIENT
Start: 2024-07-26 | End: 2024-07-26

## 2024-07-26 RX ORDER — SODIUM CHLORIDE 0.9 % (FLUSH) 0.9 %
5-40 SYRINGE (ML) INJECTION PRN
Status: DISCONTINUED | OUTPATIENT
Start: 2024-07-26 | End: 2024-07-26 | Stop reason: SDUPTHER

## 2024-07-26 RX ORDER — SODIUM CHLORIDE 0.9 % (FLUSH) 0.9 %
5-40 SYRINGE (ML) INJECTION EVERY 12 HOURS SCHEDULED
Status: DISCONTINUED | OUTPATIENT
Start: 2024-07-26 | End: 2024-07-26 | Stop reason: HOSPADM

## 2024-07-26 RX ORDER — FENTANYL CITRATE 50 UG/ML
INJECTION, SOLUTION INTRAMUSCULAR; INTRAVENOUS PRN
Status: DISCONTINUED | OUTPATIENT
Start: 2024-07-26 | End: 2024-07-26 | Stop reason: SDUPTHER

## 2024-07-26 RX ORDER — PHENYLEPHRINE HCL IN 0.9% NACL 1 MG/10 ML
SYRINGE (ML) INTRAVENOUS PRN
Status: DISCONTINUED | OUTPATIENT
Start: 2024-07-26 | End: 2024-07-26 | Stop reason: SDUPTHER

## 2024-07-26 RX ORDER — SODIUM CHLORIDE 0.9 % (FLUSH) 0.9 %
5-40 SYRINGE (ML) INJECTION PRN
Status: DISCONTINUED | OUTPATIENT
Start: 2024-07-26 | End: 2024-07-26 | Stop reason: HOSPADM

## 2024-07-26 RX ORDER — SODIUM CHLORIDE 9 MG/ML
INJECTION, SOLUTION INTRAVENOUS PRN
Status: DISCONTINUED | OUTPATIENT
Start: 2024-07-26 | End: 2024-07-26 | Stop reason: HOSPADM

## 2024-07-26 RX ORDER — DEXAMETHASONE SODIUM PHOSPHATE 4 MG/ML
INJECTION, SOLUTION INTRA-ARTICULAR; INTRALESIONAL; INTRAMUSCULAR; INTRAVENOUS; SOFT TISSUE PRN
Status: DISCONTINUED | OUTPATIENT
Start: 2024-07-26 | End: 2024-07-26 | Stop reason: SDUPTHER

## 2024-07-26 RX ORDER — OXYCODONE HYDROCHLORIDE 5 MG/1
5 TABLET ORAL PRN
Status: COMPLETED | OUTPATIENT
Start: 2024-07-26 | End: 2024-07-26

## 2024-07-26 RX ORDER — SODIUM CHLORIDE 9 MG/ML
INJECTION, SOLUTION INTRAVENOUS PRN
Status: DISCONTINUED | OUTPATIENT
Start: 2024-07-26 | End: 2024-07-26 | Stop reason: SDUPTHER

## 2024-07-26 RX ORDER — SUCCINYLCHOLINE/SOD CL,ISO/PF 200MG/10ML
SYRINGE (ML) INTRAVENOUS PRN
Status: DISCONTINUED | OUTPATIENT
Start: 2024-07-26 | End: 2024-07-26 | Stop reason: SDUPTHER

## 2024-07-26 RX ORDER — MAGNESIUM HYDROXIDE 1200 MG/15ML
LIQUID ORAL CONTINUOUS PRN
Status: COMPLETED | OUTPATIENT
Start: 2024-07-26 | End: 2024-07-26

## 2024-07-26 RX ORDER — NALOXONE HYDROCHLORIDE 0.4 MG/ML
INJECTION, SOLUTION INTRAMUSCULAR; INTRAVENOUS; SUBCUTANEOUS PRN
Status: DISCONTINUED | OUTPATIENT
Start: 2024-07-26 | End: 2024-07-26 | Stop reason: HOSPADM

## 2024-07-26 RX ORDER — PROPOFOL 10 MG/ML
INJECTION, EMULSION INTRAVENOUS PRN
Status: DISCONTINUED | OUTPATIENT
Start: 2024-07-26 | End: 2024-07-26 | Stop reason: SDUPTHER

## 2024-07-26 RX ORDER — OXYCODONE HYDROCHLORIDE 10 MG/1
10 TABLET ORAL PRN
Status: COMPLETED | OUTPATIENT
Start: 2024-07-26 | End: 2024-07-26

## 2024-07-26 RX ORDER — SODIUM CHLORIDE 9 MG/ML
INJECTION, SOLUTION INTRAVENOUS CONTINUOUS PRN
Status: DISCONTINUED | OUTPATIENT
Start: 2024-07-26 | End: 2024-07-26 | Stop reason: SDUPTHER

## 2024-07-26 RX ORDER — LIDOCAINE HYDROCHLORIDE 20 MG/ML
INJECTION, SOLUTION EPIDURAL; INFILTRATION; INTRACAUDAL; PERINEURAL PRN
Status: DISCONTINUED | OUTPATIENT
Start: 2024-07-26 | End: 2024-07-26 | Stop reason: SDUPTHER

## 2024-07-26 RX ORDER — OXYCODONE HYDROCHLORIDE AND ACETAMINOPHEN 5; 325 MG/1; MG/1
1 TABLET ORAL EVERY 6 HOURS PRN
Qty: 20 TABLET | Refills: 0 | Status: SHIPPED | OUTPATIENT
Start: 2024-07-26 | End: 2024-07-31

## 2024-07-26 RX ORDER — FENTANYL CITRATE 0.05 MG/ML
50 INJECTION, SOLUTION INTRAMUSCULAR; INTRAVENOUS EVERY 5 MIN PRN
Status: DISCONTINUED | OUTPATIENT
Start: 2024-07-26 | End: 2024-07-26 | Stop reason: HOSPADM

## 2024-07-26 RX ADMIN — LIDOCAINE HYDROCHLORIDE 80 MG: 20 INJECTION, SOLUTION EPIDURAL; INFILTRATION; INTRACAUDAL; PERINEURAL at 07:32

## 2024-07-26 RX ADMIN — HYDROMORPHONE HYDROCHLORIDE 0.25 MG: 1 INJECTION, SOLUTION INTRAMUSCULAR; INTRAVENOUS; SUBCUTANEOUS at 08:50

## 2024-07-26 RX ADMIN — ONDANSETRON 4 MG: 2 INJECTION INTRAMUSCULAR; INTRAVENOUS at 08:53

## 2024-07-26 RX ADMIN — OXYCODONE HYDROCHLORIDE 5 MG: 5 TABLET ORAL at 10:31

## 2024-07-26 RX ADMIN — HYDROMORPHONE HYDROCHLORIDE 0.25 MG: 1 INJECTION, SOLUTION INTRAMUSCULAR; INTRAVENOUS; SUBCUTANEOUS at 09:02

## 2024-07-26 RX ADMIN — ROCURONIUM BROMIDE 40 MG: 10 SOLUTION INTRAVENOUS at 07:38

## 2024-07-26 RX ADMIN — HYDROMORPHONE HYDROCHLORIDE 0.25 MG: 1 INJECTION, SOLUTION INTRAMUSCULAR; INTRAVENOUS; SUBCUTANEOUS at 08:12

## 2024-07-26 RX ADMIN — DEXAMETHASONE SODIUM PHOSPHATE 4 MG: 4 INJECTION, SOLUTION INTRAMUSCULAR; INTRAVENOUS at 07:33

## 2024-07-26 RX ADMIN — HYDROMORPHONE HYDROCHLORIDE 0.25 MG: 1 INJECTION, SOLUTION INTRAMUSCULAR; INTRAVENOUS; SUBCUTANEOUS at 07:54

## 2024-07-26 RX ADMIN — SODIUM CHLORIDE 2000 MG: 900 INJECTION INTRAVENOUS at 07:33

## 2024-07-26 RX ADMIN — SUGAMMADEX 200 MG: 100 INJECTION, SOLUTION INTRAVENOUS at 08:16

## 2024-07-26 RX ADMIN — ONDANSETRON 4 MG: 2 INJECTION INTRAMUSCULAR; INTRAVENOUS at 07:58

## 2024-07-26 RX ADMIN — Medication 120 MG: at 07:32

## 2024-07-26 RX ADMIN — ROCURONIUM BROMIDE 10 MG: 10 SOLUTION INTRAVENOUS at 07:32

## 2024-07-26 RX ADMIN — FENTANYL CITRATE 50 MCG: 50 INJECTION INTRAMUSCULAR; INTRAVENOUS at 07:41

## 2024-07-26 RX ADMIN — SODIUM CHLORIDE: 9 INJECTION, SOLUTION INTRAVENOUS at 07:28

## 2024-07-26 RX ADMIN — HYDROMORPHONE HYDROCHLORIDE 0.5 MG: 1 INJECTION, SOLUTION INTRAMUSCULAR; INTRAVENOUS; SUBCUTANEOUS at 08:26

## 2024-07-26 RX ADMIN — Medication 100 MCG: at 07:50

## 2024-07-26 RX ADMIN — FENTANYL CITRATE 50 MCG: 50 INJECTION INTRAMUSCULAR; INTRAVENOUS at 07:32

## 2024-07-26 RX ADMIN — IPRATROPIUM BROMIDE AND ALBUTEROL SULFATE 1 DOSE: 2.5; .5 SOLUTION RESPIRATORY (INHALATION) at 09:41

## 2024-07-26 RX ADMIN — PROPOFOL 160 MG: 10 INJECTION, EMULSION INTRAVENOUS at 07:32

## 2024-07-26 RX ADMIN — SODIUM CHLORIDE: 9 INJECTION, SOLUTION INTRAVENOUS at 07:23

## 2024-07-26 ASSESSMENT — PAIN - FUNCTIONAL ASSESSMENT
PAIN_FUNCTIONAL_ASSESSMENT: PREVENTS OR INTERFERES SOME ACTIVE ACTIVITIES AND ADLS
PAIN_FUNCTIONAL_ASSESSMENT: PREVENTS OR INTERFERES WITH MANY ACTIVE NOT PASSIVE ACTIVITIES
PAIN_FUNCTIONAL_ASSESSMENT: 0-10
PAIN_FUNCTIONAL_ASSESSMENT: PREVENTS OR INTERFERES SOME ACTIVE ACTIVITIES AND ADLS
PAIN_FUNCTIONAL_ASSESSMENT: 0-10
PAIN_FUNCTIONAL_ASSESSMENT: PREVENTS OR INTERFERES SOME ACTIVE ACTIVITIES AND ADLS

## 2024-07-26 ASSESSMENT — PAIN DESCRIPTION - PAIN TYPE
TYPE: SURGICAL PAIN

## 2024-07-26 ASSESSMENT — PAIN SCALES - GENERAL
PAINLEVEL_OUTOF10: 5
PAINLEVEL_OUTOF10: 4
PAINLEVEL_OUTOF10: 5
PAINLEVEL_OUTOF10: 6
PAINLEVEL_OUTOF10: 4
PAINLEVEL_OUTOF10: 6
PAINLEVEL_OUTOF10: 4
PAINLEVEL_OUTOF10: 5
PAINLEVEL_OUTOF10: 4

## 2024-07-26 ASSESSMENT — PAIN DESCRIPTION - DESCRIPTORS
DESCRIPTORS: DISCOMFORT
DESCRIPTORS: SHARP;DISCOMFORT;ACHING
DESCRIPTORS: SHARP
DESCRIPTORS: SHARP
DESCRIPTORS: ACHING;SHARP
DESCRIPTORS: DISCOMFORT;SHARP
DESCRIPTORS: DISCOMFORT
DESCRIPTORS: SHARP
DESCRIPTORS: DISCOMFORT
DESCRIPTORS: SHARP
DESCRIPTORS: DISCOMFORT

## 2024-07-26 ASSESSMENT — PAIN DESCRIPTION - ORIENTATION
ORIENTATION: MID

## 2024-07-26 ASSESSMENT — PAIN DESCRIPTION - FREQUENCY
FREQUENCY: CONTINUOUS

## 2024-07-26 ASSESSMENT — PAIN DESCRIPTION - LOCATION
LOCATION: ABDOMEN

## 2024-07-26 ASSESSMENT — PAIN DESCRIPTION - ONSET
ONSET: ON-GOING

## 2024-07-26 ASSESSMENT — LIFESTYLE VARIABLES: SMOKING_STATUS: 0

## 2024-07-26 ASSESSMENT — ENCOUNTER SYMPTOMS
DYSPNEA ACTIVITY LEVEL: NO INTERVAL CHANGE
SHORTNESS OF BREATH: 1

## 2024-07-26 NOTE — ANESTHESIA POSTPROCEDURE EVALUATION
Department of Anesthesiology  Postprocedure Note    Patient: Melody Clement  MRN: [id number]  YOB: 1955  Date of evaluation: 7/26/2024    Procedure Summary       Date: 07/26/24 Room / Location: [address]    Anesthesia Start: 0728 Anesthesia Stop: 0832    Procedure: INCARCERATED HERNIA VENTRAL REPAIR WITH MESH (Abdomen) Diagnosis:       Incarcerated ventral hernia      (Incarcerated ventral hernia [K43.6])    Surgeons: Elias Blair MD Responsible Provider: Nisa Ayala MD    Anesthesia Type: General ASA Status: 3            Anesthesia Type: General    Valerie Phase I: Valerie Score: 10    Valerie Phase II: Valerie Score: 10    Anesthesia Post Evaluation    Patient location during evaluation: bedside  Patient participation: complete - patient participated  Level of consciousness: awake and alert  Pain score: 0  Airway patency: patent  Nausea & Vomiting: no vomiting  Cardiovascular status: blood pressure returned to baseline  Respiratory status: acceptable  Hydration status: euvolemic  Pain management: adequate    No notable events documented.

## 2024-07-26 NOTE — ANESTHESIA PRE PROCEDURE
Department of Anesthesiology  Preprocedure Note       Name:  Melody Clement   Age:  69 y.o.  :  1955                                          MRN:  [id number]         Date:  2024      Surgeon: Dr. Elias Blair MD    Procedure: lap kenji    Medications prior to admission:   Prior to Admission medications    Medication Sig Start Date End Date Taking? Authorizing Provider   gabapentin (NEURONTIN) 100 MG capsule Take 1 capsule by mouth daily for 90 days. Intended supply: 90 days 7/17/24 10/15/24  Bijal Jay APRN - CNP   fenofibrate (TRIGLIDE) 160 MG tablet Take 1 tablet by mouth daily 24   Stiven Diaz MD   vitamin D (CHOLECALCIFEROL) 25 MCG (1000 UT) TABS tablet Take 1 tablet by mouth daily 24   Bijal Jay APRN - CNP   BD PEN NEEDLE PAT 2ND GEN 32G X 4 MM MISC Inject 1 each as directed 4 times daily (before meals and nightly) 24   Bijal Jay APRN - CNP   metoprolol tartrate (LOPRESSOR) 25 MG tablet Take 1 tablet by mouth 2 times daily 24   Bijal Jay APRN - CNP   sertraline (ZOLOFT) 100 MG tablet Take 1 tablet by mouth daily 24   Bijal Jay APRN - CNP   omega-3 acid ethyl esters (LOVAZA) 1 g capsule Take 2 capsules by mouth 2 times daily 24   Bijal Jay APRN - CNP   vibegron (GEMTESA) 75 MG TABS tablet Take 1 tablet by mouth daily 5/15/24   Bijal Jay APRN - CNP   insulin glargine (LANTUS SOLOSTAR) 100 UNIT/ML injection pen Inject 70 Units into the skin nightly 5/10/24   Bijal Jay APRN - CNP   trospium (SANCTURA) 20 MG tablet Take 1 tablet by mouth 2 times daily 24   Bijal Jay APRN - CNP   blood glucose test strips (EXACTECH TEST) strip 1 each by In Vitro route 4 times daily As needed. 24   Bijal Jay, APRN - CNP   Insulin Aspart, w/Niacinamide, (FIASP FLEXTOUCH) 100 UNIT/ML SOPN Inject 25 Units into the skin 3 times daily (with meals). May also inject 5 Units take with snacks (25

## 2024-07-26 NOTE — H&P
Preoperative History and Physical Update    H&P from 7/8/2024 was reviewed    No changes noted today    PE  Alert and oriented  Incarcerated ventral hernia at the umbilicus    A/P  Incarcerated ventral hernia  For repair today    The risks, benefits and alternatives to the planned procedure were discussed. Patient expressed an understanding and is willing to proceed.    Electronically signed by SARBJIT SCHMIDT MD on 7/26/2024 at 7:23 AM

## 2024-07-26 NOTE — PROGRESS NOTES
Pt tolerates PO. Oxycodone 1 tablet PO given for pain 4/10 in mid abdomen. Prescription delivered and verified pt's name and . Daughter present in room.

## 2024-07-26 NOTE — PROGRESS NOTES
Pt has urge to void. Walked pt to bathroom and instructed pt to pull cord in bathroom when finished.

## 2024-07-26 NOTE — PROGRESS NOTES
PACU Transfer to [facility]    Vitals:    07/26/24 0945   BP: (!) 157/80   Pulse: 71   Resp: 18   Temp:    SpO2: 98%         Intake/Output Summary (Last 24 hours) at 7/26/2024 0951  Last data filed at 7/26/2024 0759  Gross per 24 hour   Intake 600 ml   Output 10 ml   Net 590 ml       Pain assessment:  present - adequately treated and location, pain to abdomen still 4/10. Abdominal drsg remains C/D/I.  Pain Level: 4    Patient transferred to care of DEBBIE RN.    7/26/2024 9:51 AM

## 2024-07-26 NOTE — OP NOTE
[facility]          [address] [place] [address]                            OPERATIVE REPORT      PATIENT NAME: MATTHIEU SOLIS             : 1955  MED REC NO: [id number]                      ROOM: OR  ACCOUNT NO: [id number]                       ADMIT DATE: 2024  PROVIDER: Elias Blair MD      DATE OF PROCEDURE:  2024    SURGEON:  Elias Blair MD    PREOPERATIVE DIAGNOSIS:  Incarcerated ventral hernia.    POSTOPERATIVE DIAGNOSIS:  Incarcerated ventral hernia.    PROCEDURE:  Repair of incarcerated ventral hernia 3.5 cm defect.    SPECIMEN:  Hernia sac and contents.    ESTIMATED BLOOD LOSS:  Less than 50 mL.    COMPLICATIONS:  None.    DISPOSITION:  To Recovery in stable condition.    INDICATION:  The patient is a 69-year-old female with a longstanding umbilical hernia which has recently become painful.  On exam, it could no longer be reduced and is tender to palpation.  She does not have any GI obstructive symptoms and likely has incarcerated omental fat.  The risks, benefits, and alternatives of repair were reviewed and she agreed to proceed.    DESCRIPTION OF PROCEDURE:  The patient was brought to the operating room, placed supine, anesthesia delivered, and the abdomen prepped and draped in a sterile fashion.  Local anesthetic was infused and a transverse incision made cephalad to the umbilicus through a previous trocar site scar.  Dissection was carried into the subcutaneous tissue and a hernia sac encountered.  This was now dissected free from the surrounding subcutaneous tissue as well as the umbilical skin which had everted.  The hernia sac was opened and contained incarcerated omental fat.  This was now dissected free by using electrocautery to cut through the hernia sac at the level of the fascia and dissecting through the omental fat which had protruded through the hernia defect.  With that removed, a 3.5 cm  defect was identified.  The overlying subcutaneous tissue had already cleared due to the hernia.  We now dissected free any preperitoneal fat deep to the fascia.  A 6.4 cm Ventralex mesh was then placed in the abdominal cavity and this was now anchored with interrupted 0 PDS sutures.  Those were placed in a mattress style through the fascia and included the expansion ring at the edge of the mesh.  Ten total sutures were used and we assured there were no gaps by palpating deep to the fascia through the defect.  With the mesh anchored in place, the defect was now closed primarily with interrupted 0 PDS sutures.  Additional Marcaine was now injected around the operative field.  The umbilical skin was reattached to the fascia with a 3-0 Vicryl and the skin closed with 3-0 and 4-0 Vicryl.  Dressings were applied and the patient transferred to Recovery in good condition.          SARBJIT SCHMIDT MD      D:  07/26/2024 11:17:47     T:  07/26/2024 11:55:42     DARCY/EMMA  Job #:  [id number]     Doc#:  [id number]

## 2024-07-26 NOTE — PROGRESS NOTES
Pt awake on arrival to phase II. Pt pale and complain of nausea. Pt vomited large amount of green emesis. VSS. Pain 4/10 in abdomen. Ice pack and pt splinting abdomen with pillow. Given ice, juice and crackers. Call light within reach. Daughter to room.

## 2024-07-26 NOTE — PROGRESS NOTES
Patient admitted to PACU # 11 from OR at 0825 post INCARCERATED HERNIA VENTRAL REPAIR WITH MESH  per Elias Blair MD .  Attached to PACU monitoring system and report received from anesthesia provider.  Patient was reported to be hemodynamically stable during procedure.  Patient drowsy on admission and stated pain was 5/10, CRNA gave pt additional Dilaudid prior to leaving the PACU.

## 2024-07-26 NOTE — DISCHARGE INSTRUCTIONS
Follow up in 2-3 weeks  Call [phone] for an appointment  Remove dressings in 3-4 days  Ok to shower in AM  No Driving for 4 days. OK to drive at that time if you are not taking any pain medication.    Resume all home medications today except Eliquis. Resume Eliquis on 7/27/2024

## 2024-07-26 NOTE — BRIEF OP NOTE
Brief Postoperative Note      Patient: Melody Clement  YOB: 1955  MRN: [id number]    Date of Procedure: 7/26/2024    Pre-Op Diagnosis Codes:     * Incarcerated ventral hernia [K43.6]    Post-Op Diagnosis: Same       Procedure(s):  INCARCERATED HERNIA VENTRAL REPAIR WITH MESH 3.5 cm defect    Surgeon(s):  Sarbjit Blair MD    Assistant:  Surgical Assistant: Travis Ugarte RN    Anesthesia: General    Estimated Blood Loss (mL): less than 50     Complications: None    Specimens:   ID Type Source Tests Collected by Time Destination   A : A. Ventral Hernia Sac Contents Specimen Abdomen SURGICAL PATHOLOGY Sarbjit Blair MD 7/26/2024 0754        Implants:  Implant Name Type Inv. Item Serial No.  Lot No. LRB No. Used Action   PATCH IVON M DIA2.5IN CIR W/ STRP [employer] ABSRB - [id number]  PATCH IVON M DIA2.5IN CIR W/ STRP [employer] ABS[employer]  BARD DAVOL-WD [id number] N/A 1 Implanted         Drains: * No LDAs found *    Findings:  Infection Present At Time Of Surgery (PATOS) (choose all levels that have infection present):  No infection present  Other Findings: omental fat in a 3.5 cm defect    Electronically signed by SARBJIT BLAIR MD on 7/26/2024 at 8:19 AM

## 2024-07-26 NOTE — PROGRESS NOTES
Report handoff given to OR circulator. OR team made aware H and P still needed reviewed by the physician at this time.

## 2024-07-26 NOTE — PROGRESS NOTES
Pt voided. Back to room and tolerates PO and sitting up in recliner. VSS. O2 sat 92% on R/A. Occasional wheezes but pt denies SOB. Discharge instructions reviewed with pt and daughter. Both express an understanding of instructions. Daughter assisting pt to dress in chair.

## 2024-08-02 ENCOUNTER — OFFICE VISIT (OUTPATIENT)
Dept: CARDIOLOGY CLINIC | Age: 69
End: 2024-08-02
Payer: MEDICARE

## 2024-08-02 VITALS
SYSTOLIC BLOOD PRESSURE: 136 MMHG | DIASTOLIC BLOOD PRESSURE: 70 MMHG | BODY MASS INDEX: 38.2 KG/M2 | HEART RATE: 66 BPM | WEIGHT: 182 LBS | OXYGEN SATURATION: 95 % | HEIGHT: 58 IN

## 2024-08-02 DIAGNOSIS — I48.92 LEFT ATRIAL FLUTTER BY ELECTROCARDIOGRAM (HCC): ICD-10-CM

## 2024-08-02 DIAGNOSIS — I25.10 CAD IN NATIVE ARTERY: ICD-10-CM

## 2024-08-02 DIAGNOSIS — E78.5 HYPERLIPIDEMIA, UNSPECIFIED HYPERLIPIDEMIA TYPE: ICD-10-CM

## 2024-08-02 DIAGNOSIS — I48.0 PAROXYSMAL ATRIAL FIBRILLATION (HCC): Primary | ICD-10-CM

## 2024-08-02 DIAGNOSIS — I10 ESSENTIAL HYPERTENSION: ICD-10-CM

## 2024-08-02 PROCEDURE — 1123F ACP DISCUSS/DSCN MKR DOCD: CPT | Performed by: INTERNAL MEDICINE

## 2024-08-02 PROCEDURE — 3078F DIAST BP <80 MM HG: CPT | Performed by: INTERNAL MEDICINE

## 2024-08-02 PROCEDURE — 3075F SYST BP GE 130 - 139MM HG: CPT | Performed by: INTERNAL MEDICINE

## 2024-08-02 PROCEDURE — 93000 ELECTROCARDIOGRAM COMPLETE: CPT | Performed by: INTERNAL MEDICINE

## 2024-08-02 PROCEDURE — 99214 OFFICE O/P EST MOD 30 MIN: CPT | Performed by: INTERNAL MEDICINE

## 2024-08-02 RX ORDER — ATORVASTATIN CALCIUM 10 MG/1
10 TABLET, FILM COATED ORAL DAILY
Qty: 90 TABLET | Refills: 3 | Status: SHIPPED | OUTPATIENT
Start: 2024-08-02

## 2024-08-02 RX ORDER — FENOFIBRATE 160 MG/1
160 TABLET ORAL DAILY
Qty: 90 TABLET | Refills: 3 | Status: SHIPPED | OUTPATIENT
Start: 2024-08-02

## 2024-08-02 NOTE — PROGRESS NOTES
daily 90 tablet 0    omega-3 acid ethyl esters (LOVAZA) 1 g capsule Take 2 capsules by mouth 2 times daily 360 capsule 1    vibegron (GEMTESA) 75 MG TABS tablet Take 1 tablet by mouth daily 90 tablet 0    insulin glargine (LANTUS SOLOSTAR) 100 UNIT/ML injection pen Inject 70 Units into the skin nightly 10 Adjustable Dose Pre-filled Pen Syringe 3    trospium (SANCTURA) 20 MG tablet Take 1 tablet by mouth 2 times daily 180 tablet 0    blood glucose test strips (EXACTECH TEST) strip 1 each by In Vitro route 4 times daily As needed. 100 each 5    Insulin Aspart, w/Niacinamide, (FIASP FLEXTOUCH) 100 UNIT/ML SOPN Inject 25 Units into the skin 3 times daily (with meals). May also inject 5 Units take with snacks (25 units three times daily with meals; 5 units with snacks). 5 Adjustable Dose Pre-filled Pen Syringe 1    ELIQUIS 5 MG TABS tablet TAKE 1 TABLET BY MOUTH TWICE DAILY (Patient taking differently: Take 1 tablet by mouth 2 times daily Told to stop 5 days prior to surgery) 180 tablet 2    fexofenadine (ALLEGRA ALLERGY) 180 MG tablet Take 1 tablet by mouth daily      montelukast (SINGULAIR) 10 MG tablet Take 1 tablet by mouth nightly      atorvastatin (LIPITOR) 10 MG tablet Take 1 tablet by mouth daily      pantoprazole (PROTONIX) 40 MG tablet Take 1 tablet by mouth daily      Multiple Vitamins-Minerals (EYE VITAMINS PO) Take 1 tablet by mouth in the morning and at bedtime       acetaminophen (TYLENOL) 500 MG tablet Take 2 tablets by mouth every 6 hours as needed for Pain 180 tablet 0    lisinopril-hydroCHLOROthiazide (PRINZIDE;ZESTORETIC) 20-12.5 MG per tablet Take 1 tablet by mouth 2 times daily      brimonidine (ALPHAGAN) 0.2 % ophthalmic solution 1 drop 3 times daily (Patient not taking: Reported on 7/11/2024)      CARBOXYMETHYLCELLULOSE SODIUM OP Apply 1 drop to eye daily (Patient not taking: Reported on 7/11/2024)      dorzolamide-timolol (COSOPT) 22.3-6.8 MG/ML ophthalmic solution 1 drop 2 times daily (Patient

## 2024-08-05 DIAGNOSIS — Z79.4 TYPE 2 DIABETES MELLITUS WITH DIABETIC CATARACT, WITH LONG-TERM CURRENT USE OF INSULIN (HCC): ICD-10-CM

## 2024-08-05 DIAGNOSIS — E11.36 TYPE 2 DIABETES MELLITUS WITH DIABETIC CATARACT, WITH LONG-TERM CURRENT USE OF INSULIN (HCC): ICD-10-CM

## 2024-08-05 RX ORDER — FENOFIBRATE 160 MG/1
160 TABLET ORAL DAILY
Qty: 90 TABLET | Refills: 3 | OUTPATIENT
Start: 2024-08-05

## 2024-08-05 RX ORDER — PEN NEEDLE, DIABETIC 32GX 5/32"
1 NEEDLE, DISPOSABLE MISCELLANEOUS
Qty: 100 EACH | Refills: 2 | Status: SHIPPED | OUTPATIENT
Start: 2024-08-05

## 2024-08-05 RX ORDER — INSULIN ASPART INJECTION 100 [IU]/ML
INJECTION, SOLUTION SUBCUTANEOUS
Qty: 5 ADJUSTABLE DOSE PRE-FILLED PEN SYRINGE | Refills: 1 | Status: SHIPPED | OUTPATIENT
Start: 2024-08-05

## 2024-08-05 NOTE — TELEPHONE ENCOUNTER
Last OV: 8/2/24  Next OV: 8/8/25  Last refill: 8/3/24  Most recent Labs: CMP 6/27/24  Last EKG (if needed):       Duplicate requested. Rec'd by pharmacy on 8/2/24.  Spoke with Leora Daley) who confirmed receipt.  OK to cancel this request w/o affecting previous sent Rx.

## 2024-08-09 RX ORDER — VIBEGRON 75 MG/1
75 TABLET, FILM COATED ORAL DAILY
Qty: 90 TABLET | Refills: 0 | Status: SHIPPED | OUTPATIENT
Start: 2024-08-09

## 2024-08-12 ENCOUNTER — OFFICE VISIT (OUTPATIENT)
Dept: SURGERY | Age: 69
End: 2024-08-12

## 2024-08-12 VITALS — HEIGHT: 58 IN | WEIGHT: 182 LBS | BODY MASS INDEX: 38.2 KG/M2

## 2024-08-12 DIAGNOSIS — K43.6 INCARCERATED VENTRAL HERNIA: Primary | ICD-10-CM

## 2024-08-12 PROCEDURE — 99024 POSTOP FOLLOW-UP VISIT: CPT | Performed by: SURGERY

## 2024-08-12 RX ORDER — SERTRALINE HYDROCHLORIDE 100 MG/1
100 TABLET, FILM COATED ORAL DAILY
Qty: 90 TABLET | Refills: 0 | Status: SHIPPED | OUTPATIENT
Start: 2024-08-12

## 2024-08-12 NOTE — PROGRESS NOTES
Melody Clement (:  1955) is a 69 y.o. female,Established patient, here for evaluation of the following chief complaint(s):  Post-Op Check (Hernia repair )         Assessment & Plan  Incarcerated ventral hernia            Follow up with me as needed         Subjective   HPI  Patient presents s/p repair of an incarcerated ventral hernia. Patient is two weeks post op. Pain level is minor and improving. Incision appearance: well healed. Post op complications: none. Follow up with me as needed.    Review of Systems       Objective   Physical Exam       Electronically signed by SARBJIT SCHMIDT MD on 2024 at 10:30 AM        An electronic signature was used to authenticate this note.    --SARBJIT SCHMIDT MD

## 2024-08-26 ENCOUNTER — PATIENT MESSAGE (OUTPATIENT)
Dept: PRIMARY CARE CLINIC | Age: 69
End: 2024-08-26

## 2024-08-26 DIAGNOSIS — E11.9 INSULIN DEPENDENT TYPE 2 DIABETES MELLITUS (HCC): ICD-10-CM

## 2024-08-26 DIAGNOSIS — Z79.4 INSULIN DEPENDENT TYPE 2 DIABETES MELLITUS (HCC): ICD-10-CM

## 2024-08-26 RX ORDER — INSULIN GLARGINE 100 [IU]/ML
70 INJECTION, SOLUTION SUBCUTANEOUS NIGHTLY
Qty: 10 ADJUSTABLE DOSE PRE-FILLED PEN SYRINGE | Refills: 3 | Status: SHIPPED | OUTPATIENT
Start: 2024-08-26

## 2024-08-26 RX ORDER — FEXOFENADINE HCL 180 MG/1
180 TABLET ORAL DAILY
Qty: 90 TABLET | Refills: 0 | Status: SHIPPED | OUTPATIENT
Start: 2024-08-26

## 2024-08-27 DIAGNOSIS — E11.36 TYPE 2 DIABETES MELLITUS WITH DIABETIC CATARACT, WITH LONG-TERM CURRENT USE OF INSULIN (HCC): ICD-10-CM

## 2024-08-27 DIAGNOSIS — Z79.4 TYPE 2 DIABETES MELLITUS WITH DIABETIC CATARACT, WITH LONG-TERM CURRENT USE OF INSULIN (HCC): ICD-10-CM

## 2024-08-27 RX ORDER — INSULIN ASPART INJECTION 100 [IU]/ML
INJECTION, SOLUTION SUBCUTANEOUS
Qty: 5 ADJUSTABLE DOSE PRE-FILLED PEN SYRINGE | Refills: 1 | OUTPATIENT
Start: 2024-08-27

## 2024-08-29 RX ORDER — LORATADINE 10 MG/1
10 TABLET ORAL DAILY
COMMUNITY
End: 2024-08-29

## 2024-08-29 RX ORDER — ALBUTEROL SULFATE 90 UG/1
2 INHALANT RESPIRATORY (INHALATION) EVERY 6 HOURS PRN
COMMUNITY

## 2024-08-29 RX ORDER — AZELASTINE HCL 205.5 UG/1
SPRAY NASAL
COMMUNITY

## 2024-08-29 RX ORDER — CITALOPRAM HYDROBROMIDE 40 MG/1
40 TABLET ORAL DAILY
COMMUNITY

## 2024-08-29 RX ORDER — MIRABEGRON 25 MG/1
25 TABLET, FILM COATED, EXTENDED RELEASE ORAL DAILY
COMMUNITY

## 2024-08-29 RX ORDER — METHYLPREDNISOLONE 4 MG/1
2 TABLET ORAL DAILY
COMMUNITY

## 2024-08-29 RX ORDER — METHOCARBAMOL 500 MG/1
500 TABLET, FILM COATED ORAL 4 TIMES DAILY
COMMUNITY

## 2024-09-04 RX ORDER — METOPROLOL TARTRATE 25 MG/1
25 TABLET, FILM COATED ORAL 2 TIMES DAILY
Qty: 60 TABLET | Refills: 3 | OUTPATIENT
Start: 2024-09-04

## 2024-09-04 NOTE — TELEPHONE ENCOUNTER
Medication:   Requested Prescriptions     Pending Prescriptions Disp Refills    metoprolol tartrate (LOPRESSOR) 25 MG tablet 60 tablet 3     Sig: Take 1 tablet by mouth 2 times daily        Last Filled:      Patient Phone Number: [phone] (home)     Last appt: 6/27/2024   Next appt: 9/6/2024    Last OARRS:        No data to display

## 2024-09-06 ENCOUNTER — OFFICE VISIT (OUTPATIENT)
Dept: PRIMARY CARE CLINIC | Age: 69
End: 2024-09-06
Payer: MEDICARE

## 2024-09-06 VITALS
HEIGHT: 58 IN | TEMPERATURE: 98.4 F | WEIGHT: 183 LBS | OXYGEN SATURATION: 95 % | DIASTOLIC BLOOD PRESSURE: 70 MMHG | SYSTOLIC BLOOD PRESSURE: 134 MMHG | BODY MASS INDEX: 38.41 KG/M2 | HEART RATE: 79 BPM

## 2024-09-06 DIAGNOSIS — E11.9 INSULIN DEPENDENT TYPE 2 DIABETES MELLITUS (HCC): ICD-10-CM

## 2024-09-06 DIAGNOSIS — Z01.818 PREOP EXAMINATION: Primary | ICD-10-CM

## 2024-09-06 DIAGNOSIS — Z79.4 INSULIN DEPENDENT TYPE 2 DIABETES MELLITUS (HCC): ICD-10-CM

## 2024-09-06 DIAGNOSIS — H26.9 CATARACT OF BOTH EYES, UNSPECIFIED CATARACT TYPE: ICD-10-CM

## 2024-09-06 DIAGNOSIS — R06.2 WHEEZING: ICD-10-CM

## 2024-09-06 PROCEDURE — 99213 OFFICE O/P EST LOW 20 MIN: CPT

## 2024-09-06 PROCEDURE — 3051F HG A1C>EQUAL 7.0%<8.0%: CPT

## 2024-09-06 PROCEDURE — 3075F SYST BP GE 130 - 139MM HG: CPT

## 2024-09-06 PROCEDURE — 3078F DIAST BP <80 MM HG: CPT

## 2024-09-06 PROCEDURE — 1123F ACP DISCUSS/DSCN MKR DOCD: CPT

## 2024-09-06 RX ORDER — ALBUTEROL SULFATE 90 UG/1
2 AEROSOL, METERED RESPIRATORY (INHALATION) EVERY 6 HOURS PRN
Qty: 18 G | Refills: 2 | Status: SHIPPED | OUTPATIENT
Start: 2024-09-06

## 2024-09-06 RX ORDER — PANTOPRAZOLE SODIUM 40 MG/1
40 TABLET, DELAYED RELEASE ORAL DAILY
Qty: 90 TABLET | Refills: 0 | Status: SHIPPED | OUTPATIENT
Start: 2024-09-06

## 2024-09-06 RX ORDER — BLOOD SUGAR DIAGNOSTIC
1 STRIP MISCELLANEOUS 4 TIMES DAILY
Qty: 100 EACH | Refills: 5 | Status: SHIPPED | OUTPATIENT
Start: 2024-09-06

## 2024-09-09 ENCOUNTER — ANESTHESIA EVENT (OUTPATIENT)
Dept: SURGERY | Age: 69
End: 2024-09-09
Payer: MEDICARE

## 2024-09-10 RX ORDER — FENOFIBRATE 160 MG/1
160 TABLET ORAL DAILY
Qty: 90 TABLET | Refills: 3 | OUTPATIENT
Start: 2024-09-10

## 2024-09-10 RX ORDER — FEXOFENADINE HYDROCHLORIDE 180 MG/1
180 TABLET, FILM COATED ORAL DAILY
Qty: 90 TABLET | Refills: 0 | OUTPATIENT
Start: 2024-09-10

## 2024-09-11 RX ORDER — CALCIUM CITRATE/VITAMIN D3 200MG-6.25
1 TABLET ORAL
Qty: 100 EACH | Refills: 3 | Status: SHIPPED | OUTPATIENT
Start: 2024-09-11

## 2024-09-12 ENCOUNTER — HOSPITAL ENCOUNTER (OUTPATIENT)
Age: 69
Setting detail: OUTPATIENT SURGERY
Discharge: HOME | End: 2024-09-12
Attending: OPHTHALMOLOGY | Admitting: OPHTHALMOLOGY
Payer: MEDICARE

## 2024-09-12 ENCOUNTER — ANESTHESIA (OUTPATIENT)
Dept: SURGERY | Age: 69
End: 2024-09-12
Payer: MEDICARE

## 2024-09-12 VITALS
WEIGHT: 180 LBS | DIASTOLIC BLOOD PRESSURE: 83 MMHG | OXYGEN SATURATION: 96 % | HEART RATE: 73 BPM | SYSTOLIC BLOOD PRESSURE: 151 MMHG | RESPIRATION RATE: 21 BRPM | HEIGHT: 60 IN | TEMPERATURE: 97.4 F | BODY MASS INDEX: 35.34 KG/M2

## 2024-09-12 LAB
GLUCOSE BLD-MCNC: 101 MG/DL (ref 70–99)
GLUCOSE BLD-MCNC: 116 MG/DL (ref 70–99)
PERFORMED ON: ABNORMAL
PERFORMED ON: ABNORMAL

## 2024-09-12 PROCEDURE — 6370000000 HC RX 637 (ALT 250 FOR IP): Performed by: OPHTHALMOLOGY

## 2024-09-12 PROCEDURE — 3700000001 HC ADD 15 MINUTES (ANESTHESIA): Performed by: OPHTHALMOLOGY

## 2024-09-12 PROCEDURE — 2580000003 HC RX 258: Performed by: STUDENT IN AN ORGANIZED HEALTH CARE EDUCATION/TRAINING PROGRAM

## 2024-09-12 PROCEDURE — 2709999900 HC NON-CHARGEABLE SUPPLY: Performed by: OPHTHALMOLOGY

## 2024-09-12 PROCEDURE — 3600000004 HC SURGERY LEVEL 4 BASE: Performed by: OPHTHALMOLOGY

## 2024-09-12 PROCEDURE — 2500000003 HC RX 250 WO HCPCS: Performed by: OPHTHALMOLOGY

## 2024-09-12 PROCEDURE — 3700000000 HC ANESTHESIA ATTENDED CARE: Performed by: OPHTHALMOLOGY

## 2024-09-12 PROCEDURE — 6360000002 HC RX W HCPCS: Performed by: NURSE ANESTHETIST, CERTIFIED REGISTERED

## 2024-09-12 PROCEDURE — V2632 POST CHMBR INTRAOCULAR LENS: HCPCS | Performed by: OPHTHALMOLOGY

## 2024-09-12 PROCEDURE — 7100000010 HC PHASE II RECOVERY - FIRST 15 MIN: Performed by: OPHTHALMOLOGY

## 2024-09-12 PROCEDURE — 3600000014 HC SURGERY LEVEL 4 ADDTL 15MIN: Performed by: OPHTHALMOLOGY

## 2024-09-12 DEVICE — STERILE UV AND BLUE LIGHT FILTERING ACRYLIC FOLDABLE ASPHERIC POSTERIOR CHAMBER INTRAOCULAR LENS
Type: IMPLANTABLE DEVICE | Site: EYE | Status: FUNCTIONAL
Brand: CLAREON

## 2024-09-12 RX ORDER — PREDNISOLONE ACETATE 10 MG/ML
SUSPENSION/ DROPS OPHTHALMIC
Status: COMPLETED | OUTPATIENT
Start: 2024-09-12 | End: 2024-09-12

## 2024-09-12 RX ORDER — POVIDONE-IODINE 5 %
SOLUTION, NON-ORAL OPHTHALMIC (EYE)
Status: COMPLETED | OUTPATIENT
Start: 2024-09-12 | End: 2024-09-12

## 2024-09-12 RX ORDER — SODIUM CHLORIDE 9 MG/ML
INJECTION, SOLUTION INTRAVENOUS PRN
Status: DISCONTINUED | OUTPATIENT
Start: 2024-09-12 | End: 2024-09-12 | Stop reason: HOSPADM

## 2024-09-12 RX ORDER — DEXTROSE MONOHYDRATE 100 MG/ML
INJECTION, SOLUTION INTRAVENOUS CONTINUOUS PRN
Status: DISCONTINUED | OUTPATIENT
Start: 2024-09-12 | End: 2024-09-12 | Stop reason: HOSPADM

## 2024-09-12 RX ORDER — SODIUM CHLORIDE 0.9 % (FLUSH) 0.9 %
5-40 SYRINGE (ML) INJECTION PRN
Status: DISCONTINUED | OUTPATIENT
Start: 2024-09-12 | End: 2024-09-12 | Stop reason: HOSPADM

## 2024-09-12 RX ORDER — TETRACAINE HYDROCHLORIDE 5 MG/ML
1 SOLUTION OPHTHALMIC SEE ADMIN INSTRUCTIONS
Status: DISCONTINUED | OUTPATIENT
Start: 2024-09-12 | End: 2024-09-12 | Stop reason: HOSPADM

## 2024-09-12 RX ORDER — LABETALOL HYDROCHLORIDE 5 MG/ML
INJECTION, SOLUTION INTRAVENOUS
Status: DISCONTINUED | OUTPATIENT
Start: 2024-09-12 | End: 2024-09-12 | Stop reason: SDUPTHER

## 2024-09-12 RX ORDER — MIDAZOLAM HYDROCHLORIDE 1 MG/ML
INJECTION INTRAMUSCULAR; INTRAVENOUS
Status: DISCONTINUED | OUTPATIENT
Start: 2024-09-12 | End: 2024-09-12 | Stop reason: SDUPTHER

## 2024-09-12 RX ORDER — GLUCAGON 1 MG/ML
1 KIT INJECTION PRN
Status: DISCONTINUED | OUTPATIENT
Start: 2024-09-12 | End: 2024-09-12 | Stop reason: HOSPADM

## 2024-09-12 RX ORDER — TETRACAINE HYDROCHLORIDE 5 MG/ML
SOLUTION OPHTHALMIC
Status: COMPLETED | OUTPATIENT
Start: 2024-09-12 | End: 2024-09-12

## 2024-09-12 RX ORDER — PHENYLEPHRINE HYDROCHLORIDE 25 MG/ML
1 SOLUTION/ DROPS OPHTHALMIC SEE ADMIN INSTRUCTIONS
Status: DISCONTINUED | OUTPATIENT
Start: 2024-09-12 | End: 2024-09-12 | Stop reason: HOSPADM

## 2024-09-12 RX ORDER — ONDANSETRON 2 MG/ML
4 INJECTION INTRAMUSCULAR; INTRAVENOUS
Status: CANCELLED | OUTPATIENT
Start: 2024-09-12 | End: 2024-09-13

## 2024-09-12 RX ORDER — NALOXONE HYDROCHLORIDE 0.4 MG/ML
INJECTION, SOLUTION INTRAMUSCULAR; INTRAVENOUS; SUBCUTANEOUS PRN
Status: CANCELLED | OUTPATIENT
Start: 2024-09-12

## 2024-09-12 RX ORDER — CIPROFLOXACIN HYDROCHLORIDE 3.5 MG/ML
1 SOLUTION/ DROPS TOPICAL SEE ADMIN INSTRUCTIONS
Status: DISCONTINUED | OUTPATIENT
Start: 2024-09-12 | End: 2024-09-12 | Stop reason: HOSPADM

## 2024-09-12 RX ORDER — OFLOXACIN 3 MG/ML
SOLUTION/ DROPS OPHTHALMIC
Status: COMPLETED | OUTPATIENT
Start: 2024-09-12 | End: 2024-09-12

## 2024-09-12 RX ORDER — SODIUM CHLORIDE 0.9 % (FLUSH) 0.9 %
5-40 SYRINGE (ML) INJECTION EVERY 12 HOURS SCHEDULED
Status: CANCELLED | OUTPATIENT
Start: 2024-09-12

## 2024-09-12 RX ORDER — BRIMONIDINE TARTRATE 2 MG/ML
SOLUTION/ DROPS OPHTHALMIC
Status: COMPLETED | OUTPATIENT
Start: 2024-09-12 | End: 2024-09-12

## 2024-09-12 RX ORDER — SODIUM CHLORIDE 0.9 % (FLUSH) 0.9 %
5-40 SYRINGE (ML) INJECTION PRN
Status: CANCELLED | OUTPATIENT
Start: 2024-09-12

## 2024-09-12 RX ORDER — BALANCED SALT SOLUTION ENRICHED WITH BICARBONATE, DEXTROSE, AND GLUTATHIONE
KIT INTRAOCULAR
Status: COMPLETED | OUTPATIENT
Start: 2024-09-12 | End: 2024-09-12

## 2024-09-12 RX ORDER — IPRATROPIUM BROMIDE AND ALBUTEROL SULFATE 2.5; .5 MG/3ML; MG/3ML
1 SOLUTION RESPIRATORY (INHALATION)
Status: DISCONTINUED | OUTPATIENT
Start: 2024-09-12 | End: 2024-09-12 | Stop reason: HOSPADM

## 2024-09-12 RX ORDER — SODIUM CHLORIDE 0.9 % (FLUSH) 0.9 %
5-40 SYRINGE (ML) INJECTION EVERY 12 HOURS SCHEDULED
Status: DISCONTINUED | OUTPATIENT
Start: 2024-09-12 | End: 2024-09-12 | Stop reason: HOSPADM

## 2024-09-12 RX ORDER — SODIUM CHLORIDE 9 MG/ML
INJECTION, SOLUTION INTRAVENOUS PRN
Status: CANCELLED | OUTPATIENT
Start: 2024-09-12

## 2024-09-12 RX ORDER — DIPHENHYDRAMINE HYDROCHLORIDE 50 MG/ML
12.5 INJECTION INTRAMUSCULAR; INTRAVENOUS
Status: CANCELLED | OUTPATIENT
Start: 2024-09-12 | End: 2024-09-13

## 2024-09-12 RX ORDER — LIDOCAINE HYDROCHLORIDE 10 MG/ML
INJECTION, SOLUTION EPIDURAL; INFILTRATION; INTRACAUDAL; PERINEURAL
Status: COMPLETED | OUTPATIENT
Start: 2024-09-12 | End: 2024-09-12

## 2024-09-12 RX ORDER — KETOROLAC TROMETHAMINE 5 MG/ML
1 SOLUTION OPHTHALMIC SEE ADMIN INSTRUCTIONS
Status: COMPLETED | OUTPATIENT
Start: 2024-09-12 | End: 2024-09-12

## 2024-09-12 RX ORDER — TROPICAMIDE 10 MG/ML
1 SOLUTION/ DROPS OPHTHALMIC SEE ADMIN INSTRUCTIONS
Status: DISCONTINUED | OUTPATIENT
Start: 2024-09-12 | End: 2024-09-12 | Stop reason: HOSPADM

## 2024-09-12 RX ADMIN — LABETALOL HYDROCHLORIDE 5 MG: 5 INJECTION, SOLUTION INTRAVENOUS at 14:27

## 2024-09-12 RX ADMIN — MIDAZOLAM 2 MG: 1 INJECTION INTRAMUSCULAR; INTRAVENOUS at 14:19

## 2024-09-12 RX ADMIN — SODIUM CHLORIDE: 9 INJECTION, SOLUTION INTRAVENOUS at 12:21

## 2024-09-12 RX ADMIN — KETOROLAC TROMETHAMINE 1 DROP: 0.5 SOLUTION OPHTHALMIC at 12:15

## 2024-09-12 RX ADMIN — TROPICAMIDE 1 DROP: 10 SOLUTION/ DROPS OPHTHALMIC at 12:15

## 2024-09-12 RX ADMIN — PHENYLEPHRINE HYDROCHLORIDE 1 DROP: 25 SOLUTION/ DROPS OPHTHALMIC at 12:25

## 2024-09-12 RX ADMIN — CIPROFLOXACIN 1 DROP: 3 SOLUTION OPHTHALMIC at 12:15

## 2024-09-12 RX ADMIN — PHENYLEPHRINE HYDROCHLORIDE 1 DROP: 25 SOLUTION/ DROPS OPHTHALMIC at 12:15

## 2024-09-12 RX ADMIN — TETRACAINE HYDROCHLORIDE 1 DROP: 5 SOLUTION OPHTHALMIC at 12:25

## 2024-09-12 RX ADMIN — TETRACAINE HYDROCHLORIDE 1 DROP: 5 SOLUTION OPHTHALMIC at 12:15

## 2024-09-12 RX ADMIN — TROPICAMIDE 1 DROP: 10 SOLUTION/ DROPS OPHTHALMIC at 12:25

## 2024-09-12 ASSESSMENT — ENCOUNTER SYMPTOMS
DYSPNEA ACTIVITY LEVEL: NO INTERVAL CHANGE
SHORTNESS OF BREATH: 1

## 2024-09-12 ASSESSMENT — PAIN - FUNCTIONAL ASSESSMENT
PAIN_FUNCTIONAL_ASSESSMENT: 0-10

## 2024-09-12 ASSESSMENT — LIFESTYLE VARIABLES: SMOKING_STATUS: 0

## 2024-09-25 DIAGNOSIS — Z79.4 TYPE 2 DIABETES MELLITUS WITH DIABETIC CATARACT, WITH LONG-TERM CURRENT USE OF INSULIN (HCC): ICD-10-CM

## 2024-09-25 DIAGNOSIS — E11.36 TYPE 2 DIABETES MELLITUS WITH DIABETIC CATARACT, WITH LONG-TERM CURRENT USE OF INSULIN (HCC): ICD-10-CM

## 2024-09-25 RX ORDER — INSULIN ASPART INJECTION 100 [IU]/ML
INJECTION, SOLUTION SUBCUTANEOUS
Qty: 15 ML | Refills: 3 | Status: SHIPPED | OUTPATIENT
Start: 2024-09-25

## 2024-09-30 RX ORDER — CHOLECALCIFEROL (VITAMIN D3) 25 MCG
1 TABLET ORAL DAILY
Qty: 90 TABLET | Refills: 0 | Status: SHIPPED | OUTPATIENT
Start: 2024-09-30

## 2024-09-30 NOTE — TELEPHONE ENCOUNTER
Last OV: 09/06/2024 PRE-OP     Next OV: With Primary Care (Bijal Jay, APRN - CNP) 10/11/2024 at 9:00 AM

## 2024-10-03 RX ORDER — TROSPIUM CHLORIDE 20 MG/1
20 TABLET, FILM COATED ORAL 2 TIMES DAILY
Qty: 180 TABLET | Refills: 0 | Status: SHIPPED | OUTPATIENT
Start: 2024-10-03

## 2024-10-04 DIAGNOSIS — M79.2 NEUROPATHIC PAIN: ICD-10-CM

## 2024-10-04 RX ORDER — GABAPENTIN 100 MG/1
100 CAPSULE ORAL DAILY
Qty: 90 CAPSULE | Refills: 1 | Status: SHIPPED | OUTPATIENT
Start: 2024-10-04 | End: 2025-01-02

## 2024-10-04 NOTE — TELEPHONE ENCOUNTER
Last OV: 09/06/2024 PRE-OP     Next OV: With Primary Care (Bijal Jay, APRN - CNP)  10/18/2024 at 11:00 AM

## 2024-10-14 ENCOUNTER — OFFICE VISIT (OUTPATIENT)
Dept: SURGERY | Age: 69
End: 2024-10-14

## 2024-10-14 VITALS — HEIGHT: 60 IN | WEIGHT: 180 LBS | BODY MASS INDEX: 35.34 KG/M2

## 2024-10-14 DIAGNOSIS — K43.6 INCARCERATED VENTRAL HERNIA: Primary | ICD-10-CM

## 2024-10-14 PROCEDURE — NBSRV NON-BILLABLE SERVICE: Performed by: SURGERY

## 2024-10-15 NOTE — PROGRESS NOTES
Melody Clement (:  1955) is a 69 y.o. female,Established patient, here for evaluation of the following chief complaint(s):  Post-Op Check (Knot above surgery site)         Assessment & Plan  Incarcerated ventral hernia     Follow up with me as needed                    Subjective   HPI  Doing well overall from hernia repair. No pain. Still with some swelling but not enough to aspirate. Incision is well healed. Follow up with me as needed    Review of Systems       Objective   Physical Exam             An electronic signature was used to authenticate this note.    --SARBJIT SCHMIDT MD

## 2024-10-23 DIAGNOSIS — Z79.4 TYPE 2 DIABETES MELLITUS WITH DIABETIC CATARACT, WITH LONG-TERM CURRENT USE OF INSULIN (HCC): ICD-10-CM

## 2024-10-23 DIAGNOSIS — E11.36 TYPE 2 DIABETES MELLITUS WITH DIABETIC CATARACT, WITH LONG-TERM CURRENT USE OF INSULIN (HCC): ICD-10-CM

## 2024-10-23 RX ORDER — INSULIN ASPART INJECTION 100 [IU]/ML
INJECTION, SOLUTION SUBCUTANEOUS
Qty: 15 ML | Refills: 3 | Status: SHIPPED | OUTPATIENT
Start: 2024-10-23

## 2024-10-23 NOTE — TELEPHONE ENCOUNTER
Last OV: 09/06/2024     Next OV: With Primary Care (Bijal Jay, APRN - CNP)  11/15/2024 at 11:20 AM   
[facility]

## 2024-10-24 RX ORDER — TROSPIUM CHLORIDE 20 MG/1
20 TABLET, FILM COATED ORAL 2 TIMES DAILY
Qty: 180 TABLET | Refills: 0 | Status: SHIPPED | OUTPATIENT
Start: 2024-10-24

## 2024-10-31 DIAGNOSIS — E11.36 TYPE 2 DIABETES MELLITUS WITH DIABETIC CATARACT, WITH LONG-TERM CURRENT USE OF INSULIN (HCC): ICD-10-CM

## 2024-10-31 DIAGNOSIS — Z79.4 TYPE 2 DIABETES MELLITUS WITH DIABETIC CATARACT, WITH LONG-TERM CURRENT USE OF INSULIN (HCC): ICD-10-CM

## 2024-10-31 RX ORDER — INSULIN ASPART INJECTION 100 [IU]/ML
INJECTION, SOLUTION SUBCUTANEOUS
Qty: 15 ML | Refills: 3 | OUTPATIENT
Start: 2024-10-31

## 2024-10-31 RX ORDER — TROSPIUM CHLORIDE 20 MG/1
20 TABLET, FILM COATED ORAL 2 TIMES DAILY
Qty: 180 TABLET | Refills: 0 | OUTPATIENT
Start: 2024-10-31

## 2024-10-31 NOTE — TELEPHONE ENCOUNTER
Xuan is out until 11/7      Medication:   Requested Prescriptions     Pending Prescriptions Disp Refills    Insulin Aspart, w/Niacinamide, (FIASP FLEXTOUCH) 100 UNIT/ML SOPN 15 mL 3     Sig: INJECT 25 UNITS INTO THE SKIN THREE TIMES DAILY WITH MEALS. MAY ALSO INJECT 5 UNITS WITH SNACKS    trospium (SANCTURA) 20 MG tablet 180 tablet 0     Sig: Take 1 tablet by mouth 2 times daily        Last Filled:      Patient Phone Number: [phone] (home)     Last appt: 9/6/2024   Next appt: 11/15/2024    Last OARRS:        No data to display

## 2024-10-31 NOTE — TELEPHONE ENCOUNTER
These were sent in 1 week ago.  Please contact patient to verify she needs refills.  Repend if needed.

## 2024-11-04 RX ORDER — VIBEGRON 75 MG/1
75 TABLET, FILM COATED ORAL DAILY
Qty: 90 TABLET | Refills: 0 | Status: SHIPPED | OUTPATIENT
Start: 2024-11-04

## 2024-11-04 RX ORDER — SERTRALINE HYDROCHLORIDE 100 MG/1
100 TABLET, FILM COATED ORAL DAILY
Qty: 90 TABLET | Refills: 0 | Status: SHIPPED | OUTPATIENT
Start: 2024-11-04

## 2024-11-04 NOTE — TELEPHONE ENCOUNTER
Last OV: 09/06/2024 Pre-Op Exam    Next OV: With Primary Care (Bijal Jay, APRN - CNP)  11/15/2024 at 11:20 AM

## 2024-11-13 RX ORDER — MONTELUKAST SODIUM 10 MG/1
TABLET ORAL
Qty: 90 TABLET | OUTPATIENT
Start: 2024-11-13

## 2024-11-15 ENCOUNTER — OFFICE VISIT (OUTPATIENT)
Dept: PRIMARY CARE CLINIC | Age: 69
End: 2024-11-15

## 2024-11-15 VITALS
WEIGHT: 184 LBS | HEIGHT: 60 IN | OXYGEN SATURATION: 95 % | SYSTOLIC BLOOD PRESSURE: 122 MMHG | TEMPERATURE: 98.1 F | HEART RATE: 78 BPM | DIASTOLIC BLOOD PRESSURE: 62 MMHG | BODY MASS INDEX: 36.12 KG/M2

## 2024-11-15 DIAGNOSIS — Z12.11 SCREEN FOR COLON CANCER: ICD-10-CM

## 2024-11-15 DIAGNOSIS — I10 ESSENTIAL HYPERTENSION: Primary | ICD-10-CM

## 2024-11-15 DIAGNOSIS — F32.A ANXIETY AND DEPRESSION: ICD-10-CM

## 2024-11-15 DIAGNOSIS — F41.9 ANXIETY AND DEPRESSION: ICD-10-CM

## 2024-11-15 DIAGNOSIS — Z79.4 INSULIN DEPENDENT TYPE 2 DIABETES MELLITUS (HCC): ICD-10-CM

## 2024-11-15 DIAGNOSIS — Z23 NEED FOR INFLUENZA VACCINATION: ICD-10-CM

## 2024-11-15 DIAGNOSIS — E78.1 HYPERTRIGLYCERIDEMIA: ICD-10-CM

## 2024-11-15 DIAGNOSIS — E11.9 INSULIN DEPENDENT TYPE 2 DIABETES MELLITUS (HCC): ICD-10-CM

## 2024-11-15 DIAGNOSIS — R05.1 ACUTE COUGH: ICD-10-CM

## 2024-11-15 DIAGNOSIS — E78.00 PURE HYPERCHOLESTEROLEMIA: ICD-10-CM

## 2024-11-15 DIAGNOSIS — R06.2 WHEEZING: ICD-10-CM

## 2024-11-15 LAB — HBA1C MFR BLD: 6.7 %

## 2024-11-15 RX ORDER — OMEGA-3-ACID ETHYL ESTERS 1 G/1
2 CAPSULE, LIQUID FILLED ORAL 2 TIMES DAILY
Qty: 360 CAPSULE | Refills: 1 | Status: SHIPPED | OUTPATIENT
Start: 2024-11-15

## 2024-11-15 RX ORDER — INSULIN GLARGINE 100 [IU]/ML
70 INJECTION, SOLUTION SUBCUTANEOUS NIGHTLY
Qty: 10 ADJUSTABLE DOSE PRE-FILLED PEN SYRINGE | Refills: 3 | Status: SHIPPED | OUTPATIENT
Start: 2024-11-15

## 2024-11-15 RX ORDER — METOPROLOL TARTRATE 25 MG/1
25 TABLET, FILM COATED ORAL 2 TIMES DAILY
Qty: 60 TABLET | Refills: 3 | Status: SHIPPED | OUTPATIENT
Start: 2024-11-15

## 2024-11-15 RX ORDER — PANTOPRAZOLE SODIUM 40 MG/1
40 TABLET, DELAYED RELEASE ORAL DAILY
Qty: 90 TABLET | Refills: 0 | Status: SHIPPED | OUTPATIENT
Start: 2024-11-15

## 2024-11-15 RX ORDER — PEN NEEDLE, DIABETIC 32GX 5/32"
1 NEEDLE, DISPOSABLE MISCELLANEOUS
Qty: 100 EACH | Refills: 5 | Status: SHIPPED | OUTPATIENT
Start: 2024-11-15

## 2024-11-15 RX ORDER — PREDNISONE 10 MG/1
10 TABLET ORAL DAILY
Qty: 5 TABLET | Refills: 0 | Status: SHIPPED | OUTPATIENT
Start: 2024-11-15 | End: 2024-11-20

## 2024-11-15 RX ORDER — CHOLECALCIFEROL (VITAMIN D3) 25 MCG
1 TABLET ORAL DAILY
Qty: 90 TABLET | Refills: 0 | Status: SHIPPED | OUTPATIENT
Start: 2024-11-15

## 2024-11-15 RX ORDER — ALBUTEROL SULFATE 90 UG/1
2 INHALANT RESPIRATORY (INHALATION) EVERY 6 HOURS PRN
Qty: 18 G | Refills: 2 | Status: SHIPPED | OUTPATIENT
Start: 2024-11-15

## 2024-11-15 RX ORDER — FEXOFENADINE HCL 180 MG/1
180 TABLET ORAL DAILY
Qty: 90 TABLET | Refills: 0 | Status: SHIPPED | OUTPATIENT
Start: 2024-11-15

## 2024-11-15 SDOH — ECONOMIC STABILITY: FOOD INSECURITY: WITHIN THE PAST 12 MONTHS, THE FOOD YOU BOUGHT JUST DIDN'T LAST AND YOU DIDN'T HAVE MONEY TO GET MORE.: NEVER TRUE

## 2024-11-15 SDOH — ECONOMIC STABILITY: INCOME INSECURITY: HOW HARD IS IT FOR YOU TO PAY FOR THE VERY BASICS LIKE FOOD, HOUSING, MEDICAL CARE, AND HEATING?: NOT VERY HARD

## 2024-11-15 SDOH — ECONOMIC STABILITY: FOOD INSECURITY: WITHIN THE PAST 12 MONTHS, YOU WORRIED THAT YOUR FOOD WOULD RUN OUT BEFORE YOU GOT MONEY TO BUY MORE.: NEVER TRUE

## 2024-11-15 ASSESSMENT — PATIENT HEALTH QUESTIONNAIRE - PHQ9
3. TROUBLE FALLING OR STAYING ASLEEP: NOT AT ALL
SUM OF ALL RESPONSES TO PHQ QUESTIONS 1-9: 5
6. FEELING BAD ABOUT YOURSELF - OR THAT YOU ARE A FAILURE OR HAVE LET YOURSELF OR YOUR FAMILY DOWN: NOT AT ALL
5. POOR APPETITE OR OVEREATING: SEVERAL DAYS
SUM OF ALL RESPONSES TO PHQ QUESTIONS 1-9: 5
10. IF YOU CHECKED OFF ANY PROBLEMS, HOW DIFFICULT HAVE THESE PROBLEMS MADE IT FOR YOU TO DO YOUR WORK, TAKE CARE OF THINGS AT HOME, OR GET ALONG WITH OTHER PEOPLE: NOT DIFFICULT AT ALL
2. FEELING DOWN, DEPRESSED OR HOPELESS: SEVERAL DAYS
4. FEELING TIRED OR HAVING LITTLE ENERGY: NEARLY EVERY DAY
9. THOUGHTS THAT YOU WOULD BE BETTER OFF DEAD, OR OF HURTING YOURSELF: NOT AT ALL
7. TROUBLE CONCENTRATING ON THINGS, SUCH AS READING THE NEWSPAPER OR WATCHING TELEVISION: NOT AT ALL
8. MOVING OR SPEAKING SO SLOWLY THAT OTHER PEOPLE COULD HAVE NOTICED. OR THE OPPOSITE, BEING SO FIGETY OR RESTLESS THAT YOU HAVE BEEN MOVING AROUND A LOT MORE THAN USUAL: NOT AT ALL

## 2024-11-15 ASSESSMENT — ANXIETY QUESTIONNAIRES
1. FEELING NERVOUS, ANXIOUS, OR ON EDGE: SEVERAL DAYS
4. TROUBLE RELAXING: NOT AT ALL
IF YOU CHECKED OFF ANY PROBLEMS ON THIS QUESTIONNAIRE, HOW DIFFICULT HAVE THESE PROBLEMS MADE IT FOR YOU TO DO YOUR WORK, TAKE CARE OF THINGS AT HOME, OR GET ALONG WITH OTHER PEOPLE: NOT DIFFICULT AT ALL
2. NOT BEING ABLE TO STOP OR CONTROL WORRYING: NOT AT ALL
7. FEELING AFRAID AS IF SOMETHING AWFUL MIGHT HAPPEN: NOT AT ALL
6. BECOMING EASILY ANNOYED OR IRRITABLE: NOT AT ALL
5. BEING SO RESTLESS THAT IT IS HARD TO SIT STILL: NOT AT ALL
GAD7 TOTAL SCORE: 2
3. WORRYING TOO MUCH ABOUT DIFFERENT THINGS: SEVERAL DAYS

## 2024-11-15 ASSESSMENT — ENCOUNTER SYMPTOMS
GASTROINTESTINAL NEGATIVE: 1
COUGH: 1
SHORTNESS OF BREATH: 1
WHEEZING: 1
SORE THROAT: 0
ALLERGIC/IMMUNOLOGIC NEGATIVE: 1
EYES NEGATIVE: 1

## 2024-11-15 NOTE — ASSESSMENT & PLAN NOTE
Orders:    POCT glycosylated hemoglobin (Hb A1C)    BD PEN NEEDLE PAT 2ND GEN 32G X 4 MM MISC; Inject 1 each as directed 4 times daily (before meals and nightly)    insulin glargine (LANTUS SOLOSTAR) 100 UNIT/ML injection pen; Inject 70 Units into the skin nightly

## 2024-11-15 NOTE — PROGRESS NOTES
Immunizations Administered       Name Date Dose Route    Influenza, FLUAD, (age 65 y+), IM, Trivalent PF, 0.5mL 11/15/2024 0.5 mL Intramuscular    Site: Deltoid- Left    Lot: [id number]    NDC: 33152-485-70            
every 6 hours as needed for Wheezing    Hypertriglyceridemia  Orders:    omega-3 acid ethyl esters (LOVAZA) 1 g capsule; Take 2 capsules by mouth 2 times daily    Need for influenza vaccination       Orders:    Influenza, FLUAD Trivalent, (age 65 y+), IM, Preservative Free, 0.5mL    Screen for colon cancer       Orders:    Fecal DNA Colorectal cancer screening (Cologuard)    - POCT A1C: 6.7; Continue current medication regimen.   - Continue following with specialists.   - Refills sent to pharmacy.  - Low dose prednisone sent to pharmacy to help with cough.  Can stop after 3 days if symptoms improved. Instructed on use.   - Coricidin HBP recommended for cough suppressant.   - Order placed for Cologuard.  Encouraged to schedule mammogram as well.   - Patient education added to AVS.     Return in about 3 months (around 2/15/2025) for DM follow up or sooner if needed.     Subjective   Cough  This is a new problem. The current episode started in the past 7 days. The problem has been waxing and waning. The problem occurs every few minutes. The cough is Productive of sputum. Associated symptoms include shortness of breath and wheezing (perceived). Pertinent negatives include no chest pain, nasal congestion or sore throat. Nothing aggravates the symptoms. She has tried nothing for the symptoms.     Review of Systems   Constitutional: Negative.    HENT: Negative.  Negative for sore throat.    Eyes: Negative.    Respiratory:  Positive for cough, shortness of breath and wheezing (perceived).    Cardiovascular: Negative.  Negative for chest pain.   Gastrointestinal: Negative.    Endocrine: Negative.    Genitourinary: Negative.    Musculoskeletal: Negative.    Skin: Negative.    Allergic/Immunologic: Negative.    Neurological: Negative.    Hematological: Negative.    Psychiatric/Behavioral: Negative.        Objective   Physical Exam  Vitals and nursing note reviewed.   Constitutional:       Appearance: Normal appearance.   HENT:

## 2024-11-15 NOTE — PATIENT INSTRUCTIONS
Coricidin HBP for cough    Call [phone] to schedule with EP regarding shortness of breath with exertion, possibly due to afib.

## 2024-11-25 RX ORDER — PANTOPRAZOLE SODIUM 40 MG/1
40 TABLET, DELAYED RELEASE ORAL DAILY
Qty: 90 TABLET | Refills: 0 | OUTPATIENT
Start: 2024-11-25

## 2024-12-04 ENCOUNTER — PATIENT MESSAGE (OUTPATIENT)
Dept: PRIMARY CARE CLINIC | Age: 69
End: 2024-12-04

## 2024-12-04 RX ORDER — MONTELUKAST SODIUM 10 MG/1
10 TABLET ORAL NIGHTLY
Qty: 90 TABLET | Refills: 1 | Status: SHIPPED | OUTPATIENT
Start: 2024-12-04

## 2024-12-04 NOTE — TELEPHONE ENCOUNTER
Last OV: 11/15/2024 Return in about 3 months (around 2/15/2025) for DM follow up or sooner if needed.     Next OV: None scheduled.

## 2024-12-27 RX ORDER — CHOLECALCIFEROL (VITAMIN D3) 25 MCG
1 TABLET ORAL DAILY
Qty: 90 TABLET | Refills: 0 | OUTPATIENT
Start: 2024-12-27

## 2024-12-27 NOTE — TELEPHONE ENCOUNTER
Medication:   Requested Prescriptions     Pending Prescriptions Disp Refills    vitamin D3 (CHOLECALCIFEROL) 25 MCG (1000 UT) TABS tablet [Pharmacy Med Name: VITAMIN D3 1,000 UNIT TABLETS] 90 tablet 0     Sig: TAKE 1 TABLET BY MOUTH DAILY        Last Filled:      Patient Phone Number: [phone] (home)     Last appt: 11/15/2024   Next appt: Visit date not found    Last OARRS:        No data to display

## 2025-01-13 DIAGNOSIS — Z79.4 TYPE 2 DIABETES MELLITUS WITH DIABETIC CATARACT, WITH LONG-TERM CURRENT USE OF INSULIN (HCC): ICD-10-CM

## 2025-01-13 DIAGNOSIS — E11.36 TYPE 2 DIABETES MELLITUS WITH DIABETIC CATARACT, WITH LONG-TERM CURRENT USE OF INSULIN (HCC): ICD-10-CM

## 2025-01-13 RX ORDER — INSULIN ASPART INJECTION 100 [IU]/ML
INJECTION, SOLUTION SUBCUTANEOUS
Qty: 15 ML | Refills: 3 | Status: SHIPPED | OUTPATIENT
Start: 2025-01-13 | End: 2025-01-14 | Stop reason: ALTCHOICE

## 2025-01-14 DIAGNOSIS — E11.9 INSULIN DEPENDENT TYPE 2 DIABETES MELLITUS (HCC): Primary | ICD-10-CM

## 2025-01-14 DIAGNOSIS — Z79.4 INSULIN DEPENDENT TYPE 2 DIABETES MELLITUS (HCC): Primary | ICD-10-CM

## 2025-01-14 RX ORDER — INSULIN ASPART 100 [IU]/ML
INJECTION, SOLUTION INTRAVENOUS; SUBCUTANEOUS
Qty: 5 ADJUSTABLE DOSE PRE-FILLED PEN SYRINGE | Refills: 3 | Status: SHIPPED | OUTPATIENT
Start: 2025-01-14

## 2025-01-20 DIAGNOSIS — M79.2 NEUROPATHIC PAIN: ICD-10-CM

## 2025-01-20 DIAGNOSIS — Z79.4 INSULIN DEPENDENT TYPE 2 DIABETES MELLITUS (HCC): ICD-10-CM

## 2025-01-20 DIAGNOSIS — E11.9 INSULIN DEPENDENT TYPE 2 DIABETES MELLITUS (HCC): ICD-10-CM

## 2025-01-20 RX ORDER — PEN NEEDLE, DIABETIC 32GX 5/32"
1 NEEDLE, DISPOSABLE MISCELLANEOUS
Qty: 100 EACH | Refills: 5 | Status: SHIPPED | OUTPATIENT
Start: 2025-01-20

## 2025-01-20 RX ORDER — GABAPENTIN 100 MG/1
100 CAPSULE ORAL DAILY
Qty: 90 CAPSULE | Refills: 0 | Status: SHIPPED | OUTPATIENT
Start: 2025-01-20 | End: 2025-04-20

## 2025-01-20 NOTE — TELEPHONE ENCOUNTER
Last filled on 10/4/24 for the gabapentin   Last seen on 11/15/24    eturn in about 3 months (around 2/15/2025) for DM follow up or sooner if needed.

## 2025-01-20 NOTE — TELEPHONE ENCOUNTER
PDMP Reviewed. Medication sent to pharmacy.     PDMP Monitoring:    Last PDMP Elias as Reviewed:  Review User Review Instant Review Result   ANDREW RODAS 1/20/2025  2:16 PM Reviewed PDMP [1]     [unfilled]  Urine Drug Screenings (1 yr)    No resulted procedures found.       Medication Contract and Consent for Opioid Use Documents Filed        No documents found                  Electronically signed by SRIDHAR Vaz CNP on 1/20/2025 at 2:17 PM

## 2025-01-28 DIAGNOSIS — R06.2 WHEEZING: ICD-10-CM

## 2025-01-28 RX ORDER — SERTRALINE HYDROCHLORIDE 100 MG/1
100 TABLET, FILM COATED ORAL DAILY
Qty: 90 TABLET | Refills: 0 | Status: SHIPPED | OUTPATIENT
Start: 2025-01-28

## 2025-01-28 RX ORDER — LISINOPRIL AND HYDROCHLOROTHIAZIDE 12.5; 2 MG/1; MG/1
1 TABLET ORAL DAILY
Qty: 90 TABLET | Refills: 1 | Status: SHIPPED | OUTPATIENT
Start: 2025-01-28

## 2025-01-28 RX ORDER — VIBEGRON 75 MG/1
75 TABLET, FILM COATED ORAL DAILY
Qty: 90 TABLET | Refills: 0 | Status: SHIPPED | OUTPATIENT
Start: 2025-01-28

## 2025-01-28 NOTE — TELEPHONE ENCOUNTER
Return in about 3 months (around 2/15/2025) for DM follow up or sooner if needed.    Medication:   Requested Prescriptions     Pending Prescriptions Disp Refills    GEMTESA 75 MG TABS tablet [Pharmacy Med Name: GEMTESA 75MG TABLETS] 90 tablet 0     Sig: TAKE 1 TABLET BY MOUTH DAILY    sertraline (ZOLOFT) 100 MG tablet [Pharmacy Med Name: SERTRALINE 100MG TABLETS] 90 tablet 0     Sig: TAKE 1 TABLET BY MOUTH DAILY        Last Filled:      Patient Phone Number: [phone] (home)     Last appt: 11/15/2024   Next appt: Visit date not found    Last OARRS:        No data to display

## 2025-02-21 RX ORDER — PANTOPRAZOLE SODIUM 40 MG/1
40 TABLET, DELAYED RELEASE ORAL DAILY
Qty: 90 TABLET | Refills: 0 | Status: SHIPPED | OUTPATIENT
Start: 2025-02-21

## 2025-02-21 RX ORDER — FEXOFENADINE HYDROCHLORIDE 180 MG/1
180 TABLET, FILM COATED ORAL DAILY
Qty: 90 TABLET | Refills: 0 | Status: SHIPPED | OUTPATIENT
Start: 2025-02-21

## 2025-03-05 ENCOUNTER — PATIENT MESSAGE (OUTPATIENT)
Dept: PRIMARY CARE CLINIC | Age: 70
End: 2025-03-05

## 2025-03-05 RX ORDER — CYCLOBENZAPRINE HCL 10 MG
10 TABLET ORAL NIGHTLY PRN
Qty: 90 TABLET | Refills: 0 | Status: SHIPPED | OUTPATIENT
Start: 2025-03-05

## 2025-03-05 RX ORDER — CYCLOBENZAPRINE HCL 10 MG
10 TABLET ORAL NIGHTLY PRN
COMMUNITY
Start: 2025-01-08 | End: 2025-03-05 | Stop reason: SDUPTHER

## 2025-03-05 RX ORDER — FEXOFENADINE HCL 180 MG/1
180 TABLET ORAL DAILY
Qty: 90 TABLET | Refills: 0 | OUTPATIENT
Start: 2025-03-05

## 2025-03-05 NOTE — TELEPHONE ENCOUNTER
Last OV: 11/15/2024 Return in about 3 months (around 2/15/2025) for DM follow up or sooner if needed.     Next OV: With Primary Care (Bijal Jay, SRIDHAR - CNP)  03/28/2025 at 11:40 AM

## 2025-03-13 ENCOUNTER — PATIENT MESSAGE (OUTPATIENT)
Dept: PRIMARY CARE CLINIC | Age: 70
End: 2025-03-13

## 2025-03-13 DIAGNOSIS — Z79.4 INSULIN DEPENDENT TYPE 2 DIABETES MELLITUS (HCC): ICD-10-CM

## 2025-03-13 DIAGNOSIS — E11.9 INSULIN DEPENDENT TYPE 2 DIABETES MELLITUS (HCC): ICD-10-CM

## 2025-03-13 RX ORDER — INSULIN ASPART 100 [IU]/ML
INJECTION, SOLUTION INTRAVENOUS; SUBCUTANEOUS
Qty: 5 ADJUSTABLE DOSE PRE-FILLED PEN SYRINGE | Refills: 3 | Status: SHIPPED | OUTPATIENT
Start: 2025-03-13

## 2025-03-13 RX ORDER — INSULIN GLARGINE 100 [IU]/ML
70 INJECTION, SOLUTION SUBCUTANEOUS NIGHTLY
Qty: 10 ADJUSTABLE DOSE PRE-FILLED PEN SYRINGE | Refills: 3 | Status: SHIPPED | OUTPATIENT
Start: 2025-03-13

## 2025-03-13 RX ORDER — MONTELUKAST SODIUM 10 MG/1
10 TABLET ORAL NIGHTLY
Qty: 90 TABLET | Refills: 1 | Status: SHIPPED | OUTPATIENT
Start: 2025-03-13

## 2025-03-13 RX ORDER — PEN NEEDLE, DIABETIC 32GX 5/32"
1 NEEDLE, DISPOSABLE MISCELLANEOUS
Qty: 100 EACH | Refills: 5 | Status: SHIPPED | OUTPATIENT
Start: 2025-03-13

## 2025-03-13 RX ORDER — METOPROLOL TARTRATE 25 MG/1
25 TABLET, FILM COATED ORAL 2 TIMES DAILY
Qty: 60 TABLET | Refills: 3 | Status: SHIPPED | OUTPATIENT
Start: 2025-03-13

## 2025-03-14 ENCOUNTER — OFFICE VISIT (OUTPATIENT)
Dept: PRIMARY CARE CLINIC | Age: 70
End: 2025-03-14

## 2025-03-14 VITALS
WEIGHT: 176 LBS | OXYGEN SATURATION: 94 % | HEIGHT: 60 IN | DIASTOLIC BLOOD PRESSURE: 60 MMHG | BODY MASS INDEX: 34.55 KG/M2 | SYSTOLIC BLOOD PRESSURE: 108 MMHG | TEMPERATURE: 98.4 F | HEART RATE: 98 BPM

## 2025-03-14 DIAGNOSIS — E66.811 OBESITY (BMI 30.0-34.9): ICD-10-CM

## 2025-03-14 DIAGNOSIS — E11.9 INSULIN DEPENDENT TYPE 2 DIABETES MELLITUS (HCC): Primary | ICD-10-CM

## 2025-03-14 DIAGNOSIS — Z79.4 INSULIN DEPENDENT TYPE 2 DIABETES MELLITUS (HCC): Primary | ICD-10-CM

## 2025-03-14 DIAGNOSIS — M79.651 RIGHT THIGH PAIN: ICD-10-CM

## 2025-03-14 DIAGNOSIS — I25.10 CAD IN NATIVE ARTERY: ICD-10-CM

## 2025-03-14 DIAGNOSIS — I10 ESSENTIAL HYPERTENSION: ICD-10-CM

## 2025-03-14 RX ORDER — PREDNISONE 20 MG/1
20 TABLET ORAL DAILY
Qty: 10 TABLET | Refills: 0 | Status: SHIPPED | OUTPATIENT
Start: 2025-03-14 | End: 2025-03-24

## 2025-03-14 ASSESSMENT — ENCOUNTER SYMPTOMS
ALLERGIC/IMMUNOLOGIC NEGATIVE: 1
EYES NEGATIVE: 1
GASTROINTESTINAL NEGATIVE: 1

## 2025-03-14 NOTE — PROGRESS NOTES
Melody Clement (:  1955) is a 69 y.o. female,Established patient, here for evaluation of the following chief complaint(s):  Groin Pain (Patient presents today with multi-week history of right groin pain. Right thigh is more swollen than the left. Denies redness. No injury she is aware of. Has left knee problems. )    Melody is here today with her daughter for concerns of right thigh pain. She had an MRI today ordered by Dr. Quintanilla in the [facility] for left leg/knee pain, results pending.  She has tried muscle relaxers and steroids for her left knee but it wasn't helpful so the MRI was ordered.      She has tried tylenol a few times for her pain but doesn't feel like it's been very helpful.     Is requesting to look for alternatives for Trulicity for her diabetes and weight.   Assessment & Plan  Right thigh pain  Orders:    predniSONE (DELTASONE) 20 MG tablet; Take 1 tablet by mouth daily for 10 days    Insulin dependent type 2 diabetes mellitus (HCC)  Orders:    Semaglutide,0.25 or 0.5MG/DOS, 2 MG/3ML SOPN; Inject 0.25 mg into the skin every 7 days    Essential hypertension  Orders:    Semaglutide,0.25 or 0.5MG/DOS, 2 MG/3ML SOPN; Inject 0.25 mg into the skin every 7 days    CAD in native artery  Orders:    Semaglutide,0.25 or 0.5MG/DOS, 2 MG/3ML SOPN; Inject 0.25 mg into the skin every 7 days    Obesity (BMI 30.0-34.9)  Orders:    Semaglutide,0.25 or 0.5MG/DOS, 2 MG/3ML SOPN; Inject 0.25 mg into the skin every 7 days    - Likely compensatory pain.  Will trial another round of steroids.   - Discussed that she can take an additional 5 units of Novolog with her meals if sugars are elevated.   - Provided with stretches, take tylenol as needed.   - Will try to send in Ozempic for diabetes.   - Patient education added to AVS.     Return in about 2 weeks (around 3/28/2025) for follow up.     Subjective   Leg Pain   The incident occurred more than 1 week ago. There was no injury mechanism. The pain is

## 2025-03-14 NOTE — ASSESSMENT & PLAN NOTE
Orders:    Semaglutide,0.25 or 0.5MG/DOS, 2 MG/3ML SOPN; Inject 0.25 mg into the skin every 7 days

## 2025-03-28 ENCOUNTER — OFFICE VISIT (OUTPATIENT)
Dept: PRIMARY CARE CLINIC | Age: 70
End: 2025-03-28
Payer: MEDICARE

## 2025-03-28 VITALS
OXYGEN SATURATION: 96 % | WEIGHT: 178.8 LBS | DIASTOLIC BLOOD PRESSURE: 62 MMHG | HEART RATE: 73 BPM | SYSTOLIC BLOOD PRESSURE: 118 MMHG | BODY MASS INDEX: 34.92 KG/M2

## 2025-03-28 DIAGNOSIS — E11.9 INSULIN DEPENDENT TYPE 2 DIABETES MELLITUS (HCC): ICD-10-CM

## 2025-03-28 DIAGNOSIS — F32.A ANXIETY AND DEPRESSION: ICD-10-CM

## 2025-03-28 DIAGNOSIS — Z79.4 INSULIN DEPENDENT TYPE 2 DIABETES MELLITUS (HCC): Primary | ICD-10-CM

## 2025-03-28 DIAGNOSIS — I10 ESSENTIAL HYPERTENSION: ICD-10-CM

## 2025-03-28 DIAGNOSIS — Z79.4 INSULIN DEPENDENT TYPE 2 DIABETES MELLITUS (HCC): ICD-10-CM

## 2025-03-28 DIAGNOSIS — L98.9 SKIN LESION: ICD-10-CM

## 2025-03-28 DIAGNOSIS — F41.9 ANXIETY AND DEPRESSION: ICD-10-CM

## 2025-03-28 DIAGNOSIS — I48.0 PAROXYSMAL ATRIAL FIBRILLATION (HCC): ICD-10-CM

## 2025-03-28 DIAGNOSIS — E11.9 INSULIN DEPENDENT TYPE 2 DIABETES MELLITUS (HCC): Primary | ICD-10-CM

## 2025-03-28 DIAGNOSIS — E78.5 HYPERLIPIDEMIA, UNSPECIFIED HYPERLIPIDEMIA TYPE: ICD-10-CM

## 2025-03-28 LAB
CREAT UR-MCNC: 127 MG/DL (ref 28–259)
EST. AVERAGE GLUCOSE BLD GHB EST-MCNC: 240.3 MG/DL
HBA1C MFR BLD: 10 %
MICROALBUMIN UR DL<=1MG/L-MCNC: <1.2 MG/DL
MICROALBUMIN/CREAT UR: NORMAL MG/G (ref 0–30)

## 2025-03-28 PROCEDURE — 99213 OFFICE O/P EST LOW 20 MIN: CPT

## 2025-03-28 PROCEDURE — 3078F DIAST BP <80 MM HG: CPT

## 2025-03-28 PROCEDURE — 1123F ACP DISCUSS/DSCN MKR DOCD: CPT

## 2025-03-28 PROCEDURE — 3074F SYST BP LT 130 MM HG: CPT

## 2025-03-28 ASSESSMENT — ENCOUNTER SYMPTOMS
ROS SKIN COMMENTS: SCALP LESION
RESPIRATORY NEGATIVE: 1
GASTROINTESTINAL NEGATIVE: 1
EYES NEGATIVE: 1
ALLERGIC/IMMUNOLOGIC NEGATIVE: 1

## 2025-03-28 NOTE — ASSESSMENT & PLAN NOTE
Chronic, at goal (stable), continue current treatment plan    Orders:    Albumin/Creatinine Ratio, Urine    Diabetic Foot Exam    Hemoglobin A1C; Future

## 2025-03-28 NOTE — PROGRESS NOTES
Melody Clement (:  1955) is a 70 y.o. female,Established patient, here for evaluation of the following chief complaint(s):  Follow-up, Diabetes, Hypertension, Anxiety, and Depression    Melody is here today with her daughter, Katie, for a follow up for DM, HTN, and Anxiety/Depression.     DM: Currently on Lantus 70 units HS, Novolog 25 units TID with meals (with additional 5 units for high sugars) and Semaglutide 0.25 mg.  The Semaglutide was started recently.  She had previously had pancreatitis related to the Dulaglutide, but was still interested in starting the Semaglutide.  Has not started this yet.      HTN/AFIB/HLD: Follows with Dr. Morris. Taking Lipitor 10 mg,  Fenofibrate 160 mg, Eliquis 5 mg BID, and Lovaza.  I prescribe her Metoprolol 25 mg BID and Prinzide. BP looks good in office today, medications going well. BP normal in office today.     Anxiety/Depression: Taking Zoloft 100 mg.  Doing well. No concerns.    Lesion on Scalp: Noticed it a few months ago. Mostly flesh colored, flakes off and returns.     DM FOOT EXAM:  Visual inspection:  Deformity/amputation: absent  Skin lesions/pre-ulcerative calluses: absent  Edema: right- negative, left- negative    Sensory exam:  Monofilament sensation: normal  (minimum of 5 random plantar locations tested, avoiding callused areas - > 1 area with absence of sensation is + for neuropathy)    Plus at least one of the following:  Pulses: normal,   Pinprick: Intact  Proprioception: N/A  Vibration (128 Hz): N/A  Assessment & Plan  Insulin dependent type 2 diabetes mellitus (HCC)   Chronic, at goal (stable), continue current treatment plan    Orders:    Albumin/Creatinine Ratio, Urine    Diabetic Foot Exam    Hemoglobin A1C; Future    Paroxysmal atrial fibrillation (HCC)   Monitored by specialist- no acute findings meriting change in the plan         Essential hypertension   Chronic, at goal (stable), continue current treatment plan         Hyperlipidemia,

## 2025-03-31 ENCOUNTER — RESULTS FOLLOW-UP (OUTPATIENT)
Dept: PRIMARY CARE CLINIC | Age: 70
End: 2025-03-31

## 2025-03-31 DIAGNOSIS — M81.0 OSTEOPOROSIS WITHOUT CURRENT PATHOLOGICAL FRACTURE, UNSPECIFIED OSTEOPOROSIS TYPE: Primary | ICD-10-CM

## 2025-03-31 DIAGNOSIS — N32.81 OVERACTIVE BLADDER: ICD-10-CM

## 2025-03-31 RX ORDER — MIRABEGRON 50 MG/1
50 TABLET, FILM COATED, EXTENDED RELEASE ORAL DAILY
Qty: 90 TABLET | Refills: 1 | Status: SHIPPED | OUTPATIENT
Start: 2025-03-31

## 2025-03-31 NOTE — PROGRESS NOTES
Change Gemtesa to Myrbetriq for insurance purposes.  Order placed for Dexa Scan, will determine need for Prolia when results are in.

## 2025-04-09 ENCOUNTER — HOSPITAL ENCOUNTER (OUTPATIENT)
Dept: PHYSICAL THERAPY | Age: 70
Setting detail: THERAPIES SERIES
Discharge: HOME | End: 2025-04-09
Payer: MEDICARE

## 2025-04-09 DIAGNOSIS — M79.605 LEG PAIN, LEFT: ICD-10-CM

## 2025-04-09 DIAGNOSIS — M79.604 LEG PAIN, RIGHT: ICD-10-CM

## 2025-04-09 DIAGNOSIS — Z74.09 IMPAIRED FUNCTIONAL MOBILITY AND ACTIVITY TOLERANCE: Primary | ICD-10-CM

## 2025-04-09 PROCEDURE — 97110 THERAPEUTIC EXERCISES: CPT

## 2025-04-09 PROCEDURE — 97161 PT EVAL LOW COMPLEX 20 MIN: CPT

## 2025-04-09 NOTE — PLAN OF CARE
visits, this note will serve as a discharge from care along with the most recent update on progress.    Ortho Evaluation

## 2025-04-14 DIAGNOSIS — E11.9 INSULIN DEPENDENT TYPE 2 DIABETES MELLITUS (HCC): ICD-10-CM

## 2025-04-14 DIAGNOSIS — Z79.4 INSULIN DEPENDENT TYPE 2 DIABETES MELLITUS (HCC): ICD-10-CM

## 2025-04-14 DIAGNOSIS — M79.2 NEUROPATHIC PAIN: ICD-10-CM

## 2025-04-14 DIAGNOSIS — E78.1 HYPERTRIGLYCERIDEMIA: ICD-10-CM

## 2025-04-14 RX ORDER — PANTOPRAZOLE SODIUM 40 MG/1
40 TABLET, DELAYED RELEASE ORAL DAILY
Qty: 90 TABLET | Refills: 0 | Status: SHIPPED | OUTPATIENT
Start: 2025-04-14

## 2025-04-14 RX ORDER — SERTRALINE HYDROCHLORIDE 100 MG/1
100 TABLET, FILM COATED ORAL DAILY
Qty: 90 TABLET | Refills: 0 | Status: SHIPPED | OUTPATIENT
Start: 2025-04-14

## 2025-04-14 RX ORDER — INSULIN ASPART 100 [IU]/ML
INJECTION, SOLUTION INTRAVENOUS; SUBCUTANEOUS
Qty: 5 ADJUSTABLE DOSE PRE-FILLED PEN SYRINGE | Refills: 3 | Status: SHIPPED | OUTPATIENT
Start: 2025-04-14

## 2025-04-14 RX ORDER — OMEGA-3-ACID ETHYL ESTERS 1 G/1
2 CAPSULE, LIQUID FILLED ORAL 2 TIMES DAILY
Qty: 360 CAPSULE | Refills: 1 | Status: SHIPPED | OUTPATIENT
Start: 2025-04-14

## 2025-04-14 RX ORDER — INSULIN GLARGINE 100 [IU]/ML
70 INJECTION, SOLUTION SUBCUTANEOUS NIGHTLY
Qty: 10 ADJUSTABLE DOSE PRE-FILLED PEN SYRINGE | Refills: 3 | Status: SHIPPED | OUTPATIENT
Start: 2025-04-14

## 2025-04-14 RX ORDER — CHOLECALCIFEROL (VITAMIN D3) 25 MCG
1 TABLET ORAL DAILY
Qty: 90 TABLET | Refills: 0 | Status: SHIPPED | OUTPATIENT
Start: 2025-04-14

## 2025-04-14 RX ORDER — GABAPENTIN 100 MG/1
100 CAPSULE ORAL DAILY
Qty: 90 CAPSULE | Refills: 0 | Status: SHIPPED | OUTPATIENT
Start: 2025-04-14 | End: 2025-07-13

## 2025-04-14 RX ORDER — LISINOPRIL AND HYDROCHLOROTHIAZIDE 12.5; 2 MG/1; MG/1
1 TABLET ORAL DAILY
Qty: 90 TABLET | Refills: 1 | Status: SHIPPED | OUTPATIENT
Start: 2025-04-14

## 2025-04-14 NOTE — TELEPHONE ENCOUNTER
4/16/25 next appointment      Medication:   Requested Prescriptions     Pending Prescriptions Disp Refills    sertraline (ZOLOFT) 100 MG tablet 90 tablet 0     Sig: Take 1 tablet by mouth daily        Last Filled:      Patient Phone Number: [phone] (home)     Last appt: Visit date not found   Next appt: Visit date not found    Last OARRS:        No data to display

## 2025-04-14 NOTE — TELEPHONE ENCOUNTER
Last OV: 03/28/2025 Return in about 3 months (around 6/28/2025) for DM follow up.     Next OV: None scheduled.

## 2025-04-16 ENCOUNTER — HOSPITAL ENCOUNTER (OUTPATIENT)
Dept: PHYSICAL THERAPY | Age: 70
Setting detail: THERAPIES SERIES
Discharge: HOME | End: 2025-04-16
Payer: MEDICARE

## 2025-04-16 PROCEDURE — 97112 NEUROMUSCULAR REEDUCATION: CPT

## 2025-04-16 PROCEDURE — 97140 MANUAL THERAPY 1/> REGIONS: CPT

## 2025-04-16 NOTE — FLOWSHEET NOTE
[facility] - Outpatient Rehabilitation and Therapy: [address] [place] [address] office: [phone] fax: [phone]         Physical Therapy: TREATMENT/PROGRESS NOTE   Patient: Melody Clement (70 y.o. female)   Examination Date: 2025   :  1955 MRN: [id number]   Visit #: 2   Insurance Allowable Auth Needed   tbd [x]Yes, after 12 visits   []No    Insurance: Payor: [employer] [place] MEDICARE / Plan: [employer] [place] MED[place]ARE / Product Type: *No Product type* /   Insurance ID: [id number] - (Medicare Managed)  Secondary Insurance (if applicable): [employer] BETTER [employer]*   Treatment Diagnosis:     ICD-10-CM       1. Impaired functional mobility and activity tolerance  Z74.09         2. Leg pain, left  M79.605         3. Leg pain, right  M79.604          Medical Diagnosis:  Iliotibial band syndrome of right side (M76.31)  Left hamstring tightness left (M62.89)   Referring Physician: Margo Villarreal AP*  PCP: Bijal Jay APRN - CNP     Plan of care signed (Y/N): N    Date of Patient follow up with Physician:      Plan of Care Report: NO  POC update due: (10 visits /OR AUTH LIMITS, whichever is less)  2025                                             Medical History:  Comorbidities:  CHF, stroke, HTN, DM, chronic kidney disease, heart murmur, TKA L and R  Relevant Medical History:  DM- takes insulin and medication before meals and Lantus before bed time (tested at 103 this morning), HLD, HTN- on meds, Depression- on meds, L TKA 1.5 years ago, OA, mild MI- in - no sx needed,  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 Pt and pt' s daughter deny history of stroke/TIA, afib or flutter or heart failure or ablation sx.                                             Precautions/ Contra-indications:           Latex allergy:  NO  Pacemaker:    NO  Contraindications for Manipulation: NA  Date of Surgery:

## 2025-04-18 ENCOUNTER — APPOINTMENT (OUTPATIENT)
Dept: PHYSICAL THERAPY | Age: 70
End: 2025-04-18
Payer: COMMERCIAL

## 2025-04-21 RX ORDER — TROSPIUM CHLORIDE 20 MG/1
20 TABLET, FILM COATED ORAL 2 TIMES DAILY
Qty: 180 TABLET | Refills: 0 | Status: SHIPPED | OUTPATIENT
Start: 2025-04-21 | End: 2025-06-20 | Stop reason: SDUPTHER

## 2025-04-23 ENCOUNTER — HOSPITAL ENCOUNTER (OUTPATIENT)
Dept: PHYSICAL THERAPY | Age: 70
Setting detail: THERAPIES SERIES
Discharge: HOME | End: 2025-04-23
Payer: MEDICARE

## 2025-04-23 DIAGNOSIS — M79.2 NEUROPATHIC PAIN: ICD-10-CM

## 2025-04-23 PROCEDURE — 97112 NEUROMUSCULAR REEDUCATION: CPT

## 2025-04-23 PROCEDURE — 97140 MANUAL THERAPY 1/> REGIONS: CPT

## 2025-04-23 RX ORDER — GABAPENTIN 100 MG/1
100 CAPSULE ORAL DAILY
Qty: 90 CAPSULE | Refills: 0 | OUTPATIENT
Start: 2025-04-23

## 2025-04-23 NOTE — FLOWSHEET NOTE
[facility] - Outpatient Rehabilitation and Therapy: [address] [place] [address] office: [phone] fax: [phone]         Physical Therapy: TREATMENT/PROGRESS NOTE   Patient: Melody Clement (70 y.o. female)   Examination Date: 2025   :  1955 MRN: [id number]   Visit #: 3   Insurance Allowable Auth Needed   tbd [x]Yes, after 12 visits   []No    Insurance: Payor: [employer] [place] / Plan: [employer] [place] MEDICARE / Product Type: *No Product type* /   Insurance ID: [id number] - (Medicare Managed)  Secondary Insurance (if applicable): [employer]*   Treatment Diagnosis:     ICD-10-CM       1. Impaired functional mobility and activity tolerance  Z74.09         2. Leg pain, left  M79.605         3. Leg pain, right  M79.604          Medical Diagnosis:  Iliotibial band syndrome of right side (M76.31)  Left hamstring tightness left (M62.89)   Referring Physician: Margo Villarreal AP*  PCP: Bijal Jay, SRIDHAR - CNP     Plan of care signed (Y/N): N    Date of Patient follow up with Physician:      Plan of Care Report: NO  POC update due: (10 visits /OR AUTH LIMITS, whichever is less)  2025                                             Medical History:  Comorbidities:  CHF, stroke, HTN, DM, chronic kidney disease, heart murmur, TKA L and R  Relevant Medical History:  DM- takes insulin and medication before meals and Lantus before bed time (tested at 103 this morning), HLD, HTN- on meds, Depression- on meds, L TKA 1.5 years ago, OA, mild MI- in - no sx needed,  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 Pt and pt' s daughter deny history of stroke/TIA, afib or flutter or heart failure or ablation sx.                                             Precautions/ Contra-indications:           Latex allergy:  NO  Pacemaker:    NO  Contraindications for Manipulation: NA  Date of Surgery:

## 2025-04-25 ENCOUNTER — APPOINTMENT (OUTPATIENT)
Dept: PHYSICAL THERAPY | Age: 70
End: 2025-04-25
Payer: COMMERCIAL

## 2025-04-25 LAB — DIABETIC RETINOPATHY: POSITIVE

## 2025-04-25 RX ORDER — SERTRALINE HYDROCHLORIDE 100 MG/1
100 TABLET, FILM COATED ORAL DAILY
Qty: 90 TABLET | Refills: 0 | OUTPATIENT
Start: 2025-04-25

## 2025-04-30 ENCOUNTER — PATIENT MESSAGE (OUTPATIENT)
Dept: PRIMARY CARE CLINIC | Age: 70
End: 2025-04-30

## 2025-04-30 ENCOUNTER — HOSPITAL ENCOUNTER (OUTPATIENT)
Dept: PHYSICAL THERAPY | Age: 70
Setting detail: THERAPIES SERIES
Discharge: HOME | End: 2025-04-30
Payer: COMMERCIAL

## 2025-04-30 DIAGNOSIS — Z79.4 INSULIN DEPENDENT TYPE 2 DIABETES MELLITUS (HCC): ICD-10-CM

## 2025-04-30 DIAGNOSIS — I10 ESSENTIAL HYPERTENSION: ICD-10-CM

## 2025-04-30 DIAGNOSIS — E66.811 OBESITY (BMI 30.0-34.9): ICD-10-CM

## 2025-04-30 DIAGNOSIS — I25.10 CAD IN NATIVE ARTERY: ICD-10-CM

## 2025-04-30 DIAGNOSIS — E11.9 INSULIN DEPENDENT TYPE 2 DIABETES MELLITUS (HCC): ICD-10-CM

## 2025-04-30 PROCEDURE — 97140 MANUAL THERAPY 1/> REGIONS: CPT

## 2025-04-30 PROCEDURE — 97112 NEUROMUSCULAR REEDUCATION: CPT

## 2025-04-30 RX ORDER — PEN NEEDLE, DIABETIC 32GX 5/32"
1 NEEDLE, DISPOSABLE MISCELLANEOUS
Qty: 100 EACH | Refills: 5 | Status: SHIPPED | OUTPATIENT
Start: 2025-04-30

## 2025-04-30 RX ORDER — INSULIN ASPART 100 [IU]/ML
INJECTION, SOLUTION INTRAVENOUS; SUBCUTANEOUS
Qty: 5 ADJUSTABLE DOSE PRE-FILLED PEN SYRINGE | Refills: 3 | Status: SHIPPED | OUTPATIENT
Start: 2025-04-30

## 2025-04-30 NOTE — FLOWSHEET NOTE
[facility] - Outpatient Rehabilitation and Therapy: [address] [place] [address] office: [phone] fax: [phone]         Physical Therapy: TREATMENT/PROGRESS NOTE   Patient: Melody Clement (70 y.o. female)   Examination Date: 2025   :  1955 MRN: [id number]   Visit #: 4   Insurance Allowable Auth Needed   Auth after 12 visits [x]Yes, after 12 visits   []No    Insurance: Payor: [employer] [place] / Plan: [email or web address] [place] MED[place]ARE / Product Type: *No Product type* /   Insurance ID: [id number] - (Medicare Managed)  Secondary Insurance (if applicable): [employer]*   Treatment Diagnosis:     ICD-10-CM       1. Impaired functional mobility and activity tolerance  Z74.09         2. Leg pain, left  M79.605         3. Leg pain, right  M79.604          Medical Diagnosis:  Iliotibial band syndrome of right side (M76.31)  Left hamstring tightness left (M62.89)   Referring Physician: Margo Villarreal AP*  PCP: Bijal Jay, SRIDHAR - CNP     Plan of care signed (Y/N): N    Date of Patient follow up with Physician:      Plan of Care Report: NO  POC update due: (10 visits /OR AUTH LIMITS, whichever is less)  2025                                             Medical History:  Comorbidities:  CHF, stroke, HTN, DM, chronic kidney disease, heart murmur, TKA L and R  Relevant Medical History:  DM- takes insulin and medication before meals and Lantus before bed time (tested at 103 this morning), HLD, HTN- on meds, Depression- on meds, L TKA 1.5 years ago, OA, mild MI- in - no sx needed,  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 Pt and pt' s daughter deny history of stroke/TIA, afib or flutter or heart failure or ablation sx.                                             Precautions/ Contra-indications:           Latex allergy:  NO  Pacemaker:    NO  Contraindications for Manipulation:

## 2025-05-12 ENCOUNTER — TELEPHONE (OUTPATIENT)
Dept: PRIMARY CARE CLINIC | Age: 70
End: 2025-05-12

## 2025-05-12 DIAGNOSIS — Z79.4 INSULIN DEPENDENT TYPE 2 DIABETES MELLITUS (HCC): Primary | ICD-10-CM

## 2025-05-12 DIAGNOSIS — E11.9 INSULIN DEPENDENT TYPE 2 DIABETES MELLITUS (HCC): Primary | ICD-10-CM

## 2025-05-12 RX ORDER — INSULIN GLARGINE 100 [IU]/ML
70 INJECTION, SOLUTION SUBCUTANEOUS NIGHTLY
Qty: 5 ADJUSTABLE DOSE PRE-FILLED PEN SYRINGE | Refills: 3 | Status: SHIPPED | OUTPATIENT
Start: 2025-05-12

## 2025-05-12 NOTE — TELEPHONE ENCOUNTER
Received fax from [facility]. Lantus is no longer covered under patients insurance. Preference is Basaglar, Tresiba, Toujeo.     Please advise and send new rx.

## 2025-05-16 ENCOUNTER — OFFICE VISIT (OUTPATIENT)
Dept: PRIMARY CARE CLINIC | Age: 70
End: 2025-05-16

## 2025-05-16 VITALS
DIASTOLIC BLOOD PRESSURE: 66 MMHG | WEIGHT: 174.16 LBS | BODY MASS INDEX: 34.19 KG/M2 | OXYGEN SATURATION: 95 % | HEIGHT: 60 IN | TEMPERATURE: 98.4 F | HEART RATE: 74 BPM | SYSTOLIC BLOOD PRESSURE: 116 MMHG

## 2025-05-16 DIAGNOSIS — F32.A ANXIETY AND DEPRESSION: ICD-10-CM

## 2025-05-16 DIAGNOSIS — L73.9 FOLLICULITIS: ICD-10-CM

## 2025-05-16 DIAGNOSIS — L98.9 SKIN LESION: Primary | ICD-10-CM

## 2025-05-16 DIAGNOSIS — F41.9 ANXIETY AND DEPRESSION: ICD-10-CM

## 2025-05-16 PROBLEM — K85.90 ACUTE PANCREATITIS: Status: RESOLVED | Noted: 2022-04-02 | Resolved: 2025-05-16

## 2025-05-16 PROBLEM — K85.90 ACUTE PANCREATITIS, UNSPECIFIED COMPLICATION STATUS, UNSPECIFIED PANCREATITIS TYPE: Status: RESOLVED | Noted: 2023-04-11 | Resolved: 2025-05-16

## 2025-05-16 PROBLEM — E11.36 TYPE 2 DIABETES MELLITUS WITH DIABETIC CATARACT, WITH LONG-TERM CURRENT USE OF INSULIN (HCC): Status: ACTIVE | Noted: 2025-05-16

## 2025-05-16 PROBLEM — Z79.4 TYPE 2 DIABETES MELLITUS WITH DIABETIC CATARACT, WITH LONG-TERM CURRENT USE OF INSULIN (HCC): Status: ACTIVE | Noted: 2025-05-16

## 2025-05-16 PROBLEM — S02.85XA ORBITAL FRACTURE, CLOSED, INITIAL ENCOUNTER (HCC): Status: ACTIVE | Noted: 2025-05-16

## 2025-05-16 RX ORDER — MUPIROCIN 20 MG/G
OINTMENT TOPICAL
Qty: 30 G | Refills: 1 | Status: SHIPPED | OUTPATIENT
Start: 2025-05-16

## 2025-05-16 RX ORDER — BUSPIRONE HYDROCHLORIDE 5 MG/1
5 TABLET ORAL 2 TIMES DAILY
Qty: 60 TABLET | Refills: 0 | Status: SHIPPED | OUTPATIENT
Start: 2025-05-16 | End: 2025-06-15

## 2025-05-16 ASSESSMENT — ENCOUNTER SYMPTOMS
EYES NEGATIVE: 1
GASTROINTESTINAL NEGATIVE: 1
ALLERGIC/IMMUNOLOGIC NEGATIVE: 1
RESPIRATORY NEGATIVE: 1

## 2025-05-16 ASSESSMENT — PATIENT HEALTH QUESTIONNAIRE - PHQ9
5. POOR APPETITE OR OVEREATING: NOT AT ALL
3. TROUBLE FALLING OR STAYING ASLEEP: SEVERAL DAYS
6. FEELING BAD ABOUT YOURSELF - OR THAT YOU ARE A FAILURE OR HAVE LET YOURSELF OR YOUR FAMILY DOWN: NOT AT ALL
10. IF YOU CHECKED OFF ANY PROBLEMS, HOW DIFFICULT HAVE THESE PROBLEMS MADE IT FOR YOU TO DO YOUR WORK, TAKE CARE OF THINGS AT HOME, OR GET ALONG WITH OTHER PEOPLE: SOMEWHAT DIFFICULT
SUM OF ALL RESPONSES TO PHQ QUESTIONS 1-9: 6
SUM OF ALL RESPONSES TO PHQ QUESTIONS 1-9: 6
1. LITTLE INTEREST OR PLEASURE IN DOING THINGS: SEVERAL DAYS
8. MOVING OR SPEAKING SO SLOWLY THAT OTHER PEOPLE COULD HAVE NOTICED. OR THE OPPOSITE, BEING SO FIGETY OR RESTLESS THAT YOU HAVE BEEN MOVING AROUND A LOT MORE THAN USUAL: NOT AT ALL
4. FEELING TIRED OR HAVING LITTLE ENERGY: NEARLY EVERY DAY
2. FEELING DOWN, DEPRESSED OR HOPELESS: SEVERAL DAYS
SUM OF ALL RESPONSES TO PHQ QUESTIONS 1-9: 6
SUM OF ALL RESPONSES TO PHQ QUESTIONS 1-9: 6
7. TROUBLE CONCENTRATING ON THINGS, SUCH AS READING THE NEWSPAPER OR WATCHING TELEVISION: NOT AT ALL
9. THOUGHTS THAT YOU WOULD BE BETTER OFF DEAD, OR OF HURTING YOURSELF: NOT AT ALL

## 2025-05-16 NOTE — PROGRESS NOTES
folliculitis with Keflex and mupirocin. Instructed on use.   - Add Buspar BID.  Instructed on use.   - Encouraged to call insurance back to see who is covered for Derm through her insurance.  - Patient education added to AVS.     Return in about 1 month (around 6/16/2025) for Chronic Condition follow up.     Subjective   Skin Problem  This is a new problem. The current episode started more than 1 month ago. The problem has been gradually worsening since onset. The affected locations include the scalp. The rash is characterized by pain, redness and itchiness. It is unknown if there was an exposure to a precipitant. Past treatments include nothing.     Review of Systems   Constitutional: Negative.    HENT: Negative.     Eyes: Negative.    Respiratory: Negative.     Cardiovascular: Negative.    Gastrointestinal: Negative.    Endocrine: Negative.    Genitourinary: Negative.    Musculoskeletal: Negative.    Skin:  Positive for rash.   Allergic/Immunologic: Negative.    Neurological: Negative.    Hematological: Negative.    Psychiatric/Behavioral:  The patient is nervous/anxious.       Objective   Physical Exam  Vitals and nursing note reviewed.   Constitutional:       Appearance: Normal appearance.   HENT:      Head: Normocephalic.      Right Ear: Tympanic membrane normal.      Left Ear: Tympanic membrane normal.      Nose: Nose normal.      Mouth/Throat:      Mouth: Mucous membranes are moist.   Eyes:      Conjunctiva/sclera: Conjunctivae normal.   Cardiovascular:      Rate and Rhythm: Normal rate and regular rhythm.      Pulses: Normal pulses.      Heart sounds: Normal heart sounds.   Pulmonary:      Effort: Pulmonary effort is normal.      Breath sounds: Normal breath sounds.   Abdominal:      General: Bowel sounds are normal.      Palpations: Abdomen is soft.   Musculoskeletal:         General: Normal range of motion.      Cervical back: Normal range of motion.   Skin:     General: Skin is warm.      Capillary

## 2025-05-19 ENCOUNTER — TELEPHONE (OUTPATIENT)
Dept: PRIMARY CARE CLINIC | Age: 70
End: 2025-05-19

## 2025-05-19 DIAGNOSIS — M62.838 MUSCLE SPASM: Primary | ICD-10-CM

## 2025-05-19 RX ORDER — TIZANIDINE 2 MG/1
2 TABLET ORAL NIGHTLY PRN
Qty: 90 TABLET | Refills: 0 | Status: SHIPPED | OUTPATIENT
Start: 2025-05-19

## 2025-05-19 NOTE — TELEPHONE ENCOUNTER
Medication Refill    When was your last appointment with cardiology?    (If 1 yr or longer, please schedule appointment)    (If patient has been told they do not need to follow-up - medications should be filled by PCP)  8/2/24    When did you last have labs drawn?   3/28/25    Medication needing refilled?  ELIQUIS     Dosage of the medication?    5 MG TABS tablet     How are you taking this medication (QD, BID, TID, QID, PRN)?  Sig: TAKE 1 TABLET BY MOUTH TWICE DAILY   Patient taking differently: Take 1 tablet by mouth 2 times daily     Do you want a 30 or 90 day supply?  90    When will you run out of your medication?     Which Pharmacy are we sending this medication to?  [id number] - [place] [address]     Pharmacy Phone:[phone]   Pharmacy Fax:[phone]

## 2025-05-19 NOTE — TELEPHONE ENCOUNTER
Last OV: 8/2/24  Next OV: 8/8/25  Last refill: 2/21/24  Most recent Labs:   Last EKG (if needed):

## 2025-05-19 NOTE — TELEPHONE ENCOUNTER
Received fax from [facility] requesting Drug Change. Patients insurance does not cover Cyclobenzaprine. They prefer Tizanidine, Methocarbamol or Carisoprodol. Please advise.

## 2025-05-21 RX ORDER — MONTELUKAST SODIUM 10 MG/1
10 TABLET ORAL NIGHTLY
Qty: 90 TABLET | Refills: 1 | Status: SHIPPED | OUTPATIENT
Start: 2025-05-21

## 2025-06-07 DIAGNOSIS — F41.9 ANXIETY AND DEPRESSION: ICD-10-CM

## 2025-06-07 DIAGNOSIS — F32.A ANXIETY AND DEPRESSION: ICD-10-CM

## 2025-06-09 RX ORDER — BUSPIRONE HYDROCHLORIDE 5 MG/1
5 TABLET ORAL 2 TIMES DAILY
Qty: 60 TABLET | Refills: 0 | Status: SHIPPED | OUTPATIENT
Start: 2025-06-09

## 2025-06-09 RX ORDER — FEXOFENADINE HYDROCHLORIDE 180 MG/1
180 TABLET, FILM COATED ORAL DAILY
Qty: 90 TABLET | Refills: 0 | Status: SHIPPED | OUTPATIENT
Start: 2025-06-09

## 2025-06-10 NOTE — TELEPHONE ENCOUNTER
Attempted to contact daughter to schedule. No answer. Left detailed message on machine. Counseled to either call back and schedule or schedule via [employer].

## 2025-06-11 DIAGNOSIS — Z79.4 INSULIN DEPENDENT TYPE 2 DIABETES MELLITUS (HCC): ICD-10-CM

## 2025-06-11 DIAGNOSIS — E11.9 INSULIN DEPENDENT TYPE 2 DIABETES MELLITUS (HCC): ICD-10-CM

## 2025-06-11 RX ORDER — PEN NEEDLE, DIABETIC 32GX 5/32"
1 NEEDLE, DISPOSABLE MISCELLANEOUS
Qty: 100 EACH | Refills: 5 | OUTPATIENT
Start: 2025-06-11

## 2025-06-11 RX ORDER — INSULIN GLARGINE 100 [IU]/ML
70 INJECTION, SOLUTION SUBCUTANEOUS NIGHTLY
Qty: 5 ADJUSTABLE DOSE PRE-FILLED PEN SYRINGE | Refills: 3 | OUTPATIENT
Start: 2025-06-11

## 2025-06-20 ENCOUNTER — OFFICE VISIT (OUTPATIENT)
Dept: PRIMARY CARE CLINIC | Age: 70
End: 2025-06-20

## 2025-06-20 VITALS
DIASTOLIC BLOOD PRESSURE: 64 MMHG | WEIGHT: 173 LBS | BODY MASS INDEX: 33.96 KG/M2 | TEMPERATURE: 100 F | HEART RATE: 81 BPM | HEIGHT: 60 IN | SYSTOLIC BLOOD PRESSURE: 118 MMHG | OXYGEN SATURATION: 94 %

## 2025-06-20 DIAGNOSIS — I10 ESSENTIAL HYPERTENSION: ICD-10-CM

## 2025-06-20 DIAGNOSIS — F32.A ANXIETY AND DEPRESSION: ICD-10-CM

## 2025-06-20 DIAGNOSIS — R05.1 ACUTE COUGH: ICD-10-CM

## 2025-06-20 DIAGNOSIS — Z13.0 SCREENING FOR DEFICIENCY ANEMIA: ICD-10-CM

## 2025-06-20 DIAGNOSIS — E55.9 VITAMIN D DEFICIENCY: ICD-10-CM

## 2025-06-20 DIAGNOSIS — Z13.29 SCREENING FOR THYROID DISORDER: ICD-10-CM

## 2025-06-20 DIAGNOSIS — L98.9 SKIN LESION: ICD-10-CM

## 2025-06-20 DIAGNOSIS — E78.5 HYPERLIPIDEMIA, UNSPECIFIED HYPERLIPIDEMIA TYPE: ICD-10-CM

## 2025-06-20 DIAGNOSIS — E11.9 INSULIN DEPENDENT TYPE 2 DIABETES MELLITUS (HCC): Primary | ICD-10-CM

## 2025-06-20 DIAGNOSIS — I48.0 PAROXYSMAL ATRIAL FIBRILLATION (HCC): ICD-10-CM

## 2025-06-20 DIAGNOSIS — R06.2 WHEEZING: ICD-10-CM

## 2025-06-20 DIAGNOSIS — I25.10 CAD IN NATIVE ARTERY: ICD-10-CM

## 2025-06-20 DIAGNOSIS — Z12.11 SCREEN FOR COLON CANCER: ICD-10-CM

## 2025-06-20 DIAGNOSIS — E66.811 OBESITY (BMI 30.0-34.9): ICD-10-CM

## 2025-06-20 DIAGNOSIS — F41.9 ANXIETY AND DEPRESSION: ICD-10-CM

## 2025-06-20 DIAGNOSIS — Z12.31 ENCOUNTER FOR SCREENING MAMMOGRAM FOR MALIGNANT NEOPLASM OF BREAST: ICD-10-CM

## 2025-06-20 DIAGNOSIS — Z79.4 INSULIN DEPENDENT TYPE 2 DIABETES MELLITUS (HCC): Primary | ICD-10-CM

## 2025-06-20 PROBLEM — I48.92 LEFT ATRIAL FLUTTER BY ELECTROCARDIOGRAM (HCC): Status: RESOLVED | Noted: 2021-12-28 | Resolved: 2025-06-20

## 2025-06-20 PROBLEM — S02.85XA ORBITAL FRACTURE, CLOSED, INITIAL ENCOUNTER (HCC): Status: RESOLVED | Noted: 2025-05-16 | Resolved: 2025-06-20

## 2025-06-20 LAB
25(OH)D3 SERPL-MCNC: 26.2 NG/ML
ALBUMIN SERPL-MCNC: 4.4 G/DL (ref 3.4–5)
ALBUMIN/GLOB SERPL: 1.7 {RATIO} (ref 1.1–2.2)
ALP SERPL-CCNC: 47 U/L (ref 40–129)
ALT SERPL-CCNC: 19 U/L (ref 10–40)
ANION GAP SERPL CALCULATED.3IONS-SCNC: 13 MMOL/L (ref 3–16)
AST SERPL-CCNC: 23 U/L (ref 15–37)
BASOPHILS # BLD: 0.1 K/UL (ref 0–0.2)
BASOPHILS NFR BLD: 0.8 %
BILIRUB SERPL-MCNC: 0.5 MG/DL (ref 0–1)
BUN SERPL-MCNC: 30 MG/DL (ref 7–20)
CALCIUM SERPL-MCNC: 9.5 MG/DL (ref 8.3–10.6)
CHLORIDE SERPL-SCNC: 107 MMOL/L (ref 99–110)
CHOLEST SERPL-MCNC: 101 MG/DL (ref 0–199)
CO2 SERPL-SCNC: 25 MMOL/L (ref 21–32)
CREAT SERPL-MCNC: 1.3 MG/DL (ref 0.6–1.2)
DEPRECATED RDW RBC AUTO: 13.7 % (ref 12.4–15.4)
EOSINOPHIL # BLD: 0.4 K/UL (ref 0–0.6)
EOSINOPHIL NFR BLD: 6.1 %
EST. AVERAGE GLUCOSE BLD GHB EST-MCNC: 148.5 MG/DL
GFR SERPLBLD CREATININE-BSD FMLA CKD-EPI: 44 ML/MIN/{1.73_M2}
GLUCOSE SERPL-MCNC: 116 MG/DL (ref 70–99)
HBA1C MFR BLD: 6.8 %
HCT VFR BLD AUTO: 34.3 % (ref 36–48)
HDLC SERPL-MCNC: 31 MG/DL (ref 40–60)
HGB BLD-MCNC: 11.9 G/DL (ref 12–16)
LDLC SERPL CALC-MCNC: 28 MG/DL
LYMPHOCYTES # BLD: 1.7 K/UL (ref 1–5.1)
LYMPHOCYTES NFR BLD: 24.5 %
MCH RBC QN AUTO: 29.9 PG (ref 26–34)
MCHC RBC AUTO-ENTMCNC: 34.7 G/DL (ref 31–36)
MCV RBC AUTO: 86 FL (ref 80–100)
MONOCYTES # BLD: 0.4 K/UL (ref 0–1.3)
MONOCYTES NFR BLD: 6.1 %
NEUTROPHILS # BLD: 4.4 K/UL (ref 1.7–7.7)
NEUTROPHILS NFR BLD: 62.5 %
PLATELET # BLD AUTO: 318 K/UL (ref 135–450)
PMV BLD AUTO: 8.8 FL (ref 5–10.5)
POTASSIUM SERPL-SCNC: 4.4 MMOL/L (ref 3.5–5.1)
PROT SERPL-MCNC: 7 G/DL (ref 6.4–8.2)
RBC # BLD AUTO: 3.99 M/UL (ref 4–5.2)
SODIUM SERPL-SCNC: 145 MMOL/L (ref 136–145)
TRIGL SERPL-MCNC: 208 MG/DL (ref 0–150)
TSH SERPL DL<=0.005 MIU/L-ACNC: 1.57 UIU/ML (ref 0.27–4.2)
VLDLC SERPL CALC-MCNC: 42 MG/DL
WBC # BLD AUTO: 7 K/UL (ref 4–11)

## 2025-06-20 RX ORDER — TROSPIUM CHLORIDE 20 MG/1
20 TABLET, FILM COATED ORAL 2 TIMES DAILY
Qty: 180 TABLET | Refills: 0 | Status: SHIPPED | OUTPATIENT
Start: 2025-06-20

## 2025-06-20 RX ORDER — BUSPIRONE HYDROCHLORIDE 5 MG/1
5 TABLET ORAL 2 TIMES DAILY
Qty: 180 TABLET | Refills: 1 | Status: SHIPPED | OUTPATIENT
Start: 2025-06-20

## 2025-06-20 RX ORDER — PANTOPRAZOLE SODIUM 40 MG/1
40 TABLET, DELAYED RELEASE ORAL DAILY
Qty: 90 TABLET | Refills: 0 | Status: SHIPPED | OUTPATIENT
Start: 2025-06-20

## 2025-06-20 RX ORDER — CHOLECALCIFEROL (VITAMIN D3) 25 MCG
1 TABLET ORAL DAILY
Qty: 90 TABLET | Refills: 0 | Status: SHIPPED | OUTPATIENT
Start: 2025-06-20

## 2025-06-20 RX ORDER — SERTRALINE HYDROCHLORIDE 100 MG/1
100 TABLET, FILM COATED ORAL DAILY
Qty: 90 TABLET | Refills: 0 | Status: SHIPPED | OUTPATIENT
Start: 2025-06-20 | End: 2025-06-20

## 2025-06-20 RX ORDER — METOPROLOL TARTRATE 25 MG/1
25 TABLET, FILM COATED ORAL 2 TIMES DAILY
Qty: 180 TABLET | Refills: 1 | Status: SHIPPED | OUTPATIENT
Start: 2025-06-20

## 2025-06-20 RX ORDER — SERTRALINE HYDROCHLORIDE 100 MG/1
150 TABLET, FILM COATED ORAL DAILY
Qty: 90 TABLET | Refills: 0 | Status: SHIPPED | OUTPATIENT
Start: 2025-06-20

## 2025-06-20 RX ORDER — ALBUTEROL SULFATE 90 UG/1
2 INHALANT RESPIRATORY (INHALATION) EVERY 6 HOURS PRN
Qty: 18 G | Refills: 2 | Status: SHIPPED | OUTPATIENT
Start: 2025-06-20

## 2025-06-20 SDOH — ECONOMIC STABILITY: FOOD INSECURITY: WITHIN THE PAST 12 MONTHS, THE FOOD YOU BOUGHT JUST DIDN'T LAST AND YOU DIDN'T HAVE MONEY TO GET MORE.: NEVER TRUE

## 2025-06-20 SDOH — ECONOMIC STABILITY: FOOD INSECURITY: WITHIN THE PAST 12 MONTHS, YOU WORRIED THAT YOUR FOOD WOULD RUN OUT BEFORE YOU GOT MONEY TO BUY MORE.: NEVER TRUE

## 2025-06-20 ASSESSMENT — ENCOUNTER SYMPTOMS
WHEEZING: 0
GASTROINTESTINAL NEGATIVE: 1
ALLERGIC/IMMUNOLOGIC NEGATIVE: 1
SHORTNESS OF BREATH: 0
EYES NEGATIVE: 1
COUGH: 1

## 2025-06-20 NOTE — ASSESSMENT & PLAN NOTE
Chronic, at goal (stable), continue current treatment plan  Orders:    Comprehensive Metabolic Panel    Semaglutide,0.25 or 0.5MG/DOS, 2 MG/3ML SOPN; Inject 0.25 mg into the skin every 7 days

## 2025-06-20 NOTE — ASSESSMENT & PLAN NOTE
Chronic, not at goal (unstable), changes made today: Refill for Buspar; Increase Zoloft to 150 mg.  Orders:    busPIRone (BUSPAR) 5 MG tablet; Take 1 tablet by mouth 2 times daily    sertraline (ZOLOFT) 100 MG tablet; Take 1.5 tablets by mouth daily

## 2025-06-20 NOTE — ASSESSMENT & PLAN NOTE
Orders:    Hemoglobin A1C    Semaglutide,0.25 or 0.5MG/DOS, 2 MG/3ML SOPN; Inject 0.25 mg into the skin every 7 days

## 2025-06-20 NOTE — PROGRESS NOTES
every 7 days    Essential hypertension   Chronic, at goal (stable), continue current treatment plan  Orders:    Comprehensive Metabolic Panel    Semaglutide,0.25 or 0.5MG/DOS, 2 MG/3ML SOPN; Inject 0.25 mg into the skin every 7 days    Hyperlipidemia, unspecified hyperlipidemia type   Monitored by specialist- no acute findings meriting change in the plan  Orders:    Lipid Panel    Paroxysmal atrial fibrillation (HCC)   Monitored by specialist- no acute findings meriting change in the plan       Anxiety and depression   Chronic, not at goal (unstable), changes made today: Refill for Buspar; Increase Zoloft to 150 mg.  Orders:    busPIRone (BUSPAR) 5 MG tablet; Take 1 tablet by mouth 2 times daily    sertraline (ZOLOFT) 100 MG tablet; Take 1.5 tablets by mouth daily    Obesity (BMI 30.0-34.9)  Orders:    Semaglutide,0.25 or 0.5MG/DOS, 2 MG/3ML SOPN; Inject 0.25 mg into the skin every 7 days    Skin lesion   New external referral placed for evaluation; [facility]. Provided with number to schedule.  Orders:    Non [facility] - External Referral To Dermatology    Acute cough   Acute condition, new, Instructed to use Coricidin HBP Chest congestion and Cough.       Wheezing  Orders:    albuterol sulfate HFA (VENTOLIN HFA) 108 (90 Base) MCG/ACT inhaler; Inhale 2 puffs into the lungs every 6 hours as needed for Wheezing    Vitamin D deficiency  Orders:    Vitamin D 25 Hydroxy    Screening for deficiency anemia  Orders:    CBC with Auto Differential    Screening for thyroid disorder  Orders:    TSH reflex to FT4    Encounter for screening mammogram for malignant neoplasm of breast  Orders:    CARLEY DIGITAL SCREEN W OR WO CAD BILATERAL; Future    Screen for colon cancer  Orders:    Fecal DNA Colorectal cancer screening (Cologuard)    - Labs drawn in office today by ELOISE Monroy.  Will call Monday with results.   - Orders placed for mammogram and cologuard.  - Increase Zoloft to 150 mg. Instructed on use.  - OTC Coricidin HBP

## 2025-06-20 NOTE — ASSESSMENT & PLAN NOTE
Monitored by specialist- no acute findings meriting change in the plan  Orders:    Semaglutide,0.25 or 0.5MG/DOS, 2 MG/3ML SOPN; Inject 0.25 mg into the skin every 7 days

## 2025-06-23 ENCOUNTER — RESULTS FOLLOW-UP (OUTPATIENT)
Dept: PRIMARY CARE CLINIC | Age: 70
End: 2025-06-23

## 2025-06-23 DIAGNOSIS — R79.89 ELEVATED SERUM CREATININE: ICD-10-CM

## 2025-06-23 DIAGNOSIS — E55.9 VITAMIN D DEFICIENCY: Primary | ICD-10-CM

## 2025-06-23 DIAGNOSIS — N18.9 CHRONIC KIDNEY DISEASE, UNSPECIFIED CKD STAGE: ICD-10-CM

## 2025-06-23 DIAGNOSIS — E11.9 INSULIN DEPENDENT TYPE 2 DIABETES MELLITUS (HCC): ICD-10-CM

## 2025-06-23 DIAGNOSIS — Z79.4 INSULIN DEPENDENT TYPE 2 DIABETES MELLITUS (HCC): ICD-10-CM

## 2025-06-23 RX ORDER — ERGOCALCIFEROL (VITAMIN D2) 50 MCG
1 CAPSULE ORAL DAILY
Qty: 90 CAPSULE | Refills: 3 | Status: SHIPPED | OUTPATIENT
Start: 2025-06-23

## 2025-07-07 DIAGNOSIS — F32.A ANXIETY AND DEPRESSION: ICD-10-CM

## 2025-07-07 DIAGNOSIS — F41.9 ANXIETY AND DEPRESSION: ICD-10-CM

## 2025-07-07 RX ORDER — BUSPIRONE HYDROCHLORIDE 5 MG/1
5 TABLET ORAL 2 TIMES DAILY
Qty: 180 TABLET | Refills: 0 | Status: SHIPPED | OUTPATIENT
Start: 2025-07-07

## 2025-07-07 RX ORDER — FENOFIBRATE 160 MG/1
160 TABLET ORAL DAILY
Qty: 90 TABLET | Refills: 3 | Status: SHIPPED | OUTPATIENT
Start: 2025-07-07

## 2025-07-07 RX ORDER — CHOLECALCIFEROL (VITAMIN D3) 25 MCG
1 TABLET ORAL DAILY
Qty: 90 TABLET | Refills: 0 | Status: SHIPPED | OUTPATIENT
Start: 2025-07-07

## 2025-07-07 NOTE — TELEPHONE ENCOUNTER
Last OV:   Next OV: 8/8/25  Last refill: 8/2/24  Most recent Labs: CMP & Lipid panel 6/2/25  Last EKG (if needed):

## 2025-07-18 RX ORDER — ATORVASTATIN CALCIUM 10 MG/1
10 TABLET, FILM COATED ORAL DAILY
Qty: 90 TABLET | Refills: 3 | Status: SHIPPED | OUTPATIENT
Start: 2025-07-18

## 2025-07-22 ENCOUNTER — HOSPITAL ENCOUNTER (EMERGENCY)
Age: 70
Discharge: HOME | End: 2025-07-22
Attending: EMERGENCY MEDICINE
Payer: COMMERCIAL

## 2025-07-22 ENCOUNTER — APPOINTMENT (OUTPATIENT)
Dept: CT IMAGING | Age: 70
End: 2025-07-22
Payer: COMMERCIAL

## 2025-07-22 VITALS
HEIGHT: 57 IN | BODY MASS INDEX: 35.67 KG/M2 | WEIGHT: 165.34 LBS | HEART RATE: 75 BPM | RESPIRATION RATE: 18 BRPM | TEMPERATURE: 99.5 F | OXYGEN SATURATION: 96 % | SYSTOLIC BLOOD PRESSURE: 164 MMHG | DIASTOLIC BLOOD PRESSURE: 58 MMHG

## 2025-07-22 DIAGNOSIS — N39.0 URINARY TRACT INFECTION WITH HEMATURIA, SITE UNSPECIFIED: ICD-10-CM

## 2025-07-22 DIAGNOSIS — S22.32XA CLOSED FRACTURE OF ONE RIB OF LEFT SIDE, INITIAL ENCOUNTER: Primary | ICD-10-CM

## 2025-07-22 DIAGNOSIS — R31.9 URINARY TRACT INFECTION WITH HEMATURIA, SITE UNSPECIFIED: ICD-10-CM

## 2025-07-22 LAB
ALBUMIN SERPL-MCNC: 4.4 G/DL (ref 3.4–5)
ALBUMIN/GLOB SERPL: 1.3 {RATIO} (ref 1.1–2.2)
ALP SERPL-CCNC: 60 U/L (ref 40–129)
ALT SERPL-CCNC: 14 U/L (ref 10–40)
ANION GAP SERPL CALCULATED.3IONS-SCNC: 13 MMOL/L (ref 3–16)
AST SERPL-CCNC: 22 U/L (ref 15–37)
BACTERIA URNS QL MICRO: ABNORMAL /HPF
BASOPHILS # BLD: 0 K/UL (ref 0–0.2)
BASOPHILS NFR BLD: 0.5 %
BILIRUB SERPL-MCNC: 0.3 MG/DL (ref 0–1)
BILIRUB UR QL STRIP.AUTO: NEGATIVE
BUN SERPL-MCNC: 27 MG/DL (ref 7–20)
CALCIUM SERPL-MCNC: 9.2 MG/DL (ref 8.3–10.6)
CHARACTER UR: ABNORMAL
CHLORIDE SERPL-SCNC: 108 MMOL/L (ref 99–110)
CLARITY UR: CLEAR
CO2 SERPL-SCNC: 23 MMOL/L (ref 21–32)
COLOR UR: YELLOW
CREAT SERPL-MCNC: 1.2 MG/DL (ref 0.6–1.2)
DEPRECATED RDW RBC AUTO: 13.6 % (ref 12.4–15.4)
EOSINOPHIL # BLD: 0.3 K/UL (ref 0–0.6)
EOSINOPHIL NFR BLD: 3.4 %
EPI CELLS #/AREA URNS HPF: ABNORMAL /HPF (ref 0–5)
GFR SERPLBLD CREATININE-BSD FMLA CKD-EPI: 48 ML/MIN/{1.73_M2}
GLUCOSE SERPL-MCNC: 217 MG/DL (ref 70–99)
GLUCOSE UR STRIP.AUTO-MCNC: NEGATIVE MG/DL
HCT VFR BLD AUTO: 37.8 % (ref 36–48)
HGB BLD-MCNC: 12.1 G/DL (ref 12–16)
HGB UR QL STRIP.AUTO: ABNORMAL
IMM GRANULOCYTES # BLD: 0.1 K/UL (ref 0–0.2)
IMM GRANULOCYTES NFR BLD: 0.7 %
KETONES UR STRIP.AUTO-MCNC: NEGATIVE MG/DL
LEUKOCYTE ESTERASE UR QL STRIP.AUTO: ABNORMAL
LIPASE SERPL-CCNC: 120 U/L (ref 13–60)
LYMPHOCYTES # BLD: 2 K/UL (ref 1–5.1)
LYMPHOCYTES NFR BLD: 26.4 %
MCH RBC QN AUTO: 28.4 PG (ref 26–34)
MCHC RBC AUTO-ENTMCNC: 32 G/DL (ref 32–36.4)
MCV RBC AUTO: 88.7 FL (ref 80–100)
MONOCYTES # BLD: 0.5 K/UL (ref 0–1.3)
MONOCYTES NFR BLD: 6.6 %
NEUTROPHILS # BLD: 4.7 K/UL (ref 1.7–7.7)
NEUTROPHILS NFR BLD: 62.4 %
NITRITE UR QL STRIP.AUTO: NEGATIVE
PH UR STRIP.AUTO: 6 [PH] (ref 5–8)
PLATELET # BLD AUTO: 398 K/UL (ref 135–450)
PMV BLD AUTO: 8.8 FL (ref 5–10.5)
POTASSIUM SERPL-SCNC: 4.3 MMOL/L (ref 3.5–5.1)
PROT SERPL-MCNC: 7.7 G/DL (ref 6.4–8.2)
PROT UR STRIP.AUTO-MCNC: NEGATIVE MG/DL
RBC # BLD AUTO: 4.26 M/UL (ref 4–5.2)
RBC #/AREA URNS HPF: ABNORMAL /HPF (ref 0–4)
SODIUM SERPL-SCNC: 144 MMOL/L (ref 136–145)
SP GR UR STRIP.AUTO: 1.02 (ref 1–1.03)
UA COMPLETE W REFLEX CULTURE PNL UR: YES
UA DIPSTICK W REFLEX MICRO PNL UR: YES
URN SPEC COLLECT METH UR: ABNORMAL
UROBILINOGEN UR STRIP-ACNC: 0.2 E.U./DL
WBC # BLD AUTO: 7.5 K/UL (ref 4–11)
WBC #/AREA URNS HPF: ABNORMAL /HPF (ref 0–5)

## 2025-07-22 PROCEDURE — 96375 TX/PRO/DX INJ NEW DRUG ADDON: CPT

## 2025-07-22 PROCEDURE — 99285 EMERGENCY DEPT VISIT HI MDM: CPT

## 2025-07-22 PROCEDURE — 96374 THER/PROPH/DIAG INJ IV PUSH: CPT

## 2025-07-22 PROCEDURE — 83690 ASSAY OF LIPASE: CPT

## 2025-07-22 PROCEDURE — 6360000002 HC RX W HCPCS: Performed by: EMERGENCY MEDICINE

## 2025-07-22 PROCEDURE — 80053 COMPREHEN METABOLIC PANEL: CPT

## 2025-07-22 PROCEDURE — 87088 URINE BACTERIA CULTURE: CPT

## 2025-07-22 PROCEDURE — 6370000000 HC RX 637 (ALT 250 FOR IP): Performed by: EMERGENCY MEDICINE

## 2025-07-22 PROCEDURE — 85025 COMPLETE CBC W/AUTO DIFF WBC: CPT

## 2025-07-22 PROCEDURE — 87186 SC STD MICRODIL/AGAR DIL: CPT

## 2025-07-22 PROCEDURE — 36415 COLL VENOUS BLD VENIPUNCTURE: CPT

## 2025-07-22 PROCEDURE — 87086 URINE CULTURE/COLONY COUNT: CPT

## 2025-07-22 PROCEDURE — 71260 CT THORAX DX C+: CPT

## 2025-07-22 PROCEDURE — 6360000004 HC RX CONTRAST MEDICATION: Performed by: EMERGENCY MEDICINE

## 2025-07-22 PROCEDURE — 81001 URINALYSIS AUTO W/SCOPE: CPT

## 2025-07-22 RX ORDER — IOPAMIDOL 755 MG/ML
100 INJECTION, SOLUTION INTRAVASCULAR ONCE
Status: COMPLETED | OUTPATIENT
Start: 2025-07-22 | End: 2025-07-22

## 2025-07-22 RX ORDER — OXYCODONE AND ACETAMINOPHEN 5; 325 MG/1; MG/1
1 TABLET ORAL EVERY 6 HOURS PRN
Qty: 12 TABLET | Refills: 0 | Status: SHIPPED | OUTPATIENT
Start: 2025-07-22 | End: 2025-07-25

## 2025-07-22 RX ORDER — OXYCODONE AND ACETAMINOPHEN 5; 325 MG/1; MG/1
1 TABLET ORAL ONCE
Refills: 0 | Status: COMPLETED | OUTPATIENT
Start: 2025-07-22 | End: 2025-07-22

## 2025-07-22 RX ORDER — CEFUROXIME AXETIL 250 MG/1
250 TABLET ORAL 2 TIMES DAILY
Qty: 14 TABLET | Refills: 0 | Status: SHIPPED | OUTPATIENT
Start: 2025-07-22 | End: 2025-07-29

## 2025-07-22 RX ORDER — ONDANSETRON 2 MG/ML
4 INJECTION INTRAMUSCULAR; INTRAVENOUS ONCE
Status: COMPLETED | OUTPATIENT
Start: 2025-07-22 | End: 2025-07-22

## 2025-07-22 RX ORDER — CEFUROXIME AXETIL 250 MG/1
250 TABLET ORAL ONCE
Status: COMPLETED | OUTPATIENT
Start: 2025-07-22 | End: 2025-07-22

## 2025-07-22 RX ORDER — MORPHINE SULFATE 4 MG/ML
4 INJECTION, SOLUTION INTRAMUSCULAR; INTRAVENOUS ONCE
Refills: 0 | Status: COMPLETED | OUTPATIENT
Start: 2025-07-22 | End: 2025-07-22

## 2025-07-22 RX ADMIN — MORPHINE SULFATE 4 MG: 4 INJECTION, SOLUTION INTRAMUSCULAR; INTRAVENOUS at 20:50

## 2025-07-22 RX ADMIN — ONDANSETRON 4 MG: 2 INJECTION, SOLUTION INTRAMUSCULAR; INTRAVENOUS at 20:50

## 2025-07-22 RX ADMIN — CEFUROXIME AXETIL 250 MG: 250 TABLET ORAL at 22:38

## 2025-07-22 RX ADMIN — OXYCODONE AND ACETAMINOPHEN 1 TABLET: 5; 325 TABLET ORAL at 22:38

## 2025-07-22 RX ADMIN — IOPAMIDOL 100 ML: 755 INJECTION, SOLUTION INTRAVENOUS at 21:18

## 2025-07-22 ASSESSMENT — PAIN DESCRIPTION - ORIENTATION
ORIENTATION: LEFT

## 2025-07-22 ASSESSMENT — PAIN DESCRIPTION - LOCATION
LOCATION: RIB CAGE

## 2025-07-22 ASSESSMENT — PAIN - FUNCTIONAL ASSESSMENT: PAIN_FUNCTIONAL_ASSESSMENT: 0-10

## 2025-07-22 ASSESSMENT — PAIN DESCRIPTION - PAIN TYPE: TYPE: ACUTE PAIN

## 2025-07-22 ASSESSMENT — PAIN SCALES - GENERAL
PAINLEVEL_OUTOF10: 10
PAINLEVEL_OUTOF10: 10
PAINLEVEL_OUTOF10: 6

## 2025-07-22 ASSESSMENT — PAIN DESCRIPTION - DESCRIPTORS
DESCRIPTORS: ACHING;SHARP
DESCRIPTORS: SHARP

## 2025-07-22 NOTE — ED PROVIDER NOTES
other.      DIAGNOSTIC RESULTS     EKG: All EKG's are interpreted by KHURRAM DONOVAN MD in the absence of a cardiologist.        RADIOLOGY:   Non-plain film images such as CT, Ultrasound and MRI are read by the radiologist. Plain radiographic images are visualized and preliminarily interpreted byKHURRAM DONOVAN MD with the below findings:      Interpretation per the Radiologist below, if available at the time of this note:    CT CHEST ABDOMEN PELVIS W CONTRAST Additional Contrast? None   Final Result   1. Questionable left 6 rib fracture, limited by the motion artifact;   correlate with point tenderness.   2. Improved with residual mediastinal adenopathy, likely reactive.               ED BEDSIDE ULTRASOUND:   Performed by ED Physician - none    LABS:  Labs Reviewed   COMPREHENSIVE METABOLIC PANEL - Abnormal; Notable for the following components:       Result Value    Glucose 217 (*)     BUN 27 (*)     Est, Glom Filt Rate 48 (*)     All other components within normal limits   LIPASE - Abnormal; Notable for the following components:    Lipase 120.0 (*)     All other components within normal limits   URINALYSIS WITH REFLEX TO CULTURE - Abnormal; Notable for the following components:    Blood, Urine SMALL (*)     Leukocyte Esterase, Urine SMALL (*)     All other components within normal limits   MICROSCOPIC URINALYSIS - Abnormal; Notable for the following components:    WBC, UA 10-20 (*)     RBC, UA 5-10 (*)     Bacteria, UA 1+ (*)     All other components within normal limits   CULTURE, URINE   CBC WITH AUTO DIFFERENTIAL       All other labs were within normal range or not returned as of this dictation.    EMERGENCY DEPARTMENT COURSE and DIFFERENTIAL DIAGNOSIS/MDM:   Vitals:    Vitals:    07/22/25 1810 07/22/25 2017 07/22/25 2130 07/22/25 2200   BP: (!) 175/71 (!) 151/91     Pulse: 81 72 76 71   Resp: 20 18 18 18   Temp: 99.5 °F (37.5 °C)      TempSrc: Oral      SpO2: 100% 99% 98% 96%   Weight: 75 kg

## 2025-07-23 NOTE — DISCHARGE INSTRUCTIONS
Percocet as needed every 6 hours for pain.    Ceftin 2 times daily until completed.    Follow-up with your primary care provider in 5 to 7 days.  Return as needed for worsening of symptoms or new symptoms of concern including but not limited to shortness of breath, coughing up blood, abdominal pain.

## 2025-07-25 LAB
BACTERIA UR CULT: ABNORMAL
BACTERIA UR CULT: ABNORMAL
ORGANISM: ABNORMAL

## 2025-08-06 RX ORDER — METOPROLOL TARTRATE 25 MG/1
25 TABLET, FILM COATED ORAL 2 TIMES DAILY
Qty: 60 TABLET | Refills: 1 | Status: SHIPPED | OUTPATIENT
Start: 2025-08-06

## 2025-08-27 DIAGNOSIS — I25.10 CAD IN NATIVE ARTERY: ICD-10-CM

## 2025-08-27 DIAGNOSIS — E11.9 INSULIN DEPENDENT TYPE 2 DIABETES MELLITUS (HCC): ICD-10-CM

## 2025-08-27 DIAGNOSIS — I10 ESSENTIAL HYPERTENSION: ICD-10-CM

## 2025-08-27 DIAGNOSIS — Z79.4 INSULIN DEPENDENT TYPE 2 DIABETES MELLITUS (HCC): ICD-10-CM

## 2025-08-27 DIAGNOSIS — E66.811 OBESITY (BMI 30.0-34.9): ICD-10-CM

## 2025-08-28 RX ORDER — PANTOPRAZOLE SODIUM 40 MG/1
40 TABLET, DELAYED RELEASE ORAL DAILY
Qty: 90 TABLET | Refills: 0 | Status: SHIPPED | OUTPATIENT
Start: 2025-08-28

## 2025-08-28 RX ORDER — INSULIN GLARGINE 100 [IU]/ML
70 INJECTION, SOLUTION SUBCUTANEOUS NIGHTLY
Qty: 5 ADJUSTABLE DOSE PRE-FILLED PEN SYRINGE | Refills: 3 | Status: SHIPPED | OUTPATIENT
Start: 2025-08-28

## 2025-08-28 RX ORDER — INSULIN ASPART 100 [IU]/ML
INJECTION, SOLUTION INTRAVENOUS; SUBCUTANEOUS
Qty: 5 ADJUSTABLE DOSE PRE-FILLED PEN SYRINGE | Refills: 3 | Status: SHIPPED | OUTPATIENT
Start: 2025-08-28

## (undated) DEVICE — SET INSUF TUBE HEAT ISO CONN DISP

## (undated) DEVICE — TROCAR: Brand: KII SLEEVE

## (undated) DEVICE — GLOVE ORANGE PI 7   MSG9070

## (undated) DEVICE — GENERAL LAPAROSCOPY: Brand: MEDLINE INDUSTRIES, INC.

## (undated) DEVICE — SYRINGE TB 1ML NDL 25GA L0.625IN PLAS SLIP TIP CONVENTIONAL

## (undated) DEVICE — Device

## (undated) DEVICE — INDICATED FOR USE DURING OPEN AND LAPAROSCOPIC CHOLECYSTECTOMY PROCEDURES TO INJECT RADIOPAQUE MEDIA THROUGH THE CYSTIC DUCT INTO THE BILIARY TREE.: Brand: AEROSTAT®

## (undated) DEVICE — SURGICAL PROC PACK SHT WEST V4

## (undated) DEVICE — MAJOR SET UP: Brand: MEDLINE INDUSTRIES, INC.

## (undated) DEVICE — NEEDLE 25GA X 1.5 IN ECLIPSE

## (undated) DEVICE — WIPE INSTR W73XL73CM VISITEC

## (undated) DEVICE — NEPTUNE E-SEP SMOKE EVACUATION PENCIL, COATED, 70MM BLADE, PUSH BUTTON SWITCH: Brand: NEPTUNE E-SEP

## (undated) DEVICE — APPLIER CLP M/L SHFT DIA5MM 15 LIG LIGAMAX 5

## (undated) DEVICE — 3 ML SYRINGE LUER-LOCK TIP: Brand: MONOJECT

## (undated) DEVICE — SHEET, T, LAPAROTOMY, STERILE: Brand: MEDLINE

## (undated) DEVICE — SOLUTION IV IRRIG POUR BRL 0.9% SODIUM CHL 2F7124

## (undated) DEVICE — SYRINGE MED 10ML LUERLOCK TIP W/O SFTY DISP

## (undated) DEVICE — TRANSFER SET 3": Brand: MEDLINE INDUSTRIES, INC.

## (undated) DEVICE — SUTURE VICRYL + SZ 3-0 L27IN ABSRB UD L26MM SH 1/2 CIR VCP416H

## (undated) DEVICE — AGENT HEMSTAT W2XL14IN OXIDIZED REGENERATED CELOS ABSRB FOR

## (undated) DEVICE — SOLUTION IRRIG BSS ST 500ML

## (undated) DEVICE — STRIP,CLOSURE,WOUND,MEDI-STRIP,1/2X4: Brand: MEDLINE

## (undated) DEVICE — SOLUTION IV IRRIG WATER 500ML POUR BRL ST 2F7113

## (undated) DEVICE — Z DISC NO SUB GLOVE SURG SZ 65 L12IN FNGR THK87MIL WHT LTX FREE

## (undated) DEVICE — 3M™ TEGADERM™ TRANSPARENT FILM DRESSING FRAME STYLE, 1624W, 2-3/8 IN X 2-3/4 IN (6 CM X 7 CM), 100/CT 4CT/CASE: Brand: 3M™ TEGADERM™

## (undated) DEVICE — CLEANER,CAUTERY TIP,2X2",STERILE: Brand: MEDLINE

## (undated) DEVICE — SYRINGE 20ML LL S/C 50

## (undated) DEVICE — 3M™ STERI-STRIP™ COMPOUND BENZOIN TINCTURE 40 BAGS/CARTON 4 CARTONS/CASE C1544: Brand: 3M™ STERI-STRIP™

## (undated) DEVICE — GARMENT COMPR STD FOR 17IN CALF UNIF THER FLOTRN

## (undated) DEVICE — TROCARS: Brand: KII® BALLOON BLUNT TIP SYSTEM

## (undated) DEVICE — SUTURE VCRL + SZ 4-0 L18IN ABSRB UD L19MM PS-2 3/8 CIR PRIM VCP496H

## (undated) DEVICE — SUTURE VCRL + SZ 0 L27IN ABSRB VLT L26MM UR-6 5/8 CIR VCP603H

## (undated) DEVICE — ADTEC SINGLE USE HOOK SCISSORS, SHAFT ONLY, MONOPOLAR, STRAIGHT, WORKING LENGTH: 12 1/4", (310 MM), DIAM. 5 MM, BLUNT/BLUNT, INSULATED, SINGLE ACTION, STERILE, DISPOSABLE, PACKAGE OF 10 PIECES: Brand: AESCULAP

## (undated) DEVICE — TISSUE RETRIEVAL SYSTEM: Brand: INZII RETRIEVAL SYSTEM

## (undated) DEVICE — Device: Brand: MEDEX

## (undated) DEVICE — SOLUTION IV 1000ML 0.9% SOD CHL FOR IRRIG PLAS CONT

## (undated) DEVICE — MERCY HEALTH WEST TURNOVER: Brand: MEDLINE INDUSTRIES, INC.

## (undated) DEVICE — SPONGE GZ W4XL4IN COT 12 PLY TYP VII WVN C FLD DSGN STERILE

## (undated) DEVICE — COVER LT HNDL BLU PLAS

## (undated) DEVICE — TROCAR: Brand: KII FIOS FIRST ENTRY

## (undated) DEVICE — SURGICAL PROCEDURE PACK PIK PPK1029201] ALCON LABORATORIES INC]

## (undated) DEVICE — GAUZE,SPONGE,2"X2",8PLY,STERILE,LF,2'S: Brand: MEDLINE

## (undated) DEVICE — ELECTRODE PT RET AD L9FT HI MOIST COND ADH HYDRGEL CORDED

## (undated) DEVICE — SYRINGE MED 30ML STD CLR PLAS LUERLOCK TIP N CTRL DISP

## (undated) DEVICE — SYRINGE, LUER LOCK, 30ML: Brand: MEDLINE

## (undated) DEVICE — C-ARM: Brand: UNBRANDED